# Patient Record
Sex: FEMALE | Race: WHITE | NOT HISPANIC OR LATINO | Employment: OTHER | URBAN - METROPOLITAN AREA
[De-identification: names, ages, dates, MRNs, and addresses within clinical notes are randomized per-mention and may not be internally consistent; named-entity substitution may affect disease eponyms.]

---

## 2023-03-06 ENCOUNTER — TELEPHONE (OUTPATIENT)
Dept: PRIMARY CARE | Facility: CLINIC | Age: 63
End: 2023-03-06
Payer: COMMERCIAL

## 2023-03-06 DIAGNOSIS — C53.9 CERVICAL CANCER, FIGO STAGE IVB (MULTI): ICD-10-CM

## 2023-03-06 DIAGNOSIS — C53.9 CERVICAL CANCER, FIGO STAGE IV (MULTI): ICD-10-CM

## 2023-03-06 DIAGNOSIS — R10.2 PAIN, PELVIC, FEMALE: Primary | ICD-10-CM

## 2023-03-06 PROBLEM — R06.83 SNORING: Status: ACTIVE | Noted: 2023-03-06

## 2023-03-06 PROBLEM — L30.9 DERMATITIS: Status: ACTIVE | Noted: 2023-03-06

## 2023-03-06 PROBLEM — R00.2 HEART PALPITATIONS: Status: ACTIVE | Noted: 2023-03-06

## 2023-03-06 PROBLEM — F41.9 ANXIETY: Status: ACTIVE | Noted: 2023-03-06

## 2023-03-06 PROBLEM — I10 HYPERTENSION: Status: ACTIVE | Noted: 2023-03-06

## 2023-03-06 PROBLEM — R06.02 SOB (SHORTNESS OF BREATH) ON EXERTION: Status: ACTIVE | Noted: 2023-03-06

## 2023-03-06 PROBLEM — I47.10 PAROXYSMAL SUPRAVENTRICULAR TACHYCARDIA (CMS-HCC): Status: ACTIVE | Noted: 2023-03-06

## 2023-03-06 PROBLEM — I27.20 PULMONARY HYPERTENSION (MULTI): Status: ACTIVE | Noted: 2023-03-06

## 2023-03-06 PROBLEM — R20.2 PARESTHESIA OF SKIN: Status: ACTIVE | Noted: 2023-03-06

## 2023-03-06 PROBLEM — G47.33 OBSTRUCTIVE SLEEP APNEA: Status: ACTIVE | Noted: 2023-03-06

## 2023-03-06 PROBLEM — R79.89 ABNORMAL TSH: Status: ACTIVE | Noted: 2023-03-06

## 2023-03-06 RX ORDER — METOPROLOL TARTRATE 50 MG/1
25 TABLET ORAL 2 TIMES DAILY
COMMUNITY
Start: 2014-04-08 | End: 2023-05-02 | Stop reason: SDUPTHER

## 2023-03-06 RX ORDER — HYDROXYZINE HYDROCHLORIDE 10 MG/1
TABLET, FILM COATED ORAL
COMMUNITY
Start: 2020-08-24 | End: 2023-10-12 | Stop reason: ALTCHOICE

## 2023-03-06 RX ORDER — LORAZEPAM 1 MG/1
TABLET ORAL EVERY 6 HOURS PRN
COMMUNITY
Start: 2017-08-11 | End: 2023-11-30 | Stop reason: ALTCHOICE

## 2023-03-06 NOTE — TELEPHONE ENCOUNTER
Pt  called to state pt is continuing to have trouble and pain in lower left buttock. Pt is not sure whether the pain is coming from the bulging disc in back or bone cancer. Pt requesting to see spine doctor.

## 2023-03-17 ENCOUNTER — TELEPHONE (OUTPATIENT)
Dept: PRIMARY CARE | Facility: CLINIC | Age: 63
End: 2023-03-17
Payer: COMMERCIAL

## 2023-03-17 DIAGNOSIS — D64.9 ANEMIA, UNSPECIFIED TYPE: ICD-10-CM

## 2023-03-17 DIAGNOSIS — D51.0 ANEMIA, PERNICIOUS: Primary | ICD-10-CM

## 2023-03-19 DIAGNOSIS — C53.9: Primary | ICD-10-CM

## 2023-03-19 DIAGNOSIS — C79.11: Primary | ICD-10-CM

## 2023-03-20 RX ORDER — OXYCODONE HYDROCHLORIDE 10 MG/1
10 TABLET ORAL EVERY 6 HOURS PRN
Qty: 15 TABLET | Refills: 0 | Status: SHIPPED | OUTPATIENT
Start: 2023-03-20 | End: 2023-04-04

## 2023-03-21 LAB
AMPHETAMINE (PRESENCE) IN URINE BY SCREEN METHOD: ABNORMAL
BARBITURATES PRESENCE IN URINE BY SCREEN METHOD: ABNORMAL
BENZODIAZEPINE (PRESENCE) IN URINE BY SCREEN METHOD: ABNORMAL
CANNABINOIDS IN URINE BY SCREEN METHOD: ABNORMAL
COCAINE (PRESENCE) IN URINE BY SCREEN METHOD: ABNORMAL
DRUG SCREEN COMMENT URINE: ABNORMAL
FENTANYL URINE: ABNORMAL
METHADONE (PRESENCE) IN URINE BY SCREEN METHOD: ABNORMAL
OPIATES (PRESENCE) IN URINE BY SCREEN METHOD: ABNORMAL
OXYCODONE (PRESENCE) IN URINE BY SCREEN METHOD: ABNORMAL
PHENCYCLIDINE (PRESENCE) IN URINE BY SCREEN METHOD: ABNORMAL

## 2023-03-27 LAB
6-ACETYLMORPHINE: <25 NG/ML
CODEINE: <50 NG/ML
HYDROCODONE: <25 NG/ML
HYDROMORPHONE: <25 NG/ML
MORPHINE URINE: <50 NG/ML
NORHYDROCODONE: <25 NG/ML
NOROXYCODONE: 228 NG/ML
OXYCODONE: <25 NG/ML
OXYMORPHONE: 400 NG/ML

## 2023-05-02 ENCOUNTER — TELEPHONE (OUTPATIENT)
Dept: PRIMARY CARE | Facility: CLINIC | Age: 63
End: 2023-05-02
Payer: COMMERCIAL

## 2023-05-02 DIAGNOSIS — I10 BENIGN ESSENTIAL HTN: Primary | ICD-10-CM

## 2023-05-02 RX ORDER — METOPROLOL TARTRATE 50 MG/1
25 TABLET ORAL 2 TIMES DAILY
Qty: 30 TABLET | Refills: 3 | Status: SHIPPED | OUTPATIENT
Start: 2023-05-02 | End: 2023-10-17

## 2023-05-02 NOTE — TELEPHONE ENCOUNTER
Pt had in home visit, BP was 88/62. Home care nurse asking if Metoprolol rx is to high of a dose.

## 2023-05-10 DIAGNOSIS — M62.838 MUSCLE SPASM: ICD-10-CM

## 2023-05-10 DIAGNOSIS — M62.838 MUSCLE SPASM: Primary | ICD-10-CM

## 2023-05-10 RX ORDER — TIZANIDINE 4 MG/1
4 TABLET ORAL 3 TIMES DAILY
COMMUNITY
Start: 2023-04-12 | End: 2023-05-10 | Stop reason: SDUPTHER

## 2023-05-10 RX ORDER — TIZANIDINE 4 MG/1
4 TABLET ORAL 3 TIMES DAILY
Qty: 90 TABLET | Refills: 0 | Status: SHIPPED | OUTPATIENT
Start: 2023-05-10 | End: 2023-11-21

## 2023-05-10 RX ORDER — TIZANIDINE 4 MG/1
4 TABLET ORAL 3 TIMES DAILY
Qty: 90 TABLET | Refills: 0 | Status: SHIPPED | OUTPATIENT
Start: 2023-05-10 | End: 2023-05-10 | Stop reason: SDUPTHER

## 2023-05-10 RX ORDER — CIPROFLOXACIN 500 MG/1
500 TABLET ORAL 2 TIMES DAILY
COMMUNITY
Start: 2023-05-05 | End: 2023-05-10 | Stop reason: ALTCHOICE

## 2023-05-10 RX ORDER — CIPROFLOXACIN 500 MG/1
500 TABLET ORAL 2 TIMES DAILY
Qty: 14 TABLET | Refills: 0 | OUTPATIENT
Start: 2023-05-10

## 2023-07-05 DIAGNOSIS — R79.89 ABNORMAL TSH: Primary | ICD-10-CM

## 2023-07-05 RX ORDER — LEVOTHYROXINE SODIUM 25 UG/1
TABLET ORAL
Qty: 30 TABLET | Refills: 0 | Status: SHIPPED | OUTPATIENT
Start: 2023-07-05 | End: 2023-08-22 | Stop reason: SDUPTHER

## 2023-08-02 DIAGNOSIS — R79.89 ABNORMAL TSH: ICD-10-CM

## 2023-08-02 RX ORDER — LEVOTHYROXINE SODIUM 25 UG/1
TABLET ORAL
Qty: 30 TABLET | Refills: 0 | OUTPATIENT
Start: 2023-08-02

## 2023-08-05 ENCOUNTER — HOSPITAL ENCOUNTER (OUTPATIENT)
Dept: DATA CONVERSION | Facility: HOSPITAL | Age: 63
Discharge: LEFT BEFORE DISCHARGE | End: 2023-08-05
Attending: STUDENT IN AN ORGANIZED HEALTH CARE EDUCATION/TRAINING PROGRAM

## 2023-08-05 DIAGNOSIS — N18.9 CHRONIC KIDNEY DISEASE, UNSPECIFIED: ICD-10-CM

## 2023-08-05 DIAGNOSIS — T83.128A: Primary | ICD-10-CM

## 2023-08-05 DIAGNOSIS — C67.9 MALIGNANT NEOPLASM OF BLADDER, UNSPECIFIED (MULTI): ICD-10-CM

## 2023-08-05 DIAGNOSIS — D63.1 ANEMIA IN CHRONIC KIDNEY DISEASE (CODE): ICD-10-CM

## 2023-08-05 DIAGNOSIS — I12.9 HYPERTENSIVE CHRONIC KIDNEY DISEASE WITH STAGE 1 THROUGH STAGE 4 CHRONIC KIDNEY DISEASE, OR UNSPECIFIED CHRONIC KIDNEY DISEASE: ICD-10-CM

## 2023-08-05 DIAGNOSIS — F17.210 NICOTINE DEPENDENCE, CIGARETTES, UNCOMPLICATED: ICD-10-CM

## 2023-08-05 DIAGNOSIS — N17.9 ACUTE KIDNEY FAILURE, UNSPECIFIED (CMS-HCC): ICD-10-CM

## 2023-08-05 DIAGNOSIS — N39.0 URINARY TRACT INFECTION, SITE NOT SPECIFIED: ICD-10-CM

## 2023-08-05 LAB
ALBUMIN SERPL-MCNC: 3.6 GM/DL (ref 3.5–5)
ALBUMIN/GLOB SERPL: 0.9 RATIO (ref 1.5–3)
ALP BLD-CCNC: 63 U/L (ref 35–125)
ALT SERPL-CCNC: 6 U/L (ref 5–40)
AMPICILLIN SUSC ISLT: NORMAL
ANION GAP SERPL CALCULATED.3IONS-SCNC: 12 MMOL/L (ref 0–19)
ANISOCYTOSIS BLD QL SMEAR: ABNORMAL
AST SERPL-CCNC: 17 U/L (ref 5–40)
BACTERIA ISLT: NORMAL
BACTERIA SPEC CULT: NORMAL
BACTERIA UR QL AUTO: NEGATIVE
BASOPHILS # BLD AUTO: 0.07 K/UL (ref 0–0.22)
BASOPHILS NFR BLD AUTO: 0.7 % (ref 0–1)
BILIRUB SERPL-MCNC: 0.2 MG/DL (ref 0.1–1.2)
BILIRUB UR QL STRIP.AUTO: NEGATIVE
BUN SERPL-MCNC: 33 MG/DL (ref 8–25)
BUN/CREAT SERPL: 14.3 RATIO (ref 8–21)
CALCIUM SERPL-MCNC: 9.3 MG/DL (ref 8.5–10.4)
CC # UR: NORMAL /UL
CHLORIDE SERPL-SCNC: 105 MMOL/L (ref 97–107)
CLARITY UR: ABNORMAL
CO2 SERPL-SCNC: 24 MMOL/L (ref 24–31)
COLOR UR: ABNORMAL
CREAT SERPL-MCNC: 2.3 MG/DL (ref 0.4–1.6)
DEPRECATED RDW RBC AUTO: 66.9 FL (ref 37–54)
DIFFERENTIAL METHOD BLD: ABNORMAL
EOSINOPHIL # BLD AUTO: 0.41 K/UL (ref 0–0.45)
EOSINOPHIL NFR BLD: 3.9 % (ref 0–3)
ERYTHROCYTE [DISTWIDTH] IN BLOOD BY AUTOMATED COUNT: 17.6 % (ref 11.7–15)
GFR SERPL CREATININE-BSD FRML MDRD: 23 ML/MIN/1.73 M2
GLOBULIN SER-MCNC: 4 G/DL (ref 1.9–3.7)
GLUCOSE SERPL-MCNC: 91 MG/DL (ref 65–99)
GLUCOSE UR STRIP.AUTO-MCNC: NEGATIVE MG/DL
HCT VFR BLD AUTO: 30.2 % (ref 36–44)
HGB BLD-MCNC: 9 GM/DL (ref 12–15)
HGB UR QL STRIP.AUTO: 105 /HPF (ref 0–3)
HGB UR QL: ABNORMAL
HYALINE CASTS UR QL AUTO: 3 /LPF
HYPOCHROMIA BLD QL SMEAR: ABNORMAL
IMM GRANULOCYTES # BLD AUTO: 0.04 K/UL (ref 0–0.1)
KETONES UR QL STRIP.AUTO: NEGATIVE
LEUKOCYTE ESTERASE UR QL STRIP.AUTO: ABNORMAL
LEVOFLOXACIN SUSC ISLT: NORMAL
LYMPHOCYTES # BLD AUTO: 0.83 K/UL (ref 1.2–3.2)
LYMPHOCYTES NFR BLD MANUAL: 8 % (ref 20–40)
MACROCYTES BLD QL SMEAR: ABNORMAL
MCH RBC QN AUTO: 31 PG (ref 26–34)
MCHC RBC AUTO-ENTMCNC: 29.8 % (ref 31–37)
MCV RBC AUTO: 104.1 FL (ref 80–100)
MIC (SUSCEPTIBILITY): NORMAL
MICROSCOPIC (UA): ABNORMAL
MONOCYTES # BLD AUTO: 0.69 K/UL (ref 0–0.8)
MONOCYTES NFR BLD MANUAL: 6.6 % (ref 0–8)
NEUTROPHILS # BLD AUTO: 8.39 K/UL
NEUTROPHILS # BLD AUTO: 8.39 K/UL (ref 1.8–7.7)
NEUTROPHILS.IMMATURE NFR BLD: 0.4 % (ref 0–1)
NEUTS SEG NFR BLD: 80.4 % (ref 50–70)
NITRITE UR QL STRIP.AUTO: POSITIVE
NITROFURANTOIN SUSC ISLT: NORMAL
NRBC BLD-RTO: 0 /100 WBC
OVALOCYTES BLD QL SMEAR: ABNORMAL
PENICILLIN SUSC ISLT: NORMAL
PH UR STRIP.AUTO: 7 [PH] (ref 4.6–8)
PLATELET # BLD AUTO: 384 K/UL (ref 150–450)
PLATELET BLD QL SMEAR: ABNORMAL
PMV BLD AUTO: 9.5 CU (ref 7–12.6)
POTASSIUM SERPL-SCNC: 4.7 MMOL/L (ref 3.4–5.1)
PROT SERPL-MCNC: 7.6 G/DL (ref 5.9–7.9)
PROT UR STRIP.AUTO-MCNC: 300 MG/DL
RBC # BLD AUTO: 2.9 M/UL (ref 4–4.9)
RBC MORPH BLD: ABNORMAL
REPORT STATUS -LH SQ DATA CONVERSION: NORMAL
SERVICE CMNT-IMP: NORMAL
SODIUM SERPL-SCNC: 141 MMOL/L (ref 133–145)
SP GR UR STRIP.AUTO: 1.01 (ref 1–1.03)
SPECIMEN SOURCE: NORMAL
SQUAMOUS UR QL AUTO: ABNORMAL /HPF
URINE CULTURE: ABNORMAL
UROBILINOGEN UR QL STRIP.AUTO: NORMAL MG/DL (ref 0–1)
WBC # BLD AUTO: 10.4 K/UL (ref 4.5–11)
WBC #/AREA URNS AUTO: 943 /HPF (ref 0–3)
WBC MORPH BLD: ABNORMAL

## 2023-08-11 ENCOUNTER — HOSPITAL ENCOUNTER (OUTPATIENT)
Dept: DATA CONVERSION | Facility: HOSPITAL | Age: 63
Discharge: HOME | End: 2023-08-11

## 2023-08-11 DIAGNOSIS — C54.1 MALIGNANT NEOPLASM OF ENDOMETRIUM (MULTI): ICD-10-CM

## 2023-08-14 ENCOUNTER — HOSPITAL ENCOUNTER (OUTPATIENT)
Dept: DATA CONVERSION | Facility: HOSPITAL | Age: 63
Discharge: HOME | End: 2023-08-14

## 2023-08-14 DIAGNOSIS — Z79.899 OTHER LONG TERM (CURRENT) DRUG THERAPY: ICD-10-CM

## 2023-08-14 DIAGNOSIS — F17.210 NICOTINE DEPENDENCE, CIGARETTES, UNCOMPLICATED: ICD-10-CM

## 2023-08-14 DIAGNOSIS — I10 ESSENTIAL (PRIMARY) HYPERTENSION: ICD-10-CM

## 2023-08-14 DIAGNOSIS — T83.012A: ICD-10-CM

## 2023-08-14 DIAGNOSIS — N18.9 CHRONIC KIDNEY DISEASE, UNSPECIFIED: ICD-10-CM

## 2023-08-14 DIAGNOSIS — Z79.891 LONG TERM (CURRENT) USE OF OPIATE ANALGESIC: ICD-10-CM

## 2023-08-14 DIAGNOSIS — D64.9 ANEMIA, UNSPECIFIED: ICD-10-CM

## 2023-08-14 DIAGNOSIS — Z85.51 PERSONAL HISTORY OF MALIGNANT NEOPLASM OF BLADDER: ICD-10-CM

## 2023-08-14 DIAGNOSIS — C53.9 MALIGNANT NEOPLASM OF CERVIX UTERI, UNSPECIFIED (MULTI): ICD-10-CM

## 2023-08-14 DIAGNOSIS — R79.89 ABNORMAL TSH: ICD-10-CM

## 2023-08-14 LAB
ANION GAP SERPL CALCULATED.3IONS-SCNC: 11 MMOL/L (ref 0–19)
ANTICOAGULANT: NORMAL
BACTERIA ISLT: NORMAL
BACTERIA SPEC CULT: NORMAL
BUN SERPL-MCNC: 34 MG/DL (ref 8–25)
BUN/CREAT SERPL: 14.8 RATIO (ref 8–21)
CALCIUM SERPL-MCNC: 8.7 MG/DL (ref 8.5–10.4)
CC # UR: NORMAL /UL
CHLORIDE SERPL-SCNC: 101 MMOL/L (ref 97–107)
CO2 SERPL-SCNC: 26 MMOL/L (ref 24–31)
CREAT SERPL-MCNC: 2.3 MG/DL (ref 0.4–1.6)
DAPTOMYCIN SUSC ISLT: NORMAL
DEPRECATED RDW RBC AUTO: 66.3 FL (ref 37–54)
ERYTHROCYTE [DISTWIDTH] IN BLOOD BY AUTOMATED COUNT: 17.4 % (ref 11.7–15)
GFR SERPL CREATININE-BSD FRML MDRD: 23 ML/MIN/1.73 M2
GLUCOSE SERPL-MCNC: 99 MG/DL (ref 65–99)
GRAM STN SPEC: NORMAL
HCT VFR BLD AUTO: 28.7 % (ref 36–44)
HGB BLD-MCNC: 8.8 GM/DL (ref 12–15)
INR PPP: 1 (ref 0.86–1.16)
MCH RBC QN AUTO: 31.3 PG (ref 26–34)
MCHC RBC AUTO-ENTMCNC: 30.7 % (ref 31–37)
MCV RBC AUTO: 102.1 FL (ref 80–100)
MIC (SUSCEPTIBILITY): NORMAL
NRBC BLD-RTO: 0 /100 WBC
PLATELET # BLD AUTO: 391 K/UL (ref 150–450)
PMV BLD AUTO: 9.2 CU (ref 7–12.6)
POTASSIUM SERPL-SCNC: 4.6 MMOL/L (ref 3.4–5.1)
PROTHROMBIN TIME: 10.9 SEC (ref 9.3–12.7)
RBC # BLD AUTO: 2.81 M/UL (ref 4–4.9)
REPORT STATUS -LH SQ DATA CONVERSION: NORMAL
REPORT STATUS -LH SQ DATA CONVERSION: NORMAL
SERVICE CMNT-IMP: NORMAL
SERVICE CMNT-IMP: NORMAL
SODIUM SERPL-SCNC: 138 MMOL/L (ref 133–145)
SPECIMEN SOURCE: NORMAL
SPECIMEN SOURCE: NORMAL
TETRACYCLINE SUSC ISLT: NORMAL
TMP SMX SUSC ISLT: NORMAL
VANCOMYCIN SUSC ISLT: NORMAL
WBC # BLD AUTO: 11.5 K/UL (ref 4.5–11)

## 2023-08-14 RX ORDER — LEVOTHYROXINE SODIUM 25 UG/1
TABLET ORAL
Qty: 30 TABLET | Refills: 0 | OUTPATIENT
Start: 2023-08-14

## 2023-08-21 RX ORDER — OSELTAMIVIR PHOSPHATE 75 MG/1
1 CAPSULE ORAL
COMMUNITY
Start: 2022-12-14 | End: 2023-11-30 | Stop reason: ALTCHOICE

## 2023-08-22 ENCOUNTER — OFFICE VISIT (OUTPATIENT)
Dept: PRIMARY CARE | Facility: CLINIC | Age: 63
End: 2023-08-22
Payer: COMMERCIAL

## 2023-08-22 VITALS
OXYGEN SATURATION: 94 % | HEIGHT: 66 IN | WEIGHT: 175.2 LBS | BODY MASS INDEX: 28.16 KG/M2 | DIASTOLIC BLOOD PRESSURE: 68 MMHG | SYSTOLIC BLOOD PRESSURE: 104 MMHG | HEART RATE: 86 BPM

## 2023-08-22 DIAGNOSIS — N23 RENAL PAIN: ICD-10-CM

## 2023-08-22 DIAGNOSIS — R79.89 ABNORMAL TSH: ICD-10-CM

## 2023-08-22 DIAGNOSIS — I10 HYPERTENSION, UNSPECIFIED TYPE: Primary | ICD-10-CM

## 2023-08-22 DIAGNOSIS — R31.9 HEMATURIA, UNSPECIFIED TYPE: ICD-10-CM

## 2023-08-22 DIAGNOSIS — N18.30 CRI (CHRONIC RENAL INSUFFICIENCY), STAGE 3 (MODERATE) (MULTI): ICD-10-CM

## 2023-08-22 DIAGNOSIS — R60.0 LOWER LEG EDEMA: ICD-10-CM

## 2023-08-22 PROBLEM — I47.10 PAROXYSMAL SUPRAVENTRICULAR TACHYCARDIA (CMS-HCC): Status: RESOLVED | Noted: 2023-03-06 | Resolved: 2023-08-22

## 2023-08-22 PROCEDURE — 3074F SYST BP LT 130 MM HG: CPT | Performed by: INTERNAL MEDICINE

## 2023-08-22 PROCEDURE — 99214 OFFICE O/P EST MOD 30 MIN: CPT | Performed by: INTERNAL MEDICINE

## 2023-08-22 PROCEDURE — 1036F TOBACCO NON-USER: CPT | Performed by: INTERNAL MEDICINE

## 2023-08-22 PROCEDURE — 3078F DIAST BP <80 MM HG: CPT | Performed by: INTERNAL MEDICINE

## 2023-08-22 RX ORDER — OXYCODONE HYDROCHLORIDE 5 MG/1
5 TABLET ORAL 2 TIMES DAILY
COMMUNITY
End: 2023-10-12 | Stop reason: ALTCHOICE

## 2023-08-22 RX ORDER — FUROSEMIDE 40 MG/1
40 TABLET ORAL DAILY
Qty: 30 TABLET | Refills: 11 | Status: SHIPPED | OUTPATIENT
Start: 2023-08-22 | End: 2023-12-15 | Stop reason: ALTCHOICE

## 2023-08-22 RX ORDER — GABAPENTIN 300 MG/1
3 CAPSULE ORAL 3 TIMES DAILY
COMMUNITY
End: 2024-02-27 | Stop reason: SDUPTHER

## 2023-08-22 RX ORDER — LEVOTHYROXINE SODIUM 25 UG/1
25 TABLET ORAL DAILY
Qty: 90 TABLET | Refills: 3 | Status: SHIPPED | OUTPATIENT
Start: 2023-08-22 | End: 2024-04-24 | Stop reason: SDUPTHER

## 2023-08-22 ASSESSMENT — COLUMBIA-SUICIDE SEVERITY RATING SCALE - C-SSRS
2. HAVE YOU ACTUALLY HAD ANY THOUGHTS OF KILLING YOURSELF?: NO
6. HAVE YOU EVER DONE ANYTHING, STARTED TO DO ANYTHING, OR PREPARED TO DO ANYTHING TO END YOUR LIFE?: NO
1. IN THE PAST MONTH, HAVE YOU WISHED YOU WERE DEAD OR WISHED YOU COULD GO TO SLEEP AND NOT WAKE UP?: NO

## 2023-08-22 ASSESSMENT — ENCOUNTER SYMPTOMS
LOSS OF SENSATION IN FEET: 0
CONSTITUTIONAL NEGATIVE: 1
OCCASIONAL FEELINGS OF UNSTEADINESS: 0
DEPRESSION: 0

## 2023-08-22 ASSESSMENT — PATIENT HEALTH QUESTIONNAIRE - PHQ9
SUM OF ALL RESPONSES TO PHQ9 QUESTIONS 1 AND 2: 0
1. LITTLE INTEREST OR PLEASURE IN DOING THINGS: NOT AT ALL
2. FEELING DOWN, DEPRESSED OR HOPELESS: NOT AT ALL

## 2023-08-22 NOTE — PROGRESS NOTES
"Subjective   Patient ID: Shelbi Delaney is a 63 y.o. female who presents for Med Refill.    HPI     Fu hypothyroid. Kidney disease and cervical cancer.   She is  getting transfusions tx.  She  is done with her  chemo.  She is  on maint  chemo now   Had nephrostomy tube  changed recently 1 week  ago. Some  blood.  Pain  and  pain in the front  piercing.     She  report she has  kidney inf  right   side  wont  go away. Id  rxed antibiotic but it  return.     Review of Systems   Constitutional: Negative.    All other systems reviewed and are negative.      Objective   /68   Pulse 86   Ht 1.676 m (5' 6\")   Wt 79.5 kg (175 lb 3.2 oz)   SpO2 94%   BMI 28.28 kg/m²     Physical Exam  Vitals and nursing note reviewed.   Constitutional:       Appearance: Normal appearance.   Neurological:      Mental Status: She is alert.         Below is the patient's most recent value for Albumin, ALT, AST, BUN, Calcium, Chloride, Cholesterol, CO2, Creatinine, GFR, Glucose, HDL, Hematocrit, Hemoglobin, Hemoglobin A1C, LDL, Magnesium, Phosphorus, Platelets, Potassium, PSA, Sodium, Triglycerides, and WBC.   Lab Results   Component Value Date    ALBUMIN 3.6 08/08/2023    ALT 4 (L) 08/08/2023    AST 8 08/08/2023    BUN 32 (H) 08/08/2023    CALCIUM 8.5 08/08/2023     08/08/2023    CHOL 221 (H) 08/24/2020    CO2 25 08/08/2023    CREATININE 2.43 (H) 08/08/2023    HDL 97.2 08/24/2020    HCT 27.1 (L) 08/08/2023    HGB 8.2 (L) 08/08/2023    MG 1.96 08/08/2023     08/08/2023    K 4.3 08/08/2023     08/08/2023    TRIG 68 08/24/2020    WBC 10.2 08/08/2023     Note: for a comprehensive list of the patient's lab results, access the Results Review activity.    Assessment/Plan   Problem List Items Addressed This Visit          Medium    Abnormal TSH    Relevant Medications    levothyroxine (Synthroid, Levoxyl) 25 mcg tablet    Hypertension - Primary    Relevant Medications    furosemide (Lasix) 40 mg tablet    Other Relevant " Orders    US renal complete     Other Visit Diagnoses       Lower leg edema        Relevant Medications    furosemide (Lasix) 40 mg tablet    Other Relevant Orders    US renal complete    CRI (chronic renal insufficiency), stage 3 (moderate) (CMS/HCC)        Relevant Medications    furosemide (Lasix) 40 mg tablet    Other Relevant Orders    US renal complete    Renal pain        Relevant Orders    US renal complete    Hematuria, unspecified type        Relevant Orders    US renal complete

## 2023-08-23 PROBLEM — M51.26 HERNIATED LUMBAR INTERVERTEBRAL DISC: Status: ACTIVE | Noted: 2023-08-23

## 2023-08-23 PROBLEM — E87.5 HYPERKALEMIA: Status: ACTIVE | Noted: 2023-08-23

## 2023-08-23 PROBLEM — N95.0 POSTMENOPAUSAL BLEEDING: Status: ACTIVE | Noted: 2023-08-23

## 2023-08-23 PROBLEM — I49.3 VENTRICULAR PREMATURE DEPOLARIZATION: Status: ACTIVE | Noted: 2023-08-23

## 2023-08-23 PROBLEM — E03.9 BORDERLINE HYPOTHYROIDISM: Status: ACTIVE | Noted: 2023-08-23

## 2023-08-23 PROBLEM — N13.9 URINARY TRACT OBSTRUCTION: Status: ACTIVE | Noted: 2023-08-23

## 2023-08-23 PROBLEM — R53.83 FATIGUE: Status: ACTIVE | Noted: 2023-08-23

## 2023-08-23 PROBLEM — E87.1 HYPONATREMIA: Status: ACTIVE | Noted: 2023-08-23

## 2023-08-23 PROBLEM — G89.29 CHRONIC MIDLINE LOW BACK PAIN WITHOUT SCIATICA: Status: ACTIVE | Noted: 2023-08-23

## 2023-08-23 PROBLEM — R91.1 PULMONARY NODULE: Status: ACTIVE | Noted: 2023-08-23

## 2023-08-23 PROBLEM — E03.9 HYPOTHYROIDISM, UNSPECIFIED: Status: ACTIVE | Noted: 2023-05-10

## 2023-08-23 PROBLEM — I49.9 CARDIAC ARRHYTHMIA: Status: ACTIVE | Noted: 2023-08-23

## 2023-08-23 PROBLEM — R59.1 LYMPHADENOPATHY: Status: ACTIVE | Noted: 2023-08-23

## 2023-08-23 PROBLEM — D49.4 BLADDER NEOPLASM: Status: ACTIVE | Noted: 2023-05-10

## 2023-08-23 PROBLEM — C67.9 CARCINOMA OF BLADDER (MULTI): Status: ACTIVE | Noted: 2023-08-23

## 2023-08-23 PROBLEM — F10.10 ALCOHOL ABUSE: Status: ACTIVE | Noted: 2023-05-10

## 2023-08-23 PROBLEM — N19 RENAL FAILURE SYNDROME: Status: ACTIVE | Noted: 2023-08-23

## 2023-08-23 PROBLEM — J98.8 RESPIRATORY INFECTION: Status: ACTIVE | Noted: 2023-08-23

## 2023-08-23 PROBLEM — N83.299 OVARIAN CYST, COMPLEX: Status: ACTIVE | Noted: 2023-08-23

## 2023-08-23 PROBLEM — D49.59 CERVICAL NEOPLASM: Status: ACTIVE | Noted: 2023-05-10

## 2023-08-23 PROBLEM — R10.9 FLANK PAIN: Status: ACTIVE | Noted: 2023-08-23

## 2023-08-23 PROBLEM — N28.89 OBSTRUCTION OF KIDNEY: Status: ACTIVE | Noted: 2023-08-23

## 2023-08-23 PROBLEM — M48.062 LUMBAR STENOSIS WITH NEUROGENIC CLAUDICATION: Status: ACTIVE | Noted: 2023-08-23

## 2023-08-23 PROBLEM — E87.20 ACIDOSIS: Status: ACTIVE | Noted: 2023-08-23

## 2023-08-23 PROBLEM — G89.29 CHRONIC BACK PAIN GREATER THAN 3 MONTHS DURATION: Status: ACTIVE | Noted: 2023-08-23

## 2023-08-23 PROBLEM — R19.09 INGUINAL NODULE: Status: ACTIVE | Noted: 2023-08-23

## 2023-08-23 PROBLEM — I49.8 VENTRICULAR BIGEMINY: Status: ACTIVE | Noted: 2023-08-23

## 2023-08-23 PROBLEM — G89.18 ACUTE POSTOPERATIVE PAIN: Status: ACTIVE | Noted: 2023-08-23

## 2023-08-23 PROBLEM — D64.9 ANEMIA: Status: ACTIVE | Noted: 2023-08-23

## 2023-08-23 PROBLEM — I10 BENIGN ESSENTIAL HYPERTENSION: Status: ACTIVE | Noted: 2023-08-23

## 2023-08-23 PROBLEM — N93.9 ABNORMAL UTERINE BLEEDING: Status: ACTIVE | Noted: 2023-08-23

## 2023-08-23 PROBLEM — F32.A DEPRESSION, UNSPECIFIED: Status: ACTIVE | Noted: 2023-05-10

## 2023-08-23 PROBLEM — H90.3 SENSORINEURAL HEARING LOSS OF BOTH EARS: Status: ACTIVE | Noted: 2023-08-23

## 2023-08-23 PROBLEM — R35.0 URINARY FREQUENCY: Status: ACTIVE | Noted: 2023-08-23

## 2023-08-23 PROBLEM — I10 ESSENTIAL (PRIMARY) HYPERTENSION: Status: ACTIVE | Noted: 2023-05-10

## 2023-08-23 PROBLEM — N94.89 ADNEXAL MASS: Status: ACTIVE | Noted: 2023-08-23

## 2023-08-23 PROBLEM — N13.30 HYDRONEPHROSIS: Status: ACTIVE | Noted: 2023-05-10

## 2023-08-23 PROBLEM — M54.9 CHRONIC BACK PAIN GREATER THAN 3 MONTHS DURATION: Status: ACTIVE | Noted: 2023-08-23

## 2023-08-23 PROBLEM — E86.0 DEHYDRATION: Status: ACTIVE | Noted: 2023-08-23

## 2023-08-23 PROBLEM — N19 KIDNEY FAILURE: Status: ACTIVE | Noted: 2023-08-23

## 2023-08-23 PROBLEM — R50.9 FEVER WITH CHILLS: Status: ACTIVE | Noted: 2023-08-23

## 2023-08-23 PROBLEM — M47.816 LUMBAR SPONDYLOSIS: Status: ACTIVE | Noted: 2023-08-23

## 2023-08-23 PROBLEM — G47.00 INSOMNIA: Status: ACTIVE | Noted: 2023-08-23

## 2023-08-23 PROBLEM — N13.5 OCCLUSION OF URETER: Status: ACTIVE | Noted: 2023-08-23

## 2023-08-23 PROBLEM — I20.9 ANGINA PECTORIS (CMS-HCC): Status: ACTIVE | Noted: 2023-08-23

## 2023-08-23 PROBLEM — N32.89 MASS OF BLADDER: Status: ACTIVE | Noted: 2023-08-23

## 2023-08-23 PROBLEM — N32.89 MASS OF URINARY BLADDER: Status: ACTIVE | Noted: 2023-08-23

## 2023-08-23 PROBLEM — D72.10 EOSINOPHILIA: Status: ACTIVE | Noted: 2023-08-23

## 2023-08-23 PROBLEM — M54.50 CHRONIC MIDLINE LOW BACK PAIN WITHOUT SCIATICA: Status: ACTIVE | Noted: 2023-08-23

## 2023-08-23 PROBLEM — N13.30 BILATERAL HYDRONEPHROSIS: Status: ACTIVE | Noted: 2023-08-23

## 2023-08-23 PROBLEM — I77.810 THORACIC AORTIC ECTASIA (CMS-HCC): Status: ACTIVE | Noted: 2023-08-23

## 2023-08-23 PROBLEM — C53.9: Status: ACTIVE | Noted: 2023-08-23

## 2023-08-23 PROBLEM — M47.816 SPONDYLOSIS WITHOUT MYELOPATHY OR RADICULOPATHY, LUMBAR REGION: Status: ACTIVE | Noted: 2023-05-10

## 2023-08-23 PROBLEM — R19.7 DIARRHEA: Status: ACTIVE | Noted: 2023-08-23

## 2023-08-23 RX ORDER — NALOXONE HYDROCHLORIDE 4 MG/.1ML
1 SPRAY NASAL AS NEEDED
COMMUNITY

## 2023-08-23 RX ORDER — ZINC SULFATE 50(220)MG
1 CAPSULE ORAL DAILY
COMMUNITY
Start: 2022-11-23 | End: 2023-10-12 | Stop reason: ALTCHOICE

## 2023-08-23 RX ORDER — IBUPROFEN 100 MG/5ML
1 SUSPENSION, ORAL (FINAL DOSE FORM) ORAL DAILY
COMMUNITY
Start: 2022-11-23 | End: 2023-10-12 | Stop reason: ALTCHOICE

## 2023-08-23 RX ORDER — HYDROXYZINE HYDROCHLORIDE 25 MG/1
1 TABLET, FILM COATED ORAL 3 TIMES DAILY PRN
COMMUNITY
Start: 2022-11-08

## 2023-08-23 RX ORDER — HYDROXYZINE HYDROCHLORIDE 10 MG/1
1 TABLET, FILM COATED ORAL 3 TIMES DAILY PRN
COMMUNITY
End: 2023-12-28 | Stop reason: ALTCHOICE

## 2023-08-23 RX ORDER — FUROSEMIDE 20 MG/1
20 TABLET ORAL DAILY PRN
COMMUNITY
Start: 2022-12-28 | End: 2023-10-12 | Stop reason: ALTCHOICE

## 2023-08-23 RX ORDER — DULOXETIN HYDROCHLORIDE 20 MG/1
1 CAPSULE, DELAYED RELEASE ORAL DAILY PRN
COMMUNITY
Start: 2023-03-15 | End: 2023-11-01 | Stop reason: SDUPTHER

## 2023-08-23 RX ORDER — VIT C/E/ZN/COPPR/LUTEIN/ZEAXAN 250MG-90MG
1 CAPSULE ORAL DAILY
COMMUNITY
Start: 2022-11-23 | End: 2023-10-12 | Stop reason: ALTCHOICE

## 2023-08-23 RX ORDER — FERROUS SULFATE 325(65) MG
65 TABLET ORAL DAILY
COMMUNITY
End: 2023-10-12 | Stop reason: ALTCHOICE

## 2023-08-23 RX ORDER — METOPROLOL TARTRATE 50 MG/1
1.5 TABLET ORAL 2 TIMES DAILY
COMMUNITY
End: 2023-10-12 | Stop reason: ALTCHOICE

## 2023-08-23 RX ORDER — TRAZODONE HYDROCHLORIDE 50 MG/1
1 TABLET ORAL NIGHTLY PRN
COMMUNITY
Start: 2022-11-11 | End: 2024-04-24 | Stop reason: SDUPTHER

## 2023-08-23 RX ORDER — BISACODYL 5 MG/1
1 TABLET, COATED ORAL DAILY
COMMUNITY
Start: 2022-11-23 | End: 2023-10-12 | Stop reason: ALTCHOICE

## 2023-08-23 RX ORDER — CALCIUM CARBONATE 600 MG
600 TABLET ORAL DAILY
COMMUNITY
Start: 2022-11-23

## 2023-08-23 RX ORDER — OXYCODONE HYDROCHLORIDE 5 MG/1
5 CAPSULE ORAL EVERY 6 HOURS PRN
COMMUNITY
Start: 2023-03-15 | End: 2023-11-07 | Stop reason: SDUPTHER

## 2023-08-28 ENCOUNTER — HOSPITAL ENCOUNTER (OUTPATIENT)
Dept: DATA CONVERSION | Facility: HOSPITAL | Age: 63
Discharge: HOME | End: 2023-08-28
Payer: COMMERCIAL

## 2023-08-28 DIAGNOSIS — C53.9 MALIGNANT NEOPLASM OF CERVIX UTERI, UNSPECIFIED (MULTI): ICD-10-CM

## 2023-08-28 DIAGNOSIS — R52 PAIN, UNSPECIFIED: ICD-10-CM

## 2023-09-18 ENCOUNTER — HOSPITAL ENCOUNTER (OUTPATIENT)
Dept: DATA CONVERSION | Facility: HOSPITAL | Age: 63
Discharge: HOME | End: 2023-09-18
Payer: COMMERCIAL

## 2023-09-18 DIAGNOSIS — C53.9 MALIGNANT NEOPLASM OF CERVIX UTERI, UNSPECIFIED (MULTI): ICD-10-CM

## 2023-09-18 DIAGNOSIS — Z51.12 ENCOUNTER FOR ANTINEOPLASTIC IMMUNOTHERAPY: ICD-10-CM

## 2023-09-25 DIAGNOSIS — C53.9: Primary | ICD-10-CM

## 2023-09-25 RX ORDER — HEPARIN SODIUM,PORCINE/PF 10 UNIT/ML
50 SYRINGE (ML) INTRAVENOUS AS NEEDED
Status: CANCELLED | OUTPATIENT
Start: 2023-10-01

## 2023-09-25 RX ORDER — HEPARIN 100 UNIT/ML
500 SYRINGE INTRAVENOUS AS NEEDED
Status: CANCELLED | OUTPATIENT
Start: 2023-10-01

## 2023-09-29 RX ORDER — ALBUTEROL SULFATE 0.83 MG/ML
3 SOLUTION RESPIRATORY (INHALATION) AS NEEDED
Status: CANCELLED | OUTPATIENT
Start: 2023-10-12

## 2023-09-29 RX ORDER — FAMOTIDINE 10 MG/ML
20 INJECTION INTRAVENOUS ONCE AS NEEDED
Status: CANCELLED | OUTPATIENT
Start: 2023-10-12

## 2023-09-29 RX ORDER — PROCHLORPERAZINE EDISYLATE 5 MG/ML
10 INJECTION INTRAMUSCULAR; INTRAVENOUS EVERY 6 HOURS PRN
Status: CANCELLED | OUTPATIENT
Start: 2023-10-12

## 2023-09-29 RX ORDER — EPINEPHRINE 0.3 MG/.3ML
0.3 INJECTION SUBCUTANEOUS EVERY 5 MIN PRN
Status: CANCELLED | OUTPATIENT
Start: 2023-10-12

## 2023-09-29 RX ORDER — METHYLPREDNISOLONE SODIUM SUCCINATE 40 MG/ML
40 INJECTION INTRAMUSCULAR; INTRAVENOUS AS NEEDED
Status: CANCELLED | OUTPATIENT
Start: 2023-10-12

## 2023-09-29 RX ORDER — PROCHLORPERAZINE MALEATE 5 MG
10 TABLET ORAL EVERY 6 HOURS PRN
Status: CANCELLED | OUTPATIENT
Start: 2023-10-12

## 2023-09-29 RX ORDER — DIPHENHYDRAMINE HYDROCHLORIDE 50 MG/ML
50 INJECTION INTRAMUSCULAR; INTRAVENOUS AS NEEDED
Status: CANCELLED | OUTPATIENT
Start: 2023-10-12

## 2023-10-09 ENCOUNTER — LAB (OUTPATIENT)
Dept: LAB | Facility: CLINIC | Age: 63
End: 2023-10-09
Payer: COMMERCIAL

## 2023-10-09 DIAGNOSIS — C53.9: ICD-10-CM

## 2023-10-09 LAB
ALBUMIN SERPL BCP-MCNC: 3.8 G/DL (ref 3.4–5)
ALP SERPL-CCNC: 45 U/L (ref 33–136)
ALT SERPL W P-5'-P-CCNC: 5 U/L (ref 7–45)
ANION GAP SERPL CALC-SCNC: 13 MMOL/L (ref 10–20)
AST SERPL W P-5'-P-CCNC: 11 U/L (ref 9–39)
BASOPHILS # BLD AUTO: 0.1 X10*3/UL (ref 0–0.1)
BASOPHILS NFR BLD AUTO: 1.6 %
BILIRUB SERPL-MCNC: 0.4 MG/DL (ref 0–1.2)
BUN SERPL-MCNC: 30 MG/DL (ref 6–23)
CALCIUM SERPL-MCNC: 8.9 MG/DL (ref 8.6–10.3)
CHLORIDE SERPL-SCNC: 103 MMOL/L (ref 98–107)
CO2 SERPL-SCNC: 27 MMOL/L (ref 21–32)
CORTIS AM PEAK SERPL-MSCNC: 7.7 UG/DL (ref 5–20)
CREAT SERPL-MCNC: 2.25 MG/DL (ref 0.5–1.05)
EOSINOPHIL # BLD AUTO: 0.33 X10*3/UL (ref 0–0.7)
EOSINOPHIL NFR BLD AUTO: 5.2 %
ERYTHROCYTE [DISTWIDTH] IN BLOOD BY AUTOMATED COUNT: 14.8 % (ref 11.5–14.5)
GFR SERPL CREATININE-BSD FRML MDRD: 24 ML/MIN/1.73M*2
GLUCOSE SERPL-MCNC: 89 MG/DL (ref 74–99)
HCT VFR BLD AUTO: 27.9 % (ref 36–46)
HGB BLD-MCNC: 8.6 G/DL (ref 12–16)
IMM GRANULOCYTES # BLD AUTO: 0.03 X10*3/UL (ref 0–0.7)
IMM GRANULOCYTES NFR BLD AUTO: 0.5 % (ref 0–0.9)
LYMPHOCYTES # BLD AUTO: 1.18 X10*3/UL (ref 1.2–4.8)
LYMPHOCYTES NFR BLD AUTO: 18.7 %
MCH RBC QN AUTO: 29.8 PG (ref 26–34)
MCHC RBC AUTO-ENTMCNC: 30.8 G/DL (ref 32–36)
MCV RBC AUTO: 97 FL (ref 80–100)
MONOCYTES # BLD AUTO: 0.74 X10*3/UL (ref 0.1–1)
MONOCYTES NFR BLD AUTO: 11.7 %
NEUTROPHILS # BLD AUTO: 3.92 X10*3/UL (ref 1.2–7.7)
NEUTROPHILS NFR BLD AUTO: 62.3 %
NRBC BLD-RTO: 0 /100 WBCS (ref 0–0)
PLATELET # BLD AUTO: 257 X10*3/UL (ref 150–450)
PMV BLD AUTO: 8.6 FL (ref 7.5–11.5)
POTASSIUM SERPL-SCNC: 4.4 MMOL/L (ref 3.5–5.3)
PROT SERPL-MCNC: 7.4 G/DL (ref 6.4–8.2)
RBC # BLD AUTO: 2.89 X10*6/UL (ref 4–5.2)
SODIUM SERPL-SCNC: 139 MMOL/L (ref 136–145)
TSH SERPL-ACNC: 3.4 MIU/L (ref 0.44–3.98)
WBC # BLD AUTO: 6.3 X10*3/UL (ref 4.4–11.3)

## 2023-10-09 PROCEDURE — 84443 ASSAY THYROID STIM HORMONE: CPT

## 2023-10-09 PROCEDURE — 82024 ASSAY OF ACTH: CPT

## 2023-10-09 PROCEDURE — 82533 TOTAL CORTISOL: CPT

## 2023-10-09 PROCEDURE — 36415 COLL VENOUS BLD VENIPUNCTURE: CPT

## 2023-10-09 PROCEDURE — 85025 COMPLETE CBC W/AUTO DIFF WBC: CPT

## 2023-10-09 PROCEDURE — 80053 COMPREHEN METABOLIC PANEL: CPT

## 2023-10-11 LAB — ACTH PLAS-MCNC: 19.2 PG/ML (ref 7.2–63.3)

## 2023-10-12 ENCOUNTER — OFFICE VISIT (OUTPATIENT)
Dept: GYNECOLOGIC ONCOLOGY | Facility: CLINIC | Age: 63
End: 2023-10-12
Payer: COMMERCIAL

## 2023-10-12 ENCOUNTER — INFUSION (OUTPATIENT)
Dept: HEMATOLOGY/ONCOLOGY | Facility: CLINIC | Age: 63
End: 2023-10-12
Payer: COMMERCIAL

## 2023-10-12 VITALS
DIASTOLIC BLOOD PRESSURE: 84 MMHG | SYSTOLIC BLOOD PRESSURE: 138 MMHG | TEMPERATURE: 95.2 F | BODY MASS INDEX: 27.88 KG/M2 | HEART RATE: 67 BPM | HEIGHT: 66 IN | WEIGHT: 173.5 LBS | OXYGEN SATURATION: 97 % | RESPIRATION RATE: 18 BRPM

## 2023-10-12 VITALS
RESPIRATION RATE: 18 BRPM | HEIGHT: 66 IN | SYSTOLIC BLOOD PRESSURE: 138 MMHG | HEART RATE: 67 BPM | TEMPERATURE: 95.2 F | DIASTOLIC BLOOD PRESSURE: 84 MMHG | BODY MASS INDEX: 27.88 KG/M2 | WEIGHT: 173.5 LBS | OXYGEN SATURATION: 97 %

## 2023-10-12 DIAGNOSIS — N13.1 ACQUIRED HYDRONEPHROSIS DUE TO OBSTRUCTION OF URETER: ICD-10-CM

## 2023-10-12 DIAGNOSIS — C53.9: ICD-10-CM

## 2023-10-12 DIAGNOSIS — Z51.12 ENCOUNTER FOR ANTINEOPLASTIC IMMUNOTHERAPY: ICD-10-CM

## 2023-10-12 DIAGNOSIS — F17.200 TOBACCO USE DISORDER: ICD-10-CM

## 2023-10-12 DIAGNOSIS — C53.9 CERVICAL CANCER, FIGO STAGE IVA (MULTI): Primary | ICD-10-CM

## 2023-10-12 PROBLEM — N32.89 MASS OF URINARY BLADDER: Status: RESOLVED | Noted: 2023-08-23 | Resolved: 2023-10-12

## 2023-10-12 PROBLEM — N28.89 OBSTRUCTION OF KIDNEY: Status: RESOLVED | Noted: 2023-08-23 | Resolved: 2023-10-12

## 2023-10-12 PROBLEM — N94.89 ADNEXAL MASS: Status: RESOLVED | Noted: 2023-08-23 | Resolved: 2023-10-12

## 2023-10-12 PROBLEM — N95.0 POSTMENOPAUSAL BLEEDING: Status: RESOLVED | Noted: 2023-08-23 | Resolved: 2023-10-12

## 2023-10-12 PROBLEM — N13.30 BILATERAL HYDRONEPHROSIS: Status: RESOLVED | Noted: 2023-08-23 | Resolved: 2023-10-12

## 2023-10-12 PROBLEM — D49.59 CERVICAL NEOPLASM: Status: RESOLVED | Noted: 2023-05-10 | Resolved: 2023-10-12

## 2023-10-12 PROBLEM — C67.9 CARCINOMA OF BLADDER (MULTI): Status: RESOLVED | Noted: 2023-08-23 | Resolved: 2023-10-12

## 2023-10-12 PROBLEM — D49.4 BLADDER NEOPLASM: Status: RESOLVED | Noted: 2023-05-10 | Resolved: 2023-10-12

## 2023-10-12 PROBLEM — N13.5 OCCLUSION OF URETER: Status: RESOLVED | Noted: 2023-08-23 | Resolved: 2023-10-12

## 2023-10-12 PROCEDURE — 3075F SYST BP GE 130 - 139MM HG: CPT | Performed by: STUDENT IN AN ORGANIZED HEALTH CARE EDUCATION/TRAINING PROGRAM

## 2023-10-12 PROCEDURE — 96413 CHEMO IV INFUSION 1 HR: CPT

## 2023-10-12 PROCEDURE — 2500000004 HC RX 250 GENERAL PHARMACY W/ HCPCS (ALT 636 FOR OP/ED): Performed by: STUDENT IN AN ORGANIZED HEALTH CARE EDUCATION/TRAINING PROGRAM

## 2023-10-12 PROCEDURE — 99214 OFFICE O/P EST MOD 30 MIN: CPT | Performed by: STUDENT IN AN ORGANIZED HEALTH CARE EDUCATION/TRAINING PROGRAM

## 2023-10-12 PROCEDURE — 3079F DIAST BP 80-89 MM HG: CPT | Performed by: STUDENT IN AN ORGANIZED HEALTH CARE EDUCATION/TRAINING PROGRAM

## 2023-10-12 PROCEDURE — 1036F TOBACCO NON-USER: CPT | Performed by: STUDENT IN AN ORGANIZED HEALTH CARE EDUCATION/TRAINING PROGRAM

## 2023-10-12 RX ORDER — ALBUTEROL SULFATE 0.83 MG/ML
3 SOLUTION RESPIRATORY (INHALATION) AS NEEDED
Status: DISCONTINUED | OUTPATIENT
Start: 2023-10-12 | End: 2023-10-12 | Stop reason: HOSPADM

## 2023-10-12 RX ORDER — HEPARIN 100 UNIT/ML
500 SYRINGE INTRAVENOUS AS NEEDED
Status: CANCELLED | OUTPATIENT
Start: 2023-10-12

## 2023-10-12 RX ORDER — HEPARIN SODIUM,PORCINE/PF 10 UNIT/ML
50 SYRINGE (ML) INTRAVENOUS AS NEEDED
Status: CANCELLED | OUTPATIENT
Start: 2023-10-12

## 2023-10-12 RX ORDER — DIPHENHYDRAMINE HYDROCHLORIDE 50 MG/ML
50 INJECTION INTRAMUSCULAR; INTRAVENOUS AS NEEDED
Status: DISCONTINUED | OUTPATIENT
Start: 2023-10-12 | End: 2023-10-12 | Stop reason: HOSPADM

## 2023-10-12 RX ORDER — EPINEPHRINE 0.3 MG/.3ML
0.3 INJECTION SUBCUTANEOUS EVERY 5 MIN PRN
Status: DISCONTINUED | OUTPATIENT
Start: 2023-10-12 | End: 2023-10-12 | Stop reason: HOSPADM

## 2023-10-12 RX ORDER — PROCHLORPERAZINE MALEATE 10 MG
10 TABLET ORAL EVERY 6 HOURS PRN
Status: DISCONTINUED | OUTPATIENT
Start: 2023-10-12 | End: 2023-10-12 | Stop reason: HOSPADM

## 2023-10-12 RX ORDER — PROCHLORPERAZINE EDISYLATE 5 MG/ML
10 INJECTION INTRAMUSCULAR; INTRAVENOUS EVERY 6 HOURS PRN
Status: DISCONTINUED | OUTPATIENT
Start: 2023-10-12 | End: 2023-10-12 | Stop reason: HOSPADM

## 2023-10-12 RX ORDER — HEPARIN SODIUM,PORCINE/PF 10 UNIT/ML
50 SYRINGE (ML) INTRAVENOUS AS NEEDED
Status: DISCONTINUED | OUTPATIENT
Start: 2023-10-12 | End: 2023-10-12 | Stop reason: HOSPADM

## 2023-10-12 RX ORDER — HEPARIN 100 UNIT/ML
500 SYRINGE INTRAVENOUS AS NEEDED
Status: DISCONTINUED | OUTPATIENT
Start: 2023-10-12 | End: 2023-10-12 | Stop reason: HOSPADM

## 2023-10-12 RX ORDER — FAMOTIDINE 10 MG/ML
20 INJECTION INTRAVENOUS ONCE AS NEEDED
Status: DISCONTINUED | OUTPATIENT
Start: 2023-10-12 | End: 2023-10-12 | Stop reason: HOSPADM

## 2023-10-12 RX ADMIN — HEPARIN 500 UNITS: 100 SYRINGE at 16:54

## 2023-10-12 RX ADMIN — SODIUM CHLORIDE 200 MG: 9 INJECTION, SOLUTION INTRAVENOUS at 16:20

## 2023-10-12 ASSESSMENT — COLUMBIA-SUICIDE SEVERITY RATING SCALE - C-SSRS
1. IN THE PAST MONTH, HAVE YOU WISHED YOU WERE DEAD OR WISHED YOU COULD GO TO SLEEP AND NOT WAKE UP?: NO
6. HAVE YOU EVER DONE ANYTHING, STARTED TO DO ANYTHING, OR PREPARED TO DO ANYTHING TO END YOUR LIFE?: NO
2. HAVE YOU ACTUALLY HAD ANY THOUGHTS OF KILLING YOURSELF?: NO

## 2023-10-12 ASSESSMENT — PAIN SCALES - GENERAL
PAINLEVEL: 0-NO PAIN
PAINLEVEL: 4

## 2023-10-12 ASSESSMENT — PATIENT HEALTH QUESTIONNAIRE - PHQ9
2. FEELING DOWN, DEPRESSED OR HOPELESS: NOT AT ALL
1. LITTLE INTEREST OR PLEASURE IN DOING THINGS: NOT AT ALL
SUM OF ALL RESPONSES TO PHQ9 QUESTIONS 1 AND 2: 0

## 2023-10-12 ASSESSMENT — ENCOUNTER SYMPTOMS
OCCASIONAL FEELINGS OF UNSTEADINESS: 0
LOSS OF SENSATION IN FEET: 1

## 2023-10-12 NOTE — PROGRESS NOTES
"Had to search \"ok to treat\" in pt chart to find Dr. Jefferson order to treat overriding creatinine condition of 1.5xULN  "

## 2023-10-13 NOTE — PROGRESS NOTES
"Patient ID: Shelbi Delaney is a 63 y.o. female.  Primary Care Provider: Sonja Uriarte MD    Subjective    Patient presents today in follow-up evaluation for maintenance Pembrolizumab. She reports she is overall tolerating treatment without significant side effects. Worsening facial and bilateral lower extremity rash for which continues intermittent topical steroid use. Continues with every 2-3 day dressing changes for her nephrostomies with no ongoing drainage since last exchange. No foul odor from output; otherwise no fevers/chills, chest pain or shortness of breath. Denies vaginal bleeding; reports regular bowel movements.   Denies interim hospitalizations since last assessment.     A comprehensive review of systems was performed and otherwise negative.      Objective    BSA: 1.92 meters squared  /84   Pulse 67   Temp 35.1 °C (95.2 °F) (Temporal)   Resp 18   Ht 1.68 m (5' 6.14\")   Wt 78.7 kg (173 lb 8 oz)   SpO2 97%   BMI 27.88 kg/m²      Physical Exam  Vitals and nursing note reviewed.   Constitutional:       General: She is not in acute distress.     Appearance: Normal appearance.   HENT:      Head: Normocephalic and atraumatic.      Mouth/Throat:      Mouth: Mucous membranes are moist.      Pharynx: Oropharynx is clear.   Eyes:      Extraocular Movements: Extraocular movements intact.      Conjunctiva/sclera: Conjunctivae normal.      Pupils: Pupils are equal, round, and reactive to light.   Cardiovascular:      Rate and Rhythm: Normal rate and regular rhythm.      Pulses: Normal pulses.   Pulmonary:      Effort: Pulmonary effort is normal.      Breath sounds: Normal breath sounds. No wheezing, rhonchi or rales.   Abdominal:      General: There is no distension.      Palpations: Abdomen is soft. There is no mass.      Tenderness: There is no abdominal tenderness.      Comments: Bilateral nephrostomies C/D/I with dressing; no drainage   Genitourinary:     Comments: Deferred  Musculoskeletal:        "  General: No swelling or tenderness. Normal range of motion.      Cervical back: Normal range of motion and neck supple.   Skin:     General: Skin is warm and dry.      Findings: Rash present.      Comments: Scattered erythematous macules over cheeks with flaking  Bilateral lower extremities with dry flaky skin over anterior legs; no drainage/erythema    Neurological:      General: No focal deficit present.      Mental Status: She is alert and oriented to person, place, and time.   Psychiatric:         Mood and Affect: Mood normal.         Behavior: Behavior normal.     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 ECOG 1.   Comorbidities: active tobacco use, obstructive nephropathy with bilateral PCNs; CKD.     Oncology History   Cervical cancer, FIGO stage IVB (CMS/HCC)   3/21/2022 Cancer Staged    Staging form: Cervix Uteri, AJCC Version 9, Clinical stage from 3/21/2022: FIGO Stage RONIT (cT4, cN2, cM0) - Signed by Adrianna Jefferson MD on 10/12/2023, Prognostic indicators: CPS 10     7/20/2023 -  Chemotherapy    Pembrolizumab, 21 Day Cycles     8/23/2023 Initial Diagnosis    Cervical cancer, FIGO stage IIIB (CMS/HCC)     1) Recurrent cervical cancer  - Continue with current regimen with single-agent Pembrolizumab; pretreatment labs reviewed and within normal limits  - No dose-limiting toxicities with current therapy  - Anticipate CT after next cycle (CT C/A/P - noncontrast)   - Follow up in 3 weeks per protocol     2) Obstructive nephropathy   - Bilateral PCNs in place; s/p treatment of cellulitis in setting of inadequate frequency of dressing changes. Reevaluated with interval improvement on examination today  - Continue to reinforce q2-3 day dressing changes and self-monitoring of dressing sites     3)  Tobacco use   - Continue to encourage cessation efforts  - Declined referral for [facility] Cessation counseling previously     There are no diagnoses linked to this encounter.    Treatment Plans       Name Type Plan Dates Plan Provider         Active    Pembrolizumab, 21 Day Cycles Oncology Treatment  7/19/2023 - Present MD Adrianna Melgar MD

## 2023-10-17 DIAGNOSIS — I10 BENIGN ESSENTIAL HTN: ICD-10-CM

## 2023-10-17 RX ORDER — METOPROLOL TARTRATE 50 MG/1
TABLET ORAL
Qty: 30 TABLET | Refills: 5 | Status: SHIPPED | OUTPATIENT
Start: 2023-10-17

## 2023-10-17 NOTE — TELEPHONE ENCOUNTER
Patient called for refill oxycodone, can't fill d/t missed appointment on 10/3/23. Rescheduled her for 11/7/23. Will refill after she is seen then. Patient agrees to plan.

## 2023-10-19 DIAGNOSIS — C53.9 CERVICAL CANCER, FIGO STAGE IVB (MULTI): ICD-10-CM

## 2023-10-20 ENCOUNTER — HOSPITAL ENCOUNTER (OUTPATIENT)
Dept: CARDIOLOGY | Facility: HOSPITAL | Age: 63
Discharge: HOME | End: 2023-10-20
Payer: COMMERCIAL

## 2023-10-20 VITALS
WEIGHT: 175 LBS | TEMPERATURE: 98.2 F | HEIGHT: 67 IN | HEART RATE: 78 BPM | SYSTOLIC BLOOD PRESSURE: 134 MMHG | BODY MASS INDEX: 27.47 KG/M2 | DIASTOLIC BLOOD PRESSURE: 72 MMHG | OXYGEN SATURATION: 98 % | RESPIRATION RATE: 18 BRPM

## 2023-10-20 DIAGNOSIS — C53.9 MALIGNANT NEOPLASM OF CERVIX UTERI, UNSPECIFIED (MULTI): ICD-10-CM

## 2023-10-20 PROCEDURE — 96372 THER/PROPH/DIAG INJ SC/IM: CPT

## 2023-10-20 PROCEDURE — C1729 CATH, DRAINAGE: HCPCS

## 2023-10-20 PROCEDURE — 50435 EXCHANGE NEPHROSTOMY CATH: CPT

## 2023-10-20 PROCEDURE — C1769 GUIDE WIRE: HCPCS

## 2023-10-20 PROCEDURE — 96372 THER/PROPH/DIAG INJ SC/IM: CPT | Mod: 59

## 2023-10-20 PROCEDURE — 2720000007 HC OR 272 NO HCPCS

## 2023-10-20 PROCEDURE — 7100000010 HC PHASE TWO TIME - EACH INCREMENTAL 1 MINUTE

## 2023-10-20 PROCEDURE — 2500000005 HC RX 250 GENERAL PHARMACY W/O HCPCS

## 2023-10-20 PROCEDURE — 7100000009 HC PHASE TWO TIME - INITIAL BASE CHARGE

## 2023-10-20 RX ORDER — LIDOCAINE HCL 100 MG/5ML
SYRINGE (ML) INJECTION AS NEEDED
Status: DISCONTINUED | OUTPATIENT
Start: 2023-10-20 | End: 2023-10-21 | Stop reason: HOSPADM

## 2023-10-20 RX ADMIN — Medication 5 ML: at 11:50

## 2023-10-20 ASSESSMENT — PAIN SCALES - GENERAL: PAINLEVEL_OUTOF10: 4

## 2023-10-20 ASSESSMENT — PAIN - FUNCTIONAL ASSESSMENT: PAIN_FUNCTIONAL_ASSESSMENT: 0-10

## 2023-10-30 ENCOUNTER — LAB (OUTPATIENT)
Dept: LAB | Facility: CLINIC | Age: 63
End: 2023-10-30
Payer: COMMERCIAL

## 2023-10-30 DIAGNOSIS — C53.9 CERVICAL CANCER, FIGO STAGE IVB (MULTI): Primary | ICD-10-CM

## 2023-10-30 DIAGNOSIS — C53.9 CERVICAL CANCER, FIGO STAGE IVB: ICD-10-CM

## 2023-10-30 LAB
ALBUMIN SERPL BCP-MCNC: 3.5 G/DL (ref 3.4–5)
ALP SERPL-CCNC: 44 U/L (ref 33–136)
ALT SERPL W P-5'-P-CCNC: 5 U/L (ref 7–45)
ANION GAP SERPL CALC-SCNC: 13 MMOL/L (ref 10–20)
AST SERPL W P-5'-P-CCNC: 10 U/L (ref 9–39)
BASOPHILS # BLD AUTO: 0.08 X10*3/UL (ref 0–0.1)
BASOPHILS NFR BLD AUTO: 1.1 %
BILIRUB SERPL-MCNC: 0.2 MG/DL (ref 0–1.2)
BUN SERPL-MCNC: 35 MG/DL (ref 6–23)
CALCIUM SERPL-MCNC: 8.8 MG/DL (ref 8.6–10.3)
CHLORIDE SERPL-SCNC: 107 MMOL/L (ref 98–107)
CO2 SERPL-SCNC: 25 MMOL/L (ref 21–32)
CREAT SERPL-MCNC: 2.32 MG/DL (ref 0.5–1.05)
EOSINOPHIL # BLD AUTO: 0.44 X10*3/UL (ref 0–0.7)
EOSINOPHIL NFR BLD AUTO: 6.1 %
ERYTHROCYTE [DISTWIDTH] IN BLOOD BY AUTOMATED COUNT: 14.8 % (ref 11.5–14.5)
GFR SERPL CREATININE-BSD FRML MDRD: 23 ML/MIN/1.73M*2
GLUCOSE SERPL-MCNC: 95 MG/DL (ref 74–99)
HCT VFR BLD AUTO: 26.4 % (ref 36–46)
HGB BLD-MCNC: 8.1 G/DL (ref 12–16)
IMM GRANULOCYTES # BLD AUTO: 0.03 X10*3/UL (ref 0–0.7)
IMM GRANULOCYTES NFR BLD AUTO: 0.4 % (ref 0–0.9)
LYMPHOCYTES # BLD AUTO: 0.99 X10*3/UL (ref 1.2–4.8)
LYMPHOCYTES NFR BLD AUTO: 13.8 %
MCH RBC QN AUTO: 30 PG (ref 26–34)
MCHC RBC AUTO-ENTMCNC: 30.7 G/DL (ref 32–36)
MCV RBC AUTO: 98 FL (ref 80–100)
MONOCYTES # BLD AUTO: 0.56 X10*3/UL (ref 0.1–1)
MONOCYTES NFR BLD AUTO: 7.8 %
NEUTROPHILS # BLD AUTO: 5.1 X10*3/UL (ref 1.2–7.7)
NEUTROPHILS NFR BLD AUTO: 70.8 %
NRBC BLD-RTO: 0 /100 WBCS (ref 0–0)
PLATELET # BLD AUTO: 329 X10*3/UL (ref 150–450)
PMV BLD AUTO: 8.4 FL (ref 7.5–11.5)
POTASSIUM SERPL-SCNC: 4.8 MMOL/L (ref 3.5–5.3)
PROT SERPL-MCNC: 7.1 G/DL (ref 6.4–8.2)
RBC # BLD AUTO: 2.7 X10*6/UL (ref 4–5.2)
SODIUM SERPL-SCNC: 140 MMOL/L (ref 136–145)
WBC # BLD AUTO: 7.2 X10*3/UL (ref 4.4–11.3)

## 2023-10-30 PROCEDURE — 84075 ASSAY ALKALINE PHOSPHATASE: CPT

## 2023-10-30 PROCEDURE — 85025 COMPLETE CBC W/AUTO DIFF WBC: CPT

## 2023-10-30 RX ORDER — PROCHLORPERAZINE EDISYLATE 5 MG/ML
10 INJECTION INTRAMUSCULAR; INTRAVENOUS EVERY 6 HOURS PRN
Status: CANCELLED | OUTPATIENT
Start: 2023-11-02

## 2023-10-30 RX ORDER — EPINEPHRINE 0.3 MG/.3ML
0.3 INJECTION SUBCUTANEOUS EVERY 5 MIN PRN
Status: CANCELLED | OUTPATIENT
Start: 2023-11-02

## 2023-10-30 RX ORDER — FAMOTIDINE 10 MG/ML
20 INJECTION INTRAVENOUS ONCE AS NEEDED
Status: CANCELLED | OUTPATIENT
Start: 2023-11-02

## 2023-10-30 RX ORDER — PROCHLORPERAZINE MALEATE 10 MG
10 TABLET ORAL EVERY 6 HOURS PRN
Status: CANCELLED | OUTPATIENT
Start: 2023-11-02

## 2023-10-30 RX ORDER — ALBUTEROL SULFATE 0.83 MG/ML
3 SOLUTION RESPIRATORY (INHALATION) AS NEEDED
Status: CANCELLED | OUTPATIENT
Start: 2023-11-02

## 2023-10-30 RX ORDER — DIPHENHYDRAMINE HYDROCHLORIDE 50 MG/ML
50 INJECTION INTRAMUSCULAR; INTRAVENOUS AS NEEDED
Status: CANCELLED | OUTPATIENT
Start: 2023-11-02

## 2023-11-01 ENCOUNTER — TELEMEDICINE (OUTPATIENT)
Dept: GYNECOLOGIC ONCOLOGY | Facility: HOSPITAL | Age: 63
End: 2023-11-01
Payer: COMMERCIAL

## 2023-11-01 DIAGNOSIS — G62.0 CHEMOTHERAPY-INDUCED PERIPHERAL NEUROPATHY (MULTI): ICD-10-CM

## 2023-11-01 DIAGNOSIS — C53.9 CERVICAL CANCER, FIGO STAGE IVB (MULTI): Primary | ICD-10-CM

## 2023-11-01 DIAGNOSIS — T45.1X5A CHEMOTHERAPY-INDUCED PERIPHERAL NEUROPATHY (MULTI): ICD-10-CM

## 2023-11-01 DIAGNOSIS — R60.0 LOWER EXTREMITY EDEMA: ICD-10-CM

## 2023-11-01 PROCEDURE — 99214 OFFICE O/P EST MOD 30 MIN: CPT | Mod: 95 | Performed by: NURSE PRACTITIONER

## 2023-11-01 PROCEDURE — 99214 OFFICE O/P EST MOD 30 MIN: CPT | Performed by: NURSE PRACTITIONER

## 2023-11-01 RX ORDER — DULOXETIN HYDROCHLORIDE 20 MG/1
20 CAPSULE, DELAYED RELEASE ORAL DAILY
Qty: 30 CAPSULE | Refills: 5 | Status: SHIPPED | OUTPATIENT
Start: 2023-11-01 | End: 2024-02-27 | Stop reason: ALTCHOICE

## 2023-11-02 ENCOUNTER — INFUSION (OUTPATIENT)
Dept: HEMATOLOGY/ONCOLOGY | Facility: CLINIC | Age: 63
End: 2023-11-02
Payer: COMMERCIAL

## 2023-11-02 VITALS
RESPIRATION RATE: 18 BRPM | DIASTOLIC BLOOD PRESSURE: 83 MMHG | WEIGHT: 183.64 LBS | BODY MASS INDEX: 28.76 KG/M2 | OXYGEN SATURATION: 98 % | TEMPERATURE: 97 F | HEART RATE: 63 BPM | SYSTOLIC BLOOD PRESSURE: 127 MMHG

## 2023-11-02 DIAGNOSIS — C53.9 CERVICAL CANCER, FIGO STAGE IVB (MULTI): Primary | ICD-10-CM

## 2023-11-02 DIAGNOSIS — C53.9 CERVICAL CANCER, FIGO STAGE IVB (MULTI): ICD-10-CM

## 2023-11-02 PROCEDURE — 2500000004 HC RX 250 GENERAL PHARMACY W/ HCPCS (ALT 636 FOR OP/ED): Performed by: OBSTETRICS & GYNECOLOGY

## 2023-11-02 PROCEDURE — 96413 CHEMO IV INFUSION 1 HR: CPT

## 2023-11-02 PROCEDURE — 2500000004 HC RX 250 GENERAL PHARMACY W/ HCPCS (ALT 636 FOR OP/ED): Performed by: STUDENT IN AN ORGANIZED HEALTH CARE EDUCATION/TRAINING PROGRAM

## 2023-11-02 RX ORDER — ALBUTEROL SULFATE 0.83 MG/ML
3 SOLUTION RESPIRATORY (INHALATION) AS NEEDED
Status: DISCONTINUED | OUTPATIENT
Start: 2023-11-02 | End: 2023-11-02 | Stop reason: HOSPADM

## 2023-11-02 RX ORDER — PROCHLORPERAZINE EDISYLATE 5 MG/ML
10 INJECTION INTRAMUSCULAR; INTRAVENOUS EVERY 6 HOURS PRN
Status: DISCONTINUED | OUTPATIENT
Start: 2023-11-02 | End: 2023-11-02 | Stop reason: HOSPADM

## 2023-11-02 RX ORDER — HEPARIN 100 UNIT/ML
500 SYRINGE INTRAVENOUS AS NEEDED
Status: DISCONTINUED | OUTPATIENT
Start: 2023-11-02 | End: 2023-11-02 | Stop reason: HOSPADM

## 2023-11-02 RX ORDER — HEPARIN SODIUM,PORCINE/PF 10 UNIT/ML
50 SYRINGE (ML) INTRAVENOUS AS NEEDED
Status: CANCELLED | OUTPATIENT
Start: 2023-11-02

## 2023-11-02 RX ORDER — HEPARIN SODIUM,PORCINE/PF 10 UNIT/ML
50 SYRINGE (ML) INTRAVENOUS AS NEEDED
Status: DISCONTINUED | OUTPATIENT
Start: 2023-11-02 | End: 2023-11-02 | Stop reason: HOSPADM

## 2023-11-02 RX ORDER — EPINEPHRINE 0.3 MG/.3ML
0.3 INJECTION SUBCUTANEOUS EVERY 5 MIN PRN
Status: DISCONTINUED | OUTPATIENT
Start: 2023-11-02 | End: 2023-11-02 | Stop reason: HOSPADM

## 2023-11-02 RX ORDER — PROCHLORPERAZINE MALEATE 10 MG
10 TABLET ORAL EVERY 6 HOURS PRN
Status: DISCONTINUED | OUTPATIENT
Start: 2023-11-02 | End: 2023-11-02 | Stop reason: HOSPADM

## 2023-11-02 RX ORDER — HEPARIN 100 UNIT/ML
500 SYRINGE INTRAVENOUS AS NEEDED
Status: CANCELLED | OUTPATIENT
Start: 2023-11-02

## 2023-11-02 RX ORDER — DIPHENHYDRAMINE HYDROCHLORIDE 50 MG/ML
50 INJECTION INTRAMUSCULAR; INTRAVENOUS AS NEEDED
Status: DISCONTINUED | OUTPATIENT
Start: 2023-11-02 | End: 2023-11-02 | Stop reason: HOSPADM

## 2023-11-02 RX ORDER — FAMOTIDINE 10 MG/ML
20 INJECTION INTRAVENOUS ONCE AS NEEDED
Status: DISCONTINUED | OUTPATIENT
Start: 2023-11-02 | End: 2023-11-02 | Stop reason: HOSPADM

## 2023-11-02 RX ADMIN — HEPARIN 500 UNITS: 100 SYRINGE at 15:53

## 2023-11-02 RX ADMIN — SODIUM CHLORIDE 200 MG: 9 INJECTION, SOLUTION INTRAVENOUS at 15:08

## 2023-11-02 ASSESSMENT — PAIN SCALES - GENERAL: PAINLEVEL: 5

## 2023-11-07 ENCOUNTER — OFFICE VISIT (OUTPATIENT)
Dept: PAIN MEDICINE | Facility: CLINIC | Age: 63
End: 2023-11-07
Payer: COMMERCIAL

## 2023-11-07 ENCOUNTER — ANCILLARY PROCEDURE (OUTPATIENT)
Dept: RADIOLOGY | Facility: CLINIC | Age: 63
End: 2023-11-07
Payer: COMMERCIAL

## 2023-11-07 VITALS
RESPIRATION RATE: 16 BRPM | BODY MASS INDEX: 28.28 KG/M2 | DIASTOLIC BLOOD PRESSURE: 70 MMHG | HEART RATE: 81 BPM | SYSTOLIC BLOOD PRESSURE: 106 MMHG | HEIGHT: 66 IN | WEIGHT: 176 LBS

## 2023-11-07 DIAGNOSIS — G89.3 CANCER RELATED PAIN: Primary | ICD-10-CM

## 2023-11-07 DIAGNOSIS — M25.562 CHRONIC PAIN OF BOTH KNEES: ICD-10-CM

## 2023-11-07 DIAGNOSIS — M25.561 CHRONIC PAIN OF BOTH KNEES: ICD-10-CM

## 2023-11-07 DIAGNOSIS — G89.29 CHRONIC PAIN OF BOTH KNEES: ICD-10-CM

## 2023-11-07 DIAGNOSIS — C53.9 CERVICAL CANCER, FIGO STAGE IVB (MULTI): ICD-10-CM

## 2023-11-07 DIAGNOSIS — R60.0 BILATERAL LOWER EXTREMITY EDEMA: ICD-10-CM

## 2023-11-07 DIAGNOSIS — M48.062 LUMBAR STENOSIS WITH NEUROGENIC CLAUDICATION: ICD-10-CM

## 2023-11-07 DIAGNOSIS — Z79.891 ENCOUNTER FOR LONG-TERM USE OF OPIATE ANALGESIC: ICD-10-CM

## 2023-11-07 PROCEDURE — 3078F DIAST BP <80 MM HG: CPT | Performed by: PHYSICAL MEDICINE & REHABILITATION

## 2023-11-07 PROCEDURE — 99215 OFFICE O/P EST HI 40 MIN: CPT | Performed by: PHYSICAL MEDICINE & REHABILITATION

## 2023-11-07 PROCEDURE — 73564 X-RAY EXAM KNEE 4 OR MORE: CPT | Mod: 50,FY

## 2023-11-07 PROCEDURE — 73564 X-RAY EXAM KNEE 4 OR MORE: CPT | Mod: BILATERAL PROCEDURE | Performed by: RADIOLOGY

## 2023-11-07 PROCEDURE — 3074F SYST BP LT 130 MM HG: CPT | Performed by: PHYSICAL MEDICINE & REHABILITATION

## 2023-11-07 PROCEDURE — 1036F TOBACCO NON-USER: CPT | Performed by: PHYSICAL MEDICINE & REHABILITATION

## 2023-11-07 RX ORDER — OXYCODONE HYDROCHLORIDE 5 MG/1
5 CAPSULE ORAL 3 TIMES DAILY PRN
Qty: 90 CAPSULE | Refills: 0 | Status: SHIPPED | OUTPATIENT
Start: 2023-11-07 | End: 2023-12-06 | Stop reason: SDUPTHER

## 2023-11-07 ASSESSMENT — PAIN SCALES - GENERAL: PAINLEVEL: 6

## 2023-11-07 NOTE — ASSESSMENT & PLAN NOTE
Patient with bilateral knee pain I suspect she is having osteoarthritis.  I will obtain some new x-rays of her knees and did offer her a corticosteroid injection in both of her knees.  She will discuss with her oncologist if it would be appropriate to have steroid injection and she will call and let our office know and we will get her scheduled for an ultrasound-guided bilateral knee injection.

## 2023-11-07 NOTE — ASSESSMENT & PLAN NOTE
Patient with bilateral lower extremity edema.  I did write her for some orthopedic shoes to hopefully help that she is hopefully getting to be getting these compression stockings that will help get some of the fluid off of her feet.

## 2023-11-07 NOTE — ASSESSMENT & PLAN NOTE
64 yo F with PMH of bladder cancer s/p nephrostomy tubes, cervical cancer on chemotherapy, and herniated lumbar disc presenting today for follow-up. She has been managed with very low-dose oxycodone no than twice per day.  Unfortunately patient did miss her appointment last months with me and we did discuss that I do need to see her every month in order to continue on these medications.  Patient voiced understanding.  I did discuss with patient and her  that while tolerance is certainly a concern but given her cancer related pain opiates are indicated and especially if she is able to stay below 2-3 overall per day this medication the risk of tolerance is significantly less. We have previously discussed injections for the lumbar stenosis with neurogenic claudication and we could consider that in the future once her cancer treatment is hopefully done.     Plan for today:  -Refill of oxycodone 5 mg however I would like to increase to 3 times daily given the issues with some of the nephrostomy tubes and pain after chemotherapy. OARRS reviewed no issues.  -Continue gabapentin to 900 mg 3 times daily.  -Patient will follow-up with me in 2 to 3 months. Urine drug screen and controlled substance agreement up-to-date.  - Given that she is having some more issues especially at home and getting around her house I would like to get her over to one of my physical medicine and rehabilitation colleagues Dr. Mcknight for her assessment and potentially help with any of the cancer related rehabilitation she would need especially moving forward.

## 2023-11-07 NOTE — ASSESSMENT & PLAN NOTE
Patient does have some lumbar stenosis with neurogenic claudication.  We could certainly do an epidural steroid injection at any point in the future should this claudication symptoms become more of an issue which they are currently not.

## 2023-11-07 NOTE — PROGRESS NOTES
Subjective   Patient ID: Shelbi Delaney is a 63 y.o. female who presents for No chief complaint on file..  HPI    64 yo F with PMH of bladder cancer s/p nephrostomy tube, cervical cancer, herniated lumbar disc who presents geo follow up today for routine 3-month visit for medication management as she is currently on very low-dose oxycodone therapy for her cancer related pains. In addition we placed on gabapentin she is titrated up to 900 mg 3 times daily with relief of her pain. She has very sparingly taking the oxycodone though does feel that she has not had to take it more often recently as she has had increase in her pain especially around the nephrostomy tubes as well as after chemotherapy.  Otherwise symptoms are pretty similar to previous evaluation in terms of her back and nephrostomy tube pain.     Of note patient is having quite a bit of bilateral lower extremity edema and pain in her ankles as well as bilateral knee pain.  She does have likely osteoarthritis and has noticed some swelling in her knees and pain with transitioning from sitting to standing or going up and down stairs.  In addition she is having herself fitted for some compression stockings and is asking for prescription for orthopedic shoes to help as her feet her swelling quite a bit this is all related to her kidney issues with the nephrostomy tubes.                    OARRS:  Richard Zuniga MD on 11/7/2023  1:27 PM  I have personally reviewed the OARRS report for Shelbi Delaney. I have considered the risks of abuse, dependence, addiction and diversion and I believe that it is clinically appropriate for Shelbi Delaney to be prescribed this medication    Is the patient prescribed a combination of a benzodiazepine and opioid?  No    Last Urine Drug Screen / ordered today: No  Recent Results (from the past 8760 hour(s))   Opiate Confirmation, Urine    Collection Time: 03/21/23 11:26 AM   Result Value Ref Range    6-Acetylmorphine <25 Cutoff <25  ng/mL    Codeine <50 Cutoff <50 ng/mL    Hydrocodone <25 Cutoff <25 ng/mL    Hydromorphone <25 Cutoff <25 ng/mL    Morphine Urine <50 Cutoff <50 ng/mL    Norhydrocodone <25 Cutoff <25 ng/mL    Noroxycodone 228 (A) Cutoff <25 ng/mL    Oxycodone <25 Cutoff <25 ng/mL    Oxymorphone 400 (A) Cutoff <25 ng/mL   Drug Screen, Urine With Reflex to Confirmation    Collection Time: 03/21/23 11:26 AM   Result Value Ref Range    DRUG SCREEN COMMENT URINE SEE BELOW     Amphetamine Screen, Urine PRESUMPTIVE NEGATIVE NEGATIVE    Barbiturate Screen, Urine PRESUMPTIVE NEGATIVE NEGATIVE    BENZODIAZEPINE (PRESENCE) IN URINE BY SCREEN METHOD PRESUMPTIVE NEGATIVE NEGATIVE    Cannabinoid Screen, Urine PRESUMPTIVE NEGATIVE NEGATIVE    Cocaine Screen, Urine PRESUMPTIVE NEGATIVE NEGATIVE    Fentanyl, Ur PRESUMPTIVE NEGATIVE NEGATIVE    Methadone Screen, Urine PRESUMPTIVE NEGATIVE NEGATIVE    Opiate Screen, Urine PRESUMPTIVE NEGATIVE NEGATIVE    Oxycodone Screen, Ur PRESUMPTIVE POSITIVE (A) NEGATIVE    PCP Screen, Urine PRESUMPTIVE NEGATIVE NEGATIVE   OPIATE/OPIOID/BENZO PRESCRIPTION COMPLIANCE    Collection Time: 11/21/22  4:28 PM   Result Value Ref Range    DRUG SCREEN COMMENT URINE SEE BELOW     Creatine, Urine 73.3 mg/dL    Amphetamine Screen, Urine PRESUMPTIVE NEGATIVE NEGATIVE    Barbiturate Screen, Urine PRESUMPTIVE NEGATIVE NEGATIVE    Cannabinoid Screen, Urine PRESUMPTIVE NEGATIVE NEGATIVE    Cocaine Screen, Urine PRESUMPTIVE NEGATIVE NEGATIVE    PCP Screen, Urine PRESUMPTIVE NEGATIVE NEGATIVE    7-Aminoclonazepam <25 Cutoff <25 ng/mL    Alpha-Hydroxyalprazolam <25 Cutoff <25 ng/mL    Alpha-Hydroxymidazolam <25 Cutoff <25 ng/mL    Alprazolam <25 Cutoff <25 ng/mL    Chlordiazepoxide <25 Cutoff <25 ng/mL    Clonazepam <25 Cutoff <25 ng/mL    Diazepam <25 Cutoff <25 ng/mL    Lorazepam <25 Cutoff <25 ng/mL    Midazolam <25 Cutoff <25 ng/mL    Nordiazepam <25 Cutoff <25 ng/mL    Oxazepam <25 Cutoff <25 ng/mL    Temazepam <25 Cutoff <25  ng/mL    Zolpidem <25 Cutoff <25 ng/mL    Zolpidem Metabolite (ZCA) <25 Cutoff <25 ng/mL    6-Acetylmorphine <25 Cutoff <25 ng/mL    Codeine <50 Cutoff <50 ng/mL    Hydrocodone <25 Cutoff <25 ng/mL    Hydromorphone <25 Cutoff <25 ng/mL    Morphine Urine <50 Cutoff <50 ng/mL    Norhydrocodone <25 Cutoff <25 ng/mL    Noroxycodone <25 Cutoff <25 ng/mL    Oxycodone <25 Cutoff <25 ng/mL    Oxymorphone <25 Cutoff <25 ng/mL    Tramadol <50 Cutoff <50 ng/mL    O-Desmethyltramadol <50 Cutoff <50 ng/mL    Fentanyl <2.5 Cutoff<2.5 ng/mL    Norfentanyl <2.5 Cutoff<2.5 ng/mL    METHADONE CONFIRMATION,URINE <25 Cutoff <25 ng/mL    EDDP <25 Cutoff <25 ng/mL     Results are as expected.     Controlled Substance Agreement:  Date of the Last Agreement: 3/21/23  Reviewed Controlled Substance Agreement including but not limited to the benefits, risks, and alternatives to treatment with a Controlled Substance medication(s).    Monitoring and compliance:    ORT:    PDUQ:    Office Agreement:      Review of Systems   All other systems reviewed and are negative.       Current Outpatient Medications   Medication Instructions    calcium carbonate (CALCIUM 600) 600 mg, oral, Daily    DULoxetine (CYMBALTA) 20 mg, oral, Daily    furosemide (LASIX) 40 mg, oral, Daily    gabapentin (NEURONTIN) 900 mg, oral, 3 times daily    hydrOXYzine HCL (Atarax) 10 mg tablet 1 tablet, oral, 3 times daily PRN    hydrOXYzine HCL (Atarax) 25 mg tablet 1 tablet, oral, 3 times daily PRN    levothyroxine (SYNTHROID, LEVOXYL) 25 mcg, oral, Daily    LORazepam (Ativan) 1 mg tablet Every 6 hours PRN    metoprolol tartrate (Lopressor) 50 mg tablet take 0.5 tablets by mouth 2 times a day    naloxone (Narcan) 4 mg/0.1 mL nasal spray 0.1 mL, nasal, As needed, May repeat every 2-3 minutes if needed, alternating nostrils, until medical assistance becomes available.    oseltamivir (Tamiflu) 75 mg capsule 1 capsule, oral, 2 times daily after meals    oxyCODONE (OXY-IR) 5 mg,  oral, Every 6 hours PRN    tiZANidine (ZANAFLEX) 4 mg, oral, 3 times daily    traZODone (Desyrel) 50 mg tablet 1 tablet, oral, Nightly PRN        Past Medical History:   Diagnosis Date    Abdominal distension (gaseous) 11/11/2022    Abdominal bloating    Body mass index (BMI)30.0-30.9, adult 08/07/2019    BMI 30.0-30.9,adult    Encounter for screening for malignant neoplasm of colon     Encounter for screening colonoscopy    Personal history of other diseases of the circulatory system 08/24/2020    History of hypertension    Personal history of other specified conditions     History of snoring    Tachycardia, unspecified     Tachycardia        Past Surgical History:   Procedure Laterality Date    CT GUIDED IMAGING FOR NEEDLE PLACEMENT  1/12/2023    CT GUIDED IMAGING FOR NEEDLE PLACEMENT [facility]    IR NEPHROSTOMY TUBE EXCHANGE  10/20/2023    IR NEPHROSTOMY TUBE EXCHANGE 10/20/2023 Manuel Marvin MD [facility]    OTHER SURGICAL HISTORY  11/21/2022    Nephrostomy    OTHER SURGICAL HISTORY  11/21/2022    Transurethral resection of bladder tumor    US GUIDED PERCUTANEOUS PLACEMENT  11/14/2022    US GUIDED PERCUTANEOUS PLACEMENT [facility]    US GUIDED PERCUTANEOUS PLACEMENT  8/28/2023    US GUIDED PERCUTANEOUS PLACEMENT [facility]        Family History   Problem Relation Name Age of Onset    No Known Problems Mother      Other (Acute myocardial infarction) Father          No Known Allergies     Objective     There were no vitals filed for this visit.     Physical Exam    GENERAL EXAM  Vital Signs: Vital signs to include heart rate, respiration rate, blood pressure, and temperature were reviewed.  General Appearance:  Awake, alert, healthy appearing, well developed, No acute distress.  Head: Normocephalic without evidence of head injury.  Neck: The appearance of the neck was normal without swelling with a midline trachea.  Eyes: The eyelids and eyebrows exhibited no abnormalities.  Pupils  were not pin-point.  Sclera was without icterus.  Lungs: Respiration rhythm and depth was normal.  Respiratory movements were normal without labored breathing.  Cardiovascular: No peripheral edema was present.    Neurological: Patient was oriented to time, place, and person.  Speech was normal.  Balance, gait, and stance were unremarkable.    Psychiatric: Appearance was normal with appropriate dress.  Mood was euthymic and affect was normal.  Skin: Affected regions were without ecchymosis or skin lesions.        Physical exam as above except:  2-3+ pitting edema bilateral lower extremities.  Tenderness to palpation over bilateral knees at the medial and lateral joint lines.      Assessment/Plan   Problem List Items Addressed This Visit             ICD-10-CM    Cervical cancer, FIGO stage IVB (CMS/[facility]) C53.9    Relevant Medications    oxyCODONE (Oxy-IR) 5 mg immediate release capsule    Other Relevant Orders    Referral to Physical Medicine Rehab    Lumbar stenosis with neurogenic claudication M48.062     Patient does have some lumbar stenosis with neurogenic claudication.  We could certainly do an epidural steroid injection at any point in the future should this claudication symptoms become more of an issue which they are currently not.         Cancer related pain - Primary G89.3     62 yo F with PMH of bladder cancer s/p nephrostomy tubes, cervical cancer on chemotherapy, and herniated lumbar disc presenting today for follow-up. She has been managed with very low-dose oxycodone no than twice per day.  Unfortunately patient did miss her appointment last months with me and we did discuss that I do need to see her every month in order to continue on these medications.  Patient voiced understanding.  I did discuss with patient and her  that while tolerance is certainly a concern but given her cancer related pain opiates are indicated and especially if she is able to stay below 2-3 overall per day this medication  the risk of tolerance is significantly less. We have previously discussed injections for the lumbar stenosis with neurogenic claudication and we could consider that in the future once her cancer treatment is hopefully done.     Plan for today:  -Refill of oxycodone 5 mg however I would like to increase to 3 times daily given the issues with some of the nephrostomy tubes and pain after chemotherapy. OARRS reviewed no issues.  -Continue gabapentin to 900 mg 3 times daily.  -Patient will follow-up with me in 2 to 3 months. Urine drug screen and controlled substance agreement up-to-date.  - Given that she is having some more issues especially at home and getting around her house I would like to get her over to one of my physical medicine and rehabilitation colleagues Dr. Mcknight for her assessment and potentially help with any of the cancer related rehabilitation she would need especially moving forward.           Relevant Medications    oxyCODONE (Oxy-IR) 5 mg immediate release capsule    Other Relevant Orders    Referral to Physical Medicine Rehab    Encounter for long-term use of opiate analgesic Z79.891    Relevant Medications    oxyCODONE (Oxy-IR) 5 mg immediate release capsule    Bilateral lower extremity edema R60.0     Patient with bilateral lower extremity edema.  I did write her for some orthopedic shoes to hopefully help that she is hopefully getting to be getting these compression stockings that will help get some of the fluid off of her feet.         Relevant Orders    Orthopedic Footwear, Ladies Shoe    Chronic pain of both knees M25.561, M25.562, G89.29     Patient with bilateral knee pain I suspect she is having osteoarthritis.  I will obtain some new x-rays of her knees and did offer her a corticosteroid injection in both of her knees.  She will discuss with her oncologist if it would be appropriate to have steroid injection and she will call and let our office know and we will get her scheduled for an  ultrasound-guided bilateral knee injection.         Relevant Orders    XR knee 4+ views bilateral

## 2023-11-09 DIAGNOSIS — I89.0 LYMPHEDEMA: ICD-10-CM

## 2023-11-09 DIAGNOSIS — C53.9 CERVICAL CANCER, FIGO STAGE IVB (MULTI): Primary | ICD-10-CM

## 2023-11-09 DIAGNOSIS — C53.9 CERVICAL CANCER, FIGO STAGE IVB (MULTI): ICD-10-CM

## 2023-11-12 DIAGNOSIS — C53.9 CERVICAL CANCER, FIGO STAGE IVB (MULTI): Primary | ICD-10-CM

## 2023-11-12 RX ORDER — FAMOTIDINE 10 MG/ML
20 INJECTION INTRAVENOUS ONCE AS NEEDED
Status: CANCELLED | OUTPATIENT
Start: 2023-11-30

## 2023-11-12 RX ORDER — PROCHLORPERAZINE EDISYLATE 5 MG/ML
10 INJECTION INTRAMUSCULAR; INTRAVENOUS EVERY 6 HOURS PRN
Status: CANCELLED | OUTPATIENT
Start: 2023-12-21

## 2023-11-12 RX ORDER — ALBUTEROL SULFATE 0.83 MG/ML
3 SOLUTION RESPIRATORY (INHALATION) AS NEEDED
Status: CANCELLED | OUTPATIENT
Start: 2023-12-21

## 2023-11-12 RX ORDER — PROCHLORPERAZINE EDISYLATE 5 MG/ML
10 INJECTION INTRAMUSCULAR; INTRAVENOUS EVERY 6 HOURS PRN
Status: CANCELLED | OUTPATIENT
Start: 2023-11-30

## 2023-11-12 RX ORDER — EPINEPHRINE 0.3 MG/.3ML
0.3 INJECTION SUBCUTANEOUS EVERY 5 MIN PRN
Status: CANCELLED | OUTPATIENT
Start: 2023-11-30

## 2023-11-12 RX ORDER — PROCHLORPERAZINE MALEATE 5 MG
10 TABLET ORAL EVERY 6 HOURS PRN
Status: CANCELLED | OUTPATIENT
Start: 2023-11-30

## 2023-11-12 RX ORDER — DIPHENHYDRAMINE HYDROCHLORIDE 50 MG/ML
50 INJECTION INTRAMUSCULAR; INTRAVENOUS AS NEEDED
Status: CANCELLED | OUTPATIENT
Start: 2023-12-21

## 2023-11-12 RX ORDER — PROCHLORPERAZINE MALEATE 5 MG
10 TABLET ORAL EVERY 6 HOURS PRN
Status: CANCELLED | OUTPATIENT
Start: 2023-12-21

## 2023-11-12 RX ORDER — EPINEPHRINE 0.3 MG/.3ML
0.3 INJECTION SUBCUTANEOUS EVERY 5 MIN PRN
Status: CANCELLED | OUTPATIENT
Start: 2023-12-21

## 2023-11-12 RX ORDER — ALBUTEROL SULFATE 0.83 MG/ML
3 SOLUTION RESPIRATORY (INHALATION) AS NEEDED
Status: CANCELLED | OUTPATIENT
Start: 2023-11-30

## 2023-11-12 RX ORDER — FAMOTIDINE 10 MG/ML
20 INJECTION INTRAVENOUS ONCE AS NEEDED
Status: CANCELLED | OUTPATIENT
Start: 2023-12-21

## 2023-11-12 RX ORDER — DIPHENHYDRAMINE HYDROCHLORIDE 50 MG/ML
50 INJECTION INTRAMUSCULAR; INTRAVENOUS AS NEEDED
Status: CANCELLED | OUTPATIENT
Start: 2023-11-30

## 2023-11-13 ENCOUNTER — TELEPHONE (OUTPATIENT)
Dept: GYNECOLOGIC ONCOLOGY | Facility: HOSPITAL | Age: 63
End: 2023-11-13
Payer: COMMERCIAL

## 2023-11-13 DIAGNOSIS — Z79.891 ENCOUNTER FOR LONG-TERM USE OF OPIATE ANALGESIC: ICD-10-CM

## 2023-11-13 DIAGNOSIS — C53.9 CERVICAL CANCER, FIGO STAGE IVB (MULTI): ICD-10-CM

## 2023-11-13 DIAGNOSIS — G89.3 CANCER RELATED PAIN: ICD-10-CM

## 2023-11-13 RX ORDER — OXYCODONE HYDROCHLORIDE 5 MG/1
5 TABLET ORAL 3 TIMES DAILY
COMMUNITY
End: 2023-11-13 | Stop reason: SDUPTHER

## 2023-11-13 NOTE — TELEPHONE ENCOUNTER
Pharmacy called re: they are not able to get oxycodone capsules, but they have tablets. Need to cancel the order for oxcodone 5mg capsules and reorder oxycodone 5mg tablets.

## 2023-11-14 RX ORDER — OXYCODONE HYDROCHLORIDE 5 MG/1
5 TABLET ORAL 3 TIMES DAILY
Qty: 90 TABLET | Refills: 0 | Status: SHIPPED | OUTPATIENT
Start: 2023-11-14 | End: 2024-02-05

## 2023-11-15 NOTE — PROGRESS NOTES
Consent:  Verbal consent was requested and obtained from patient on this date for a telehealth visit.    Patient ID: Shelbi Delaney is a 63 y.o. female.  Referring Physician: No referring provider defined for this encounter.  Primary Care Provider: Sonja Uriarte MD    Subjective    Patient presents today in follow-up evaluation for C#7 Pembrolizumab. She reports she is overall tolerating treatment without significant side effects. Denies abdominopelvic pain, nausea/vomiting, fevers, chills, chest pain or shortness of breath. Continues to have bilateral lower extremity swelling unchanged since last assessment. Intermittent facial rash controlled with steroids. Having regular bowel movements; voiding via nephrostomies no issues/changes. Denies redness around drain sites or foul odor. No recent falls. No other concerns/complaints.   Denies interim hospitalizations since last assessment.     A comprehensive review of systems was performed and otherwise negative.     Objective    BSA: There is no height or weight on file to calculate BSA.  There were no vitals taken for this visit.     Physical Exam  Pulmonary:      Effort: Pulmonary effort is normal.   Neurological:      Mental Status: She is alert and oriented to person, place, and time. Mental status is at baseline.   Psychiatric:         Mood and Affect: Mood normal.         Behavior: Behavior normal.       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  review with history of cervical mass consistent with GYN primary  - Bilateral nephrostomy placement   2/16/23 - 6/1/23: Carbo AUC5/Taxol 175 +Avastin 15mg/kg, Pembrolizumab 200mg/m2  - C2-C4 +Avastin - further treatments held due to progressive gr2 proteinuria   - Grade 3 anemia declining transfusion,   7/20/23 - present: Pembrolizumab 200mg/m2      Molecular Testing  PLD1: CPS 10     Cervical cancer, FIGO stage IVB (CMS/HCC)   3/21/2022 Cancer Staged    Staging form: Cervix Uteri, AJCC Version 9, Clinical stage from 3/21/2022: FIGO Stage RONIT (cT4, cN2, cM0) - Signed by Adrianna Jefferson MD on 10/12/2023, Prognostic indicators: CPS 10     7/20/2023 -  Chemotherapy    Pembrolizumab, 21 Day Cycles     8/23/2023 Initial Diagnosis    Cervical cancer, FIGO stage IIIB (CMS/HCC)       Cancer Staging   Cervical cancer, FIGO stage IVB (CMS/HCC)  Staging form: Cervix Uteri, AJCC Version 9  - Clinical stage from 3/21/2022: FIGO Stage RONIT (cT4, cN2, cM0) - Signed by Adrianna Jefferson MD on 10/12/2023    Diagnoses and all orders for this visit:  Cervical cancer, FIGO stage IVB (CMS/HCC)  - Treatment 11/30 (delay x1 week) for Thanksgiving holiday per patient request  - Follow up pretreatment labs; continue q3 wk dosing  - Anticipate CT C/A/P (noncon) after C#9  Acquired hydronephrosis due to obstruction of ureter  - Last PCN exchange 10/16/23; due for exchange next month per patient report  - History of recurrent UTIs - no symptoms at present; reassess next visit  Encounter for antineoplastic immunotherapy  - Follow up per chemo calendar    Approximately 7min spent in discussion with patient.     Adrianna Jefferson MD

## 2023-11-16 ENCOUNTER — TELEMEDICINE (OUTPATIENT)
Dept: GYNECOLOGIC ONCOLOGY | Facility: CLINIC | Age: 63
End: 2023-11-16
Payer: COMMERCIAL

## 2023-11-16 DIAGNOSIS — I27.20 PULMONARY HYPERTENSION (MULTI): ICD-10-CM

## 2023-11-16 DIAGNOSIS — Z51.12 ENCOUNTER FOR ANTINEOPLASTIC IMMUNOTHERAPY: ICD-10-CM

## 2023-11-16 DIAGNOSIS — C53.9 CERVICAL CANCER, FIGO STAGE IVB (MULTI): Primary | ICD-10-CM

## 2023-11-16 DIAGNOSIS — N18.4 CHRONIC RENAL DISEASE, STAGE IV (MULTI): ICD-10-CM

## 2023-11-16 DIAGNOSIS — N13.1 ACQUIRED HYDRONEPHROSIS DUE TO OBSTRUCTION OF URETER: ICD-10-CM

## 2023-11-16 PROCEDURE — 99441 PR PHYS/QHP TELEPHONE EVALUATION 5-10 MIN: CPT | Performed by: STUDENT IN AN ORGANIZED HEALTH CARE EDUCATION/TRAINING PROGRAM

## 2023-11-16 ASSESSMENT — PAIN SCALES - GENERAL: PAINLEVEL: 0-NO PAIN

## 2023-11-16 ASSESSMENT — COLUMBIA-SUICIDE SEVERITY RATING SCALE - C-SSRS
6. HAVE YOU EVER DONE ANYTHING, STARTED TO DO ANYTHING, OR PREPARED TO DO ANYTHING TO END YOUR LIFE?: NO
1. IN THE PAST MONTH, HAVE YOU WISHED YOU WERE DEAD OR WISHED YOU COULD GO TO SLEEP AND NOT WAKE UP?: NO
2. HAVE YOU ACTUALLY HAD ANY THOUGHTS OF KILLING YOURSELF?: NO

## 2023-11-16 ASSESSMENT — PATIENT HEALTH QUESTIONNAIRE - PHQ9
2. FEELING DOWN, DEPRESSED OR HOPELESS: NOT AT ALL
SUM OF ALL RESPONSES TO PHQ9 QUESTIONS 1 AND 2: 0
1. LITTLE INTEREST OR PLEASURE IN DOING THINGS: NOT AT ALL

## 2023-11-16 ASSESSMENT — ENCOUNTER SYMPTOMS
OCCASIONAL FEELINGS OF UNSTEADINESS: 0
LOSS OF SENSATION IN FEET: 0
DEPRESSION: 0

## 2023-11-20 DIAGNOSIS — M62.838 MUSCLE SPASM: ICD-10-CM

## 2023-11-21 RX ORDER — TIZANIDINE 4 MG/1
4 TABLET ORAL 3 TIMES DAILY
Qty: 90 TABLET | Refills: 0 | Status: SHIPPED | OUTPATIENT
Start: 2023-11-21 | End: 2024-02-05

## 2023-11-22 ENCOUNTER — APPOINTMENT (OUTPATIENT)
Dept: HEMATOLOGY/ONCOLOGY | Facility: CLINIC | Age: 63
End: 2023-11-22
Payer: COMMERCIAL

## 2023-11-27 ENCOUNTER — LAB (OUTPATIENT)
Dept: LAB | Facility: CLINIC | Age: 63
End: 2023-11-27
Payer: COMMERCIAL

## 2023-11-27 DIAGNOSIS — C53.9 CERVICAL CANCER, FIGO STAGE IVB: ICD-10-CM

## 2023-11-27 LAB
ALBUMIN SERPL BCP-MCNC: 3.8 G/DL (ref 3.4–5)
ALP SERPL-CCNC: 42 U/L (ref 33–136)
ALT SERPL W P-5'-P-CCNC: 5 U/L (ref 7–45)
ANION GAP SERPL CALC-SCNC: 14 MMOL/L (ref 10–20)
AST SERPL W P-5'-P-CCNC: 14 U/L (ref 9–39)
BASOPHILS # BLD AUTO: 0.09 X10*3/UL (ref 0–0.1)
BASOPHILS NFR BLD AUTO: 1.4 %
BILIRUB SERPL-MCNC: 0.3 MG/DL (ref 0–1.2)
BUN SERPL-MCNC: 38 MG/DL (ref 6–23)
CALCIUM SERPL-MCNC: 9 MG/DL (ref 8.6–10.3)
CHLORIDE SERPL-SCNC: 104 MMOL/L (ref 98–107)
CO2 SERPL-SCNC: 25 MMOL/L (ref 21–32)
CORTIS AM PEAK SERPL-MSCNC: 8.2 UG/DL (ref 5–20)
CREAT SERPL-MCNC: 2.47 MG/DL (ref 0.5–1.05)
EOSINOPHIL # BLD AUTO: 0.45 X10*3/UL (ref 0–0.7)
EOSINOPHIL NFR BLD AUTO: 6.9 %
ERYTHROCYTE [DISTWIDTH] IN BLOOD BY AUTOMATED COUNT: 14.8 % (ref 11.5–14.5)
GFR SERPL CREATININE-BSD FRML MDRD: 21 ML/MIN/1.73M*2
GLUCOSE SERPL-MCNC: 89 MG/DL (ref 74–99)
HCT VFR BLD AUTO: 29.2 % (ref 36–46)
HGB BLD-MCNC: 8.8 G/DL (ref 12–16)
IMM GRANULOCYTES # BLD AUTO: 0.01 X10*3/UL (ref 0–0.7)
IMM GRANULOCYTES NFR BLD AUTO: 0.2 % (ref 0–0.9)
LYMPHOCYTES # BLD AUTO: 0.87 X10*3/UL (ref 1.2–4.8)
LYMPHOCYTES NFR BLD AUTO: 13.3 %
MCH RBC QN AUTO: 29.1 PG (ref 26–34)
MCHC RBC AUTO-ENTMCNC: 30.1 G/DL (ref 32–36)
MCV RBC AUTO: 97 FL (ref 80–100)
MONOCYTES # BLD AUTO: 0.56 X10*3/UL (ref 0.1–1)
MONOCYTES NFR BLD AUTO: 8.6 %
NEUTROPHILS # BLD AUTO: 4.56 X10*3/UL (ref 1.2–7.7)
NEUTROPHILS NFR BLD AUTO: 69.6 %
NRBC BLD-RTO: 0 /100 WBCS (ref 0–0)
PLATELET # BLD AUTO: 293 X10*3/UL (ref 150–450)
POTASSIUM SERPL-SCNC: 4.7 MMOL/L (ref 3.5–5.3)
PROT SERPL-MCNC: 7.6 G/DL (ref 6.4–8.2)
RBC # BLD AUTO: 3.02 X10*6/UL (ref 4–5.2)
SODIUM SERPL-SCNC: 138 MMOL/L (ref 136–145)
T4 FREE SERPL-MCNC: 0.79 NG/DL (ref 0.78–1.48)
TSH SERPL-ACNC: 5.1 MIU/L (ref 0.44–3.98)
WBC # BLD AUTO: 6.5 X10*3/UL (ref 4.4–11.3)

## 2023-11-27 PROCEDURE — 80053 COMPREHEN METABOLIC PANEL: CPT

## 2023-11-27 PROCEDURE — 84439 ASSAY OF FREE THYROXINE: CPT

## 2023-11-27 PROCEDURE — 82533 TOTAL CORTISOL: CPT

## 2023-11-27 PROCEDURE — 84443 ASSAY THYROID STIM HORMONE: CPT

## 2023-11-27 PROCEDURE — 82024 ASSAY OF ACTH: CPT

## 2023-11-27 PROCEDURE — 85025 COMPLETE CBC W/AUTO DIFF WBC: CPT

## 2023-11-29 LAB — ACTH PLAS-MCNC: 18 PG/ML (ref 7.2–63.3)

## 2023-11-30 ENCOUNTER — INFUSION (OUTPATIENT)
Dept: HEMATOLOGY/ONCOLOGY | Facility: CLINIC | Age: 63
End: 2023-11-30
Payer: COMMERCIAL

## 2023-11-30 VITALS
BODY MASS INDEX: 29 KG/M2 | RESPIRATION RATE: 18 BRPM | SYSTOLIC BLOOD PRESSURE: 124 MMHG | OXYGEN SATURATION: 96 % | WEIGHT: 179.68 LBS | TEMPERATURE: 97.5 F | DIASTOLIC BLOOD PRESSURE: 83 MMHG | HEART RATE: 73 BPM

## 2023-11-30 DIAGNOSIS — C53.9 CERVICAL CANCER, FIGO STAGE IVB (MULTI): ICD-10-CM

## 2023-11-30 PROCEDURE — 2500000004 HC RX 250 GENERAL PHARMACY W/ HCPCS (ALT 636 FOR OP/ED): Mod: JZ | Performed by: STUDENT IN AN ORGANIZED HEALTH CARE EDUCATION/TRAINING PROGRAM

## 2023-11-30 PROCEDURE — 96413 CHEMO IV INFUSION 1 HR: CPT

## 2023-11-30 RX ORDER — HEPARIN 100 UNIT/ML
500 SYRINGE INTRAVENOUS AS NEEDED
Status: DISCONTINUED | OUTPATIENT
Start: 2023-11-30 | End: 2023-11-30 | Stop reason: HOSPADM

## 2023-11-30 RX ORDER — EPINEPHRINE 0.3 MG/.3ML
0.3 INJECTION SUBCUTANEOUS EVERY 5 MIN PRN
Status: DISCONTINUED | OUTPATIENT
Start: 2023-11-30 | End: 2023-11-30 | Stop reason: HOSPADM

## 2023-11-30 RX ORDER — HEPARIN SODIUM,PORCINE/PF 10 UNIT/ML
50 SYRINGE (ML) INTRAVENOUS AS NEEDED
Status: DISCONTINUED | OUTPATIENT
Start: 2023-11-30 | End: 2023-11-30 | Stop reason: HOSPADM

## 2023-11-30 RX ORDER — HEPARIN 100 UNIT/ML
500 SYRINGE INTRAVENOUS AS NEEDED
Status: CANCELLED | OUTPATIENT
Start: 2023-11-30

## 2023-11-30 RX ORDER — PROCHLORPERAZINE MALEATE 10 MG
10 TABLET ORAL EVERY 6 HOURS PRN
Status: DISCONTINUED | OUTPATIENT
Start: 2023-11-30 | End: 2023-11-30 | Stop reason: HOSPADM

## 2023-11-30 RX ORDER — PROCHLORPERAZINE EDISYLATE 5 MG/ML
10 INJECTION INTRAMUSCULAR; INTRAVENOUS EVERY 6 HOURS PRN
Status: DISCONTINUED | OUTPATIENT
Start: 2023-11-30 | End: 2023-11-30 | Stop reason: HOSPADM

## 2023-11-30 RX ORDER — FAMOTIDINE 10 MG/ML
20 INJECTION INTRAVENOUS ONCE AS NEEDED
Status: DISCONTINUED | OUTPATIENT
Start: 2023-11-30 | End: 2023-11-30 | Stop reason: HOSPADM

## 2023-11-30 RX ORDER — ALBUTEROL SULFATE 0.83 MG/ML
3 SOLUTION RESPIRATORY (INHALATION) AS NEEDED
Status: DISCONTINUED | OUTPATIENT
Start: 2023-11-30 | End: 2023-11-30 | Stop reason: HOSPADM

## 2023-11-30 RX ORDER — HEPARIN SODIUM,PORCINE/PF 10 UNIT/ML
50 SYRINGE (ML) INTRAVENOUS AS NEEDED
Status: CANCELLED | OUTPATIENT
Start: 2023-11-30

## 2023-11-30 RX ORDER — DIPHENHYDRAMINE HYDROCHLORIDE 50 MG/ML
50 INJECTION INTRAMUSCULAR; INTRAVENOUS AS NEEDED
Status: DISCONTINUED | OUTPATIENT
Start: 2023-11-30 | End: 2023-11-30 | Stop reason: HOSPADM

## 2023-11-30 RX ADMIN — SODIUM CHLORIDE 200 MG: 9 INJECTION, SOLUTION INTRAVENOUS at 10:12

## 2023-11-30 ASSESSMENT — PAIN SCALES - GENERAL: PAINLEVEL: 4

## 2023-12-01 DIAGNOSIS — C53.9 CERVICAL CANCER, FIGO STAGE IVB (MULTI): Primary | ICD-10-CM

## 2023-12-06 DIAGNOSIS — C53.9 CERVICAL CANCER, FIGO STAGE IVB (MULTI): ICD-10-CM

## 2023-12-06 DIAGNOSIS — G89.3 CANCER RELATED PAIN: ICD-10-CM

## 2023-12-06 DIAGNOSIS — Z79.891 ENCOUNTER FOR LONG-TERM USE OF OPIATE ANALGESIC: ICD-10-CM

## 2023-12-06 RX ORDER — OXYCODONE HYDROCHLORIDE 5 MG/1
5 CAPSULE ORAL 3 TIMES DAILY PRN
Qty: 90 CAPSULE | Refills: 0 | Status: SHIPPED | OUTPATIENT
Start: 2023-12-06 | End: 2024-01-03 | Stop reason: SDUPTHER

## 2023-12-11 ENCOUNTER — PREP FOR PROCEDURE (OUTPATIENT)
Dept: RADIOLOGY | Facility: HOSPITAL | Age: 63
End: 2023-12-11
Payer: COMMERCIAL

## 2023-12-12 ENCOUNTER — TELEPHONE (OUTPATIENT)
Dept: RADIOLOGY | Facility: HOSPITAL | Age: 63
End: 2023-12-12

## 2023-12-12 DIAGNOSIS — N13.9 URINARY TRACT OBSTRUCTION: Primary | ICD-10-CM

## 2023-12-14 ENCOUNTER — APPOINTMENT (OUTPATIENT)
Dept: GYNECOLOGIC ONCOLOGY | Facility: CLINIC | Age: 63
End: 2023-12-14
Payer: COMMERCIAL

## 2023-12-15 ENCOUNTER — PRE-ADMISSION TESTING (OUTPATIENT)
Dept: PREADMISSION TESTING | Facility: HOSPITAL | Age: 63
End: 2023-12-15
Payer: COMMERCIAL

## 2023-12-15 VITALS
BODY MASS INDEX: 28.93 KG/M2 | HEART RATE: 78 BPM | WEIGHT: 184.3 LBS | DIASTOLIC BLOOD PRESSURE: 84 MMHG | TEMPERATURE: 96.8 F | HEIGHT: 67 IN | OXYGEN SATURATION: 100 % | SYSTOLIC BLOOD PRESSURE: 128 MMHG

## 2023-12-15 DIAGNOSIS — N18.9 CHRONIC KIDNEY DISEASE, UNSPECIFIED CKD STAGE: Primary | ICD-10-CM

## 2023-12-15 PROCEDURE — 94760 N-INVAS EAR/PLS OXIMETRY 1: CPT

## 2023-12-15 PROCEDURE — 99203 OFFICE O/P NEW LOW 30 MIN: CPT | Performed by: PHYSICIAN ASSISTANT

## 2023-12-15 ASSESSMENT — CHADS2 SCORE
HYPERTENSION: NO
AGE GREATER THAN OR EQUAL TO 75: NO
CHADS2 SCORE: 0
CHF: NO
AGE GREATER THAN OR EQUAL TO 75: NO
PRIOR STROKE OR TIA OR THROMBOEMBOLISM: NO
DIABETES: NO

## 2023-12-15 ASSESSMENT — DUKE ACTIVITY SCORE INDEX (DASI)
DASI METS SCORE: 5.1
CAN YOU PARTICIPATE IN MODERATE RECREATIONAL ACTIVITIES LIKE GOLF, BOWLING, DANCING, DOUBLES TENNIS OR THROWING A BASEBALL OR FOOTBALL: NO
CAN YOU WALK INDOORS, SUCH AS AROUND YOUR HOUSE: YES
CAN YOU WALK A BLOCK OR TWO ON LEVEL GROUND: YES
TOTAL_SCORE: 18.95
CAN YOU PARTICIPATE IN STRENOUS SPORTS LIKE SWIMMING, SINGLES TENNIS, FOOTBALL, BASKETBALL, OR SKIING: NO
CAN YOU DO LIGHT WORK AROUND THE HOUSE LIKE DUSTING OR WASHING DISHES: YES
CAN YOU DO YARD WORK LIKE RAKING LEAVES, WEEDING OR PUSHING A MOWER: NO
CAN YOU DO MODERATE WORK AROUND THE HOUSE LIKE VACUUMING, SWEEPING FLOORS OR CARRYING GROCERIES: YES
CAN YOU TAKE CARE OF YOURSELF (EAT, DRESS, BATHE, OR USE TOILET): YES
CAN YOU CLIMB A FLIGHT OF STAIRS OR WALK UP A HILL: YES
CAN YOU DO HEAVY WORK AROUND THE HOUSE LIKE SCRUBBING FLOORS OR LIFTING AND MOVING HEAVY FURNITURE: NO
CAN YOU HAVE SEXUAL RELATIONS: NO
CAN YOU RUN A SHORT DISTANCE: NO

## 2023-12-15 ASSESSMENT — PAIN SCALES - GENERAL: PAINLEVEL_OUTOF10: 5 - MODERATE PAIN

## 2023-12-15 ASSESSMENT — LIFESTYLE VARIABLES: SMOKING_STATUS: NONSMOKER

## 2023-12-15 ASSESSMENT — PAIN - FUNCTIONAL ASSESSMENT: PAIN_FUNCTIONAL_ASSESSMENT: 0-10

## 2023-12-15 NOTE — CPM/PAT H&P
CPM/PAT Evaluation       Name: Shelbi Delaney (Shelbi Delaney)  /Age: 1960/63 y.o.     In-Person       Chief Complaint: Chronic kidney disease    HPI 63-year-old female with history of stage IV cervix cancer and chronic kidney disease.  Patient has had multiple percutaneous nephrostomy tube exchanges.  Patient states she usually gets exchanged every 3 months.  Last change was approximately 2 months ago.  Patient states that she has local infection of both sides.  Patient denies pain.  She states the nephrostomy tubes are uncomfortable.  She denies fevers or chills.  Patient is scheduled for bilateral nephrostomy tube exchange 2023    Past Medical History:   Diagnosis Date    Abdominal distension (gaseous) 2022    Abdominal bloating    Anxiety     Body mass index (BMI)30.0-30.9, adult 2019    BMI 30.0-30.9,adult    Cervix cancer (CMS/HCC)     Chemotherapy-induced peripheral neuropathy (CMS/HCC)     Chronic kidney disease     Encounter for screening for malignant neoplasm of colon     Encounter for screening colonoscopy    Hypertension     Hypothyroidism     Personal history of other specified conditions     History of snoring    Tachycardia, unspecified     Tachycardia       Past Surgical History:   Procedure Laterality Date    CT GUIDED IMAGING FOR NEEDLE PLACEMENT  2023    CT GUIDED IMAGING FOR NEEDLE PLACEMENT [facility]    IR NEPHROSTOMY TUBE EXCHANGE  10/20/2023    IR NEPHROSTOMY TUBE EXCHANGE 10/20/2023 Manuel Marvin MD [facility]    NEPHROSTOMY      ORIF TIBIA FRACTURE Right     TRANSURETHRAL RESECTION OF BLADDER TUMOR      US GUIDED PERCUTANEOUS PLACEMENT  2022    US GUIDED PERCUTANEOUS PLACEMENT [facility]    US GUIDED PERCUTANEOUS PLACEMENT  2023    US GUIDED PERCUTANEOUS PLACEMENT [facility]       Patient  has no history on file for sexual activity.    Family History   Problem Relation Name Age of Onset    No Known Problems Mother       Other (Acute myocardial infarction) Father         No Known Allergies    Current Outpatient Medications   Medication Instructions    calcium carbonate (CALCIUM 600) 600 mg, oral, Daily    DULoxetine (CYMBALTA) 20 mg, oral, Daily    gabapentin (NEURONTIN) 900 mg, oral, 3 times daily    hydrOXYzine HCL (Atarax) 10 mg tablet 1 tablet, oral, 3 times daily PRN    hydrOXYzine HCL (Atarax) 25 mg tablet 1 tablet, oral, 3 times daily PRN    levothyroxine (SYNTHROID, LEVOXYL) 25 mcg, oral, Daily    metoprolol tartrate (Lopressor) 50 mg tablet take 0.5 tablets by mouth 2 times a day    naloxone (Narcan) 4 mg/0.1 mL nasal spray 0.1 mL, nasal, As needed, May repeat every 2-3 minutes if needed, alternating nostrils, until medical assistance becomes available.    oxyCODONE (OXY-IR) 5 mg, oral, 3 times daily PRN    oxyCODONE (ROXICODONE) 5 mg, oral, 3 times daily, As needed for pain    tiZANidine (ZANAFLEX) 4 mg, oral, 3 times daily    traZODone (Desyrel) 50 mg tablet 1 tablet, oral, Nightly PRN     Review of Systems   All other systems reviewed and are negative.       Physical Exam  Vitals reviewed. Physical exam within normal limits.   Constitutional:       Appearance: Normal appearance.   HENT:      Head: Normocephalic and atraumatic.      Mouth/Throat:      Mouth: Mucous membranes are moist.   Eyes:      Extraocular Movements: Extraocular movements intact.      Pupils: Pupils are equal, round, and reactive to light.   Cardiovascular:      Rate and Rhythm: Normal rate and regular rhythm.   Pulmonary:      Effort: Pulmonary effort is normal.      Breath sounds: Normal breath sounds.   Abdominal:      General: Bowel sounds are normal.      Palpations: Abdomen is soft.   Musculoskeletal:         General: Normal range of motion.      Cervical back: Normal range of motion.      Right lower leg: Edema present.      Left lower leg: Edema present.   Skin:     General: Skin is warm and dry.   Neurological:      General: No focal  "deficit present.      Mental Status: She is alert and oriented to person, place, and time.   Psychiatric:         Mood and Affect: Mood normal.         Behavior: Behavior normal.         Thought Content: Thought content normal.         Judgment: Judgment normal.          PAT AIRWAY:   Airway:     Mallampati::  III    TM distance::  >3 FB    Neck ROM::  Full      Vitals  Heart Rate:  [78]   Temp:  [36 °C (96.8 °F)]   BP: (128)/(84)   Height:  [170.2 cm (5' 7\")]   Weight:  [83.6 kg (184 lb 4.9 oz)]   SpO2:  [100 %]        DASI Risk Score      Flowsheet Row Most Recent Value   DASI SCORE 18.95   METS Score (Will be calculated only when all the questions are answered) 5.1          Caprini DVT Assessment      Flowsheet Row Most Recent Value   DVT Score 19   Current Status Central venous access, Varicose veins, Swollen legs, Minor surgery planned, Present cancer or chemotherapy   History SVT, DVT/PE, Prior major surgery, Hip, pelvis or leg fracture   Age 60-75 years   BMI 30 or less          Modified Frailty Index    No data to display       CHADS2 Stroke Risk  Current as of just now        N/A 3 - 100%: High Risk   2 - 3%: Medium Risk   0 - 2%: Low Risk     Last Change: N/A          This score determines the patient's risk of having a stroke if the patient has atrial fibrillation.        This score is not applicable to this patient. Components are not calculated.          Revised Cardiac Risk Index      Flowsheet Row Most Recent Value   Revised Cardiac Risk Calculator 1          Apfel Simplified Score      Flowsheet Row Most Recent Value   Apfel Simplified Score Calculator 3          Risk Analysis Index Results This Encounter    No data found in the last 1 encounters.       Stop Bang Score      Flowsheet Row Most Recent Value   Do you snore loudly? 1   Do you often feel tired or fatigued after your sleep? 0   Has anyone ever observed you stop breathing in your sleep? 0   Do you have or are you being treated for high " blood pressure? 1   Recent BMI (Calculated) 28.9   Is BMI greater than 35 kg/m2? 0=No   Age older than 50 years old? 1=Yes   Is your neck circumference greater than 17 inches (Male) or 16 inches (Female)? 0   Gender - Male 0=No   STOP-BANG Total Score 3            Assessment and Plan:     1.  Chronic kidney disease   Plan: Bilateral nephrostomy tube exchange 12/19/2023  2.  Cervix cancer stage IV  3.  Hypothyroidism  4.  Hypertension  5.  ASA 3

## 2023-12-15 NOTE — PREPROCEDURE INSTRUCTIONS
Medication List            Accurate as of December 15, 2023 10:55 AM. Always use your most recent med list.                Calcium 600 600 mg calcium (1,500 mg) tablet  Generic drug: calcium carbonate     DULoxetine 20 mg DR capsule  Commonly known as: Cymbalta  Take 1 capsule (20 mg) by mouth once daily.     gabapentin 300 mg capsule  Commonly known as: Neurontin     * hydrOXYzine HCL 10 mg tablet  Commonly known as: Atarax     * hydrOXYzine HCL 25 mg tablet  Commonly known as: Atarax     levothyroxine 25 mcg tablet  Commonly known as: Synthroid, Levoxyl  Take 1 tablet (25 mcg) by mouth once daily.     metoprolol tartrate 50 mg tablet  Commonly known as: Lopressor  take 0.5 tablets by mouth 2 times a day     naloxone 4 mg/0.1 mL nasal spray  Commonly known as: Narcan     * oxyCODONE 5 mg immediate release tablet  Commonly known as: Roxicodone  Take 1 tablet (5 mg) by mouth 3 times a day. As needed for pain     * oxyCODONE 5 mg immediate release capsule  Commonly known as: Oxy-IR  Take 1 capsule (5 mg) by mouth 3 times a day as needed for severe pain (7 - 10).     tiZANidine 4 mg tablet  Commonly known as: Zanaflex  TAKE 1 TABLET BY MOUTH 3 TIMES DAILY     traZODone 50 mg tablet  Commonly known as: Desyrel           * This list has 4 medication(s) that are the same as other medications prescribed for you. Read the directions carefully, and ask your doctor or other care provider to review them with you.                                  NPO Instructions:    Do not eat any food after midnight the night before your surgery/procedure.    Additional Instructions:     Wear  comfortable loose fitting clothing  Do not use moisturizers, creams, lotions or perfume  All jewelry and valuables should be left at home    You will receive a call with further instructions from Interventional radiology and [facility] phone number  call with any questions or concerns.

## 2023-12-18 ENCOUNTER — LAB (OUTPATIENT)
Dept: LAB | Facility: CLINIC | Age: 63
End: 2023-12-18
Payer: COMMERCIAL

## 2023-12-18 ENCOUNTER — TELEPHONE (OUTPATIENT)
Dept: PREOP | Facility: HOSPITAL | Age: 63
End: 2023-12-18

## 2023-12-18 ENCOUNTER — ANCILLARY PROCEDURE (OUTPATIENT)
Dept: RADIOLOGY | Facility: CLINIC | Age: 63
End: 2023-12-18
Payer: COMMERCIAL

## 2023-12-18 DIAGNOSIS — C53.9 CERVICAL CANCER, FIGO STAGE IVB (MULTI): ICD-10-CM

## 2023-12-18 DIAGNOSIS — C53.9 CERVICAL CANCER, FIGO STAGE IVB: ICD-10-CM

## 2023-12-18 LAB
ALBUMIN SERPL BCP-MCNC: 3.7 G/DL (ref 3.4–5)
ALP SERPL-CCNC: 53 U/L (ref 33–136)
ALT SERPL W P-5'-P-CCNC: 5 U/L (ref 7–45)
ANION GAP SERPL CALC-SCNC: 14 MMOL/L (ref 10–20)
AST SERPL W P-5'-P-CCNC: 12 U/L (ref 9–39)
BASOPHILS # BLD AUTO: 0.07 X10*3/UL (ref 0–0.1)
BASOPHILS NFR BLD AUTO: 1.2 %
BILIRUB SERPL-MCNC: 0.4 MG/DL (ref 0–1.2)
BUN SERPL-MCNC: 33 MG/DL (ref 6–23)
CALCIUM SERPL-MCNC: 8.8 MG/DL (ref 8.6–10.3)
CHLORIDE SERPL-SCNC: 106 MMOL/L (ref 98–107)
CO2 SERPL-SCNC: 24 MMOL/L (ref 21–32)
CREAT SERPL-MCNC: 2.5 MG/DL (ref 0.5–1.05)
EOSINOPHIL # BLD AUTO: 0.44 X10*3/UL (ref 0–0.7)
EOSINOPHIL NFR BLD AUTO: 7.6 %
ERYTHROCYTE [DISTWIDTH] IN BLOOD BY AUTOMATED COUNT: 14.7 % (ref 11.5–14.5)
GFR SERPL CREATININE-BSD FRML MDRD: 21 ML/MIN/1.73M*2
GLUCOSE SERPL-MCNC: 92 MG/DL (ref 74–99)
HCT VFR BLD AUTO: 28.4 % (ref 36–46)
HGB BLD-MCNC: 8.7 G/DL (ref 12–16)
IMM GRANULOCYTES # BLD AUTO: 0.03 X10*3/UL (ref 0–0.7)
IMM GRANULOCYTES NFR BLD AUTO: 0.5 % (ref 0–0.9)
LYMPHOCYTES # BLD AUTO: 0.84 X10*3/UL (ref 1.2–4.8)
LYMPHOCYTES NFR BLD AUTO: 14.5 %
MCH RBC QN AUTO: 29.4 PG (ref 26–34)
MCHC RBC AUTO-ENTMCNC: 30.6 G/DL (ref 32–36)
MCV RBC AUTO: 96 FL (ref 80–100)
MONOCYTES # BLD AUTO: 0.53 X10*3/UL (ref 0.1–1)
MONOCYTES NFR BLD AUTO: 9.1 %
NEUTROPHILS # BLD AUTO: 3.9 X10*3/UL (ref 1.2–7.7)
NEUTROPHILS NFR BLD AUTO: 67.1 %
NRBC BLD-RTO: 0 /100 WBCS (ref 0–0)
PLATELET # BLD AUTO: 283 X10*3/UL (ref 150–450)
POTASSIUM SERPL-SCNC: 4.8 MMOL/L (ref 3.5–5.3)
PROT SERPL-MCNC: 7.5 G/DL (ref 6.4–8.2)
RBC # BLD AUTO: 2.96 X10*6/UL (ref 4–5.2)
SODIUM SERPL-SCNC: 139 MMOL/L (ref 136–145)
WBC # BLD AUTO: 5.8 X10*3/UL (ref 4.4–11.3)

## 2023-12-18 PROCEDURE — 80053 COMPREHEN METABOLIC PANEL: CPT

## 2023-12-18 PROCEDURE — 85025 COMPLETE CBC W/AUTO DIFF WBC: CPT

## 2023-12-18 PROCEDURE — 71250 CT THORAX DX C-: CPT | Performed by: STUDENT IN AN ORGANIZED HEALTH CARE EDUCATION/TRAINING PROGRAM

## 2023-12-18 PROCEDURE — 74176 CT ABD & PELVIS W/O CONTRAST: CPT

## 2023-12-18 PROCEDURE — 74176 CT ABD & PELVIS W/O CONTRAST: CPT | Performed by: STUDENT IN AN ORGANIZED HEALTH CARE EDUCATION/TRAINING PROGRAM

## 2023-12-18 RX ORDER — GABAPENTIN 300 MG/1
900 CAPSULE ORAL 3 TIMES DAILY
Qty: 270 CAPSULE | Refills: 0 | OUTPATIENT
Start: 2023-12-18

## 2023-12-19 ENCOUNTER — TELEPHONE (OUTPATIENT)
Dept: PRIMARY CARE | Facility: CLINIC | Age: 63
End: 2023-12-19
Payer: COMMERCIAL

## 2023-12-19 ENCOUNTER — HOSPITAL ENCOUNTER (OUTPATIENT)
Dept: CARDIOLOGY | Facility: HOSPITAL | Age: 63
Discharge: HOME | End: 2023-12-19
Payer: COMMERCIAL

## 2023-12-19 VITALS
RESPIRATION RATE: 16 BRPM | DIASTOLIC BLOOD PRESSURE: 81 MMHG | OXYGEN SATURATION: 100 % | TEMPERATURE: 97.9 F | SYSTOLIC BLOOD PRESSURE: 129 MMHG | HEART RATE: 58 BPM

## 2023-12-19 DIAGNOSIS — R21 RASH: Primary | ICD-10-CM

## 2023-12-19 DIAGNOSIS — C53.9 MALIGNANT NEOPLASM OF CERVIX, UNSPECIFIED SITE (MULTI): Primary | ICD-10-CM

## 2023-12-19 DIAGNOSIS — N13.9 URINARY TRACT OBSTRUCTION: ICD-10-CM

## 2023-12-19 PROCEDURE — 2500000004 HC RX 250 GENERAL PHARMACY W/ HCPCS (ALT 636 FOR OP/ED): Performed by: RADIOLOGY

## 2023-12-19 PROCEDURE — 2550000001 HC RX 255 CONTRASTS: Performed by: RADIOLOGY

## 2023-12-19 PROCEDURE — 99152 MOD SED SAME PHYS/QHP 5/>YRS: CPT

## 2023-12-19 PROCEDURE — 2720000007 HC OR 272 NO HCPCS

## 2023-12-19 PROCEDURE — C1769 GUIDE WIRE: HCPCS

## 2023-12-19 PROCEDURE — 7100000010 HC PHASE TWO TIME - EACH INCREMENTAL 1 MINUTE

## 2023-12-19 PROCEDURE — 2500000005 HC RX 250 GENERAL PHARMACY W/O HCPCS: Performed by: RADIOLOGY

## 2023-12-19 PROCEDURE — 7100000009 HC PHASE TWO TIME - INITIAL BASE CHARGE

## 2023-12-19 PROCEDURE — C1729 CATH, DRAINAGE: HCPCS

## 2023-12-19 RX ORDER — SODIUM CHLORIDE 9 MG/ML
INJECTION, SOLUTION INTRAVENOUS CONTINUOUS PRN
Status: DISCONTINUED | OUTPATIENT
Start: 2023-12-19 | End: 2023-12-20 | Stop reason: HOSPADM

## 2023-12-19 RX ORDER — MIDAZOLAM HYDROCHLORIDE 1 MG/ML
INJECTION, SOLUTION INTRAMUSCULAR; INTRAVENOUS AS NEEDED
Status: DISCONTINUED | OUTPATIENT
Start: 2023-12-19 | End: 2023-12-20 | Stop reason: HOSPADM

## 2023-12-19 RX ORDER — IODIXANOL 320 MG/ML
INJECTION, SOLUTION INTRAVASCULAR AS NEEDED
Status: DISCONTINUED | OUTPATIENT
Start: 2023-12-19 | End: 2023-12-20 | Stop reason: HOSPADM

## 2023-12-19 RX ORDER — CIPROFLOXACIN 2 MG/ML
400 INJECTION, SOLUTION INTRAVENOUS ONCE
Status: COMPLETED | OUTPATIENT
Start: 2023-12-19 | End: 2023-12-19

## 2023-12-19 RX ORDER — FENTANYL CITRATE 50 UG/ML
INJECTION, SOLUTION INTRAMUSCULAR; INTRAVENOUS AS NEEDED
Status: DISCONTINUED | OUTPATIENT
Start: 2023-12-19 | End: 2023-12-20 | Stop reason: HOSPADM

## 2023-12-19 RX ORDER — NYSTATIN 100000 [USP'U]/G
1 POWDER TOPICAL 2 TIMES DAILY
Qty: 60 G | Refills: 1 | Status: SHIPPED | OUTPATIENT
Start: 2023-12-19 | End: 2023-12-28

## 2023-12-19 RX ADMIN — IODIXANOL 10 ML: 320 INJECTION, SOLUTION INTRAVASCULAR at 13:49

## 2023-12-19 RX ADMIN — FENTANYL CITRATE 50 MCG: 50 INJECTION, SOLUTION INTRAMUSCULAR; INTRAVENOUS at 13:25

## 2023-12-19 RX ADMIN — SODIUM CHLORIDE 100 ML/HR: 9 INJECTION, SOLUTION INTRAVENOUS at 13:02

## 2023-12-19 RX ADMIN — CIPROFLOXACIN 400 MG: 400 INJECTION, SOLUTION INTRAVENOUS at 11:45

## 2023-12-19 RX ADMIN — Medication 3 L/MIN: at 12:59

## 2023-12-19 RX ADMIN — MIDAZOLAM 1 MG: 1 INJECTION INTRAMUSCULAR; INTRAVENOUS at 13:26

## 2023-12-19 ASSESSMENT — PAIN SCALES - GENERAL
PAINLEVEL_OUTOF10: 7
PAINLEVEL_OUTOF10: 0 - NO PAIN
PAINLEVEL_OUTOF10: 0 - NO PAIN

## 2023-12-19 ASSESSMENT — PAIN - FUNCTIONAL ASSESSMENT
PAIN_FUNCTIONAL_ASSESSMENT: 0-10

## 2023-12-19 NOTE — NURSING NOTE
Patient going to wound clinic from [facility].  Nephrostomy tubes secured with large ace wrap.

## 2023-12-20 NOTE — TUMOR BOARD NOTE
Gynecologic Oncology Tumor Board 2023    Specialties Present: Gyn Onc, Radiology, Genetics, Radiation oncology, Pathology, Nurse Navigator, Research    Shelbi Delaney  63 y.o.    1960  MRN [id number]    Provider: Adrianna Jefferson MD  Disease Site/Stage: Cervical, Stage IVB  Pathology: Squamous cell carcinoma  Prior Therapy: TURBT, PCNs, Carbo/Taxol + Avastin + Pembrolizumab (Avastin held after C4 due to progressive proteinuria)  Impression: 64yo with stage IVB G2 SCC of cervix s/p cycle 9 of Pembrolizumab with progressive disease.    We reviewed previous medical and familial history, history of present illness, and recent lab results along with all available histopathologic and imaging studies. The tumor board considered available treatment options and made the following recommendations.    Recommendations:  Recommended Plan: Chemotherapy  HER2 testing.   Consider NGS.  Consider Tisotumab vedotin vs. Fam-trastuzumab deruxtecan (T-DXd)  Clinical Trial Eligibility: None available       National site-specific guidelines were discussed with respect to the case. It is recognized that there may be additional factors not discussed in the Review Board discussion that can influence management decisions, and alternative management options which fall within the standard of care. Accordingly the final treatment disposition will be determined by the patient, in the context of an informed discussion with their providers. The actual prescribed management or treatment plan may or may not be consistent with the Review Board recommendations.

## 2023-12-20 NOTE — TELEPHONE ENCOUNTER
Patients phone number is disconnected, I also tried her emergency contacts and their phone number won't go through.

## 2023-12-21 ENCOUNTER — OFFICE VISIT (OUTPATIENT)
Dept: GYNECOLOGIC ONCOLOGY | Facility: CLINIC | Age: 63
End: 2023-12-21
Payer: COMMERCIAL

## 2023-12-21 ENCOUNTER — APPOINTMENT (OUTPATIENT)
Dept: HEMATOLOGY/ONCOLOGY | Facility: CLINIC | Age: 63
End: 2023-12-21
Payer: COMMERCIAL

## 2023-12-21 ENCOUNTER — HOME HEALTH ADMISSION (OUTPATIENT)
Dept: HOME HEALTH SERVICES | Facility: HOME HEALTH | Age: 63
End: 2023-12-21
Payer: COMMERCIAL

## 2023-12-21 ENCOUNTER — INFUSION (OUTPATIENT)
Dept: HEMATOLOGY/ONCOLOGY | Facility: CLINIC | Age: 63
End: 2023-12-21
Payer: COMMERCIAL

## 2023-12-21 VITALS
WEIGHT: 182.21 LBS | OXYGEN SATURATION: 97 % | HEART RATE: 67 BPM | DIASTOLIC BLOOD PRESSURE: 76 MMHG | BODY MASS INDEX: 28.54 KG/M2 | SYSTOLIC BLOOD PRESSURE: 112 MMHG | RESPIRATION RATE: 18 BRPM | TEMPERATURE: 98.1 F

## 2023-12-21 DIAGNOSIS — C53.9 CERVICAL CANCER, FIGO STAGE IVB (MULTI): ICD-10-CM

## 2023-12-21 DIAGNOSIS — B37.2 YEAST DERMATITIS: ICD-10-CM

## 2023-12-21 DIAGNOSIS — C53.9 CERVICAL CANCER, FIGO STAGE IVA (MULTI): Primary | ICD-10-CM

## 2023-12-21 DIAGNOSIS — R60.0 BILATERAL LOWER EXTREMITY EDEMA: ICD-10-CM

## 2023-12-21 DIAGNOSIS — Z51.11 ENCOUNTER FOR ANTINEOPLASTIC CHEMOTHERAPY AND IMMUNOTHERAPY: ICD-10-CM

## 2023-12-21 DIAGNOSIS — Z51.12 ENCOUNTER FOR ANTINEOPLASTIC CHEMOTHERAPY AND IMMUNOTHERAPY: ICD-10-CM

## 2023-12-21 DIAGNOSIS — N13.9 OBSTRUCTIVE UROPATHY: ICD-10-CM

## 2023-12-21 PROCEDURE — 3078F DIAST BP <80 MM HG: CPT | Performed by: STUDENT IN AN ORGANIZED HEALTH CARE EDUCATION/TRAINING PROGRAM

## 2023-12-21 PROCEDURE — 99215 OFFICE O/P EST HI 40 MIN: CPT | Performed by: STUDENT IN AN ORGANIZED HEALTH CARE EDUCATION/TRAINING PROGRAM

## 2023-12-21 PROCEDURE — 1036F TOBACCO NON-USER: CPT | Performed by: STUDENT IN AN ORGANIZED HEALTH CARE EDUCATION/TRAINING PROGRAM

## 2023-12-21 PROCEDURE — 3074F SYST BP LT 130 MM HG: CPT | Performed by: STUDENT IN AN ORGANIZED HEALTH CARE EDUCATION/TRAINING PROGRAM

## 2023-12-21 RX ORDER — FLUCONAZOLE 150 MG/1
150 TABLET ORAL ONCE
Qty: 1 TABLET | Refills: 0 | Status: SHIPPED | OUTPATIENT
Start: 2023-12-21 | End: 2023-12-21

## 2023-12-21 ASSESSMENT — ENCOUNTER SYMPTOMS
OCCASIONAL FEELINGS OF UNSTEADINESS: 0
DEPRESSION: 0
LOSS OF SENSATION IN FEET: 1

## 2023-12-21 ASSESSMENT — COLUMBIA-SUICIDE SEVERITY RATING SCALE - C-SSRS
1. IN THE PAST MONTH, HAVE YOU WISHED YOU WERE DEAD OR WISHED YOU COULD GO TO SLEEP AND NOT WAKE UP?: NO
2. HAVE YOU ACTUALLY HAD ANY THOUGHTS OF KILLING YOURSELF?: NO
6. HAVE YOU EVER DONE ANYTHING, STARTED TO DO ANYTHING, OR PREPARED TO DO ANYTHING TO END YOUR LIFE?: NO

## 2023-12-21 ASSESSMENT — PAIN SCALES - GENERAL: PAINLEVEL: 4

## 2023-12-21 NOTE — PROGRESS NOTES
Patient ID: Shelbi Delaney is a 63 y.o. female.  Referring Physician: No referring provider defined for this encounter.  Primary Care Provider: Sonja Uriarte MD    Subjective    Patient presenting for consideration of C#8 of Pembrolizumab; reports progressive fatigue and muscle weakness over past several weeks. Otherwise tolerating therapy as anticipated - facial rash present, adequately controlled with topical steroids. She reports interval development of diffuse rash over lower back attributed to PCN occlusive dressings and empirically started on topical antifungal treatment. PCNs most recently changed on 12/19 and states they advised Kerlix/Ace wraps around flank to stabilize nephrostomies. Pain related to rash and cancer pain for which she follows with Pain Management - increased Oxycodone requirement. Denies fevers or chills. Having regular bowel movements, denies vaginal bleeding/drainage. No other complaints.     A comprehensive review of systems was performed and otherwise negative.    Objective    BSA: 1.98 meters squared  /76 (BP Location: Left arm, Patient Position: Sitting, BP Cuff Size: Adult)   Pulse 67   Temp 36.7 °C (98.1 °F) (Temporal)   Resp 18   Wt 82.6 kg (182 lb 3.4 oz)   SpO2 97%   BMI 28.54 kg/m²      Physical Exam  Vitals and nursing note reviewed.   Constitutional:       General: She is not in acute distress.     Appearance: Normal appearance.      Comments: Ambulates with cane   HENT:      Head: Normocephalic and atraumatic.      Mouth/Throat:      Mouth: Mucous membranes are moist.      Pharynx: Oropharynx is clear.   Eyes:      Extraocular Movements: Extraocular movements intact.      Conjunctiva/sclera: Conjunctivae normal.      Pupils: Pupils are equal, round, and reactive to light.   Cardiovascular:      Rate and Rhythm: Normal rate and regular rhythm.      Pulses: Normal pulses.   Pulmonary:      Effort: Pulmonary effort is normal.      Breath sounds: Normal breath  sounds. No wheezing, rhonchi or rales.   Abdominal:      General: There is no distension.      Palpations: Abdomen is soft. There is no mass.      Tenderness: There is no abdominal tenderness.   Genitourinary:     Comments: Deferred  Musculoskeletal:         General: Swelling present. No tenderness. Normal range of motion.      Cervical back: Normal range of motion and neck supple.      Comments: RLE 2+ edema greater than left, 1+ - unchanged from prior assessments   Skin:     General: Skin is warm.      Findings: Rash present.      Comments: Erythematous desquamated macules with irregular borders and satellite erythematous papule on bilateral flanks circumferentially encompassing nephrostomy sites x12cm with excoriations   Neurological:      General: No focal deficit present.      Mental Status: She is alert and oriented to person, place, and time.   Psychiatric:         Mood and Affect: Mood normal.         Behavior: Behavior normal.     CT chest abdomen pelvis wo IV contrast  Result Date: 12/20/2023  Impression:   1. Cervical cancer restaging scan compared to loops 09/10/2023 CT chest and 08/28/2023 CT abdominopelvic. The patient's primary cervical mass is not well delineated on this noncontrast CT scan. The appearance of extension of calcifications into the vagina and uterus is unchanged. However, there is overall increasing metastatic lymphadenopathy including interval enlargement of calcified lymph nodes within the mediastinum, retroperitoneum, bilateral pelvis, and bilateral inguinal region. Metastatic focus within the right inferior pubic ramus is stable. No new metastatic lesions identified.   2. Cystic pelvic masses as described above are not well delineated on this noncontrast study. These appear overall not significantly changed from the prior CT.   3. Bilateral nephrostomy tubes in place with mild right hydroureteronephrosis, unchanged compared to 08/28/2023 CT and no left hydronephrosis. Right  pelvicaliceal and ureteral urothelial thickening and fat stranding noted, for correlation with urinalysis to assess for urinary tract infection.  4. Additional findings as described above.      Performance Status:  Symptomatic; fully ambulatory    Assessment/Plan   64yo with stage RONIT squamous cell carcinoma of the cervix s/p Carbo/Taxol/Avastin/Pembrolizumab; most recently on Pembrolizumab maintenance with imaging concerning for progressive disease. Obstructive uropathy with PCNs in place, candidiasis on exam. ECOG 1.   Oncology History Overview Note   Stage RONIT grade 2 squamous cell carcinoma of cervix  1/20/23: TURBT - poorly differentiated squamaous cell carcinoma; CPS10.  review with history of cervical mass consistent with GYN primary  - Extension to rectal mucosa, pubic ramus with ?reactive mediastinal LN   - Bilateral nephrostomy placement   2/16/23 - 6/1/23: Carbo AUC5/Taxol 175 +Avastin 15mg/kg, Pembrolizumab 200mg/m2  - C2-C4 +Avastin - further treatments held due to progressive gr2 proteinuria   - Grade 3 anemia declining transfusion,   7/20/23 - present: Pembrolizumab 200mg/m2     Molecular Testing  PLD1: CPS 10     Cervical cancer, FIGO stage IVB (CMS/HCC)   3/21/2022 Cancer Staged    Staging form: Cervix Uteri, AJCC Version 9, Clinical stage from 3/21/2022: FIGO Stage RONIT (cT4, cN2, cM0) - Signed by Adrianna Jefferson MD on 10/12/2023, Prognostic indicators: CPS 10     7/20/2023 -  Chemotherapy    Pembrolizumab, 21 Day Cycles       Diagnoses and all orders for this visit:  Cervical cancer, FIGO stage RONIT (CMS/HCC)  - Imaging concerning for disease progression on maintenance Pembrolizumab, >30% interval change consistent with progressive disease per irRECIST criteria  - Discussed NGS testing, Her2 re: TDXd vs. T[place], supportive care. Patient wishes to pursue further cancer-directed therapy   -     Referral to Home Care; Future  Encounter for antineoplastic chemotherapy and immunotherapy  - Concern for  progression of pulmonary disease; MD in pelvis - plan to review imaging at GYN Tumor board in setting of mixed response  - Defer treatment x1 week pending treatment planning discussion   Yeast dermatitis  -     fluconazole (Diflucan) 150 mg tablet; Take 1 tablet (150 mg) by mouth 1 time for 1 dose.  Lower extremity swelling  - Previously ruled out for DVT; last US 8/11/23  - Repeat US evaluation and referral for lymphedema compression stockings; likely 2/2 disease process with inguinal lymphadenopathy and renal insufficiency   Obstructive uropathy  - Limited ability for ongoing self-care and approrpiate identification of progressive infection; s/p recent PCN exchange 12/19 with severe progressive candidal dermatitis  - Consider Wound Care RN evaluation for further recommendations if refractory   -     Referral to Home Care; Future    Treatment Plans       Name Type Plan Dates Plan Provider         Active    Pembrolizumab, 21 Day Cycles Oncology Treatment  7/19/2023 - Present MD Adrianna Melgar MD

## 2023-12-22 ENCOUNTER — TUMOR BOARD CONFERENCE (OUTPATIENT)
Dept: HEMATOLOGY/ONCOLOGY | Facility: HOSPITAL | Age: 63
End: 2023-12-22
Payer: COMMERCIAL

## 2023-12-22 ENCOUNTER — DOCUMENTATION (OUTPATIENT)
Dept: HOME HEALTH SERVICES | Facility: HOME HEALTH | Age: 63
End: 2023-12-22

## 2023-12-22 NOTE — HH CARE COORDINATION
Home Care received a Referral for Nursing. We have processed the referral for a Start of Care on 12/23.     If you have any questions or concerns, please feel free to contact us at [phone]. Follow the prompts, enter your five digit zip code, and you will be directed to your care team on E[facility] 1.

## 2023-12-27 ENCOUNTER — TELEPHONE (OUTPATIENT)
Dept: GYNECOLOGIC ONCOLOGY | Facility: HOSPITAL | Age: 63
End: 2023-12-27
Payer: COMMERCIAL

## 2023-12-27 DIAGNOSIS — B37.9 YEAST INFECTION: ICD-10-CM

## 2023-12-27 DIAGNOSIS — C53.9 CERVICAL CANCER, FIGO STAGE IVA (MULTI): ICD-10-CM

## 2023-12-27 NOTE — TELEPHONE ENCOUNTER
Patient's  Kenn asking about a wound care consult for the yeast infection of her nephrostomy tube as discussed at her appointment. Sent referral to [facility]. Also, he is asking if she can take Dr. Padron lymphatic drain supplement. Reviewed the label. It is an immune booster and has 27 herbs.  Recommend that she not take this during chemo and explained rationale. Recommend compression hose and elevation as the best way to treat edema.

## 2023-12-28 ENCOUNTER — HOME CARE VISIT (OUTPATIENT)
Dept: HOME HEALTH SERVICES | Facility: HOME HEALTH | Age: 63
End: 2023-12-28
Payer: COMMERCIAL

## 2023-12-28 VITALS
OXYGEN SATURATION: 99 % | DIASTOLIC BLOOD PRESSURE: 82 MMHG | TEMPERATURE: 97.7 F | HEART RATE: 74 BPM | SYSTOLIC BLOOD PRESSURE: 130 MMHG | RESPIRATION RATE: 18 BRPM

## 2023-12-28 DIAGNOSIS — R21 RASH: ICD-10-CM

## 2023-12-28 DIAGNOSIS — C53.9 CERVICAL CANCER, FIGO STAGE IVA (MULTI): Primary | ICD-10-CM

## 2023-12-28 PROCEDURE — 0023 HH SOC

## 2023-12-28 PROCEDURE — G0299 HHS/HOSPICE OF RN EA 15 MIN: HCPCS

## 2023-12-28 RX ORDER — NYSTATIN 100000 [USP'U]/G
POWDER TOPICAL
Qty: 60 G | Refills: 0 | Status: SHIPPED | OUTPATIENT
Start: 2023-12-28 | End: 2024-02-27 | Stop reason: ALTCHOICE

## 2023-12-28 ASSESSMENT — ENCOUNTER SYMPTOMS
APPETITE LEVEL: GOOD
PAIN LOCATION: BACK
HIGHEST PAIN SEVERITY IN PAST 24 HOURS: 5/10
PAIN: 1
PAIN SEVERITY GOAL: 0/10
LOWEST PAIN SEVERITY IN PAST 24 HOURS: 2/10

## 2023-12-28 ASSESSMENT — ACTIVITIES OF DAILY LIVING (ADL)
DRESSING_LB_CURRENT_FUNCTION: STAND BY ASSIST
ENTERING_EXITING_HOME: STAND BY ASSIST
OASIS_M1830: 03

## 2023-12-29 ENCOUNTER — OFFICE VISIT (OUTPATIENT)
Dept: WOUND CARE | Facility: HOSPITAL | Age: 63
End: 2023-12-29
Payer: COMMERCIAL

## 2023-12-29 DIAGNOSIS — T83.512D INFECTION AND INFLAMMATORY REACTION DUE TO NEPHROSTOMY CATHETER, SUBSEQUENT ENCOUNTER: ICD-10-CM

## 2023-12-29 PROCEDURE — 99215 OFFICE O/P EST HI 40 MIN: CPT

## 2023-12-29 PROCEDURE — 87205 SMEAR GRAM STAIN: CPT | Mod: WESLAB

## 2023-12-29 PROCEDURE — 99417 PROLNG OP E/M EACH 15 MIN: CPT

## 2024-01-01 LAB
BACTERIA SPEC CULT: ABNORMAL
GRAM STN SPEC: ABNORMAL
GRAM STN SPEC: ABNORMAL

## 2024-01-03 DIAGNOSIS — Z79.891 ENCOUNTER FOR LONG-TERM USE OF OPIATE ANALGESIC: ICD-10-CM

## 2024-01-03 DIAGNOSIS — C53.9 CERVICAL CANCER, FIGO STAGE IVB (MULTI): ICD-10-CM

## 2024-01-03 DIAGNOSIS — G89.3 CANCER RELATED PAIN: ICD-10-CM

## 2024-01-03 NOTE — TELEPHONE ENCOUNTER
Patient requesting oxycodone refill, OARRS reviewed, no issues, last fill 12/6/23, CSA/UDS up to date. Will remind patient to schedule 3 month follow up.

## 2024-01-04 ENCOUNTER — HOME CARE VISIT (OUTPATIENT)
Dept: HOME HEALTH SERVICES | Facility: HOME HEALTH | Age: 64
End: 2024-01-04
Payer: COMMERCIAL

## 2024-01-04 VITALS
DIASTOLIC BLOOD PRESSURE: 80 MMHG | SYSTOLIC BLOOD PRESSURE: 128 MMHG | HEART RATE: 70 BPM | OXYGEN SATURATION: 100 % | RESPIRATION RATE: 18 BRPM | TEMPERATURE: 98.1 F

## 2024-01-04 DIAGNOSIS — R23.8: ICD-10-CM

## 2024-01-04 DIAGNOSIS — C53.9 CERVICAL CANCER, FIGO STAGE IVA (MULTI): ICD-10-CM

## 2024-01-04 PROCEDURE — G0299 HHS/HOSPICE OF RN EA 15 MIN: HCPCS

## 2024-01-04 RX ORDER — OXYCODONE HYDROCHLORIDE 5 MG/1
5 CAPSULE ORAL 3 TIMES DAILY PRN
Qty: 90 CAPSULE | Refills: 0 | Status: SHIPPED | OUTPATIENT
Start: 2024-01-05 | End: 2024-02-05

## 2024-01-04 RX ORDER — AMMONIUM LACTATE 12 G/100G
LOTION TOPICAL AS NEEDED
Qty: 225 G | Refills: 1 | Status: SHIPPED | OUTPATIENT
Start: 2024-01-04

## 2024-01-04 SDOH — ECONOMIC STABILITY: GENERAL

## 2024-01-04 ASSESSMENT — ENCOUNTER SYMPTOMS
PAIN: 1
PAIN LOCATION: GENERALIZED
LOWER EXTREMITY EDEMA: 1
APPETITE LEVEL: GOOD

## 2024-01-04 ASSESSMENT — ACTIVITIES OF DAILY LIVING (ADL)
CURRENT_FUNCTION: STAND BY ASSIST
AMBULATION ASSISTANCE: STAND BY ASSIST

## 2024-01-04 NOTE — PROGRESS NOTES
Patient was seen by wound clinic yesterday. They advised that she use Lac Hydrin cream to her neph tube sites. She would like to see if it could be covered under her insurance. Rx sent.

## 2024-01-05 ENCOUNTER — APPOINTMENT (OUTPATIENT)
Dept: WOUND CARE | Facility: HOSPITAL | Age: 64
End: 2024-01-05
Payer: COMMERCIAL

## 2024-01-09 ENCOUNTER — HOME CARE VISIT (OUTPATIENT)
Dept: HOME HEALTH SERVICES | Facility: HOME HEALTH | Age: 64
End: 2024-01-09
Payer: COMMERCIAL

## 2024-01-09 VITALS
TEMPERATURE: 97.2 F | SYSTOLIC BLOOD PRESSURE: 130 MMHG | HEART RATE: 55 BPM | RESPIRATION RATE: 18 BRPM | OXYGEN SATURATION: 100 % | DIASTOLIC BLOOD PRESSURE: 80 MMHG

## 2024-01-09 PROCEDURE — G0299 HHS/HOSPICE OF RN EA 15 MIN: HCPCS

## 2024-01-09 SDOH — ECONOMIC STABILITY: GENERAL

## 2024-01-09 ASSESSMENT — ENCOUNTER SYMPTOMS
APPETITE LEVEL: GOOD
DRY SKIN: 1
LOWER EXTREMITY EDEMA: 1

## 2024-01-09 ASSESSMENT — ACTIVITIES OF DAILY LIVING (ADL)
AMBULATION ASSISTANCE: STAND BY ASSIST
CURRENT_FUNCTION: STAND BY ASSIST

## 2024-01-10 ENCOUNTER — ONCOLOGY MEDICATION OUTREACH (OUTPATIENT)
Dept: GYNECOLOGIC ONCOLOGY | Facility: HOSPITAL | Age: 64
End: 2024-01-10
Payer: COMMERCIAL

## 2024-01-10 DIAGNOSIS — C53.9 CERVICAL CANCER, FIGO STAGE IVA (MULTI): Primary | ICD-10-CM

## 2024-01-10 DIAGNOSIS — C53.9 CERVICAL CANCER, FIGO STAGE IVA (MULTI): ICD-10-CM

## 2024-01-10 RX ORDER — PROCHLORPERAZINE MALEATE 10 MG
10 TABLET ORAL EVERY 6 HOURS PRN
Qty: 30 TABLET | Refills: 5 | Status: SHIPPED | OUTPATIENT
Start: 2024-01-10

## 2024-01-10 RX ORDER — ONDANSETRON HYDROCHLORIDE 8 MG/1
8 TABLET, FILM COATED ORAL EVERY 8 HOURS PRN
Qty: 30 TABLET | Refills: 5 | Status: SHIPPED | OUTPATIENT
Start: 2024-01-10

## 2024-01-10 RX ORDER — PREDNISOLONE ACETATE 10 MG/ML
1 SUSPENSION/ DROPS OPHTHALMIC 3 TIMES DAILY
Qty: 5 ML | Refills: 11 | Status: SHIPPED | OUTPATIENT
Start: 2024-01-10 | End: 2024-03-14 | Stop reason: WASHOUT

## 2024-01-10 NOTE — PROGRESS NOTES
ONCOLOGY CLINICAL PHARMACY NOTE     Subjective  Shelbi Delaney is a 63 y.o. female with cervical cancer, called for education.        Treatment history  Treatment Details   Treatment goal [No plan goal]   Plan Name Venous Access Orders   Status Active   Start Date 10/12/2023   End Date Until discontinued   Provider Adrianna Jefferson MD   Chemotherapy [No matching medication found in this treatment plan]     Treatment Details   Treatment goal [No plan goal]   Plan Name Pembrolizumab, 21 Day Cycles   Status Inactive   Start Date 7/20/2023   End Date 11/30/2023   Provider Adrianna Jefferson MD   Chemotherapy pembrolizumab (Keytruda) 200 mg in sodium chloride 0.9% 100 mL IV, 200 mg, intravenous, Once, 7 of 17 cycles  Administration: 200 mg (10/12/2023), 200 mg (11/2/2023), 200 mg (11/30/2023)       Treatment Details   Treatment goal Palliative   Plan Name Tisotumab vedotin, 21 Day Cycles   Status Active   Start Date 1/11/2024 (Planned)   End Date 12/12/2024 (Planned)   Provider Adrianna Jefferson MD   Chemotherapy tisotumab vedotin (Tivdak) 74 mg in sodium chloride 0.9% 107.4 mL IV, 0.9 mg/kg = 74 mg (45 % of original dose 2 mg/kg), intravenous, Once, 0 of 17 cycles  Dose modification: 0.9 mg/kg (original dose 2 mg/kg, Cycle 1, Reason: Abnormal Renal Function, Comment: reduced CrCl)          Objective  There were no vitals taken for this visit.  Lab Results   Component Value Date    WBC 5.8 12/18/2023    HGB 8.7 (L) 12/18/2023    HCT 28.4 (L) 12/18/2023    MCV 96 12/18/2023     12/18/2023      Lab Results   Component Value Date    GLUCOSE 92 12/18/2023    CALCIUM 8.8 12/18/2023     12/18/2023    K 4.8 12/18/2023    CO2 24 12/18/2023     12/18/2023    BUN 33 (H) 12/18/2023    CREATININE 2.50 (H) 12/18/2023     Lab Results   Component Value Date    ALT 5 (L) 12/18/2023    AST 12 12/18/2023    ALKPHOS 53 12/18/2023    BILITOT 0.4 12/18/2023       Allergies and Medications   No Known Allergies    Current Outpatient  Medications:     ammonium lactate (Lac-Hydrin) 12 % lotion, Apply topically if needed for dry skin., Disp: 225 g, Rfl: 1    calcium carbonate (Calcium 600) 600 mg calcium (1,500 mg) tablet, Take 600 mg by mouth once daily., Disp: , Rfl:     carboxymethylcell-glycerin,PF, 0.5-0.9 % dropperette, Administer 1 drop into both eyes every 6 hours. Continue for 30 days after last treatment is complete., Disp: 120 each, Rfl: 11    doxycycline (Vibra-Tabs) 100 mg tablet, Take 100 mg by mouth 2 times a day. Indications: infection, Disp: , Rfl:     DULoxetine (Cymbalta) 20 mg DR capsule, Take 1 capsule (20 mg) by mouth once daily., Disp: 30 capsule, Rfl: 5    gabapentin (Neurontin) 300 mg capsule, Take 3 capsules by mouth 3 times a day., Disp: , Rfl:     hydrOXYzine HCL (Atarax) 25 mg tablet, Take 1 tablet (25 mg) by mouth 3 times a day as needed for anxiety., Disp: , Rfl:     levothyroxine (Synthroid, Levoxyl) 25 mcg tablet, Take 1 tablet (25 mcg) by mouth once daily., Disp: 90 tablet, Rfl: 3    metoprolol tartrate (Lopressor) 50 mg tablet, take 0.5 tablets by mouth 2 times a day, Disp: 30 tablet, Rfl: 5    naloxone (Narcan) 4 mg/0.1 mL nasal spray, Administer 1 spray into affected nostril(s) if needed for opioid reversal. May repeat every 2-3 minutes if needed, alternating nostrils, until medical assistance becomes available., Disp: , Rfl:     naphazoline-glycerin 0.012-0.2 % drops, Administer 2 drops into both eyes 1 time for 1 dose. Take immediately prior to infusion., Disp: 6 mL, Rfl: 0    nystatin (Mycostatin) 100,000 unit/gram powder, apply one application topically two times a day, Disp: 60 g, Rfl: 0    ondansetron (Zofran) 8 mg tablet, Take 1 tablet (8 mg) by mouth every 8 hours if needed for nausea or vomiting., Disp: 30 tablet, Rfl: 5    oxyCODONE (Oxy-IR) 5 mg immediate release capsule, Take 1 capsule (5 mg) by mouth 3 times a day as needed for severe pain (7 - 10). Do not start before January 5, 2024., Disp: 90  capsule, Rfl: 0    oxyCODONE (Roxicodone) 5 mg immediate release tablet, Take 1 tablet (5 mg) by mouth 3 times a day. As needed for pain, Disp: 90 tablet, Rfl: 0    prednisoLONE acetate (Pred-Forte) 1 % ophthalmic suspension, Administer 1 drop into both eyes 3 times a day. Administer the first drop in each eye prior to each tisotumab vedotin infusion then continue as prescribed for 72 hours., Disp: 5 mL, Rfl: 11    prochlorperazine (Compazine) 10 mg tablet, Take 1 tablet (10 mg) by mouth every 6 hours if needed for nausea or vomiting., Disp: 30 tablet, Rfl: 5    tiZANidine (Zanaflex) 4 mg tablet, TAKE 1 TABLET BY MOUTH 3 TIMES DAILY, Disp: 90 tablet, Rfl: 0    traZODone (Desyrel) 50 mg tablet, Take 1 tablet (50 mg) by mouth as needed at bedtime for sleep., Disp: , Rfl:     Assessment and Plan  Shelbi Delaney is a 63 y.o. female with cervical cancer, to be treated with tivdak.    Chemotherapy  Education: Reviewed drug, dose, frequency, administration, treatment cycle, duration of therapy, and missed doses. Counseled on potential side effects including but not limited to chemotherapy side effects: neutropenia, infection risk, anemia, fatigue, weakness, low energy, thrombocytopenia, bleeding/bruising, n/v, diarrhea, hair loss, muscle or joint pain, skin rash, tumor lysis syndrome, and neuropathy. Other key toxicity reviewed was ocular toxicity. Discussed signs/symptoms and monitoring frequency. Discussed techniques to mitigate severity of side effects such as blood count checks, temperature checks, electrolyte monitoring, antiemetic use, loperamide use with max dose of 8 tabs per 24 hours, and staying hydrated if having diarrhea.  Reviewed eye drop schedule with patient (naphazoline, prednisolone, and lubricant) in detail. Unable to provide handouts due to virtual nature of encounter. Patient had questions about toxicities. All questions answered and contact information was given to patient.    Drug-drug interactions:  no major interactions identified  Time spent on direct patient care: 45 minutes.             Coleman Moon, ArvindD

## 2024-01-11 ENCOUNTER — OFFICE VISIT (OUTPATIENT)
Dept: OPHTHALMOLOGY | Facility: CLINIC | Age: 64
End: 2024-01-11
Payer: COMMERCIAL

## 2024-01-11 ENCOUNTER — OFFICE VISIT (OUTPATIENT)
Dept: GYNECOLOGIC ONCOLOGY | Facility: CLINIC | Age: 64
End: 2024-01-11
Payer: COMMERCIAL

## 2024-01-11 ENCOUNTER — INFUSION (OUTPATIENT)
Dept: HEMATOLOGY/ONCOLOGY | Facility: CLINIC | Age: 64
End: 2024-01-11
Payer: COMMERCIAL

## 2024-01-11 VITALS
SYSTOLIC BLOOD PRESSURE: 128 MMHG | WEIGHT: 181.66 LBS | OXYGEN SATURATION: 98 % | DIASTOLIC BLOOD PRESSURE: 85 MMHG | TEMPERATURE: 96.4 F | BODY MASS INDEX: 28.45 KG/M2 | RESPIRATION RATE: 18 BRPM | HEART RATE: 69 BPM

## 2024-01-11 DIAGNOSIS — H52.4 HYPEROPIA WITH PRESBYOPIA OF BOTH EYES: ICD-10-CM

## 2024-01-11 DIAGNOSIS — L30.9 DERMATITIS: ICD-10-CM

## 2024-01-11 DIAGNOSIS — Z51.12 ENCOUNTER FOR ANTINEOPLASTIC CHEMOTHERAPY AND IMMUNOTHERAPY: ICD-10-CM

## 2024-01-11 DIAGNOSIS — C53.9 CERVICAL CANCER, FIGO STAGE IVA (MULTI): ICD-10-CM

## 2024-01-11 DIAGNOSIS — N13.9 OBSTRUCTIVE UROPATHY: ICD-10-CM

## 2024-01-11 DIAGNOSIS — H25.13 AGE-RELATED NUCLEAR CATARACT OF BOTH EYES: ICD-10-CM

## 2024-01-11 DIAGNOSIS — C53.9 CERVICAL CANCER, FIGO STAGE IVB (MULTI): ICD-10-CM

## 2024-01-11 DIAGNOSIS — H04.123 DRY EYE SYNDROME OF BOTH EYES: Primary | ICD-10-CM

## 2024-01-11 DIAGNOSIS — H01.029 SQUAMOUS BLEPHARITIS OF BOTH UPPER AND LOWER EYELID: ICD-10-CM

## 2024-01-11 DIAGNOSIS — C53.9 CERVICAL CANCER, FIGO STAGE IVA (MULTI): Primary | ICD-10-CM

## 2024-01-11 DIAGNOSIS — G89.3 CANCER RELATED PAIN: ICD-10-CM

## 2024-01-11 DIAGNOSIS — Z51.11 ENCOUNTER FOR ANTINEOPLASTIC CHEMOTHERAPY AND IMMUNOTHERAPY: ICD-10-CM

## 2024-01-11 DIAGNOSIS — H52.03 HYPEROPIA WITH PRESBYOPIA OF BOTH EYES: ICD-10-CM

## 2024-01-11 DIAGNOSIS — H02.889 MEIBOMIAN GLAND DISEASE, UNSPECIFIED LATERALITY: ICD-10-CM

## 2024-01-11 LAB
ALBUMIN SERPL BCP-MCNC: 3.8 G/DL (ref 3.4–5)
ALP SERPL-CCNC: 50 U/L (ref 33–136)
ALT SERPL W P-5'-P-CCNC: 7 U/L (ref 7–45)
ANION GAP SERPL CALC-SCNC: 13 MMOL/L (ref 10–20)
AST SERPL W P-5'-P-CCNC: 14 U/L (ref 9–39)
BASOPHILS # BLD AUTO: 0.1 X10*3/UL (ref 0–0.1)
BASOPHILS NFR BLD AUTO: 1.5 %
BILIRUB SERPL-MCNC: 0.4 MG/DL (ref 0–1.2)
BUN SERPL-MCNC: 43 MG/DL (ref 6–23)
CALCIUM SERPL-MCNC: 8.7 MG/DL (ref 8.6–10.3)
CHLORIDE SERPL-SCNC: 108 MMOL/L (ref 98–107)
CO2 SERPL-SCNC: 25 MMOL/L (ref 21–32)
CREAT SERPL-MCNC: 2.19 MG/DL (ref 0.5–1.05)
EGFRCR SERPLBLD CKD-EPI 2021: 25 ML/MIN/1.73M*2
EOSINOPHIL # BLD AUTO: 0.49 X10*3/UL (ref 0–0.7)
EOSINOPHIL NFR BLD AUTO: 7.5 %
ERYTHROCYTE [DISTWIDTH] IN BLOOD BY AUTOMATED COUNT: 16.3 % (ref 11.5–14.5)
GLUCOSE SERPL-MCNC: 95 MG/DL (ref 74–99)
HBV CORE AB SER QL: NONREACTIVE
HBV SURFACE AB SER-ACNC: <3.1 MIU/ML
HBV SURFACE AG SERPL QL IA: NONREACTIVE
HCT VFR BLD AUTO: 29.2 % (ref 36–46)
HGB BLD-MCNC: 9 G/DL (ref 12–16)
IMM GRANULOCYTES # BLD AUTO: 0.02 X10*3/UL (ref 0–0.7)
IMM GRANULOCYTES NFR BLD AUTO: 0.3 % (ref 0–0.9)
LYMPHOCYTES # BLD AUTO: 0.76 X10*3/UL (ref 1.2–4.8)
LYMPHOCYTES NFR BLD AUTO: 11.7 %
MCH RBC QN AUTO: 29.7 PG (ref 26–34)
MCHC RBC AUTO-ENTMCNC: 30.8 G/DL (ref 32–36)
MCV RBC AUTO: 96 FL (ref 80–100)
MONOCYTES # BLD AUTO: 0.57 X10*3/UL (ref 0.1–1)
MONOCYTES NFR BLD AUTO: 8.8 %
NEUTROPHILS # BLD AUTO: 4.56 X10*3/UL (ref 1.2–7.7)
NEUTROPHILS NFR BLD AUTO: 70.2 %
NRBC BLD-RTO: 0 /100 WBCS (ref 0–0)
PLATELET # BLD AUTO: 234 X10*3/UL (ref 150–450)
POTASSIUM SERPL-SCNC: 4.9 MMOL/L (ref 3.5–5.3)
PROT SERPL-MCNC: 7.5 G/DL (ref 6.4–8.2)
RBC # BLD AUTO: 3.03 X10*6/UL (ref 4–5.2)
SODIUM SERPL-SCNC: 141 MMOL/L (ref 136–145)
WBC # BLD AUTO: 6.5 X10*3/UL (ref 4.4–11.3)

## 2024-01-11 PROCEDURE — 3079F DIAST BP 80-89 MM HG: CPT | Performed by: STUDENT IN AN ORGANIZED HEALTH CARE EDUCATION/TRAINING PROGRAM

## 2024-01-11 PROCEDURE — 86706 HEP B SURFACE ANTIBODY: CPT

## 2024-01-11 PROCEDURE — 1036F TOBACCO NON-USER: CPT | Performed by: STUDENT IN AN ORGANIZED HEALTH CARE EDUCATION/TRAINING PROGRAM

## 2024-01-11 PROCEDURE — 92004 COMPRE OPH EXAM NEW PT 1/>: CPT | Performed by: STUDENT IN AN ORGANIZED HEALTH CARE EDUCATION/TRAINING PROGRAM

## 2024-01-11 PROCEDURE — 86704 HEP B CORE ANTIBODY TOTAL: CPT

## 2024-01-11 PROCEDURE — 80053 COMPREHEN METABOLIC PANEL: CPT

## 2024-01-11 PROCEDURE — 87340 HEPATITIS B SURFACE AG IA: CPT

## 2024-01-11 PROCEDURE — 3074F SYST BP LT 130 MM HG: CPT | Performed by: STUDENT IN AN ORGANIZED HEALTH CARE EDUCATION/TRAINING PROGRAM

## 2024-01-11 PROCEDURE — 99215 OFFICE O/P EST HI 40 MIN: CPT | Performed by: STUDENT IN AN ORGANIZED HEALTH CARE EDUCATION/TRAINING PROGRAM

## 2024-01-11 PROCEDURE — 85025 COMPLETE CBC W/AUTO DIFF WBC: CPT

## 2024-01-11 RX ORDER — EPINEPHRINE 0.3 MG/.3ML
0.3 INJECTION SUBCUTANEOUS EVERY 5 MIN PRN
Status: CANCELLED | OUTPATIENT
Start: 2024-01-12

## 2024-01-11 RX ORDER — PROCHLORPERAZINE EDISYLATE 5 MG/ML
10 INJECTION INTRAMUSCULAR; INTRAVENOUS EVERY 6 HOURS PRN
Status: CANCELLED | OUTPATIENT
Start: 2024-01-12

## 2024-01-11 RX ORDER — ONDANSETRON HYDROCHLORIDE 2 MG/ML
8 INJECTION, SOLUTION INTRAVENOUS ONCE
Status: CANCELLED | OUTPATIENT
Start: 2024-01-12

## 2024-01-11 RX ORDER — ALBUTEROL SULFATE 0.83 MG/ML
3 SOLUTION RESPIRATORY (INHALATION) AS NEEDED
Status: CANCELLED | OUTPATIENT
Start: 2024-01-12

## 2024-01-11 RX ORDER — DIPHENHYDRAMINE HYDROCHLORIDE 50 MG/ML
50 INJECTION INTRAMUSCULAR; INTRAVENOUS AS NEEDED
Status: CANCELLED | OUTPATIENT
Start: 2024-01-12

## 2024-01-11 RX ORDER — PREDNISOLONE ACETATE 10 MG/ML
1 SUSPENSION/ DROPS OPHTHALMIC ONCE
Status: CANCELLED | OUTPATIENT
Start: 2024-01-12

## 2024-01-11 RX ORDER — PROCHLORPERAZINE MALEATE 10 MG
10 TABLET ORAL EVERY 6 HOURS PRN
Status: CANCELLED | OUTPATIENT
Start: 2024-01-12

## 2024-01-11 RX ORDER — FAMOTIDINE 10 MG/ML
20 INJECTION INTRAVENOUS ONCE AS NEEDED
Status: CANCELLED | OUTPATIENT
Start: 2024-01-12

## 2024-01-11 ASSESSMENT — CONF VISUAL FIELD
OD_SUPERIOR_TEMPORAL_RESTRICTION: 0
OD_SUPERIOR_NASAL_RESTRICTION: 0
OS_NORMAL: 1
OS_SUPERIOR_NASAL_RESTRICTION: 0
OS_SUPERIOR_TEMPORAL_RESTRICTION: 0
OS_INFERIOR_TEMPORAL_RESTRICTION: 0
METHOD: COUNTING FINGERS
OD_INFERIOR_TEMPORAL_RESTRICTION: 0
OD_NORMAL: 1
OS_INFERIOR_NASAL_RESTRICTION: 0
OD_INFERIOR_NASAL_RESTRICTION: 0

## 2024-01-11 ASSESSMENT — ENCOUNTER SYMPTOMS
LOSS OF SENSATION IN FEET: 0
HEMATOLOGIC/LYMPHATIC NEGATIVE: 0
GASTROINTESTINAL NEGATIVE: 0
OCCASIONAL FEELINGS OF UNSTEADINESS: 0
PSYCHIATRIC NEGATIVE: 0
ALLERGIC/IMMUNOLOGIC NEGATIVE: 0
RESPIRATORY NEGATIVE: 0
CONSTITUTIONAL NEGATIVE: 0
DEPRESSION: 0
MUSCULOSKELETAL NEGATIVE: 0
EYES NEGATIVE: 0
NEUROLOGICAL NEGATIVE: 0
ENDOCRINE NEGATIVE: 0
CARDIOVASCULAR NEGATIVE: 0

## 2024-01-11 ASSESSMENT — COLUMBIA-SUICIDE SEVERITY RATING SCALE - C-SSRS
6. HAVE YOU EVER DONE ANYTHING, STARTED TO DO ANYTHING, OR PREPARED TO DO ANYTHING TO END YOUR LIFE?: NO
2. HAVE YOU ACTUALLY HAD ANY THOUGHTS OF KILLING YOURSELF?: NO
1. IN THE PAST MONTH, HAVE YOU WISHED YOU WERE DEAD OR WISHED YOU COULD GO TO SLEEP AND NOT WAKE UP?: NO

## 2024-01-11 ASSESSMENT — TONOMETRY
OS_IOP_MMHG: 15
OD_IOP_MMHG: 15
IOP_METHOD: GOLDMANN APPLANATION

## 2024-01-11 ASSESSMENT — REFRACTION_WEARINGRX
OD_SPHERE: +2.75
OS_SPHERE: +2.75

## 2024-01-11 ASSESSMENT — CUP TO DISC RATIO
OS_RATIO: .35
OD_RATIO: .35

## 2024-01-11 ASSESSMENT — VISUAL ACUITY
OD_CC: J5
CORRECTION_TYPE: GLASSES
OS_PH_CC: 20/20
OS_CC: 20/30-1
METHOD: SNELLEN - LINEAR
OD_PH_CC: 20/20
OD_CC: 20/25

## 2024-01-11 ASSESSMENT — PATIENT HEALTH QUESTIONNAIRE - PHQ9
SUM OF ALL RESPONSES TO PHQ9 QUESTIONS 1 AND 2: 0
2. FEELING DOWN, DEPRESSED OR HOPELESS: NOT AT ALL
1. LITTLE INTEREST OR PLEASURE IN DOING THINGS: NOT AT ALL

## 2024-01-11 ASSESSMENT — EXTERNAL EXAM - LEFT EYE: OS_EXAM: NORMAL

## 2024-01-11 ASSESSMENT — REFRACTION_MANIFEST
OS_CYLINDER: -1.00
OD_ADD: +2.50
OS_SPHERE: +3.75
OD_AXIS: 085
OS_ADD: +2.50
OD_SPHERE: +3.75
OD_CYLINDER: -1.25
OS_AXIS: 082

## 2024-01-11 ASSESSMENT — PAIN SCALES - GENERAL: PAINLEVEL: 5

## 2024-01-11 ASSESSMENT — EXTERNAL EXAM - RIGHT EYE: OD_EXAM: NORMAL

## 2024-01-11 NOTE — PROGRESS NOTES
Patient ID: Shelbi Delaney is a 63 y.o. female.  Referring Physician: No referring provider defined for this encounter.  Primary Care Provider: Sonja Uriarte MD    Subjective    Patient presents today in follow-up/initiation for C#1 of Tivdak for progressive metastatic cervical cancer. Continues to have bilateral flank rash 2/2 superimposed staph infection around nephrostomy sites with [facility] - home wound care RN and following up with wound clinic tomorrow for ongoing evaluation/management. Continues with dry dressings; no fevers or chills. Ongoing pain in bilateral lower extremities and back - otherwise in usual health. Denies nausea/vomiting, fevers, chills, chest pain or shortness of breath. Adequate output via PCNs and having bowel movements without difficulty. Denies intermittent numbness/tingling in hands and feet. No recent falls. No other concerns/complaints.   Denies interim hospitalizations since last assessment.     A comprehensive review of systems was performed and otherwise negative.     Last eye examination: 1/11/24  - dry eye syndrome of both eyes    Objective    BSA: 1.97 meters squared  /85 (BP Location: Left arm, Patient Position: Sitting, BP Cuff Size: Large adult long)   Pulse 69   Temp 35.8 °C (96.4 °F) (Temporal)   Resp 18   Wt 82.4 kg (181 lb 10.5 oz)   SpO2 98%   BMI 28.45 kg/m²      Physical Exam  Vitals and nursing note reviewed.   Constitutional:       General: She is not in acute distress.     Appearance: Normal appearance.   HENT:      Head: Normocephalic and atraumatic.      Mouth/Throat:      Mouth: Mucous membranes are moist.      Pharynx: Oropharynx is clear.   Eyes:      Extraocular Movements: Extraocular movements intact.      Conjunctiva/sclera: Conjunctivae normal.      Pupils: Pupils are equal, round, and reactive to light.   Cardiovascular:      Rate and Rhythm: Normal rate and regular rhythm.      Pulses: Normal pulses.   Pulmonary:      Effort: Pulmonary  effort is normal.      Breath sounds: Normal breath sounds. No wheezing, rhonchi or rales.   Abdominal:      General: There is no distension.      Palpations: Abdomen is soft. There is no mass.      Tenderness: There is no abdominal tenderness.   Genitourinary:     Comments: Bilateral PCNs in place draining clear urine  Musculoskeletal:         General: No swelling or tenderness. Normal range of motion.      Cervical back: Normal range of motion and neck supple.   Skin:     General: Skin is warm.      Findings: Rash present.      Comments: Erythematous desquamated patch with irregular borders and satellite erythematous papule on bilateral flanks circumferentially encompassing nephrostomy sites x12cm with excoriations    Neurological:      General: No focal deficit present.      Mental Status: She is alert and oriented to person, place, and time.   Psychiatric:         Mood and Affect: Mood normal.         Behavior: Behavior normal.     Performance Status:  Symptomatic; fully ambulatory    Assessment/Plan   64yo with progressive stage RONIT moderately differentiated SCC of cervix for initiation of Tivdak; severe dermatitis re: chronic leakage and superimposed staph infection of bilateral PCNs   Oncology History Overview Note   Stage RONIT grade 2 squamous cell carcinoma of cervix  1/20/23: TURBT - poorly differentiated squamaous cell carcinoma; CPS10.  review with history of cervical mass consistent with GYN primary  - Extension to rectal mucosa, pubic ramus with ?reactive mediastinal LN   - Bilateral nephrostomy placement   2/16/23 - 6/1/23: Carbo AUC5/Taxol 175 +Avastin 15mg/kg, Pembrolizumab 200mg/m2  - C2-C4 +Avastin - further treatments held due to progressive gr2 proteinuria   - Grade 3 anemia declining transfusion,   7/20/23 - 12/18/23: Pembrolizumab 200mg/m2, progressive disease  1/11/24 - Present: Tivdak  - C1: 0.9mg/kg in setting of CrCl < 30    Molecular Testing  PLD1: CPS 10     Cervical cancer, FIGO stage  RONIT (CMS/HCC)   3/21/2022 Cancer Staged    Staging form: Cervix Uteri, AJCC Version 9, Clinical stage from 3/21/2022: FIGO Stage RONIT (cT4, cN2, cM0) - Signed by Adrianna Jefferson MD on 10/12/2023, Prognostic indicators: CPS 10     7/20/2023 - 11/30/2023 Chemotherapy    Pembrolizumab, 21 Day Cycles     1/11/2024 -  Chemotherapy    Tisotumab vedotin, 21 Day Cycles          Diagnoses and all orders for this visit:  Cervical cancer, FIGO stage RONIT (CMS/HCC)  Encounter for antineoplastic chemotherapy and immunotherapy  - Imaging with progression of disease reviewed with patient and her spouse; recommendation for Tivdak based on GYN Tumor Board recommendations. Discussed anticipated response rates and consideration of Her2 testing re: T-DXd in event of disease progression.   - Follow up pretreatment labs re: proceeding with therapy as scheduled  - Anticipate imaging after 3 cycles/sooner PRN  Obstructive uropathy  - Superimposed staph infection of bilateral PCN sites  - Most recent PCN exchange 12/19/23; continue to assess q visit     Treatment Plans       Name Type Plan Dates Plan Provider         Active    Tisotumab vedotin, 21 Day Cycles Oncology Treatment  1/10/2024 - Present MD Adrianna Melgar MD

## 2024-01-11 NOTE — PROGRESS NOTES
Unable to obtain eye exam report from pt eye doctor, pt will return tomorrow for JEISON almanza RN & Dr. Jefferson aware

## 2024-01-11 NOTE — PROGRESS NOTES
Assessment/Plan   Diagnoses and all orders for this visit:  Dry eye syndrome of both eyes  Meibomian gland disease, unspecified laterality  Squamous blepharitis of both upper and lower eyelid  Age-related nuclear cataract of both eyes  Hyperopia with presbyopia of both eyes  -patient here for baseline evaluation prior to Tisotumab infusion  -BCVA 20/20 OD+OS  -anterior segment reveals signs of dry eye, MGD, and blepharitis  -in addition to corticosteroid, vasoconstrictor, and lubricating drops provided by oncology will have patient use Cliradex or Ocusoft lid scrubs BID, warm compresses, and Refresh PM or Genteal jane at bedtime  -RTC 3 weeks for F/u prior to first infusion    RTC for VA check, IOP, and slit lamp exam. No dilation

## 2024-01-12 ENCOUNTER — OFFICE VISIT (OUTPATIENT)
Dept: WOUND CARE | Facility: HOSPITAL | Age: 64
End: 2024-01-12
Payer: COMMERCIAL

## 2024-01-12 ENCOUNTER — INFUSION (OUTPATIENT)
Dept: HEMATOLOGY/ONCOLOGY | Facility: CLINIC | Age: 64
End: 2024-01-12
Payer: COMMERCIAL

## 2024-01-12 VITALS
RESPIRATION RATE: 16 BRPM | DIASTOLIC BLOOD PRESSURE: 77 MMHG | HEART RATE: 73 BPM | BODY MASS INDEX: 28.45 KG/M2 | OXYGEN SATURATION: 97 % | WEIGHT: 181.66 LBS | TEMPERATURE: 98.1 F | SYSTOLIC BLOOD PRESSURE: 137 MMHG

## 2024-01-12 DIAGNOSIS — C53.9 CERVICAL CANCER, FIGO STAGE IVA (MULTI): ICD-10-CM

## 2024-01-12 DIAGNOSIS — C53.9 CERVICAL CANCER, FIGO STAGE IVB (MULTI): ICD-10-CM

## 2024-01-12 LAB — UH GNO TEMPUS PORTAL: NORMAL

## 2024-01-12 PROCEDURE — 96413 CHEMO IV INFUSION 1 HR: CPT

## 2024-01-12 PROCEDURE — 2500000005 HC RX 250 GENERAL PHARMACY W/O HCPCS: Performed by: STUDENT IN AN ORGANIZED HEALTH CARE EDUCATION/TRAINING PROGRAM

## 2024-01-12 PROCEDURE — 96375 TX/PRO/DX INJ NEW DRUG ADDON: CPT | Mod: INF

## 2024-01-12 PROCEDURE — 99214 OFFICE O/P EST MOD 30 MIN: CPT | Mod: 25

## 2024-01-12 PROCEDURE — 2500000004 HC RX 250 GENERAL PHARMACY W/ HCPCS (ALT 636 FOR OP/ED): Performed by: STUDENT IN AN ORGANIZED HEALTH CARE EDUCATION/TRAINING PROGRAM

## 2024-01-12 RX ORDER — ALBUTEROL SULFATE 0.83 MG/ML
3 SOLUTION RESPIRATORY (INHALATION) AS NEEDED
Status: DISCONTINUED | OUTPATIENT
Start: 2024-01-12 | End: 2024-01-12 | Stop reason: HOSPADM

## 2024-01-12 RX ORDER — PREDNISOLONE ACETATE 10 MG/ML
1 SUSPENSION/ DROPS OPHTHALMIC ONCE
Status: COMPLETED | OUTPATIENT
Start: 2024-01-12 | End: 2024-01-12

## 2024-01-12 RX ORDER — EPINEPHRINE 0.3 MG/.3ML
0.3 INJECTION SUBCUTANEOUS EVERY 5 MIN PRN
Status: DISCONTINUED | OUTPATIENT
Start: 2024-01-12 | End: 2024-01-12 | Stop reason: HOSPADM

## 2024-01-12 RX ORDER — DIPHENHYDRAMINE HYDROCHLORIDE 50 MG/ML
50 INJECTION INTRAMUSCULAR; INTRAVENOUS AS NEEDED
Status: DISCONTINUED | OUTPATIENT
Start: 2024-01-12 | End: 2024-01-12 | Stop reason: HOSPADM

## 2024-01-12 RX ORDER — PROCHLORPERAZINE EDISYLATE 5 MG/ML
10 INJECTION INTRAMUSCULAR; INTRAVENOUS EVERY 6 HOURS PRN
Status: DISCONTINUED | OUTPATIENT
Start: 2024-01-12 | End: 2024-01-12 | Stop reason: HOSPADM

## 2024-01-12 RX ORDER — PROCHLORPERAZINE MALEATE 10 MG
10 TABLET ORAL EVERY 6 HOURS PRN
Status: DISCONTINUED | OUTPATIENT
Start: 2024-01-12 | End: 2024-01-12 | Stop reason: HOSPADM

## 2024-01-12 RX ORDER — FAMOTIDINE 10 MG/ML
20 INJECTION INTRAVENOUS ONCE AS NEEDED
Status: DISCONTINUED | OUTPATIENT
Start: 2024-01-12 | End: 2024-01-12 | Stop reason: HOSPADM

## 2024-01-12 RX ORDER — ONDANSETRON HYDROCHLORIDE 2 MG/ML
8 INJECTION, SOLUTION INTRAVENOUS ONCE
Status: COMPLETED | OUTPATIENT
Start: 2024-01-12 | End: 2024-01-12

## 2024-01-12 RX ADMIN — PREDNISOLONE ACETATE 1 DROP: 10 SUSPENSION/ DROPS OPHTHALMIC at 08:46

## 2024-01-12 RX ADMIN — ONDANSETRON 8 MG: 2 INJECTION INTRAMUSCULAR; INTRAVENOUS at 08:46

## 2024-01-12 RX ADMIN — TISOTUMAB VEDOTIN 74 MG: 40 INJECTION, POWDER, FOR SOLUTION INTRAVENOUS at 09:00

## 2024-01-12 RX ADMIN — Medication 1 DROP: at 08:45

## 2024-01-12 ASSESSMENT — PAIN SCALES - GENERAL: PAINLEVEL: 0-NO PAIN

## 2024-01-18 ENCOUNTER — HOME CARE VISIT (OUTPATIENT)
Dept: HOME HEALTH SERVICES | Facility: HOME HEALTH | Age: 64
End: 2024-01-18
Payer: COMMERCIAL

## 2024-01-18 VITALS
OXYGEN SATURATION: 95 % | HEART RATE: 76 BPM | SYSTOLIC BLOOD PRESSURE: 128 MMHG | TEMPERATURE: 96.8 F | RESPIRATION RATE: 18 BRPM | DIASTOLIC BLOOD PRESSURE: 80 MMHG

## 2024-01-18 PROCEDURE — G0299 HHS/HOSPICE OF RN EA 15 MIN: HCPCS

## 2024-01-18 SDOH — ECONOMIC STABILITY: GENERAL

## 2024-01-18 ASSESSMENT — ACTIVITIES OF DAILY LIVING (ADL)
CURRENT_FUNCTION: STAND BY ASSIST
AMBULATION ASSISTANCE: STAND BY ASSIST

## 2024-01-18 ASSESSMENT — ENCOUNTER SYMPTOMS
LOWER EXTREMITY EDEMA: 1
DENIES PAIN: 1
APPETITE LEVEL: GOOD

## 2024-01-19 ENCOUNTER — OFFICE VISIT (OUTPATIENT)
Dept: WOUND CARE | Facility: HOSPITAL | Age: 64
End: 2024-01-19
Payer: COMMERCIAL

## 2024-01-19 ENCOUNTER — APPOINTMENT (OUTPATIENT)
Dept: WOUND CARE | Facility: HOSPITAL | Age: 64
End: 2024-01-19
Payer: COMMERCIAL

## 2024-01-19 PROCEDURE — 29580 STRAPPING UNNA BOOT: CPT

## 2024-01-25 ENCOUNTER — HOME CARE VISIT (OUTPATIENT)
Dept: HOME HEALTH SERVICES | Facility: HOME HEALTH | Age: 64
End: 2024-01-25
Payer: COMMERCIAL

## 2024-01-25 VITALS
OXYGEN SATURATION: 96 % | TEMPERATURE: 96.8 F | SYSTOLIC BLOOD PRESSURE: 122 MMHG | RESPIRATION RATE: 18 BRPM | DIASTOLIC BLOOD PRESSURE: 80 MMHG | HEART RATE: 74 BPM

## 2024-01-25 PROCEDURE — G0299 HHS/HOSPICE OF RN EA 15 MIN: HCPCS

## 2024-01-25 SDOH — ECONOMIC STABILITY: GENERAL

## 2024-01-25 ASSESSMENT — ENCOUNTER SYMPTOMS
LOWER EXTREMITY EDEMA: 1
DENIES PAIN: 1
APPETITE LEVEL: GOOD

## 2024-01-26 ENCOUNTER — TELEPHONE (OUTPATIENT)
Dept: GYNECOLOGIC ONCOLOGY | Facility: HOSPITAL | Age: 64
End: 2024-01-26
Payer: COMMERCIAL

## 2024-01-26 ENCOUNTER — OFFICE VISIT (OUTPATIENT)
Dept: WOUND CARE | Facility: HOSPITAL | Age: 64
End: 2024-01-26
Payer: COMMERCIAL

## 2024-01-26 DIAGNOSIS — L97.812 NON-PRESSURE CHRONIC ULCER OF OTHER PART OF RIGHT LOWER LEG WITH FAT LAYER EXPOSED (MULTI): Primary | ICD-10-CM

## 2024-01-26 PROCEDURE — 11042 DBRDMT SUBQ TIS 1ST 20SQCM/<: CPT

## 2024-01-26 PROCEDURE — 11045 DBRDMT SUBQ TISS EACH ADDL: CPT

## 2024-01-26 PROCEDURE — 87186 SC STD MICRODIL/AGAR DIL: CPT | Mod: WESLAB

## 2024-01-26 NOTE — TELEPHONE ENCOUNTER
Patient's   called and asked if the eye exam appointment on Monday can be changed from [facility] to [place]. Messaged Dr. Rai's office and he is the only provider in the [place] area and he does not see patients at the [place] location every week. Called and LM that the [facility] location is the only day next week that he is available. Recommended she contact her insurance and see what eye doctors she could go to locally and we can schedule her with another provider.

## 2024-01-29 ENCOUNTER — APPOINTMENT (OUTPATIENT)
Dept: OPHTHALMOLOGY | Facility: CLINIC | Age: 64
End: 2024-01-29
Payer: COMMERCIAL

## 2024-01-29 ENCOUNTER — TELEPHONE (OUTPATIENT)
Dept: GYNECOLOGIC ONCOLOGY | Facility: HOSPITAL | Age: 64
End: 2024-01-29

## 2024-01-29 ENCOUNTER — OFFICE VISIT (OUTPATIENT)
Dept: OPHTHALMOLOGY | Facility: CLINIC | Age: 64
End: 2024-01-29
Payer: COMMERCIAL

## 2024-01-29 ENCOUNTER — LAB (OUTPATIENT)
Dept: LAB | Facility: LAB | Age: 64
End: 2024-01-29
Payer: COMMERCIAL

## 2024-01-29 DIAGNOSIS — Z79.899 ENCOUNTER FOR LONG-TERM (CURRENT) USE OF HIGH-RISK MEDICATION: ICD-10-CM

## 2024-01-29 DIAGNOSIS — H04.123 DRY EYE SYNDROME OF BOTH EYES: Primary | ICD-10-CM

## 2024-01-29 DIAGNOSIS — H02.889 MEIBOMIAN GLAND DISEASE, UNSPECIFIED LATERALITY: ICD-10-CM

## 2024-01-29 DIAGNOSIS — C53.9 CERVICAL CANCER, FIGO STAGE IVA (MULTI): ICD-10-CM

## 2024-01-29 LAB
ALBUMIN SERPL BCP-MCNC: 3.7 G/DL (ref 3.4–5)
ALP SERPL-CCNC: 59 U/L (ref 33–136)
ALT SERPL W P-5'-P-CCNC: 6 U/L (ref 7–45)
ANION GAP SERPL CALC-SCNC: 14 MMOL/L (ref 10–20)
AST SERPL W P-5'-P-CCNC: 15 U/L (ref 9–39)
BACTERIA SPEC CULT: ABNORMAL
BACTERIA SPEC CULT: ABNORMAL
BASOPHILS # BLD AUTO: 0.1 X10*3/UL (ref 0–0.1)
BASOPHILS NFR BLD AUTO: 1.4 %
BILIRUB SERPL-MCNC: 0.3 MG/DL (ref 0–1.2)
BUN SERPL-MCNC: 33 MG/DL (ref 6–23)
CALCIUM SERPL-MCNC: 9.2 MG/DL (ref 8.6–10.6)
CHLORIDE SERPL-SCNC: 106 MMOL/L (ref 98–107)
CO2 SERPL-SCNC: 28 MMOL/L (ref 21–32)
CREAT SERPL-MCNC: 2.39 MG/DL (ref 0.5–1.05)
EGFRCR SERPLBLD CKD-EPI 2021: 22 ML/MIN/1.73M*2
EOSINOPHIL # BLD AUTO: 0.61 X10*3/UL (ref 0–0.7)
EOSINOPHIL NFR BLD AUTO: 8.7 %
ERYTHROCYTE [DISTWIDTH] IN BLOOD BY AUTOMATED COUNT: 16.8 % (ref 11.5–14.5)
GLUCOSE SERPL-MCNC: 97 MG/DL (ref 74–99)
GRAM STN SPEC: ABNORMAL
GRAM STN SPEC: ABNORMAL
HCT VFR BLD AUTO: 29.8 % (ref 36–46)
HGB BLD-MCNC: 9 G/DL (ref 12–16)
IMM GRANULOCYTES # BLD AUTO: 0.01 X10*3/UL (ref 0–0.7)
IMM GRANULOCYTES NFR BLD AUTO: 0.1 % (ref 0–0.9)
LYMPHOCYTES # BLD AUTO: 0.97 X10*3/UL (ref 1.2–4.8)
LYMPHOCYTES NFR BLD AUTO: 13.9 %
MCH RBC QN AUTO: 29.8 PG (ref 26–34)
MCHC RBC AUTO-ENTMCNC: 30.2 G/DL (ref 32–36)
MCV RBC AUTO: 99 FL (ref 80–100)
MONOCYTES # BLD AUTO: 0.75 X10*3/UL (ref 0.1–1)
MONOCYTES NFR BLD AUTO: 10.7 %
NEUTROPHILS # BLD AUTO: 4.55 X10*3/UL (ref 1.2–7.7)
NEUTROPHILS NFR BLD AUTO: 65.2 %
NRBC BLD-RTO: 0 /100 WBCS (ref 0–0)
PLATELET # BLD AUTO: 313 X10*3/UL (ref 150–450)
POTASSIUM SERPL-SCNC: 5 MMOL/L (ref 3.5–5.3)
PROT SERPL-MCNC: 6.9 G/DL (ref 6.4–8.2)
RBC # BLD AUTO: 3.02 X10*6/UL (ref 4–5.2)
SODIUM SERPL-SCNC: 143 MMOL/L (ref 136–145)
WBC # BLD AUTO: 7 X10*3/UL (ref 4.4–11.3)

## 2024-01-29 PROCEDURE — 80053 COMPREHEN METABOLIC PANEL: CPT

## 2024-01-29 PROCEDURE — 99213 OFFICE O/P EST LOW 20 MIN: CPT | Performed by: STUDENT IN AN ORGANIZED HEALTH CARE EDUCATION/TRAINING PROGRAM

## 2024-01-29 PROCEDURE — 85025 COMPLETE CBC W/AUTO DIFF WBC: CPT

## 2024-01-29 ASSESSMENT — CONF VISUAL FIELD
OS_INFERIOR_TEMPORAL_RESTRICTION: 0
OS_SUPERIOR_TEMPORAL_RESTRICTION: 0
OD_INFERIOR_NASAL_RESTRICTION: 0
OS_NORMAL: 1
OD_NORMAL: 1
METHOD: COUNTING FINGERS
OD_SUPERIOR_NASAL_RESTRICTION: 0
OD_INFERIOR_TEMPORAL_RESTRICTION: 0
OD_SUPERIOR_TEMPORAL_RESTRICTION: 0
OS_SUPERIOR_NASAL_RESTRICTION: 0
OS_INFERIOR_NASAL_RESTRICTION: 0

## 2024-01-29 ASSESSMENT — VISUAL ACUITY
METHOD: SNELLEN - LINEAR
OD_CC+: -2
OD_CC: 20/20
OS_CC+: +
OS_CC: 20/25
CORRECTION_TYPE: GLASSES

## 2024-01-29 ASSESSMENT — ENCOUNTER SYMPTOMS
CONSTITUTIONAL NEGATIVE: 0
MUSCULOSKELETAL NEGATIVE: 0
PSYCHIATRIC NEGATIVE: 0
NEUROLOGICAL NEGATIVE: 0
EYES NEGATIVE: 0
CARDIOVASCULAR NEGATIVE: 0
RESPIRATORY NEGATIVE: 0
ENDOCRINE NEGATIVE: 0
GASTROINTESTINAL NEGATIVE: 0
HEMATOLOGIC/LYMPHATIC NEGATIVE: 0
ALLERGIC/IMMUNOLOGIC NEGATIVE: 0

## 2024-01-29 ASSESSMENT — EXTERNAL EXAM - RIGHT EYE: OD_EXAM: NORMAL

## 2024-01-29 ASSESSMENT — REFRACTION_WEARINGRX
OS_SPHERE: +2.75
OD_SPHERE: +2.75

## 2024-01-29 ASSESSMENT — CUP TO DISC RATIO
OS_RATIO: .35
OD_RATIO: .35

## 2024-01-29 ASSESSMENT — TONOMETRY
IOP_METHOD: GOLDMANN APPLANATION
OD_IOP_MMHG: 14
OS_IOP_MMHG: 14

## 2024-01-29 ASSESSMENT — EXTERNAL EXAM - LEFT EYE: OS_EXAM: NORMAL

## 2024-01-29 NOTE — PROGRESS NOTES
Assessment/Plan   Diagnoses and all orders for this visit:  Dry eye syndrome of both eyes  Meibomian gland disease, unspecified laterality  Encounter for long term (current) use of high risk medication  -patient here for 3 week follow up after 1st Tisotumab infusion  -BCVA OD 20/20 OS 20/25+  -patient reveals no new symptoms-had one episode where eye was crusted shut  -reports compliance with corticosteroids, vasoconstrictor, lubricating drops  -has not used Genteal or warm compresses at this time  -anterior segment actually improved to last exam OS; overall stable findings OD  -mild signs of ocular surface changes stable to baseline  -continue with corticosteroids, vasoconstrictor, lubricating drops; can add Genteal or warm compresses    RTC for VA check, IOP, and slit lamp exam. No dilation 3 weeks for F/u

## 2024-01-29 NOTE — TELEPHONE ENCOUNTER
Patient's  called and reports she has been constipated. She tried a stool softner one tablet and she did not have results so she took 2 Colace and this was effective. She will continue to take one Colace daily for prevention.

## 2024-01-30 ENCOUNTER — HOME CARE VISIT (OUTPATIENT)
Dept: HOME HEALTH SERVICES | Facility: HOME HEALTH | Age: 64
End: 2024-01-30
Payer: COMMERCIAL

## 2024-01-30 VITALS
SYSTOLIC BLOOD PRESSURE: 130 MMHG | DIASTOLIC BLOOD PRESSURE: 80 MMHG | RESPIRATION RATE: 18 BRPM | HEART RATE: 85 BPM | OXYGEN SATURATION: 95 % | TEMPERATURE: 98.1 F

## 2024-01-30 PROCEDURE — G0299 HHS/HOSPICE OF RN EA 15 MIN: HCPCS

## 2024-01-30 PROCEDURE — 0023 HH SOC

## 2024-01-30 ASSESSMENT — ACTIVITIES OF DAILY LIVING (ADL)
HOME_HEALTH_OASIS: 00
OASIS_M1830: 00

## 2024-02-01 ENCOUNTER — INFUSION (OUTPATIENT)
Dept: HEMATOLOGY/ONCOLOGY | Facility: CLINIC | Age: 64
End: 2024-02-01
Payer: COMMERCIAL

## 2024-02-01 ENCOUNTER — OFFICE VISIT (OUTPATIENT)
Dept: GYNECOLOGIC ONCOLOGY | Facility: CLINIC | Age: 64
End: 2024-02-01
Payer: COMMERCIAL

## 2024-02-01 VITALS
WEIGHT: 184.97 LBS | TEMPERATURE: 98.6 F | RESPIRATION RATE: 18 BRPM | DIASTOLIC BLOOD PRESSURE: 88 MMHG | OXYGEN SATURATION: 97 % | SYSTOLIC BLOOD PRESSURE: 139 MMHG | BODY MASS INDEX: 28.97 KG/M2 | HEART RATE: 63 BPM

## 2024-02-01 DIAGNOSIS — N18.4 CHRONIC RENAL DISEASE, STAGE IV (MULTI): ICD-10-CM

## 2024-02-01 DIAGNOSIS — C53.9 CERVICAL CANCER, FIGO STAGE IVA (MULTI): ICD-10-CM

## 2024-02-01 DIAGNOSIS — L03.115 CELLULITIS OF RIGHT LOWER EXTREMITY: ICD-10-CM

## 2024-02-01 DIAGNOSIS — N13.9 OBSTRUCTIVE UROPATHY: ICD-10-CM

## 2024-02-01 DIAGNOSIS — Z51.12 ENCOUNTER FOR ANTINEOPLASTIC IMMUNOTHERAPY: Primary | ICD-10-CM

## 2024-02-01 DIAGNOSIS — H04.123 DRY EYE SYNDROME OF BOTH EYES: ICD-10-CM

## 2024-02-01 DIAGNOSIS — C53.9 CERVICAL CANCER, FIGO STAGE IVB (MULTI): ICD-10-CM

## 2024-02-01 LAB — TEST COMMENT - SURGICAL SENDOUT REQUEST: NORMAL

## 2024-02-01 PROCEDURE — 96375 TX/PRO/DX INJ NEW DRUG ADDON: CPT | Mod: INF

## 2024-02-01 PROCEDURE — 2500000004 HC RX 250 GENERAL PHARMACY W/ HCPCS (ALT 636 FOR OP/ED): Performed by: STUDENT IN AN ORGANIZED HEALTH CARE EDUCATION/TRAINING PROGRAM

## 2024-02-01 PROCEDURE — 2500000005 HC RX 250 GENERAL PHARMACY W/O HCPCS: Performed by: STUDENT IN AN ORGANIZED HEALTH CARE EDUCATION/TRAINING PROGRAM

## 2024-02-01 PROCEDURE — 99215 OFFICE O/P EST HI 40 MIN: CPT | Mod: 25 | Performed by: STUDENT IN AN ORGANIZED HEALTH CARE EDUCATION/TRAINING PROGRAM

## 2024-02-01 PROCEDURE — 1036F TOBACCO NON-USER: CPT | Performed by: STUDENT IN AN ORGANIZED HEALTH CARE EDUCATION/TRAINING PROGRAM

## 2024-02-01 PROCEDURE — 3079F DIAST BP 80-89 MM HG: CPT | Performed by: STUDENT IN AN ORGANIZED HEALTH CARE EDUCATION/TRAINING PROGRAM

## 2024-02-01 PROCEDURE — 96413 CHEMO IV INFUSION 1 HR: CPT

## 2024-02-01 PROCEDURE — 2500000001 HC RX 250 WO HCPCS SELF ADMINISTERED DRUGS (ALT 637 FOR MEDICARE OP): Performed by: STUDENT IN AN ORGANIZED HEALTH CARE EDUCATION/TRAINING PROGRAM

## 2024-02-01 PROCEDURE — 99215 OFFICE O/P EST HI 40 MIN: CPT | Performed by: STUDENT IN AN ORGANIZED HEALTH CARE EDUCATION/TRAINING PROGRAM

## 2024-02-01 PROCEDURE — 3075F SYST BP GE 130 - 139MM HG: CPT | Performed by: STUDENT IN AN ORGANIZED HEALTH CARE EDUCATION/TRAINING PROGRAM

## 2024-02-01 RX ORDER — EPINEPHRINE 0.3 MG/.3ML
0.3 INJECTION SUBCUTANEOUS EVERY 5 MIN PRN
Status: DISCONTINUED | OUTPATIENT
Start: 2024-02-01 | End: 2024-02-01 | Stop reason: HOSPADM

## 2024-02-01 RX ORDER — PREDNISOLONE ACETATE 10 MG/ML
1 SUSPENSION/ DROPS OPHTHALMIC ONCE
Status: CANCELLED | OUTPATIENT
Start: 2024-02-01

## 2024-02-01 RX ORDER — ONDANSETRON HYDROCHLORIDE 2 MG/ML
8 INJECTION, SOLUTION INTRAVENOUS ONCE
Status: CANCELLED | OUTPATIENT
Start: 2024-02-01

## 2024-02-01 RX ORDER — ALBUTEROL SULFATE 0.83 MG/ML
3 SOLUTION RESPIRATORY (INHALATION) AS NEEDED
Status: DISCONTINUED | OUTPATIENT
Start: 2024-02-01 | End: 2024-02-01 | Stop reason: HOSPADM

## 2024-02-01 RX ORDER — ONDANSETRON HYDROCHLORIDE 2 MG/ML
8 INJECTION, SOLUTION INTRAVENOUS ONCE
Status: COMPLETED | OUTPATIENT
Start: 2024-02-01 | End: 2024-02-01

## 2024-02-01 RX ORDER — PREDNISOLONE ACETATE 10 MG/ML
1 SUSPENSION/ DROPS OPHTHALMIC ONCE
Status: COMPLETED | OUTPATIENT
Start: 2024-02-01 | End: 2024-02-01

## 2024-02-01 RX ORDER — DIPHENHYDRAMINE HYDROCHLORIDE 50 MG/ML
50 INJECTION INTRAMUSCULAR; INTRAVENOUS AS NEEDED
Status: DISCONTINUED | OUTPATIENT
Start: 2024-02-01 | End: 2024-02-01 | Stop reason: HOSPADM

## 2024-02-01 RX ORDER — PROCHLORPERAZINE EDISYLATE 5 MG/ML
10 INJECTION INTRAMUSCULAR; INTRAVENOUS EVERY 6 HOURS PRN
Status: CANCELLED | OUTPATIENT
Start: 2024-02-01

## 2024-02-01 RX ORDER — EPINEPHRINE 0.3 MG/.3ML
0.3 INJECTION SUBCUTANEOUS EVERY 5 MIN PRN
Status: CANCELLED | OUTPATIENT
Start: 2024-02-01

## 2024-02-01 RX ORDER — ALBUTEROL SULFATE 0.83 MG/ML
3 SOLUTION RESPIRATORY (INHALATION) AS NEEDED
Status: CANCELLED | OUTPATIENT
Start: 2024-02-01

## 2024-02-01 RX ORDER — FAMOTIDINE 10 MG/ML
20 INJECTION INTRAVENOUS ONCE AS NEEDED
Status: DISCONTINUED | OUTPATIENT
Start: 2024-02-01 | End: 2024-02-01 | Stop reason: HOSPADM

## 2024-02-01 RX ORDER — DIPHENHYDRAMINE HYDROCHLORIDE 50 MG/ML
50 INJECTION INTRAMUSCULAR; INTRAVENOUS AS NEEDED
Status: CANCELLED | OUTPATIENT
Start: 2024-02-01

## 2024-02-01 RX ORDER — PROCHLORPERAZINE MALEATE 10 MG
10 TABLET ORAL EVERY 6 HOURS PRN
Status: CANCELLED | OUTPATIENT
Start: 2024-02-01

## 2024-02-01 RX ORDER — PROCHLORPERAZINE MALEATE 10 MG
10 TABLET ORAL EVERY 6 HOURS PRN
Status: DISCONTINUED | OUTPATIENT
Start: 2024-02-01 | End: 2024-02-01 | Stop reason: HOSPADM

## 2024-02-01 RX ORDER — PROCHLORPERAZINE EDISYLATE 5 MG/ML
10 INJECTION INTRAMUSCULAR; INTRAVENOUS EVERY 6 HOURS PRN
Status: DISCONTINUED | OUTPATIENT
Start: 2024-02-01 | End: 2024-02-01 | Stop reason: HOSPADM

## 2024-02-01 RX ORDER — FAMOTIDINE 10 MG/ML
20 INJECTION INTRAVENOUS ONCE AS NEEDED
Status: CANCELLED | OUTPATIENT
Start: 2024-02-01

## 2024-02-01 RX ADMIN — TISOTUMAB VEDOTIN 108 MG: 40 INJECTION, POWDER, FOR SOLUTION INTRAVENOUS at 11:12

## 2024-02-01 RX ADMIN — ONDANSETRON 8 MG: 2 INJECTION INTRAMUSCULAR; INTRAVENOUS at 10:56

## 2024-02-01 RX ADMIN — PREDNISOLONE ACETATE 1 DROP: 10 SUSPENSION/ DROPS OPHTHALMIC at 10:57

## 2024-02-01 RX ADMIN — Medication 2 DROP: at 10:45

## 2024-02-01 ASSESSMENT — ENCOUNTER SYMPTOMS
DEPRESSION: 0
LOSS OF SENSATION IN FEET: 1

## 2024-02-01 ASSESSMENT — PAIN SCALES - GENERAL: PAINLEVEL: 0-NO PAIN

## 2024-02-01 NOTE — PROGRESS NOTES
Patient ID: Shelbi Delaney is a 63 y.o. female.  Referring Physician: No referring provider defined for this encounter.  Primary Care Provider: Sonja Uriarte MD    Subjective    Patient presents today in follow-up evaluation for C#2 Tisotumab vedotin/Tivdak infusion. She reports she is overall tolerating treatment without significant side effects. Mild exacerbation of dry eye after first infusion for a day, isolated episode of blurry vision that has resolved. Continues to use drops with no other interval changes per Ophthalmology. Progressively bothersome lower extremity swelling with blistering of the skin for which Wound Clinic ([facility]) applied lymphedema wraps and was recommended antibiotics for presumed cellulitis. Improvement in bilateral flank rash related to PCNs. Denies abdominopelvic pain, nausea/vomiting, fevers, chills, chest pain or shortness of breath. Voiding and having regular bowel movements without difficulty. Constipation over the weekend now resolved; denies blood in stools. Occasional pink vaginal discharge - no bleeding. No recent falls. Continues to follow with pain management re: spinal stenosis, cancer-related pain. No other concerns/complaints. Denies interim hospitalizations since last assessment.     Last ophthalmologic assessment: 1/29/24 (prior to treatment).  A comprehensive review of systems was performed and otherwise negative.     Objective    BSA: 1.99 meters squared  /88 (BP Location: Left arm, Patient Position: Sitting, BP Cuff Size: Adult long)   Pulse 63   Temp 37 °C (98.6 °F) (Temporal)   Resp 18   Wt 83.9 kg (184 lb 15.5 oz)   SpO2 97%   BMI 28.97 kg/m²      Physical Exam  Vitals and nursing note reviewed. Exam conducted with a chaperone present.   Constitutional:       General: She is not in acute distress.     Appearance: Normal appearance.   HENT:      Head: Normocephalic and atraumatic.      Mouth/Throat:      Mouth: Mucous membranes are moist.       Pharynx: No oropharyngeal exudate or posterior oropharyngeal erythema.   Eyes:      Extraocular Movements: Extraocular movements intact.      Conjunctiva/sclera: Conjunctivae normal.      Pupils: Pupils are equal, round, and reactive to light.   Neck:      Thyroid: No thyroid mass or thyromegaly.   Cardiovascular:      Rate and Rhythm: Normal rate and regular rhythm.      Pulses: Normal pulses.      Heart sounds: Normal heart sounds.   Pulmonary:      Effort: Pulmonary effort is normal.      Breath sounds: Normal breath sounds. No wheezing, rhonchi or rales.   Abdominal:      General: Bowel sounds are normal. There is no distension.      Palpations: Abdomen is soft. There is no mass.      Tenderness: There is no abdominal tenderness.      Hernia: No hernia is present.   Genitourinary:     Comments: Bilateral nephrostomies in place, small blood in left PCN tubing otherwise clear urine  Musculoskeletal:         General: No tenderness. Normal range of motion.      Cervical back: Normal range of motion and neck supple. No tenderness.      Right lower leg: Edema present.      Left lower leg: Edema present.      Comments: Chronic BLE edema; compression dressings in place   Skin:     General: Skin is warm.      Findings: Rash present. No lesion.      Comments: Erythematous desquamated patch with irregular borders encompassing bilateral flanks around nephrostomy sites x8cm with excoriations - marked interval improvement since last assessment. No induration/erythema.    Neurological:      General: No focal deficit present.      Mental Status: She is alert and oriented to person, place, and time.   Psychiatric:         Mood and Affect: Mood normal.         Behavior: Behavior normal.     Performance Status:  Symptomatic; fully ambulatory    Assessment/Plan   62yo with stage RONIT moderately differentiated SCC of cervix for C#2 Tivdak; obstructive uropathy with bilateral PCNs in place. Chronic venous stasis changes with  superimposed MSSA cellulitis of RLE. ECOG 1.   Oncology History Overview Note   Stage RONIT grade 2 squamous cell carcinoma of cervix  1/20/23: TURBT - poorly differentiated squamaous cell carcinoma; CPS10. [facility] review with history of cervical mass consistent with GYN primary  - Extension to rectal mucosa, pubic ramus with ?reactive mediastinal LN   - Bilateral nephrostomy placement   2/16/23 - 6/1/23: Carbo AUC5/Taxol 175 +Avastin 15mg/kg, Pembrolizumab 200mg/m2  - C2-C4 +Avastin - further treatments held due to progressive gr2 proteinuria   - Grade 3 anemia declining transfusion,   7/20/23 - 12/18/23: Pembrolizumab 200mg/m2, progressive disease  1/11/24 - Present: Tivdak  - C1: 0.9mg/kg in setting of CrCl < 30    Molecular Testing  PLD1: CPS 10  1/2024 Tempus xT - PIK3CA [id number] missense (gain): consider alpelisib  Loss of function: BCORL1, KMT2C, NSD1, KMT2D, RAD21  TMB 7.9mB - RACHELE  - Her2 pending     Cervical cancer, FIGO stage RONIT (CMS/HCC)   3/21/2022 Cancer Staged    Staging form: Cervix Uteri, AJCC Version 9, Clinical stage from 3/21/2022: FIGO Stage RONIT (cT4, cN2, cM0) - Signed by Adrianna Jefferson MD on 10/12/2023, Prognostic indicators: CPS 10     7/20/2023 - 11/30/2023 Chemotherapy    Pembrolizumab, 21 Day Cycles     1/12/2024 -  Chemotherapy    Tisotumab vedotin, 21 Day Cycles          Diagnoses and all orders for this visit:  Encounter for antineoplastic immunotherapy  Cervical cancer, FIGO stage RONIT (CMS/HCC)  - Grade 1 dry eye; stable on Tisotumab treatment with no interval changes on ophthalmologic assessment. Ongoing compliance to prescribed eye drops reviewed and confirmed.   - No dose-limiting toxicities; ongoing renal insufficiency with no evidence of progressive impairment on current treatment regimen  - Anticipate imaging (noncon CT) after C3  - Caris NGS   -     Clinic Appointment Request; Future  -     Infusion Appointment Request; Future  -     Referral to Nephrology; Future  Dry eye syndrome of  both eyes  - Continue current therapy; following with Ophto  Obstructive uropathy  - Interval improvement s/p treatment of superimposed MSSA of PCN sites; most recent PCN change 12/19/23  - Ongoing home care with RN assessment   Chronic renal disease, stage IV (CMS/HCC)  - Chronic in setting of obstructive uropathy (peak Cr 11) at initial clinical presentation; eGFR <25 stable after dose-adjusted therapies now  with treatment ongoing with Tivdak. No specific adjustments in setting of reduced GFR, however not investigated in setting of GFR <30  - Continue current therapy  - Referral re: Onconephrology for further recs in setting of potentially nephrotoxic agents  -     Referral to Nephrology; Future  Cellulitis of right lower extremity  - MSSA noted on RLE culture obtained in wound care clinic  - Pending initiation of PO antibiotics today - adherence strongly encouraged in setting of active cancer-directed therapy   Other orders  - Continues to require home care and  evaluation in setting of limited ability for self-care and wound management despite frequent education  -     Treatment Conditions - OK to Treat    Treatment Plans       Name Type Plan Dates Plan Provider         Active    Tisotumab vedotin, 21 Day Cycles Oncology Treatment  1/10/2024 - Present Adrianna Jefferson MD

## 2024-02-02 ENCOUNTER — OFFICE VISIT (OUTPATIENT)
Dept: WOUND CARE | Facility: HOSPITAL | Age: 64
End: 2024-02-02
Payer: COMMERCIAL

## 2024-02-02 DIAGNOSIS — C53.9 CERVICAL CANCER, FIGO STAGE IVA (MULTI): ICD-10-CM

## 2024-02-02 DIAGNOSIS — M62.838 MUSCLE SPASM: ICD-10-CM

## 2024-02-02 DIAGNOSIS — B37.9 YEAST INFECTION: ICD-10-CM

## 2024-02-02 PROCEDURE — 97597 DBRDMT OPN WND 1ST 20 CM/<: CPT

## 2024-02-05 DIAGNOSIS — C53.9 CERVICAL CANCER, FIGO STAGE IVB (MULTI): ICD-10-CM

## 2024-02-05 DIAGNOSIS — Z79.891 ENCOUNTER FOR LONG-TERM USE OF OPIATE ANALGESIC: ICD-10-CM

## 2024-02-05 RX ORDER — OXYCODONE HYDROCHLORIDE 5 MG/1
5 TABLET ORAL 3 TIMES DAILY
Qty: 90 TABLET | Refills: 0 | Status: SHIPPED | OUTPATIENT
Start: 2024-02-05 | End: 2024-03-19 | Stop reason: SDUPTHER

## 2024-02-05 RX ORDER — TIZANIDINE 4 MG/1
4 TABLET ORAL 3 TIMES DAILY
Qty: 90 TABLET | Refills: 0 | Status: SHIPPED | OUTPATIENT
Start: 2024-02-05 | End: 2024-02-12

## 2024-02-05 NOTE — TELEPHONE ENCOUNTER
Patient needs refill of Oxycodone. Must be tablets per capsules are too expensive. Last fill 12/06/23, CSA/UDS up to date. Has 3 month follow up scheduled 2/27/24.

## 2024-02-09 ENCOUNTER — OFFICE VISIT (OUTPATIENT)
Dept: WOUND CARE | Facility: HOSPITAL | Age: 64
End: 2024-02-09
Payer: COMMERCIAL

## 2024-02-09 PROCEDURE — 29580 STRAPPING UNNA BOOT: CPT

## 2024-02-12 DIAGNOSIS — M62.838 MUSCLE SPASM: ICD-10-CM

## 2024-02-12 RX ORDER — TIZANIDINE 4 MG/1
4 TABLET ORAL 3 TIMES DAILY
Qty: 90 TABLET | Refills: 0 | Status: SHIPPED | OUTPATIENT
Start: 2024-02-12 | End: 2024-04-24 | Stop reason: SDUPTHER

## 2024-02-13 ENCOUNTER — TELEPHONE (OUTPATIENT)
Dept: GYNECOLOGIC ONCOLOGY | Facility: HOSPITAL | Age: 64
End: 2024-02-13
Payer: COMMERCIAL

## 2024-02-13 NOTE — TELEPHONE ENCOUNTER
Patient's  noting that patient's blood pressure has been higher than usual. She was taking metoprolol but has been off of it due to low blood pressures. He is asking if her current regimen Tivdak can cause high blood pressure. Her current regimen is not known to cause high blood pressure. Recommended that he contact her PCP for evaluation of blood pressure and the need for resuming medication.

## 2024-02-16 ENCOUNTER — OFFICE VISIT (OUTPATIENT)
Dept: WOUND CARE | Facility: HOSPITAL | Age: 64
End: 2024-02-16
Payer: COMMERCIAL

## 2024-02-16 PROCEDURE — 97597 DBRDMT OPN WND 1ST 20 CM/<: CPT

## 2024-02-19 ENCOUNTER — OFFICE VISIT (OUTPATIENT)
Dept: OPHTHALMOLOGY | Facility: CLINIC | Age: 64
End: 2024-02-19
Payer: COMMERCIAL

## 2024-02-19 DIAGNOSIS — H02.889 MEIBOMIAN GLAND DISEASE, UNSPECIFIED LATERALITY: ICD-10-CM

## 2024-02-19 DIAGNOSIS — Z79.899 ENCOUNTER FOR LONG-TERM (CURRENT) USE OF HIGH-RISK MEDICATION: Primary | ICD-10-CM

## 2024-02-19 DIAGNOSIS — H04.123 DRY EYE SYNDROME OF BOTH EYES: ICD-10-CM

## 2024-02-19 PROCEDURE — 99213 OFFICE O/P EST LOW 20 MIN: CPT | Performed by: STUDENT IN AN ORGANIZED HEALTH CARE EDUCATION/TRAINING PROGRAM

## 2024-02-19 ASSESSMENT — VISUAL ACUITY
OS_CC+: +
OS_PH_CC: 20/25
METHOD: SNELLEN - LINEAR
OS_CC: 20/25
OD_CC: 20/25
OD_PH_CC: 20/25

## 2024-02-19 ASSESSMENT — CONF VISUAL FIELD
OS_NORMAL: 1
METHOD: COUNTING FINGERS
OD_INFERIOR_NASAL_RESTRICTION: 0
OS_SUPERIOR_NASAL_RESTRICTION: 0
OS_INFERIOR_TEMPORAL_RESTRICTION: 0
OD_NORMAL: 1
OD_SUPERIOR_NASAL_RESTRICTION: 0
OS_SUPERIOR_TEMPORAL_RESTRICTION: 0
OD_SUPERIOR_TEMPORAL_RESTRICTION: 0
OD_INFERIOR_TEMPORAL_RESTRICTION: 0
OS_INFERIOR_NASAL_RESTRICTION: 0

## 2024-02-19 ASSESSMENT — EXTERNAL EXAM - LEFT EYE: OS_EXAM: NORMAL

## 2024-02-19 ASSESSMENT — ENCOUNTER SYMPTOMS
EYES NEGATIVE: 0
RESPIRATORY NEGATIVE: 0
CONSTITUTIONAL NEGATIVE: 0
PSYCHIATRIC NEGATIVE: 0
CARDIOVASCULAR NEGATIVE: 0
ALLERGIC/IMMUNOLOGIC NEGATIVE: 0
MUSCULOSKELETAL NEGATIVE: 0
NEUROLOGICAL NEGATIVE: 0
HEMATOLOGIC/LYMPHATIC NEGATIVE: 0
ENDOCRINE NEGATIVE: 0
GASTROINTESTINAL NEGATIVE: 0

## 2024-02-19 ASSESSMENT — REFRACTION_MANIFEST
OS_CYLINDER: +1.00
OD_ADD: +2.50
OD_AXIS: 175
OS_ADD: +2.50
OS_SPHERE: +2.75
OD_CYLINDER: +1.25
OS_AXIS: 172
OD_SPHERE: +2.50

## 2024-02-19 ASSESSMENT — TONOMETRY
OD_IOP_MMHG: 13
OS_IOP_MMHG: 13
IOP_METHOD: GOLDMANN APPLANATION

## 2024-02-19 ASSESSMENT — CUP TO DISC RATIO
OS_RATIO: .35
OD_RATIO: .35

## 2024-02-19 ASSESSMENT — EXTERNAL EXAM - RIGHT EYE: OD_EXAM: NORMAL

## 2024-02-19 NOTE — PROGRESS NOTES
Assessment/Plan   Diagnoses and all orders for this visit:  Dry eye syndrome of both eyes  Meibomian gland disease, unspecified laterality  Encounter for long term (current) use of high risk medication  -patient here for 3 week follow up after 2nd Tisotumab infusion  -BCVA OD 20/25+ OS 20/25+  -patient reveals no new symptoms  -reports compliance with corticosteroids, vasoconstrictor, lubricating drops  -has not used Genteal ointment at this time  -using warm compresses intermittently  -anterior segment stable findings OU  -mild signs of ocular surface changes stable to baseline  -continue with corticosteroids, vasoconstrictor, lubricating drops; can add Genteal or warm compresses    RTC for VA check, IOP, and slit lamp exam. No dilation 3 weeks for F/u

## 2024-02-20 ENCOUNTER — LAB (OUTPATIENT)
Dept: LAB | Facility: LAB | Age: 64
End: 2024-02-20
Payer: COMMERCIAL

## 2024-02-20 DIAGNOSIS — C53.9 CERVICAL CANCER, FIGO STAGE IVA (MULTI): ICD-10-CM

## 2024-02-20 PROCEDURE — 85025 COMPLETE CBC W/AUTO DIFF WBC: CPT

## 2024-02-20 PROCEDURE — 80053 COMPREHEN METABOLIC PANEL: CPT

## 2024-02-21 LAB
ALBUMIN SERPL BCP-MCNC: 3.9 G/DL (ref 3.4–5)
ALP SERPL-CCNC: 55 U/L (ref 33–136)
ALT SERPL W P-5'-P-CCNC: 7 U/L (ref 7–45)
ANION GAP SERPL CALC-SCNC: 14 MMOL/L (ref 10–20)
AST SERPL W P-5'-P-CCNC: 16 U/L (ref 9–39)
BASOPHILS # BLD AUTO: 0.1 X10*3/UL (ref 0–0.1)
BASOPHILS NFR BLD AUTO: 1.6 %
BILIRUB SERPL-MCNC: 0.4 MG/DL (ref 0–1.2)
BUN SERPL-MCNC: 28 MG/DL (ref 6–23)
CALCIUM SERPL-MCNC: 8.9 MG/DL (ref 8.6–10.6)
CHLORIDE SERPL-SCNC: 103 MMOL/L (ref 98–107)
CO2 SERPL-SCNC: 26 MMOL/L (ref 21–32)
CREAT SERPL-MCNC: 2.03 MG/DL (ref 0.5–1.05)
EGFRCR SERPLBLD CKD-EPI 2021: 27 ML/MIN/1.73M*2
EOSINOPHIL # BLD AUTO: 0.56 X10*3/UL (ref 0–0.7)
EOSINOPHIL NFR BLD AUTO: 8.9 %
ERYTHROCYTE [DISTWIDTH] IN BLOOD BY AUTOMATED COUNT: 14.8 % (ref 11.5–14.5)
GLUCOSE SERPL-MCNC: 106 MG/DL (ref 74–99)
HCT VFR BLD AUTO: 32.8 % (ref 36–46)
HGB BLD-MCNC: 10.1 G/DL (ref 12–16)
IMM GRANULOCYTES # BLD AUTO: 0.02 X10*3/UL (ref 0–0.7)
IMM GRANULOCYTES NFR BLD AUTO: 0.3 % (ref 0–0.9)
LYMPHOCYTES # BLD AUTO: 0.96 X10*3/UL (ref 1.2–4.8)
LYMPHOCYTES NFR BLD AUTO: 15.3 %
MCH RBC QN AUTO: 31.3 PG (ref 26–34)
MCHC RBC AUTO-ENTMCNC: 30.8 G/DL (ref 32–36)
MCV RBC AUTO: 102 FL (ref 80–100)
MONOCYTES # BLD AUTO: 0.51 X10*3/UL (ref 0.1–1)
MONOCYTES NFR BLD AUTO: 8.1 %
NEUTROPHILS # BLD AUTO: 4.12 X10*3/UL (ref 1.2–7.7)
NEUTROPHILS NFR BLD AUTO: 65.8 %
NRBC BLD-RTO: 0 /100 WBCS (ref 0–0)
PLATELET # BLD AUTO: 249 X10*3/UL (ref 150–450)
POTASSIUM SERPL-SCNC: 4.4 MMOL/L (ref 3.5–5.3)
PROT SERPL-MCNC: 7.1 G/DL (ref 6.4–8.2)
RBC # BLD AUTO: 3.23 X10*6/UL (ref 4–5.2)
SODIUM SERPL-SCNC: 139 MMOL/L (ref 136–145)
WBC # BLD AUTO: 6.3 X10*3/UL (ref 4.4–11.3)

## 2024-02-22 ENCOUNTER — OFFICE VISIT (OUTPATIENT)
Dept: GYNECOLOGIC ONCOLOGY | Facility: CLINIC | Age: 64
End: 2024-02-22
Payer: COMMERCIAL

## 2024-02-22 ENCOUNTER — INFUSION (OUTPATIENT)
Dept: HEMATOLOGY/ONCOLOGY | Facility: CLINIC | Age: 64
End: 2024-02-22
Payer: COMMERCIAL

## 2024-02-22 ENCOUNTER — APPOINTMENT (OUTPATIENT)
Dept: OPHTHALMOLOGY | Facility: CLINIC | Age: 64
End: 2024-02-22
Payer: COMMERCIAL

## 2024-02-22 VITALS
OXYGEN SATURATION: 97 % | DIASTOLIC BLOOD PRESSURE: 86 MMHG | BODY MASS INDEX: 27.99 KG/M2 | RESPIRATION RATE: 18 BRPM | HEART RATE: 67 BPM | TEMPERATURE: 97.3 F | SYSTOLIC BLOOD PRESSURE: 143 MMHG | WEIGHT: 178.68 LBS

## 2024-02-22 DIAGNOSIS — N18.4 CHRONIC RENAL DISEASE, STAGE IV (MULTI): ICD-10-CM

## 2024-02-22 DIAGNOSIS — H04.123 DRY EYE SYNDROME OF BOTH EYES: ICD-10-CM

## 2024-02-22 DIAGNOSIS — N13.1 ACQUIRED HYDRONEPHROSIS DUE TO OBSTRUCTION OF URETER: ICD-10-CM

## 2024-02-22 DIAGNOSIS — C53.9 CERVICAL CANCER, FIGO STAGE IVA (MULTI): ICD-10-CM

## 2024-02-22 DIAGNOSIS — Z51.12 ENCOUNTER FOR ANTINEOPLASTIC CHEMOTHERAPY AND IMMUNOTHERAPY: Primary | ICD-10-CM

## 2024-02-22 DIAGNOSIS — Z51.11 ENCOUNTER FOR ANTINEOPLASTIC CHEMOTHERAPY AND IMMUNOTHERAPY: Primary | ICD-10-CM

## 2024-02-22 PROCEDURE — 2500000005 HC RX 250 GENERAL PHARMACY W/O HCPCS: Performed by: STUDENT IN AN ORGANIZED HEALTH CARE EDUCATION/TRAINING PROGRAM

## 2024-02-22 PROCEDURE — 3079F DIAST BP 80-89 MM HG: CPT | Performed by: STUDENT IN AN ORGANIZED HEALTH CARE EDUCATION/TRAINING PROGRAM

## 2024-02-22 PROCEDURE — 3077F SYST BP >= 140 MM HG: CPT | Performed by: STUDENT IN AN ORGANIZED HEALTH CARE EDUCATION/TRAINING PROGRAM

## 2024-02-22 PROCEDURE — 1036F TOBACCO NON-USER: CPT | Performed by: STUDENT IN AN ORGANIZED HEALTH CARE EDUCATION/TRAINING PROGRAM

## 2024-02-22 PROCEDURE — 96413 CHEMO IV INFUSION 1 HR: CPT

## 2024-02-22 PROCEDURE — 2500000001 HC RX 250 WO HCPCS SELF ADMINISTERED DRUGS (ALT 637 FOR MEDICARE OP): Performed by: STUDENT IN AN ORGANIZED HEALTH CARE EDUCATION/TRAINING PROGRAM

## 2024-02-22 PROCEDURE — 99214 OFFICE O/P EST MOD 30 MIN: CPT | Performed by: STUDENT IN AN ORGANIZED HEALTH CARE EDUCATION/TRAINING PROGRAM

## 2024-02-22 PROCEDURE — 96375 TX/PRO/DX INJ NEW DRUG ADDON: CPT | Mod: INF

## 2024-02-22 PROCEDURE — 2500000004 HC RX 250 GENERAL PHARMACY W/ HCPCS (ALT 636 FOR OP/ED): Performed by: STUDENT IN AN ORGANIZED HEALTH CARE EDUCATION/TRAINING PROGRAM

## 2024-02-22 RX ORDER — FAMOTIDINE 10 MG/ML
20 INJECTION INTRAVENOUS ONCE AS NEEDED
Status: DISCONTINUED | OUTPATIENT
Start: 2024-02-22 | End: 2024-02-22 | Stop reason: HOSPADM

## 2024-02-22 RX ORDER — EPINEPHRINE 0.3 MG/.3ML
0.3 INJECTION SUBCUTANEOUS EVERY 5 MIN PRN
Status: DISCONTINUED | OUTPATIENT
Start: 2024-02-22 | End: 2024-02-22 | Stop reason: HOSPADM

## 2024-02-22 RX ORDER — HEPARIN 100 UNIT/ML
500 SYRINGE INTRAVENOUS AS NEEDED
Status: CANCELLED | OUTPATIENT
Start: 2024-02-22

## 2024-02-22 RX ORDER — DIPHENHYDRAMINE HYDROCHLORIDE 50 MG/ML
50 INJECTION INTRAMUSCULAR; INTRAVENOUS AS NEEDED
Status: DISCONTINUED | OUTPATIENT
Start: 2024-02-22 | End: 2024-02-22 | Stop reason: HOSPADM

## 2024-02-22 RX ORDER — PROCHLORPERAZINE EDISYLATE 5 MG/ML
10 INJECTION INTRAMUSCULAR; INTRAVENOUS EVERY 6 HOURS PRN
Status: DISCONTINUED | OUTPATIENT
Start: 2024-02-22 | End: 2024-02-22 | Stop reason: HOSPADM

## 2024-02-22 RX ORDER — ALBUTEROL SULFATE 0.83 MG/ML
3 SOLUTION RESPIRATORY (INHALATION) AS NEEDED
Status: DISCONTINUED | OUTPATIENT
Start: 2024-02-22 | End: 2024-02-22 | Stop reason: HOSPADM

## 2024-02-22 RX ORDER — ONDANSETRON HYDROCHLORIDE 2 MG/ML
8 INJECTION, SOLUTION INTRAVENOUS ONCE
Status: CANCELLED | OUTPATIENT
Start: 2024-02-22

## 2024-02-22 RX ORDER — PROCHLORPERAZINE MALEATE 10 MG
10 TABLET ORAL EVERY 6 HOURS PRN
Status: CANCELLED | OUTPATIENT
Start: 2024-02-22

## 2024-02-22 RX ORDER — PREDNISOLONE ACETATE 10 MG/ML
1 SUSPENSION/ DROPS OPHTHALMIC ONCE
Status: CANCELLED | OUTPATIENT
Start: 2024-02-22

## 2024-02-22 RX ORDER — PROCHLORPERAZINE EDISYLATE 5 MG/ML
10 INJECTION INTRAMUSCULAR; INTRAVENOUS EVERY 6 HOURS PRN
Status: CANCELLED | OUTPATIENT
Start: 2024-02-22

## 2024-02-22 RX ORDER — HEPARIN SODIUM,PORCINE/PF 10 UNIT/ML
50 SYRINGE (ML) INTRAVENOUS AS NEEDED
Status: DISCONTINUED | OUTPATIENT
Start: 2024-02-22 | End: 2024-02-22 | Stop reason: HOSPADM

## 2024-02-22 RX ORDER — DIPHENHYDRAMINE HYDROCHLORIDE 50 MG/ML
50 INJECTION INTRAMUSCULAR; INTRAVENOUS AS NEEDED
Status: CANCELLED | OUTPATIENT
Start: 2024-02-22

## 2024-02-22 RX ORDER — PROCHLORPERAZINE MALEATE 10 MG
10 TABLET ORAL EVERY 6 HOURS PRN
Status: DISCONTINUED | OUTPATIENT
Start: 2024-02-22 | End: 2024-02-22 | Stop reason: HOSPADM

## 2024-02-22 RX ORDER — ALBUTEROL SULFATE 0.83 MG/ML
3 SOLUTION RESPIRATORY (INHALATION) AS NEEDED
Status: CANCELLED | OUTPATIENT
Start: 2024-02-22

## 2024-02-22 RX ORDER — PREDNISOLONE ACETATE 10 MG/ML
1 SUSPENSION/ DROPS OPHTHALMIC ONCE
Status: COMPLETED | OUTPATIENT
Start: 2024-02-22 | End: 2024-02-22

## 2024-02-22 RX ORDER — HEPARIN SODIUM,PORCINE/PF 10 UNIT/ML
50 SYRINGE (ML) INTRAVENOUS AS NEEDED
Status: CANCELLED | OUTPATIENT
Start: 2024-02-22

## 2024-02-22 RX ORDER — EPINEPHRINE 0.3 MG/.3ML
0.3 INJECTION SUBCUTANEOUS EVERY 5 MIN PRN
Status: CANCELLED | OUTPATIENT
Start: 2024-02-22

## 2024-02-22 RX ORDER — ONDANSETRON HYDROCHLORIDE 2 MG/ML
8 INJECTION, SOLUTION INTRAVENOUS ONCE
Status: COMPLETED | OUTPATIENT
Start: 2024-02-22 | End: 2024-02-22

## 2024-02-22 RX ORDER — HEPARIN 100 UNIT/ML
500 SYRINGE INTRAVENOUS AS NEEDED
Status: DISCONTINUED | OUTPATIENT
Start: 2024-02-22 | End: 2024-02-22 | Stop reason: HOSPADM

## 2024-02-22 RX ORDER — FAMOTIDINE 10 MG/ML
20 INJECTION INTRAVENOUS ONCE AS NEEDED
Status: CANCELLED | OUTPATIENT
Start: 2024-02-22

## 2024-02-22 RX ADMIN — Medication 2 DROP: at 11:00

## 2024-02-22 RX ADMIN — ONDANSETRON 8 MG: 2 INJECTION INTRAMUSCULAR; INTRAVENOUS at 10:21

## 2024-02-22 RX ADMIN — HEPARIN 500 UNITS: 100 SYRINGE at 11:38

## 2024-02-22 RX ADMIN — TISOTUMAB VEDOTIN 108 MG: 40 INJECTION, POWDER, FOR SOLUTION INTRAVENOUS at 10:51

## 2024-02-22 RX ADMIN — PREDNISOLONE ACETATE 1 DROP: 10 SUSPENSION/ DROPS OPHTHALMIC at 10:35

## 2024-02-22 ASSESSMENT — ENCOUNTER SYMPTOMS
LOSS OF SENSATION IN FEET: 0
OCCASIONAL FEELINGS OF UNSTEADINESS: 0
DEPRESSION: 0

## 2024-02-22 ASSESSMENT — PAIN SCALES - GENERAL: PAINLEVEL: 0-NO PAIN

## 2024-02-22 NOTE — PROGRESS NOTES
Patient ID: Shelbi Delaney is a 63 y.o. female.  Referring Physician: No referring provider defined for this encounter.  Primary Care Provider: Sonja Uriarte MD    Subjective    Patient presents today in follow-up evaluation for C#3 Tivdak for persistent cervical cancer (started at dose level -2; most recent: 1.3mg/kg). Chronic LE swelling for which she is followed by [facility] - treated for cellulitis noted last assessment. Bilateral PCNs in place - no interval changes. Ongoing improvement in flank rash. Denies abdominopelvic pain, nausea/vomiting, fevers, chills, chest pain or shortness of breath. Having regular bowel movements without difficulty - no further episodes of constipation. No recent falls. No other concerns/complaints.   Denies interim hospitalizations since last assessment.     Last ophthalmologic assessment: 2/19/24 - stable findings.  Last PCN exchange 12/9/23 ([facility]); q12 weeks   A comprehensive review of systems was performed and otherwise negative.       Objective    BSA: 1.96 meters squared  /86 (BP Location: Right arm, Patient Position: Sitting, BP Cuff Size: Adult long)   Pulse 67   Temp 36.3 °C (97.3 °F) (Temporal)   Resp 18   Wt 81.1 kg (178 lb 10.9 oz)   SpO2 97%   BMI 27.99 kg/m²      Physical Exam  Vitals and nursing note reviewed. Exam conducted with a chaperone present.   Constitutional:       General: She is not in acute distress.     Appearance: Normal appearance.   HENT:      Head: Normocephalic and atraumatic.      Mouth/Throat:      Mouth: Mucous membranes are moist.      Pharynx: No oropharyngeal exudate or posterior oropharyngeal erythema.   Eyes:      Extraocular Movements: Extraocular movements intact.      Conjunctiva/sclera: Conjunctivae normal.      Pupils: Pupils are equal, round, and reactive to light.   Neck:      Thyroid: No thyroid mass or thyromegaly.   Cardiovascular:      Rate and Rhythm: Normal rate and regular rhythm.      Pulses:  Normal pulses.      Heart sounds: Normal heart sounds.   Pulmonary:      Effort: Pulmonary effort is normal.      Breath sounds: Normal breath sounds. No wheezing, rhonchi or rales.   Abdominal:      General: Bowel sounds are normal. There is no distension.      Palpations: Abdomen is soft. There is no mass.      Tenderness: There is no abdominal tenderness.      Hernia: No hernia is present.   Genitourinary:     Comments: Bilateral nephrostomies in place,  clear urine in tubing  Musculoskeletal:         General: No tenderness. Normal range of motion.      Cervical back: Normal range of motion and neck supple. No tenderness.      Right lower leg: Edema present.      Left lower leg: Edema present.      Comments: Chronic BLE edema; compression dressings in place   Skin:     General: Skin is warm.      Findings: Rash present. No lesion.      Comments: Hyperkeratotic plaque surrounding nephrostomy site - no redness/drainage/erythema; scattered excoriations throughout. Ongoing interval improvement.   Neurological:      General: No focal deficit present.      Mental Status: She is alert and oriented to person, place, and time.   Psychiatric:         Mood and Affect: Mood normal.         Behavior: Behavior normal.       Performance Status:  Symptomatic; fully ambulatory    Assessment/Plan   62yo with stage RONIT moderately differentiated SCC of cervix for C#3 Tivdak; obstructive uropathy with bilateral PCNs in place. Resolved RLE MSSA. ECOG 1.   Oncology History Overview Note   Stage RONIT grade 2 squamous cell carcinoma of cervix  1/20/23: TURBT - poorly differentiated squamaous cell carcinoma; CPS10.  review with history of cervical mass consistent with GYN primary  - Extension to rectal mucosa, pubic ramus with ?reactive mediastinal LN   - Bilateral nephrostomy placement   2/16/23 - 6/1/23: Carbo AUC5/Taxol 175 +Avastin 15mg/kg, Pembrolizumab 200mg/m2  - C2-C4 +Avastin - further treatments held due to progressive gr2  proteinuria   - Grade 3 anemia declining transfusion,   7/20/23 - 12/18/23: Pembrolizumab 200mg/m2, progressive disease  1/11/24 - Present: Tivdak  - C1: 0.9mg/kg in setting of CrCl < 30    Molecular Testing  PLD1: CPS 10  1/2024 Tempus xT - PIK3CA p.[id number] missense (gain): consider alpelisib  Loss of function: BCORL1, KMT2C, NSD1, KMT2D, RAD21  TMB 7.9mB - RACHELE  - Her2 pending     Cervical cancer, FIGO stage RONIT (CMS/HCC)   3/21/2022 Cancer Staged    Staging form: Cervix Uteri, AJCC Version 9, Clinical stage from 3/21/2022: FIGO Stage RONIT (cT4, cN2, cM0) - Signed by Adrianna Jefferson MD on 10/12/2023, Prognostic indicators: CPS 10     7/20/2023 - 11/30/2023 Chemotherapy    Pembrolizumab, 21 Day Cycles     1/12/2024 -  Chemotherapy    Tisotumab vedotin, 21 Day Cycles          Diagnoses and all orders for this visit:  Encounter for antineoplastic immunotherapy  Cervical cancer, FIGO stage RONIT (CMS/HCC)  - Grade 1 dry eye; stable on Tisotumab treatment with no interval changes on ophthalmologic assessment. Ongoing compliance to prescribed eye drops reviewed and confirmed.   - No dose-limiting toxicities; ongoing renal insufficiency with no evidence of progressive impairment on current treatment regimen  - Anticipate imaging (noncon CT) after C3; ordered   [ ] Her2 pending   -     Clinic Appointment Request; Future  -     Infusion Appointment Request; Future  -     Referral to Nephrology; Future  Dry eye syndrome of both eyes  - Continue current therapy; following with Ophto  Obstructive uropathy  - Interval improvement s/p treatment of superimposed MSSA of PCN sites; most recent PCN change 12/19/23  - Ongoing home care with RN assessment   Chronic renal disease, stage IV (CMS/HCC)  - Chronic in setting of obstructive uropathy (peak Cr 11) at initial clinical presentation; eGFR <25 stable after dose-adjusted therapies now  with treatment ongoing with Tivdak. No specific adjustments in setting of reduced GFR, however not  investigated in setting of GFR <30  - Continue current therapy  - Referral re: Onconephrology for further recs in setting of potentially nephrotoxic agents  -     Referral to Nephrology; Future  Other orders  - Continues to require home care and  evaluation in setting of limited ability for self-care and wound management despite frequent education  -     Treatment Conditions - OK to Treat    Treatment Plans       Name Type Plan Dates Plan Provider         Active    Tisotumab vedotin, 21 Day Cycles Oncology Treatment  1/10/2024 - Present Adrianna Jefferson MD

## 2024-02-23 ENCOUNTER — OFFICE VISIT (OUTPATIENT)
Dept: WOUND CARE | Facility: HOSPITAL | Age: 64
End: 2024-02-23
Payer: COMMERCIAL

## 2024-02-23 DIAGNOSIS — C53.9 CERVICAL CANCER, FIGO STAGE IVA (MULTI): ICD-10-CM

## 2024-02-23 PROCEDURE — 99214 OFFICE O/P EST MOD 30 MIN: CPT

## 2024-02-27 ENCOUNTER — OFFICE VISIT (OUTPATIENT)
Dept: PAIN MEDICINE | Facility: CLINIC | Age: 64
End: 2024-02-27
Payer: COMMERCIAL

## 2024-02-27 ENCOUNTER — LAB (OUTPATIENT)
Dept: LAB | Facility: LAB | Age: 64
End: 2024-02-27
Payer: COMMERCIAL

## 2024-02-27 DIAGNOSIS — M48.062 LUMBAR STENOSIS WITH NEUROGENIC CLAUDICATION: ICD-10-CM

## 2024-02-27 DIAGNOSIS — Z79.891 ENCOUNTER FOR LONG-TERM USE OF OPIATE ANALGESIC: ICD-10-CM

## 2024-02-27 DIAGNOSIS — C53.9 CERVICAL CANCER, FIGO STAGE IVA (MULTI): ICD-10-CM

## 2024-02-27 DIAGNOSIS — G89.3 CANCER RELATED PAIN: ICD-10-CM

## 2024-02-27 DIAGNOSIS — G89.3 CANCER RELATED PAIN: Primary | ICD-10-CM

## 2024-02-27 LAB
AMPHETAMINES UR QL SCN: ABNORMAL
BARBITURATES UR QL SCN: ABNORMAL
BENZODIAZ UR QL SCN: ABNORMAL
BZE UR QL SCN: ABNORMAL
CANNABINOIDS UR QL SCN: ABNORMAL
FENTANYL+NORFENTANYL UR QL SCN: ABNORMAL
OPIATES UR QL SCN: ABNORMAL
OXYCODONE+OXYMORPHONE UR QL SCN: ABNORMAL
PCP UR QL SCN: ABNORMAL

## 2024-02-27 PROCEDURE — 80361 OPIATES 1 OR MORE: CPT

## 2024-02-27 PROCEDURE — 80365 DRUG SCREENING OXYCODONE: CPT

## 2024-02-27 PROCEDURE — 99214 OFFICE O/P EST MOD 30 MIN: CPT | Performed by: PHYSICAL MEDICINE & REHABILITATION

## 2024-02-27 PROCEDURE — 80307 DRUG TEST PRSMV CHEM ANLYZR: CPT

## 2024-02-27 PROCEDURE — 1036F TOBACCO NON-USER: CPT | Performed by: PHYSICAL MEDICINE & REHABILITATION

## 2024-02-27 RX ORDER — GABAPENTIN 300 MG/1
900 CAPSULE ORAL 3 TIMES DAILY
Qty: 270 CAPSULE | Refills: 2 | Status: SHIPPED | OUTPATIENT
Start: 2024-02-27

## 2024-02-27 NOTE — PROGRESS NOTES
Subjective   Patient ID: Shelbi Delaney is a 63 y.o. female who presents for Pain.  HPI    64 yo F with PMH of bladder cancer s/p nephrostomy tube, cervical cancer, herniated lumbar disc who presents geo follow up today for routine 3-month visit for medication management as she is currently on very low-dose oxycodone therapy for her cancer related pains. In addition we placed on gabapentin she is titrated up to 900 mg 3 times daily with relief of her pain. She has very sparingly taking the oxycodone of no more than 2 to 3/day.  Also taking gabapentin 900 mg 3 times daily.  She does need a refill of gabapentin today.  She is here to update her urine drug screening and controlled substance agreement.  She is undergoing a new treatment for her cancer and she is having a scan shortly here and is hopeful for good results from that                  OARRS:  No data recorded  I have personally reviewed the OARRS report for Shelbi Delaney. I have considered the risks of abuse, dependence, addiction and diversion and I believe that it is clinically appropriate for Shelbi Delaney to be prescribed this medication    Is the patient prescribed a combination of a benzodiazepine and opioid?  No    Last Urine Drug Screen / ordered today: Yes  Recent Results (from the past 8760 hour(s))   Opiate Confirmation, Urine    Collection Time: 03/21/23 11:26 AM   Result Value Ref Range    6-Acetylmorphine <25 Cutoff <25 ng/mL    Codeine <50 Cutoff <50 ng/mL    Hydrocodone <25 Cutoff <25 ng/mL    Hydromorphone <25 Cutoff <25 ng/mL    Morphine Urine <50 Cutoff <50 ng/mL    Norhydrocodone <25 Cutoff <25 ng/mL    Noroxycodone 228 (A) Cutoff <25 ng/mL    Oxycodone <25 Cutoff <25 ng/mL    Oxymorphone 400 (A) Cutoff <25 ng/mL   Drug Screen, Urine With Reflex to Confirmation    Collection Time: 03/21/23 11:26 AM   Result Value Ref Range    DRUG SCREEN COMMENT URINE SEE BELOW     Amphetamine Screen, Urine PRESUMPTIVE NEGATIVE NEGATIVE    Barbiturate  Screen, Urine PRESUMPTIVE NEGATIVE NEGATIVE    BENZODIAZEPINE (PRESENCE) IN URINE BY SCREEN METHOD PRESUMPTIVE NEGATIVE NEGATIVE    Cannabinoid Screen, Urine PRESUMPTIVE NEGATIVE NEGATIVE    Cocaine Screen, Urine PRESUMPTIVE NEGATIVE NEGATIVE    Fentanyl, Ur PRESUMPTIVE NEGATIVE NEGATIVE    Methadone Screen, Urine PRESUMPTIVE NEGATIVE NEGATIVE    Opiate Screen, Urine PRESUMPTIVE NEGATIVE NEGATIVE    Oxycodone Screen, Ur PRESUMPTIVE POSITIVE (A) NEGATIVE    PCP Screen, Urine PRESUMPTIVE NEGATIVE NEGATIVE     Results are as expected.     Controlled Substance Agreement:  Date of the Last Agreement: 2/27/24  Reviewed Controlled Substance Agreement including but not limited to the benefits, risks, and alternatives to treatment with a Controlled Substance medication(s).    Monitoring and compliance:    ORT:    PDUQ:    Office Agreement:      Review of Systems     Current Outpatient Medications   Medication Instructions    ammonium lactate (Lac-Hydrin) 12 % lotion Topical, As needed    calcium carbonate (CALCIUM 600) 600 mg, oral, Daily    carboxymethylcell-glycerin,PF, 0.5-0.9 % dropperette 1 drop, Both Eyes, Every 6 hours, Continue for 30 days after last treatment is complete.    gabapentin (NEURONTIN) 900 mg, oral, 3 times daily    hydrOXYzine HCL (Atarax) 25 mg tablet 1 tablet, oral, 3 times daily PRN    levothyroxine (SYNTHROID, LEVOXYL) 25 mcg, oral, Daily    metoprolol tartrate (Lopressor) 50 mg tablet take 0.5 tablets by mouth 2 times a day    naloxone (Narcan) 4 mg/0.1 mL nasal spray 1 spray, nasal, As needed, May repeat every 2-3 minutes if needed, alternating nostrils, until medical assistance becomes available.    ondansetron (ZOFRAN) 8 mg, oral, Every 8 hours PRN    oxyCODONE (ROXICODONE) 5 mg, oral, 3 times daily, As needed for pain    prednisoLONE acetate (Pred-Forte) 1 % ophthalmic suspension 1 drop, Both Eyes, 3 times daily, Administer the first drop in each eye prior to each tisotumab vedotin infusion  then continue as prescribed for 72 hours.    prochlorperazine (COMPAZINE) 10 mg, oral, Every 6 hours PRN    tiZANidine (ZANAFLEX) 4 mg, oral, 3 times daily    traZODone (Desyrel) 50 mg tablet 1 tablet, oral, Nightly PRN        Past Medical History:   Diagnosis Date    Abdominal distension (gaseous) 11/11/2022    Abdominal bloating    Anxiety     Body mass index (BMI)30.0-30.9, adult 08/07/2019    BMI 30.0-30.9,adult    Cervix cancer (CMS/HCC)     Chemotherapy-induced peripheral neuropathy (CMS/HCC)     Chronic kidney disease     Encounter for screening for malignant neoplasm of colon     Encounter for screening colonoscopy    Hypertension     Hypothyroidism     Personal history of other specified conditions     History of snoring    Tachycardia, unspecified     Tachycardia        Past Surgical History:   Procedure Laterality Date    CT GUIDED IMAGING FOR NEEDLE PLACEMENT  01/12/2023    CT GUIDED IMAGING FOR NEEDLE PLACEMENT [facility] CLINICAL LEGACY    IR NEPHROSTOMY TUBE EXCHANGE  10/20/2023    IR NEPHROSTOMY TUBE EXCHANGE 10/20/2023 Manuel Marvin MD [facility]    IR NEPHROSTOMY TUBE EXCHANGE  12/19/2023    IR NEPHROSTOMY TUBE EXCHANGE 12/19/2023 [facility]    MEDIPORT      NEPHROSTOMY      ORIF TIBIA FRACTURE Right     TRANSURETHRAL RESECTION OF BLADDER TUMOR      US GUIDED PERCUTANEOUS PLACEMENT  11/14/2022    US GUIDED PERCUTANEOUS PLACEMENT [facility] INPATIENT LEGACY    US GUIDED PERCUTANEOUS PLACEMENT  08/28/2023    US GUIDED PERCUTANEOUS PLACEMENT [facility]        Family History   Problem Relation Name Age of Onset    No Known Problems Mother      Other (Acute myocardial infarction) Father          No Known Allergies     Objective     There were no vitals filed for this visit.     Physical Exam    GENERAL EXAM  Vital Signs: Vital signs to include heart rate, respiration rate, blood pressure, and temperature were reviewed.  General Appearance:  Awake, alert, healthy appearing, well developed, No acute  distress.  Head: Normocephalic without evidence of head injury.  Neck: The appearance of the neck was normal without swelling with a midline trachea.  Eyes: The eyelids and eyebrows exhibited no abnormalities.  Pupils were not pin-point.  Sclera was without icterus.  Lungs: Respiration rhythm and depth was normal.  Respiratory movements were normal without labored breathing.  Cardiovascular: No peripheral edema was present.    Neurological: Patient was oriented to time, place, and person.  Speech was normal.  Balance, gait, and stance were unremarkable.    Psychiatric: Appearance was normal with appropriate dress.  Mood was euthymic and affect was normal.  Skin: Affected regions were without ecchymosis or skin lesions.          Assessment/Plan   Diagnoses and all orders for this visit:  Cancer related pain  -     Drug Screen, Urine With Reflex to Confirmation; Future  -     gabapentin (Neurontin) 300 mg capsule; Take 3 capsules (900 mg) by mouth 3 times a day.  Cervical cancer, FIGO stage RONIT (CMS/HCC)  -     Drug Screen, Urine With Reflex to Confirmation; Future  -     gabapentin (Neurontin) 300 mg capsule; Take 3 capsules (900 mg) by mouth 3 times a day.  Encounter for long-term use of opiate analgesic  -     Drug Screen, Urine With Reflex to Confirmation; Future  -     gabapentin (Neurontin) 300 mg capsule; Take 3 capsules (900 mg) by mouth 3 times a day.  Lumbar stenosis with neurogenic claudication  -     Drug Screen, Urine With Reflex to Confirmation; Future  -     gabapentin (Neurontin) 300 mg capsule; Take 3 capsules (900 mg) by mouth 3 times a day.    64 yo F with PMH of bladder cancer s/p nephrostomy tubes, cervical cancer on chemotherapy, and herniated lumbar disc presenting today for follow-up. She has been managed with very low-dose oxycodone no than 2-3 per day.  We have previously discussed injections for the lumbar stenosis with neurogenic claudication and we could consider that in the future once  her cancer treatment is hopefully done.     Plan for today:  -Refill of oxycodone 5 mg 3 times daily. OARRS reviewed no issues.  We updated her controlled substance agreement and urine drug screen today  -Continue gabapentin to 900 mg 3 times daily.  -Patient will follow-up with me in 3 months. Urine drug screen and controlled substance agreement up-to-date.           This note was generated with the aid of dictation software, there may be typos despite my attempts at proofreading.

## 2024-03-01 ENCOUNTER — APPOINTMENT (OUTPATIENT)
Dept: WOUND CARE | Facility: HOSPITAL | Age: 64
End: 2024-03-01
Payer: COMMERCIAL

## 2024-03-01 LAB
6MAM UR CFM-MCNC: <25 NG/ML
CODEINE UR CFM-MCNC: <50 NG/ML
HYDROCODONE CTO UR CFM-MCNC: <25 NG/ML
HYDROMORPHONE UR CFM-MCNC: <25 NG/ML
MORPHINE UR CFM-MCNC: <50 NG/ML
NORHYDROCODONE UR CFM-MCNC: <25 NG/ML
NOROXYCODONE UR CFM-MCNC: 279 NG/ML
OXYCODONE UR CFM-MCNC: 35 NG/ML
OXYMORPHONE UR CFM-MCNC: 330 NG/ML

## 2024-03-05 ENCOUNTER — TELEPHONE (OUTPATIENT)
Dept: GYNECOLOGIC ONCOLOGY | Facility: HOSPITAL | Age: 64
End: 2024-03-05
Payer: COMMERCIAL

## 2024-03-05 NOTE — TELEPHONE ENCOUNTER
Called patient and left message re: nephrology consult.  Called central scheduling and [facility] [place]- there were none available.  Appointment made on 5/13 at [facility] main [facility].  I did leave her central scheduling phone on message if she would like to call and change appt.  Per central scheduling, first available appt at [facility] would be in June.

## 2024-03-07 DIAGNOSIS — T45.1X5A PERIPHERAL NEUROPATHY DUE TO CHEMOTHERAPY (MULTI): ICD-10-CM

## 2024-03-07 DIAGNOSIS — C53.9 CERVICAL CANCER, FIGO STAGE IVA (MULTI): ICD-10-CM

## 2024-03-07 DIAGNOSIS — G62.0 PERIPHERAL NEUROPATHY DUE TO CHEMOTHERAPY (MULTI): ICD-10-CM

## 2024-03-07 RX ORDER — DULOXETIN HYDROCHLORIDE 20 MG/1
20 CAPSULE, DELAYED RELEASE ORAL DAILY
Qty: 30 CAPSULE | Refills: 11 | Status: SHIPPED | OUTPATIENT
Start: 2024-03-07 | End: 2025-03-07

## 2024-03-11 ENCOUNTER — LAB (OUTPATIENT)
Dept: LAB | Facility: LAB | Age: 64
End: 2024-03-11
Payer: COMMERCIAL

## 2024-03-11 ENCOUNTER — TELEPHONE (OUTPATIENT)
Dept: CARDIOLOGY | Facility: HOSPITAL | Age: 64
End: 2024-03-11

## 2024-03-11 ENCOUNTER — APPOINTMENT (OUTPATIENT)
Dept: RADIOLOGY | Facility: HOSPITAL | Age: 64
End: 2024-03-11
Payer: COMMERCIAL

## 2024-03-11 ENCOUNTER — HOSPITAL ENCOUNTER (OUTPATIENT)
Dept: RADIOLOGY | Facility: CLINIC | Age: 64
Discharge: HOME | End: 2024-03-11
Payer: COMMERCIAL

## 2024-03-11 ENCOUNTER — OFFICE VISIT (OUTPATIENT)
Dept: OPHTHALMOLOGY | Facility: CLINIC | Age: 64
End: 2024-03-11
Payer: COMMERCIAL

## 2024-03-11 DIAGNOSIS — H04.123 DRY EYE SYNDROME OF BOTH EYES: ICD-10-CM

## 2024-03-11 DIAGNOSIS — C53.9 CERVICAL CANCER, FIGO STAGE IVA (MULTI): ICD-10-CM

## 2024-03-11 DIAGNOSIS — N13.1 ACQUIRED HYDRONEPHROSIS DUE TO OBSTRUCTION OF URETER: ICD-10-CM

## 2024-03-11 DIAGNOSIS — Z79.899 ENCOUNTER FOR LONG-TERM (CURRENT) USE OF HIGH-RISK MEDICATION: Primary | ICD-10-CM

## 2024-03-11 DIAGNOSIS — Z51.12 ENCOUNTER FOR ANTINEOPLASTIC CHEMOTHERAPY AND IMMUNOTHERAPY: ICD-10-CM

## 2024-03-11 DIAGNOSIS — Z51.11 ENCOUNTER FOR ANTINEOPLASTIC CHEMOTHERAPY AND IMMUNOTHERAPY: ICD-10-CM

## 2024-03-11 LAB
ALBUMIN SERPL BCP-MCNC: 4 G/DL (ref 3.4–5)
ALP SERPL-CCNC: 64 U/L (ref 33–136)
ALT SERPL W P-5'-P-CCNC: 9 U/L (ref 7–45)
ANION GAP SERPL CALC-SCNC: 15 MMOL/L (ref 10–20)
AST SERPL W P-5'-P-CCNC: 22 U/L (ref 9–39)
BASOPHILS # BLD AUTO: 0.1 X10*3/UL (ref 0–0.1)
BASOPHILS NFR BLD AUTO: 0.8 %
BILIRUB SERPL-MCNC: 0.2 MG/DL (ref 0–1.2)
BUN SERPL-MCNC: 36 MG/DL (ref 6–23)
CALCIUM SERPL-MCNC: 8.8 MG/DL (ref 8.6–10.6)
CHLORIDE SERPL-SCNC: 107 MMOL/L (ref 98–107)
CO2 SERPL-SCNC: 26 MMOL/L (ref 21–32)
CREAT SERPL-MCNC: 2.54 MG/DL (ref 0.5–1.05)
EGFRCR SERPLBLD CKD-EPI 2021: 21 ML/MIN/1.73M*2
EOSINOPHIL # BLD AUTO: 0.57 X10*3/UL (ref 0–0.7)
EOSINOPHIL NFR BLD AUTO: 4.7 %
ERYTHROCYTE [DISTWIDTH] IN BLOOD BY AUTOMATED COUNT: 14.7 % (ref 11.5–14.5)
GLUCOSE SERPL-MCNC: 89 MG/DL (ref 74–99)
HCT VFR BLD AUTO: 34.7 % (ref 36–46)
HGB BLD-MCNC: 10.3 G/DL (ref 12–16)
IMM GRANULOCYTES # BLD AUTO: 0.06 X10*3/UL (ref 0–0.7)
IMM GRANULOCYTES NFR BLD AUTO: 0.5 % (ref 0–0.9)
LYMPHOCYTES # BLD AUTO: 0.97 X10*3/UL (ref 1.2–4.8)
LYMPHOCYTES NFR BLD AUTO: 7.9 %
MCH RBC QN AUTO: 30.6 PG (ref 26–34)
MCHC RBC AUTO-ENTMCNC: 29.7 G/DL (ref 32–36)
MCV RBC AUTO: 103 FL (ref 80–100)
MONOCYTES # BLD AUTO: 0.75 X10*3/UL (ref 0.1–1)
MONOCYTES NFR BLD AUTO: 6.1 %
NEUTROPHILS # BLD AUTO: 9.8 X10*3/UL (ref 1.2–7.7)
NEUTROPHILS NFR BLD AUTO: 80 %
NRBC BLD-RTO: 0 /100 WBCS (ref 0–0)
PLATELET # BLD AUTO: 317 X10*3/UL (ref 150–450)
POTASSIUM SERPL-SCNC: 5 MMOL/L (ref 3.5–5.3)
PROT SERPL-MCNC: 7.1 G/DL (ref 6.4–8.2)
RBC # BLD AUTO: 3.37 X10*6/UL (ref 4–5.2)
SODIUM SERPL-SCNC: 143 MMOL/L (ref 136–145)
WBC # BLD AUTO: 12.3 X10*3/UL (ref 4.4–11.3)

## 2024-03-11 PROCEDURE — 74176 CT ABD & PELVIS W/O CONTRAST: CPT | Performed by: RADIOLOGY

## 2024-03-11 PROCEDURE — 85025 COMPLETE CBC W/AUTO DIFF WBC: CPT

## 2024-03-11 PROCEDURE — 71250 CT THORAX DX C-: CPT | Performed by: RADIOLOGY

## 2024-03-11 PROCEDURE — 80053 COMPREHEN METABOLIC PANEL: CPT

## 2024-03-11 PROCEDURE — 99213 OFFICE O/P EST LOW 20 MIN: CPT | Performed by: STUDENT IN AN ORGANIZED HEALTH CARE EDUCATION/TRAINING PROGRAM

## 2024-03-11 PROCEDURE — 74176 CT ABD & PELVIS W/O CONTRAST: CPT

## 2024-03-11 RX ORDER — NYSTATIN 100000 U/G
1 CREAM TOPICAL 2 TIMES DAILY
COMMUNITY
Start: 2024-01-11

## 2024-03-11 RX ORDER — DOXYCYCLINE 100 MG/1
100 CAPSULE ORAL 2 TIMES DAILY
COMMUNITY
Start: 2024-01-31

## 2024-03-11 RX ORDER — CLOTRIMAZOLE AND BETAMETHASONE DIPROPIONATE 10; .64 MG/G; MG/G
CREAM TOPICAL
COMMUNITY
Start: 2024-02-14 | End: 2024-04-04 | Stop reason: ALTCHOICE

## 2024-03-11 ASSESSMENT — VISUAL ACUITY
OS_CC+: +2
CORRECTION_TYPE: GLASSES
OD_CC: 20/20
OD_CC+: -2
METHOD: SNELLEN - LINEAR
OS_CC: 20/25

## 2024-03-11 ASSESSMENT — EXTERNAL EXAM - RIGHT EYE: OD_EXAM: NORMAL

## 2024-03-11 ASSESSMENT — CUP TO DISC RATIO
OD_RATIO: .35
OS_RATIO: .35

## 2024-03-11 ASSESSMENT — CONF VISUAL FIELD
OS_NORMAL: 1
OS_INFERIOR_TEMPORAL_RESTRICTION: 0
OD_INFERIOR_NASAL_RESTRICTION: 0
OD_NORMAL: 1
OD_SUPERIOR_NASAL_RESTRICTION: 0
METHOD: COUNTING FINGERS
OS_INFERIOR_NASAL_RESTRICTION: 0
OS_SUPERIOR_NASAL_RESTRICTION: 0
OS_SUPERIOR_TEMPORAL_RESTRICTION: 0
OD_INFERIOR_TEMPORAL_RESTRICTION: 0
OD_SUPERIOR_TEMPORAL_RESTRICTION: 0

## 2024-03-11 ASSESSMENT — ENCOUNTER SYMPTOMS
MUSCULOSKELETAL NEGATIVE: 0
GASTROINTESTINAL NEGATIVE: 0
CONSTITUTIONAL NEGATIVE: 0
CARDIOVASCULAR NEGATIVE: 0
EYES NEGATIVE: 0
NEUROLOGICAL NEGATIVE: 0
ALLERGIC/IMMUNOLOGIC NEGATIVE: 0
PSYCHIATRIC NEGATIVE: 0
RESPIRATORY NEGATIVE: 0
ENDOCRINE NEGATIVE: 0
HEMATOLOGIC/LYMPHATIC NEGATIVE: 0

## 2024-03-11 ASSESSMENT — TONOMETRY
IOP_METHOD: GOLDMANN APPLANATION
OD_IOP_MMHG: 12
OS_IOP_MMHG: 12

## 2024-03-11 ASSESSMENT — EXTERNAL EXAM - LEFT EYE: OS_EXAM: NORMAL

## 2024-03-11 NOTE — PROGRESS NOTES
Assessment/Plan   Diagnoses and all orders for this visit:  Dry eye syndrome of both eyes  Meibomian gland disease, unspecified laterality  Encounter for long term (current) use of high risk medication  -patient here for 3 week follow up after 3rd Tisotumab infusion  -BCVA OD 20/20+ OS 20/25+  -patient reveals no new symptoms  -reports compliance with corticosteroids, vasoconstrictor, lubricating drops  -intermittent use of Genteal ointment at this time  -using warm compresses intermittently  -anterior segment stable findings OU  -mild signs of ocular surface changes stable to baseline  -continue with corticosteroids, vasoconstrictor, lubricating drops, Genteal, warm compresses    RTC for VA check, IOP, and slit lamp exam. No dilation 3 weeks for F/u.

## 2024-03-12 ENCOUNTER — HOSPITAL ENCOUNTER (OUTPATIENT)
Dept: CARDIOLOGY | Facility: HOSPITAL | Age: 64
Discharge: HOME | End: 2024-03-12
Payer: COMMERCIAL

## 2024-03-12 VITALS
DIASTOLIC BLOOD PRESSURE: 79 MMHG | HEART RATE: 60 BPM | RESPIRATION RATE: 16 BRPM | SYSTOLIC BLOOD PRESSURE: 146 MMHG | OXYGEN SATURATION: 96 %

## 2024-03-12 DIAGNOSIS — C53.9 MALIGNANT NEOPLASM OF CERVIX UTERI, UNSPECIFIED (MULTI): ICD-10-CM

## 2024-03-12 LAB
POCT INTERNATIONAL NORMALIZATION RATIO: 1
POCT PROTHROMBIN TIME: 12.1 SECONDS

## 2024-03-12 PROCEDURE — 99152 MOD SED SAME PHYS/QHP 5/>YRS: CPT

## 2024-03-12 PROCEDURE — 50435 EXCHANGE NEPHROSTOMY CATH: CPT

## 2024-03-12 PROCEDURE — 7100000009 HC PHASE TWO TIME - INITIAL BASE CHARGE

## 2024-03-12 PROCEDURE — 2500000004 HC RX 250 GENERAL PHARMACY W/ HCPCS (ALT 636 FOR OP/ED): Performed by: RADIOLOGY

## 2024-03-12 PROCEDURE — C1769 GUIDE WIRE: HCPCS

## 2024-03-12 PROCEDURE — 99153 MOD SED SAME PHYS/QHP EA: CPT

## 2024-03-12 PROCEDURE — C1729 CATH, DRAINAGE: HCPCS

## 2024-03-12 PROCEDURE — 2720000007 HC OR 272 NO HCPCS

## 2024-03-12 PROCEDURE — 7100000010 HC PHASE TWO TIME - EACH INCREMENTAL 1 MINUTE

## 2024-03-12 PROCEDURE — 99153 MOD SED SAME PHYS/QHP EA: CPT | Performed by: RADIOLOGY

## 2024-03-12 PROCEDURE — 50435 EXCHANGE NEPHROSTOMY CATH: CPT | Performed by: RADIOLOGY

## 2024-03-12 PROCEDURE — 99152 MOD SED SAME PHYS/QHP 5/>YRS: CPT | Performed by: RADIOLOGY

## 2024-03-12 RX ORDER — MIDAZOLAM HYDROCHLORIDE 1 MG/ML
INJECTION, SOLUTION INTRAMUSCULAR; INTRAVENOUS AS NEEDED
Status: DISCONTINUED | OUTPATIENT
Start: 2024-03-12 | End: 2024-03-13 | Stop reason: HOSPADM

## 2024-03-12 RX ORDER — DEXTROSE MONOHYDRATE AND SODIUM CHLORIDE 5; .45 G/100ML; G/100ML
50 INJECTION, SOLUTION INTRAVENOUS CONTINUOUS
Status: DISCONTINUED | OUTPATIENT
Start: 2024-03-12 | End: 2024-03-13 | Stop reason: HOSPADM

## 2024-03-12 RX ORDER — FENTANYL CITRATE 50 UG/ML
INJECTION, SOLUTION INTRAMUSCULAR; INTRAVENOUS AS NEEDED
Status: DISCONTINUED | OUTPATIENT
Start: 2024-03-12 | End: 2024-03-13 | Stop reason: HOSPADM

## 2024-03-12 RX ADMIN — MIDAZOLAM 1 MG: 1 INJECTION INTRAMUSCULAR; INTRAVENOUS at 09:26

## 2024-03-12 RX ADMIN — MIDAZOLAM 1 MG: 1 INJECTION INTRAMUSCULAR; INTRAVENOUS at 09:25

## 2024-03-12 RX ADMIN — FENTANYL CITRATE 50 MCG: 50 INJECTION, SOLUTION INTRAMUSCULAR; INTRAVENOUS at 09:25

## 2024-03-12 ASSESSMENT — PAIN SCALES - GENERAL: PAINLEVEL_OUTOF10: 0 - NO PAIN

## 2024-03-12 ASSESSMENT — PAIN - FUNCTIONAL ASSESSMENT
PAIN_FUNCTIONAL_ASSESSMENT: 0-10

## 2024-03-13 DIAGNOSIS — C53.9 CERVICAL CANCER, FIGO STAGE IVA (MULTI): ICD-10-CM

## 2024-03-13 ASSESSMENT — PAIN SCALES - GENERAL: PAINLEVEL_OUTOF10: 0 - NO PAIN

## 2024-03-14 ENCOUNTER — OFFICE VISIT (OUTPATIENT)
Dept: GYNECOLOGIC ONCOLOGY | Facility: CLINIC | Age: 64
End: 2024-03-14
Payer: COMMERCIAL

## 2024-03-14 ENCOUNTER — APPOINTMENT (OUTPATIENT)
Dept: GYNECOLOGIC ONCOLOGY | Facility: CLINIC | Age: 64
End: 2024-03-14
Payer: COMMERCIAL

## 2024-03-14 VITALS
RESPIRATION RATE: 18 BRPM | WEIGHT: 184.08 LBS | HEART RATE: 71 BPM | DIASTOLIC BLOOD PRESSURE: 76 MMHG | BODY MASS INDEX: 28.83 KG/M2 | SYSTOLIC BLOOD PRESSURE: 126 MMHG | OXYGEN SATURATION: 99 % | TEMPERATURE: 96.4 F

## 2024-03-14 DIAGNOSIS — N13.9 OBSTRUCTIVE UROPATHY: ICD-10-CM

## 2024-03-14 DIAGNOSIS — Z51.11 ENCOUNTER FOR ANTINEOPLASTIC CHEMOTHERAPY AND IMMUNOTHERAPY: ICD-10-CM

## 2024-03-14 DIAGNOSIS — N18.4 CHRONIC RENAL DISEASE, STAGE IV (MULTI): ICD-10-CM

## 2024-03-14 DIAGNOSIS — Z51.12 ENCOUNTER FOR ANTINEOPLASTIC CHEMOTHERAPY AND IMMUNOTHERAPY: ICD-10-CM

## 2024-03-14 DIAGNOSIS — C53.9 CERVICAL CANCER, FIGO STAGE IVA (MULTI): ICD-10-CM

## 2024-03-14 DIAGNOSIS — R60.0 BILATERAL LOWER EXTREMITY EDEMA: ICD-10-CM

## 2024-03-14 DIAGNOSIS — C53.9 CERVICAL CANCER, FIGO STAGE IVA (MULTI): Primary | ICD-10-CM

## 2024-03-14 PROCEDURE — 99215 OFFICE O/P EST HI 40 MIN: CPT | Performed by: STUDENT IN AN ORGANIZED HEALTH CARE EDUCATION/TRAINING PROGRAM

## 2024-03-14 PROCEDURE — 1036F TOBACCO NON-USER: CPT | Performed by: STUDENT IN AN ORGANIZED HEALTH CARE EDUCATION/TRAINING PROGRAM

## 2024-03-14 PROCEDURE — 3074F SYST BP LT 130 MM HG: CPT | Performed by: STUDENT IN AN ORGANIZED HEALTH CARE EDUCATION/TRAINING PROGRAM

## 2024-03-14 PROCEDURE — 3078F DIAST BP <80 MM HG: CPT | Performed by: STUDENT IN AN ORGANIZED HEALTH CARE EDUCATION/TRAINING PROGRAM

## 2024-03-14 RX ORDER — PROCHLORPERAZINE EDISYLATE 5 MG/ML
10 INJECTION INTRAMUSCULAR; INTRAVENOUS EVERY 6 HOURS PRN
Status: CANCELLED | OUTPATIENT
Start: 2024-03-21

## 2024-03-14 RX ORDER — EPINEPHRINE 0.3 MG/.3ML
0.3 INJECTION SUBCUTANEOUS EVERY 5 MIN PRN
Status: CANCELLED | OUTPATIENT
Start: 2024-03-22

## 2024-03-14 RX ORDER — ALBUTEROL SULFATE 0.83 MG/ML
3 SOLUTION RESPIRATORY (INHALATION) AS NEEDED
Status: CANCELLED | OUTPATIENT
Start: 2024-03-21

## 2024-03-14 RX ORDER — DIPHENHYDRAMINE HYDROCHLORIDE 50 MG/ML
50 INJECTION INTRAMUSCULAR; INTRAVENOUS AS NEEDED
Status: CANCELLED | OUTPATIENT
Start: 2024-03-22

## 2024-03-14 RX ORDER — FAMOTIDINE 10 MG/ML
20 INJECTION INTRAVENOUS ONCE AS NEEDED
Status: CANCELLED | OUTPATIENT
Start: 2024-03-22

## 2024-03-14 RX ORDER — PROCHLORPERAZINE MALEATE 10 MG
10 TABLET ORAL EVERY 6 HOURS PRN
Status: CANCELLED | OUTPATIENT
Start: 2024-03-21

## 2024-03-14 RX ORDER — DIPHENHYDRAMINE HYDROCHLORIDE 50 MG/ML
50 INJECTION INTRAMUSCULAR; INTRAVENOUS AS NEEDED
Status: CANCELLED | OUTPATIENT
Start: 2024-03-21

## 2024-03-14 RX ORDER — ALBUTEROL SULFATE 0.83 MG/ML
3 SOLUTION RESPIRATORY (INHALATION) AS NEEDED
Status: CANCELLED | OUTPATIENT
Start: 2024-03-22

## 2024-03-14 RX ORDER — DEXAMETHASONE 4 MG/1
8 TABLET ORAL EVERY MORNING
Qty: 66 TABLET | Refills: 1 | Status: SHIPPED | OUTPATIENT
Start: 2024-03-14 | End: 2024-03-20 | Stop reason: SDUPTHER

## 2024-03-14 RX ORDER — EPINEPHRINE 0.3 MG/.3ML
0.3 INJECTION SUBCUTANEOUS EVERY 5 MIN PRN
Status: CANCELLED | OUTPATIENT
Start: 2024-03-21

## 2024-03-14 RX ORDER — ONDANSETRON HYDROCHLORIDE 2 MG/ML
8 INJECTION, SOLUTION INTRAVENOUS ONCE
Status: CANCELLED | OUTPATIENT
Start: 2024-03-21

## 2024-03-14 RX ORDER — FAMOTIDINE 10 MG/ML
20 INJECTION INTRAVENOUS ONCE AS NEEDED
Status: CANCELLED | OUTPATIENT
Start: 2024-03-21

## 2024-03-14 ASSESSMENT — PAIN SCALES - GENERAL: PAINLEVEL: 5

## 2024-03-14 NOTE — PROGRESS NOTES
Patient ID: Shelbi Delaney is a 63 y.o. female.  Referring Physician: No referring provider defined for this encounter.  Primary Care Provider: Sonja Uriarte MD    Subjective    Patient presents today in follow-up after 3 cycles of Tivdak for persistent cervical cancer. Chronic LE swelling for which she is followed by [facility] [place], operated by [facility] and recently s/p PCN exchange yesterday with no issues. Ongoing improvement in flank rash. Denies abdominopelvic pain, nausea/vomiting, fevers, chills, chest pain or shortness of breath. Having regular bowel movements without difficulty - no further episodes of constipation. No recent falls. Persistent thin pink vaginal discharge without bleeding. Denies interim hospitalizations since last assessment.     Last PCN exchange 3/12/24 ([facility], [place], operated by [facility]); q12 weeks   A comprehensive review of systems was performed and otherwise negative.       Objective    BSA: 1.99 meters squared  /76 (BP Location: Left arm, Patient Position: Sitting, BP Cuff Size: Adult long)   Pulse 71   Temp 35.8 °C (96.4 °F) (Temporal)   Resp 18   Wt 83.5 kg (184 lb 1.4 oz)   SpO2 99%   BMI 28.83 kg/m²      Physical Exam  Vitals and nursing note reviewed. Exam conducted with a chaperone present.   Constitutional:       General: She is not in acute distress.     Appearance: Normal appearance.   HENT:      Head: Normocephalic and atraumatic.      Mouth/Throat:      Mouth: Mucous membranes are moist.      Pharynx: No oropharyngeal exudate or posterior oropharyngeal erythema.   Eyes:      Extraocular Movements: Extraocular movements intact.      Conjunctiva/sclera: Conjunctivae normal.      Pupils: Pupils are equal, round, and reactive to light.   Neck:      Thyroid: No thyroid mass or thyromegaly.   Cardiovascular:      Rate and Rhythm: Normal rate and regular rhythm.      Pulses: Normal pulses.      Heart sounds: Normal heart sounds.   Pulmonary:      Effort: Pulmonary effort is normal.      Breath sounds:  Normal breath sounds. No wheezing, rhonchi or rales.   Abdominal:      General: Bowel sounds are normal. There is no distension.      Palpations: Abdomen is soft. There is no mass.      Tenderness: There is no abdominal tenderness.      Hernia: No hernia is present.   Genitourinary:     Comments: Bilateral nephrostomies in place,  clear urine in tubing  Musculoskeletal:         General: No tenderness. Normal range of motion.      Cervical back: Normal range of motion and neck supple. No tenderness.      Right lower leg: Edema present.      Left lower leg: Edema present.      Comments: Chronic BLE edema; compression dressings in place   Skin:     General: Skin is warm.      Findings: Rash present. No lesion.      Comments: Marked improvement in hyperkeratotic plaque surrounding nephrostomy site - no redness/drainage/erythema   Neurological:      General: No focal deficit present.      Mental Status: She is alert and oriented to person, place, and time.   Psychiatric:         Mood and Affect: Mood normal.         Behavior: Behavior normal.       Performance Status:  Symptomatic; fully ambulatory    Assessment/Plan   62yo with stage RONIT moderately differentiated SCC of cervix with progressive disease s/p C#3 Tivdak; obstructive uropathy with bilateral PCNs in place. Resolved RLE MSSA. ECOG 1.   Oncology History Overview Note   Stage RONIT grade 2 squamous cell carcinoma of cervix  1/20/23: TURBT - poorly differentiated squamaous cell carcinoma; CPS10.  review with history of cervical mass consistent with GYN primary  - Extension to rectal mucosa, pubic ramus with ?reactive mediastinal LN   - Bilateral nephrostomy placement   2/16/23 - 6/1/23: Carbo AUC5/Taxol 175 +Avastin 15mg/kg, Pembrolizumab 200mg/m2  - C2-C4 +Avastin - further treatments held due to progressive gr2 proteinuria   - Grade 3 anemia declining transfusion,   7/20/23 - 12/18/23: Pembrolizumab 200mg/m2, progressive disease  1/11/24 - 3/14/24: Tivdak x3,  progressive disease   - C1: 0.9mg/kg in setting of CrCl < 30    Molecular Testing  PLD1: CPS 10  1/2024 Tempus xT - PIK3CA p.E726K missense (gain): consider alpelisib  Loss of function: BCORL1, KMT2C, NSD1, KMT2D, RAD21  TMB 7.9mB - RACHELE  - Her2 pending 2/21/24     Cervical cancer, FIGO stage RONIT (CMS/HCC)   3/21/2022 Cancer Staged    Staging form: Cervix Uteri, AJCC Version 9, Clinical stage from 3/21/2022: FIGO Stage RONIT (cT4, cN2, cM0) - Signed by Adrianna Jefferson MD on 10/12/2023, Prognostic indicators: CPS 10     7/20/2023 - 11/30/2023 Chemotherapy    Pembrolizumab, 21 Day Cycles     1/12/2024 - 2/22/2024 Chemotherapy    Tisotumab vedotin, 21 Day Cycles     3/21/2024 -  Chemotherapy    DOCEtaxel, 21 Day Cycles          Diagnoses and all orders for this visit:  Encounter for antineoplastic immunotherapy  Cervical cancer, FIGO stage RONIT (CMS/HCC)  - Imaging reviewed with patient demonstrating progression of primary cervical tumor with ?pulmonary nodule too small to characterize. Discussed treatment options including supportive care vs cancer-directed therapies, acknowledging limitation in treatment options in setting of chronic renal insufficiency. Discussed consideration of alpelisib however likely cost-prohibitive given off-label use of medication for GYN malignancy.   - Discussed anticipated response rates in setting of further cytotoxic chemotherapy options including Gemcitabine or Docetaxel; patient wishes to pursue treatment with Docetaxel based on risk of renal damage due to therapy. Treatment consents signed.   - Additionally discussed consideration for Supportive Oncology referral/discussion re: comprehensive pain management plan, symptom optimization and goals of care discussion with her /family as she presents alone to appointment today. She expressed she is still forward thinking and future-oriented, but understands risks of aggressive medical interventions at end of life. Will revisit at  subsequent visits.   [ ] Her2 pending   -     Clinic Appointment Request; Future  -     Infusion Appointment Request; Future  Obstructive uropathy  - Interval improvement s/p treatment of superimposed MSSA of PCN sites; most recent PCN change 3/12  - Ongoing home care with RN assessment   Chronic renal disease, stage IV (CMS/HCC)  - Chronic in setting of obstructive uropathy (peak Cr 11) at initial clinical presentation; eGFR <25 stable after dose-adjusted therapies now planning treatment with Docetaxel  - No specific adjustments in setting of reduced GFR, however not investigated in setting of GFR <30  - Previously referred re: Onconephrology for further recs in setting of potentially nephrotoxic agents  -     Referral to Nephrology; Future    Adrianna Jefferson MD

## 2024-03-15 ENCOUNTER — ONCOLOGY MEDICATION OUTREACH (OUTPATIENT)
Dept: GYNECOLOGIC ONCOLOGY | Facility: HOSPITAL | Age: 64
End: 2024-03-15
Payer: COMMERCIAL

## 2024-03-15 NOTE — PROGRESS NOTES
ONCOLOGY CLINICAL PHARMACY NOTE     Subjective  Shelbi Delaney is a 63 y.o. female with cervical cancer, here for education.        Treatment history  Treatment Details   Treatment goal [No plan goal]   Plan Name Venous Access Orders   Status Active   Start Date 10/12/2023   End Date Until discontinued   Provider Adrianna Jefferson MD   Chemotherapy [No matching medication found in this treatment plan]     Treatment Details   Treatment goal [No plan goal]   Plan Name Pembrolizumab, 21 Day Cycles   Status Inactive   Start Date 7/20/2023   End Date 11/30/2023   Provider Adrianna Jefferson MD   Chemotherapy pembrolizumab (Keytruda) 200 mg in sodium chloride 0.9% 100 mL IV, 200 mg, intravenous, Once, 7 of 17 cycles  Administration: 200 mg (10/12/2023), 200 mg (11/2/2023), 200 mg (11/30/2023)       Treatment Details   Treatment goal Palliative   Plan Name Tisotumab vedotin, 21 Day Cycles   Status Inactive   Start Date 1/12/2024   End Date 2/22/2024   Provider Adrianna Jefferson MD   Chemotherapy tisotumab vedotin (Tivdak) 74 mg in sodium chloride 0.9% 65 mL IV, 0.9 mg/kg = 74 mg (45 % of original dose 2 mg/kg), intravenous, Once, 3 of 17 cycles  Dose modification: 0.9 mg/kg (original dose 2 mg/kg, Cycle 1, Reason: Abnormal Renal Function, Comment: reduced CrCl), 1.3 mg/kg (original dose 2 mg/kg, Cycle 2, Reason: Other (See Comments), Comment: Reduced GFR)  Administration: 74 mg (1/12/2024), 108 mg (2/1/2024), 108 mg (2/22/2024)       Treatment Details   Treatment goal Palliative   Plan Name DOCEtaxel, 21 Day Cycles   Status Active   Start Date 3/21/2024 (Planned)   End Date 2/20/2025 (Planned)   Provider Adrianna Jefferson MD   Chemotherapy DOCEtaxeL (Taxotere) 200 mg in dextrose 5 % in water (D5W) 510 mL IV, 100 mg/m2 = 200 mg, intravenous, Once, 0 of 17 cycles       Treatment Details   Treatment goal Palliative   Plan Name PACLitaxel / CARBOplatin, 21 Day Cycles - Gyn   Status Inactive   Start Date    End Date    Provider Adrianna WASHINGTON  MD Li   Chemotherapy fosaprepitant (Emend) 150 mg in sodium chloride 0.9% 250 mL IV, 150 mg, intravenous, Once, 0 of 6 cycles    CARBOplatin (Paraplatin) in sodium chloride 0.9% 100 mL IV, , intravenous, Once, 0 of 6 cycles    PACLitaxeL (Taxol) 175 mg/m2 in dextrose 5 % in water (D5W) 500 mL IV, 175 mg/m2, intravenous, Once, 0 of 6 cycles    palonosetron (Aloxi) injection 250 mcg, 0.25 mg, intravenous, Once, 0 of 6 cycles       Treatment Details   Treatment goal Palliative   Plan Name PACLitaxel / CARBOplatin, 21 Day Cycles - Gyn   Status Inactive   Start Date    End Date    Provider Adrianna Jefferson MD   Chemotherapy fosaprepitant (Emend) 150 mg in sodium chloride 0.9% 250 mL IV, 150 mg, intravenous, Once, 0 of 6 cycles    CARBOplatin (Paraplatin) in sodium chloride 0.9% 100 mL IV, , intravenous, Once, 0 of 6 cycles    PACLitaxeL (Taxol) 348 mg in dextrose 5 % in water (D5W) 308 mL IV, 175 mg/m2, intravenous, Once, 0 of 6 cycles    palonosetron (Aloxi) injection 250 mcg, 0.25 mg, intravenous, Once, 0 of 6 cycles          Objective  There were no vitals taken for this visit.  Lab Results   Component Value Date    WBC 12.3 (H) 03/11/2024    HGB 10.3 (L) 03/11/2024    HCT 34.7 (L) 03/11/2024     (H) 03/11/2024     03/11/2024      Lab Results   Component Value Date    GLUCOSE 89 03/11/2024    CALCIUM 8.8 03/11/2024     03/11/2024    K 5.0 03/11/2024    CO2 26 03/11/2024     03/11/2024    BUN 36 (H) 03/11/2024    CREATININE 2.54 (H) 03/11/2024     Lab Results   Component Value Date    ALT 9 03/11/2024    AST 22 03/11/2024    ALKPHOS 64 03/11/2024    BILITOT 0.2 03/11/2024       Allergies and Medications   No Known Allergies    Current Outpatient Medications:     ammonium lactate (Lac-Hydrin) 12 % lotion, Apply topically if needed for dry skin. (Patient not taking: Reported on 3/14/2024), Disp: 225 g, Rfl: 1    calcium carbonate (Calcium 600) 600 mg calcium (1,500 mg) tablet, Take 600 mg by  mouth once daily., Disp: , Rfl:     clotrimazole-betamethasone (Lotrisone) cream, APPLY TO AFFECTED AREA TWICE DAILY FOR 14 DAYS, Disp: , Rfl:     dexAMETHasone (Decadron) 4 mg tablet, Take 2 tablets (8 mg) by mouth once daily in the morning. Take 5 tablets (20mg) by mouth the night BEFORE Chemotherapy -- Take 2 tablest (8mg) once a day in the morning on 1st, 2nd, 3rd day AFTER Chemotherapy, Disp: 66 tablet, Rfl: 1    doxycycline (Vibramycin) 100 mg capsule, Take 1 capsule (100 mg) by mouth 2 times a day., Disp: , Rfl:     DULoxetine (Cymbalta) 20 mg DR capsule, Take 1 capsule (20 mg) by mouth once daily. Do not crush or chew., Disp: 30 capsule, Rfl: 11    gabapentin (Neurontin) 300 mg capsule, Take 3 capsules (900 mg) by mouth 3 times a day., Disp: 270 capsule, Rfl: 2    hydrOXYzine HCL (Atarax) 25 mg tablet, Take 1 tablet (25 mg) by mouth 3 times a day as needed for anxiety., Disp: , Rfl:     levothyroxine (Synthroid, Levoxyl) 25 mcg tablet, Take 1 tablet (25 mcg) by mouth once daily., Disp: 90 tablet, Rfl: 3    metoprolol tartrate (Lopressor) 50 mg tablet, take 0.5 tablets by mouth 2 times a day, Disp: 30 tablet, Rfl: 5    naloxone (Narcan) 4 mg/0.1 mL nasal spray, Administer 1 spray (4 mg) into affected nostril(s) if needed for opioid reversal. May repeat every 2-3 minutes if needed, alternating nostrils, until medical assistance becomes available., Disp: , Rfl:     nystatin (Mycostatin) cream, Apply 1 Application topically 2 times a day., Disp: , Rfl:     ondansetron (Zofran) 8 mg tablet, Take 1 tablet (8 mg) by mouth every 8 hours if needed for nausea or vomiting., Disp: 30 tablet, Rfl: 5    oxyCODONE (Roxicodone) 5 mg immediate release tablet, Take 1 tablet (5 mg) by mouth 3 times a day. As needed for pain, Disp: 90 tablet, Rfl: 0    prochlorperazine (Compazine) 10 mg tablet, Take 1 tablet (10 mg) by mouth every 6 hours if needed for nausea or vomiting., Disp: 30 tablet, Rfl: 5    tiZANidine (Zanaflex) 4 mg  tablet, TAKE ONE TABLET BY MOUTH THREE TIMES A DAY, Disp: 90 tablet, Rfl: 0    traZODone (Desyrel) 50 mg tablet, Take 1 tablet (50 mg) by mouth as needed at bedtime for sleep., Disp: , Rfl:     Assessment and Plan  Shelbi Delaney is a 63 y.o. female with cervical cancer, to be treated with docetaxel.    Chemotherapy  Education: Reviewed drug, dose, frequency, administration, treatment cycle, duration of therapy, and missed doses. Counseled on potential side effects including but not limited to chemotherapy side effects: neutropenia, infection risk, anemia, fatigue, weakness, low energy, thrombocytopenia, bleeding/bruising, n/v, diarrhea, edema, hair loss, muscle or joint pain, skin rash, infusion reactions, and neuropathy. Discussed techniques to mitigate severity of side effects such as blood count checks, temperature checks, electrolyte monitoring, antiemetic use, loperamide use with max dose of 8 tabs per 24 hours, staying hydrated if having diarrhea, and monitoring for leg edema, SOB, trouble breathing. Unable to provide handout due to virtual nature of encounter. Patient had questions about toxicities. All questions answered and contact information was given to patient.    Drug drug interactions: no major interactions identified  Clinical clarification: will confirm dosing of oral dex premedication. Will inform patient of when to  script and final dosing.   Time spent on patient care: 45 minutes            Coleman Moon, ArvindD

## 2024-03-19 DIAGNOSIS — Z79.891 ENCOUNTER FOR LONG-TERM USE OF OPIATE ANALGESIC: ICD-10-CM

## 2024-03-19 DIAGNOSIS — C53.9 CERVICAL CANCER, FIGO STAGE IVB (MULTI): ICD-10-CM

## 2024-03-19 RX ORDER — OXYCODONE HYDROCHLORIDE 5 MG/1
5 TABLET ORAL 3 TIMES DAILY
Qty: 90 TABLET | Refills: 0 | Status: SHIPPED | OUTPATIENT
Start: 2024-03-19 | End: 2024-04-18 | Stop reason: SDUPTHER

## 2024-03-19 NOTE — TELEPHONE ENCOUNTER
Patient requesting oxycodone refill, OARRS reviewed, no issues. Last fill 2/5/24, CSA/UDS expires March. Will schedule patient for next available appointment to renew CSA/UDS.

## 2024-03-20 ENCOUNTER — LAB (OUTPATIENT)
Dept: LAB | Facility: LAB | Age: 64
End: 2024-03-20
Payer: COMMERCIAL

## 2024-03-20 DIAGNOSIS — C53.9 CERVICAL CANCER, FIGO STAGE IVA (MULTI): ICD-10-CM

## 2024-03-20 LAB
ALBUMIN SERPL BCP-MCNC: 3.8 G/DL (ref 3.4–5)
ALP SERPL-CCNC: 67 U/L (ref 33–136)
ALT SERPL W P-5'-P-CCNC: 13 U/L (ref 7–45)
ANION GAP SERPL CALC-SCNC: 12 MMOL/L (ref 10–20)
AST SERPL W P-5'-P-CCNC: 25 U/L (ref 9–39)
BASOPHILS # BLD AUTO: 0.1 X10*3/UL (ref 0–0.1)
BASOPHILS NFR BLD AUTO: 1.1 %
BILIRUB SERPL-MCNC: 0.3 MG/DL (ref 0–1.2)
BUN SERPL-MCNC: 39 MG/DL (ref 6–23)
CALCIUM SERPL-MCNC: 8.9 MG/DL (ref 8.6–10.6)
CHLORIDE SERPL-SCNC: 103 MMOL/L (ref 98–107)
CO2 SERPL-SCNC: 29 MMOL/L (ref 21–32)
CREAT SERPL-MCNC: 2.34 MG/DL (ref 0.5–1.05)
EGFRCR SERPLBLD CKD-EPI 2021: 23 ML/MIN/1.73M*2
EOSINOPHIL # BLD AUTO: 0.36 X10*3/UL (ref 0–0.7)
EOSINOPHIL NFR BLD AUTO: 3.9 %
ERYTHROCYTE [DISTWIDTH] IN BLOOD BY AUTOMATED COUNT: 14.5 % (ref 11.5–14.5)
GLUCOSE SERPL-MCNC: 103 MG/DL (ref 74–99)
HCT VFR BLD AUTO: 32 % (ref 36–46)
HGB BLD-MCNC: 10 G/DL (ref 12–16)
IMM GRANULOCYTES # BLD AUTO: 0.04 X10*3/UL (ref 0–0.7)
IMM GRANULOCYTES NFR BLD AUTO: 0.4 % (ref 0–0.9)
LYMPHOCYTES # BLD AUTO: 0.82 X10*3/UL (ref 1.2–4.8)
LYMPHOCYTES NFR BLD AUTO: 8.9 %
MCH RBC QN AUTO: 32.1 PG (ref 26–34)
MCHC RBC AUTO-ENTMCNC: 31.3 G/DL (ref 32–36)
MCV RBC AUTO: 103 FL (ref 80–100)
MONOCYTES # BLD AUTO: 0.86 X10*3/UL (ref 0.1–1)
MONOCYTES NFR BLD AUTO: 9.3 %
NEUTROPHILS # BLD AUTO: 7.06 X10*3/UL (ref 1.2–7.7)
NEUTROPHILS NFR BLD AUTO: 76.4 %
NRBC BLD-RTO: 0 /100 WBCS (ref 0–0)
PLATELET # BLD AUTO: 332 X10*3/UL (ref 150–450)
POTASSIUM SERPL-SCNC: 5.1 MMOL/L (ref 3.5–5.3)
PROT SERPL-MCNC: 7 G/DL (ref 6.4–8.2)
RBC # BLD AUTO: 3.12 X10*6/UL (ref 4–5.2)
SODIUM SERPL-SCNC: 139 MMOL/L (ref 136–145)
WBC # BLD AUTO: 9.2 X10*3/UL (ref 4.4–11.3)

## 2024-03-20 PROCEDURE — 80053 COMPREHEN METABOLIC PANEL: CPT

## 2024-03-20 PROCEDURE — 85025 COMPLETE CBC W/AUTO DIFF WBC: CPT

## 2024-03-20 RX ORDER — DEXAMETHASONE 4 MG/1
8 TABLET ORAL 2 TIMES DAILY
Qty: 36 TABLET | Refills: 3 | Status: SHIPPED | OUTPATIENT
Start: 2024-03-20

## 2024-03-21 ENCOUNTER — INFUSION (OUTPATIENT)
Dept: HEMATOLOGY/ONCOLOGY | Facility: CLINIC | Age: 64
End: 2024-03-21
Payer: COMMERCIAL

## 2024-03-21 VITALS
HEIGHT: 66 IN | HEART RATE: 75 BPM | SYSTOLIC BLOOD PRESSURE: 157 MMHG | TEMPERATURE: 97 F | BODY MASS INDEX: 29.83 KG/M2 | WEIGHT: 185.63 LBS | DIASTOLIC BLOOD PRESSURE: 84 MMHG

## 2024-03-21 DIAGNOSIS — C53.9 CERVICAL CANCER, FIGO STAGE IVA (MULTI): ICD-10-CM

## 2024-03-21 PROCEDURE — 96375 TX/PRO/DX INJ NEW DRUG ADDON: CPT | Mod: INF

## 2024-03-21 PROCEDURE — 2500000004 HC RX 250 GENERAL PHARMACY W/ HCPCS (ALT 636 FOR OP/ED): Performed by: STUDENT IN AN ORGANIZED HEALTH CARE EDUCATION/TRAINING PROGRAM

## 2024-03-21 PROCEDURE — 96413 CHEMO IV INFUSION 1 HR: CPT

## 2024-03-21 RX ORDER — HEPARIN 100 UNIT/ML
500 SYRINGE INTRAVENOUS AS NEEDED
Status: CANCELLED | OUTPATIENT
Start: 2024-03-21

## 2024-03-21 RX ORDER — DEXAMETHASONE IN 0.9 % SOD CHL 20 MG/50ML
20 INTRAVENOUS SOLUTION, PIGGYBACK (ML) INTRAVENOUS ONCE
Status: COMPLETED | OUTPATIENT
Start: 2024-03-21 | End: 2024-03-21

## 2024-03-21 RX ORDER — PROCHLORPERAZINE EDISYLATE 5 MG/ML
10 INJECTION INTRAMUSCULAR; INTRAVENOUS EVERY 6 HOURS PRN
Status: DISCONTINUED | OUTPATIENT
Start: 2024-03-21 | End: 2024-03-21 | Stop reason: HOSPADM

## 2024-03-21 RX ORDER — DIPHENHYDRAMINE HYDROCHLORIDE 50 MG/ML
50 INJECTION INTRAMUSCULAR; INTRAVENOUS AS NEEDED
Status: DISCONTINUED | OUTPATIENT
Start: 2024-03-21 | End: 2024-03-21 | Stop reason: HOSPADM

## 2024-03-21 RX ORDER — HEPARIN SODIUM,PORCINE/PF 10 UNIT/ML
50 SYRINGE (ML) INTRAVENOUS AS NEEDED
Status: CANCELLED | OUTPATIENT
Start: 2024-03-21

## 2024-03-21 RX ORDER — HEPARIN 100 UNIT/ML
500 SYRINGE INTRAVENOUS AS NEEDED
Status: DISCONTINUED | OUTPATIENT
Start: 2024-03-21 | End: 2024-03-21 | Stop reason: HOSPADM

## 2024-03-21 RX ORDER — HEPARIN SODIUM,PORCINE/PF 10 UNIT/ML
50 SYRINGE (ML) INTRAVENOUS AS NEEDED
Status: DISCONTINUED | OUTPATIENT
Start: 2024-03-21 | End: 2024-03-21 | Stop reason: HOSPADM

## 2024-03-21 RX ORDER — ONDANSETRON HYDROCHLORIDE 2 MG/ML
8 INJECTION, SOLUTION INTRAVENOUS ONCE
Status: COMPLETED | OUTPATIENT
Start: 2024-03-21 | End: 2024-03-21

## 2024-03-21 RX ORDER — ALBUTEROL SULFATE 0.83 MG/ML
3 SOLUTION RESPIRATORY (INHALATION) AS NEEDED
Status: DISCONTINUED | OUTPATIENT
Start: 2024-03-21 | End: 2024-03-21 | Stop reason: HOSPADM

## 2024-03-21 RX ORDER — EPINEPHRINE 0.3 MG/.3ML
0.3 INJECTION SUBCUTANEOUS EVERY 5 MIN PRN
Status: DISCONTINUED | OUTPATIENT
Start: 2024-03-21 | End: 2024-03-21 | Stop reason: HOSPADM

## 2024-03-21 RX ORDER — PROCHLORPERAZINE MALEATE 10 MG
10 TABLET ORAL EVERY 6 HOURS PRN
Status: DISCONTINUED | OUTPATIENT
Start: 2024-03-21 | End: 2024-03-21 | Stop reason: HOSPADM

## 2024-03-21 RX ORDER — FAMOTIDINE 10 MG/ML
20 INJECTION INTRAVENOUS ONCE AS NEEDED
Status: DISCONTINUED | OUTPATIENT
Start: 2024-03-21 | End: 2024-03-21 | Stop reason: HOSPADM

## 2024-03-21 RX ADMIN — DOCETAXEL 200 MG: 20 INJECTION, SOLUTION, CONCENTRATE INTRAVENOUS at 13:53

## 2024-03-21 RX ADMIN — ONDANSETRON 8 MG: 2 INJECTION INTRAMUSCULAR; INTRAVENOUS at 13:14

## 2024-03-21 RX ADMIN — SODIUM CHLORIDE, PRESERVATIVE FREE 500 UNITS: 5 INJECTION INTRAVENOUS at 15:00

## 2024-03-21 RX ADMIN — DEXAMETHASONE SODIUM PHOSPHATE 20 MG: 10 INJECTION, SOLUTION INTRAMUSCULAR; INTRAVENOUS at 13:14

## 2024-03-21 ASSESSMENT — PAIN SCALES - GENERAL: PAINLEVEL: 0-NO PAIN

## 2024-03-22 DIAGNOSIS — C53.9 CERVICAL CANCER, FIGO STAGE IVA (MULTI): ICD-10-CM

## 2024-03-23 ENCOUNTER — HOSPITAL ENCOUNTER (EMERGENCY)
Facility: HOSPITAL | Age: 64
Discharge: HOME | End: 2024-03-23
Attending: STUDENT IN AN ORGANIZED HEALTH CARE EDUCATION/TRAINING PROGRAM
Payer: COMMERCIAL

## 2024-03-23 VITALS
OXYGEN SATURATION: 99 % | HEART RATE: 51 BPM | BODY MASS INDEX: 28.61 KG/M2 | RESPIRATION RATE: 19 BRPM | HEIGHT: 66 IN | WEIGHT: 178 LBS | TEMPERATURE: 98.8 F | SYSTOLIC BLOOD PRESSURE: 170 MMHG | DIASTOLIC BLOOD PRESSURE: 75 MMHG

## 2024-03-23 DIAGNOSIS — T83.022A DISPLACEMENT OF NEPHROSTOMY TUBE (CMS-HCC): Primary | ICD-10-CM

## 2024-03-23 PROCEDURE — 99284 EMERGENCY DEPT VISIT MOD MDM: CPT

## 2024-03-23 RX ORDER — CEPHALEXIN 500 MG/1
500 CAPSULE ORAL 3 TIMES DAILY
Qty: 30 CAPSULE | Refills: 0 | Status: SHIPPED | OUTPATIENT
Start: 2024-03-23 | End: 2024-04-04 | Stop reason: ALTCHOICE

## 2024-03-23 ASSESSMENT — COLUMBIA-SUICIDE SEVERITY RATING SCALE - C-SSRS
2. HAVE YOU ACTUALLY HAD ANY THOUGHTS OF KILLING YOURSELF?: NO
1. IN THE PAST MONTH, HAVE YOU WISHED YOU WERE DEAD OR WISHED YOU COULD GO TO SLEEP AND NOT WAKE UP?: NO
6. HAVE YOU EVER DONE ANYTHING, STARTED TO DO ANYTHING, OR PREPARED TO DO ANYTHING TO END YOUR LIFE?: NO

## 2024-03-23 ASSESSMENT — LIFESTYLE VARIABLES
EVER FELT BAD OR GUILTY ABOUT YOUR DRINKING: NO
HAVE YOU EVER FELT YOU SHOULD CUT DOWN ON YOUR DRINKING: NO
EVER HAD A DRINK FIRST THING IN THE MORNING TO STEADY YOUR NERVES TO GET RID OF A HANGOVER: NO
HAVE PEOPLE ANNOYED YOU BY CRITICIZING YOUR DRINKING: NO
TOTAL SCORE: 0

## 2024-03-23 ASSESSMENT — PAIN - FUNCTIONAL ASSESSMENT: PAIN_FUNCTIONAL_ASSESSMENT: 0-10

## 2024-03-23 ASSESSMENT — PAIN SCALES - GENERAL: PAINLEVEL_OUTOF10: 0 - NO PAIN

## 2024-03-23 NOTE — ED PROVIDER NOTES
HPI   Chief Complaint   Patient presents with    Nephrostomy Tube Replacement     Patient stated she was visiting her mother in the nursing home and the right nephrostomy tube snagged on her mother's bed.  2 weeks ago replaced by Dr. Yates.       64-year-old female presented emergency department with a chief complaint of concern for dislodgment of right nephrostomy tube.  They have been in for several months.  She has a history of cervical and bladder cancer on chemotherapy.  She does have bilateral nephrostomy tubes.  Additionally has a urinary catheter with some urinary output.  States that her nephrostomy tube got caught on the side of bed while visiting her mother and pulled out.  She seen Dr. Camp in the past but does not regularly follow with a urologist.  Dr. Marvin placed the nephrostomy tube approximately 2 weeks ago.  Otherwise she has no complaints.                          Jerald Coma Scale Score: 15                     Patient History   Past Medical History:   Diagnosis Date    Abdominal distension (gaseous) 11/11/2022    Abdominal bloating    Anxiety     Body mass index (BMI)30.0-30.9, adult 08/07/2019    BMI 30.0-30.9,adult    Cervix cancer (CMS/HCC)     Chemotherapy-induced peripheral neuropathy (CMS/HCC)     Chronic kidney disease     Encounter for screening for malignant neoplasm of colon     Encounter for screening colonoscopy    Hypertension     Hypothyroidism     Personal history of other specified conditions     History of snoring    Tachycardia, unspecified     Tachycardia     Past Surgical History:   Procedure Laterality Date    CT GUIDED IMAGING FOR NEEDLE PLACEMENT  01/12/2023    CT GUIDED IMAGING FOR NEEDLE PLACEMENT [facility] CLINICAL LEGACY    IR NEPHROSTOMY TUBE EXCHANGE  10/20/2023    IR NEPHROSTOMY TUBE EXCHANGE 10/20/2023 MD MERY Berman CVEPINANGELIKA    IR NEPHROSTOMY TUBE EXCHANGE  12/19/2023    IR NEPHROSTOMY TUBE EXCHANGE 12/19/2023 MERY CVEPALBERT    MEDIPORT      NEPHROSTOMY       ORIF TIBIA FRACTURE Right     TRANSURETHRAL RESECTION OF BLADDER TUMOR      US GUIDED PERCUTANEOUS PLACEMENT  2022    US GUIDED PERCUTANEOUS PLACEMENT [facility] LEGAC[facility]    US GUIDED PERCUTANEOUS PLACEMENT  2023    US GUIDED PERCUTANEOUS PLACEMENT [facility]LL[facility] LEGAC[facility]     Family History   Problem Relation Name Age of Onset    No Known Problems Mother      Other (Acute myocardial infarction) Father       Social History     Tobacco Use    Smoking status: Former     Packs/day: 0.25     Years: 42.00     Additional pack years: 0.00     Total pack years: 10.50     Types: Cigarettes     Start date:      Quit date: 2019     Years since quittin.3     Passive exposure: Past    Smokeless tobacco: Never   Vaping Use    Vaping Use: Never used   Substance Use Topics    Alcohol use: Not Currently     Alcohol/week: 8.0 standard drinks of alcohol     Types: 4 Shots of liquor, 4 Standard drinks or equivalent per week     Comment: quit 2019    Drug use: Never       Physical Exam   ED Triage Vitals [24 1237]   Temperature Heart Rate Respirations BP   37.1 °C (98.8 °F) 51 19 (!) 183/84      Pulse Ox Temp Source Heart Rate Source Patient Position   99 % Oral Monitor Sitting      BP Location FiO2 (%)     Left arm --       Physical Exam  Vitals and nursing note reviewed.   Constitutional:       Appearance: Normal appearance.   HENT:      Head: Normocephalic.      Nose: Nose normal.      Mouth/Throat:      Mouth: Mucous membranes are moist.   Cardiovascular:      Rate and Rhythm: Normal rate and regular rhythm.   Pulmonary:      Effort: Pulmonary effort is normal.   Musculoskeletal:         General: Normal range of motion.      Cervical back: Normal range of motion.   Skin:     General: Skin is warm.   Neurological:      General: No focal deficit present.      Mental Status: She is alert and oriented to person, place, and time.   Psychiatric:         Mood and Affect: Mood normal.         ED Course &  [facility]   Diagnoses as of 03/23/24 1418   Displacement of nephrostomy tube (CMS/[facility])       Medical Decision Making  I have seen and evaluated this patient.  The attending physician has also seen and evaluated this patient.  Vital signs, laboratory testing and diagnostic images if applicable have been reviewed.  All laboratory and imaging is interpreted by myself unless otherwise stated.  Radiology studies are also formally interpreted by radiologist.    I called the on-call interventional radiologist and discussed the case at length.  They are unable to place the tube this weekend and state to call Monday and they will be able to accommodate and place the tube on Monday.  Patient was unhappy with this answer.  I subsequently called our on-call urologist Dr. Murray and explained the situation to him.  He recommended to place the patient prophylactically on antibiotic and place a gauze adhesive dressing over the right nephrostomy wound with the expectation that it would continue to leak and drain over the weekend until interventional radiology could place a tube on Monday.  I spoke with the patient about this plan which she was hesitant to do.  He additionally spoke with her significant other on the phone.  Ultimately I had the attending physician evaluate the patient who was able to convince the patient to this plan.  Given strict return precautions if development of fever or worsening or change in symptoms.  Placed on antibiotic.  Released in good condition.    Labs Reviewed - No data to display  Follow-Up Consult to Interventional Radiology    (Results Pending)     Medications - No data to display  New Prescriptions    CEPHALEXIN (KEFLEX) 500 MG CAPSULE    Take 1 capsule (500 mg) by mouth 3 times a day for 10 days.         Procedure  Procedures     Pio Ramirez PA-C  03/23/24 1421       Pio Ramirez PA-C  03/23/24 4345

## 2024-03-25 ENCOUNTER — PREP FOR PROCEDURE (OUTPATIENT)
Dept: RADIOLOGY | Facility: HOSPITAL | Age: 64
End: 2024-03-25

## 2024-03-25 ENCOUNTER — HOSPITAL ENCOUNTER (OUTPATIENT)
Dept: CARDIOLOGY | Facility: HOSPITAL | Age: 64
Discharge: HOME | End: 2024-03-25
Payer: COMMERCIAL

## 2024-03-25 ENCOUNTER — HOSPITAL ENCOUNTER (OUTPATIENT)
Dept: RADIOLOGY | Facility: HOSPITAL | Age: 64
Discharge: HOME | End: 2024-03-25
Payer: COMMERCIAL

## 2024-03-25 VITALS
OXYGEN SATURATION: 100 % | HEART RATE: 71 BPM | DIASTOLIC BLOOD PRESSURE: 66 MMHG | SYSTOLIC BLOOD PRESSURE: 126 MMHG | RESPIRATION RATE: 16 BRPM

## 2024-03-25 DIAGNOSIS — N13.1 ACQUIRED HYDRONEPHROSIS DUE TO OBSTRUCTION OF URETER: ICD-10-CM

## 2024-03-25 DIAGNOSIS — N13.1 ACQUIRED HYDRONEPHROSIS DUE TO OBSTRUCTION OF URETER: Primary | ICD-10-CM

## 2024-03-25 LAB
INR PPP: 1 (ref 0.9–1.2)
PROTHROMBIN TIME: 10.6 SECONDS (ref 9.3–12.7)

## 2024-03-25 PROCEDURE — 2720000007 HC OR 272 NO HCPCS

## 2024-03-25 PROCEDURE — C1894 INTRO/SHEATH, NON-LASER: HCPCS

## 2024-03-25 PROCEDURE — 50433 PLMT NEPHROURETERAL CATHETER: CPT | Performed by: RADIOLOGY

## 2024-03-25 PROCEDURE — C1887 CATHETER, GUIDING: HCPCS

## 2024-03-25 PROCEDURE — 99152 MOD SED SAME PHYS/QHP 5/>YRS: CPT

## 2024-03-25 PROCEDURE — 99153 MOD SED SAME PHYS/QHP EA: CPT | Performed by: RADIOLOGY

## 2024-03-25 PROCEDURE — 85610 PROTHROMBIN TIME: CPT | Performed by: RADIOLOGY

## 2024-03-25 PROCEDURE — 2500000005 HC RX 250 GENERAL PHARMACY W/O HCPCS: Performed by: RADIOLOGY

## 2024-03-25 PROCEDURE — 7100000010 HC PHASE TWO TIME - EACH INCREMENTAL 1 MINUTE

## 2024-03-25 PROCEDURE — 2500000004 HC RX 250 GENERAL PHARMACY W/ HCPCS (ALT 636 FOR OP/ED): Performed by: RADIOLOGY

## 2024-03-25 PROCEDURE — 7100000009 HC PHASE TWO TIME - INITIAL BASE CHARGE

## 2024-03-25 PROCEDURE — C2625 STENT, NON-COR, TEM W/DEL SY: HCPCS

## 2024-03-25 PROCEDURE — C1769 GUIDE WIRE: HCPCS

## 2024-03-25 PROCEDURE — 99152 MOD SED SAME PHYS/QHP 5/>YRS: CPT | Performed by: RADIOLOGY

## 2024-03-25 PROCEDURE — 99153 MOD SED SAME PHYS/QHP EA: CPT

## 2024-03-25 PROCEDURE — 50433 PLMT NEPHROURETERAL CATHETER: CPT

## 2024-03-25 PROCEDURE — 76942 ECHO GUIDE FOR BIOPSY: CPT

## 2024-03-25 PROCEDURE — 2780000003 HC OR 278 NO HCPCS

## 2024-03-25 RX ORDER — MIDAZOLAM HYDROCHLORIDE 1 MG/ML
INJECTION, SOLUTION INTRAMUSCULAR; INTRAVENOUS AS NEEDED
Status: DISCONTINUED | OUTPATIENT
Start: 2024-03-25 | End: 2024-03-26 | Stop reason: HOSPADM

## 2024-03-25 RX ORDER — DEXTROSE MONOHYDRATE AND SODIUM CHLORIDE 5; .45 G/100ML; G/100ML
100 INJECTION, SOLUTION INTRAVENOUS CONTINUOUS
Status: CANCELLED | OUTPATIENT
Start: 2024-03-25

## 2024-03-25 RX ORDER — LIDOCAINE HYDROCHLORIDE AND EPINEPHRINE 10; 10 MG/ML; UG/ML
INJECTION, SOLUTION INFILTRATION; PERINEURAL AS NEEDED
Status: DISCONTINUED | OUTPATIENT
Start: 2024-03-25 | End: 2024-03-26 | Stop reason: HOSPADM

## 2024-03-25 RX ORDER — FENTANYL CITRATE 50 UG/ML
INJECTION, SOLUTION INTRAMUSCULAR; INTRAVENOUS AS NEEDED
Status: DISCONTINUED | OUTPATIENT
Start: 2024-03-25 | End: 2024-03-26 | Stop reason: HOSPADM

## 2024-03-25 RX ORDER — DEXTROSE MONOHYDRATE AND SODIUM CHLORIDE 5; .45 G/100ML; G/100ML
100 INJECTION, SOLUTION INTRAVENOUS CONTINUOUS
Status: DISCONTINUED | OUTPATIENT
Start: 2024-03-25 | End: 2024-03-26 | Stop reason: HOSPADM

## 2024-03-25 RX ADMIN — MIDAZOLAM 2 MG: 1 INJECTION INTRAMUSCULAR; INTRAVENOUS at 12:30

## 2024-03-25 RX ADMIN — FENTANYL CITRATE 50 MCG: 50 INJECTION, SOLUTION INTRAMUSCULAR; INTRAVENOUS at 12:30

## 2024-03-25 RX ADMIN — LIDOCAINE HYDROCHLORIDE AND EPINEPHRINE 10 ML: 10; 10 INJECTION, SOLUTION INFILTRATION; PERINEURAL at 12:30

## 2024-03-25 RX ADMIN — FENTANYL CITRATE 50 MCG: 50 INJECTION, SOLUTION INTRAMUSCULAR; INTRAVENOUS at 12:56

## 2024-03-25 RX ADMIN — Medication 3 L/MIN: at 12:24

## 2024-03-25 ASSESSMENT — PAIN SCALES - GENERAL: PAINLEVEL_OUTOF10: 0 - NO PAIN

## 2024-03-25 ASSESSMENT — PAIN - FUNCTIONAL ASSESSMENT: PAIN_FUNCTIONAL_ASSESSMENT: 0-10

## 2024-03-26 ASSESSMENT — PAIN SCALES - GENERAL: PAINLEVEL_OUTOF10: 6

## 2024-03-29 ENCOUNTER — TELEPHONE (OUTPATIENT)
Dept: GYNECOLOGIC ONCOLOGY | Facility: HOSPITAL | Age: 64
End: 2024-03-29
Payer: COMMERCIAL

## 2024-03-29 DIAGNOSIS — C53.9 CERVICAL CANCER, FIGO STAGE IVA (MULTI): Primary | ICD-10-CM

## 2024-03-29 DIAGNOSIS — K12.31 MUCOSITIS DUE TO CHEMOTHERAPY: Primary | ICD-10-CM

## 2024-03-29 DIAGNOSIS — C53.9 CERVICAL CANCER, FIGO STAGE IVA (MULTI): ICD-10-CM

## 2024-03-29 RX ORDER — DIAPER,BRIEF,ADULT, DISPOSABLE
3 EACH MISCELLANEOUS 3 TIMES DAILY
Qty: 270 TABLET | Refills: 1 | Status: SHIPPED | OUTPATIENT
Start: 2024-03-29 | End: 2024-04-11

## 2024-03-29 RX ORDER — DIAPER,BRIEF,ADULT, DISPOSABLE
1 EACH MISCELLANEOUS 2 TIMES DAILY
Qty: 60 TABLET | Refills: 1 | Status: SHIPPED | OUTPATIENT
Start: 2024-03-29 | End: 2024-03-29 | Stop reason: SDUPTHER

## 2024-03-29 RX ORDER — DEXAMETHASONE 0.5 MG/5ML
1 ELIXIR ORAL 2 TIMES DAILY PRN
Qty: 100 ML | Refills: 0 | Status: SHIPPED | OUTPATIENT
Start: 2024-03-29

## 2024-03-29 NOTE — TELEPHONE ENCOUNTER
Patient SO called to report painful mouth sores. Advised to use L-lysine 1500mg TID to prevent future occurrences. Please order magic mouthwash.

## 2024-04-01 ENCOUNTER — APPOINTMENT (OUTPATIENT)
Dept: OPHTHALMOLOGY | Facility: CLINIC | Age: 64
End: 2024-04-01
Payer: COMMERCIAL

## 2024-04-04 ENCOUNTER — APPOINTMENT (OUTPATIENT)
Dept: HEMATOLOGY/ONCOLOGY | Facility: CLINIC | Age: 64
End: 2024-04-04
Payer: COMMERCIAL

## 2024-04-04 ENCOUNTER — OFFICE VISIT (OUTPATIENT)
Dept: GYNECOLOGIC ONCOLOGY | Facility: CLINIC | Age: 64
End: 2024-04-04
Payer: COMMERCIAL

## 2024-04-04 ENCOUNTER — APPOINTMENT (OUTPATIENT)
Dept: GYNECOLOGIC ONCOLOGY | Facility: CLINIC | Age: 64
End: 2024-04-04
Payer: COMMERCIAL

## 2024-04-04 VITALS
OXYGEN SATURATION: 97 % | DIASTOLIC BLOOD PRESSURE: 58 MMHG | SYSTOLIC BLOOD PRESSURE: 97 MMHG | WEIGHT: 181.22 LBS | BODY MASS INDEX: 29.25 KG/M2 | HEART RATE: 103 BPM | TEMPERATURE: 100.6 F | RESPIRATION RATE: 18 BRPM

## 2024-04-04 DIAGNOSIS — R50.9 FEVER, UNSPECIFIED FEVER CAUSE: ICD-10-CM

## 2024-04-04 DIAGNOSIS — C53.9 CERVICAL CANCER, FIGO STAGE IVA (MULTI): Primary | ICD-10-CM

## 2024-04-04 DIAGNOSIS — R21 MACULOPAPULAR RASH: ICD-10-CM

## 2024-04-04 LAB
ALBUMIN SERPL BCP-MCNC: 3.3 G/DL (ref 3.4–5)
ALP SERPL-CCNC: 60 U/L (ref 33–136)
ALT SERPL W P-5'-P-CCNC: 7 U/L (ref 7–45)
ANION GAP SERPL CALC-SCNC: 15 MMOL/L (ref 10–20)
AST SERPL W P-5'-P-CCNC: 14 U/L (ref 9–39)
BASOPHILS # BLD MANUAL: 0.17 X10*3/UL (ref 0–0.1)
BASOPHILS NFR BLD MANUAL: 1 %
BILIRUB SERPL-MCNC: 0.3 MG/DL (ref 0–1.2)
BUN SERPL-MCNC: 33 MG/DL (ref 6–23)
CALCIUM SERPL-MCNC: 8 MG/DL (ref 8.6–10.3)
CHLORIDE SERPL-SCNC: 102 MMOL/L (ref 98–107)
CO2 SERPL-SCNC: 22 MMOL/L (ref 21–32)
CREAT SERPL-MCNC: 2.75 MG/DL (ref 0.5–1.05)
DOHLE BOD BLD QL SMEAR: PRESENT
EGFRCR SERPLBLD CKD-EPI 2021: 19 ML/MIN/1.73M*2
EOSINOPHIL # BLD MANUAL: 0.17 X10*3/UL (ref 0–0.7)
EOSINOPHIL NFR BLD MANUAL: 1 %
ERYTHROCYTE [DISTWIDTH] IN BLOOD BY AUTOMATED COUNT: 14.4 % (ref 11.5–14.5)
GLUCOSE SERPL-MCNC: 123 MG/DL (ref 74–99)
HCT VFR BLD AUTO: 30.4 % (ref 36–46)
HGB BLD-MCNC: 9.9 G/DL (ref 12–16)
IMM GRANULOCYTES # BLD AUTO: 2.07 X10*3/UL (ref 0–0.7)
IMM GRANULOCYTES NFR BLD AUTO: 12.2 % (ref 0–0.9)
LYMPHOCYTES # BLD MANUAL: 0.34 X10*3/UL (ref 1.2–4.8)
LYMPHOCYTES NFR BLD MANUAL: 2 %
MAGNESIUM SERPL-MCNC: 1.64 MG/DL (ref 1.6–2.4)
MCH RBC QN AUTO: 31.6 PG (ref 26–34)
MCHC RBC AUTO-ENTMCNC: 32.6 G/DL (ref 32–36)
MCV RBC AUTO: 97 FL (ref 80–100)
METAMYELOCYTES # BLD MANUAL: 0.68 X10*3/UL
METAMYELOCYTES NFR BLD MANUAL: 4 %
MONOCYTES # BLD MANUAL: 0.34 X10*3/UL (ref 0.1–1)
MONOCYTES NFR BLD MANUAL: 2 %
MYELOCYTES # BLD MANUAL: 0.34 X10*3/UL
MYELOCYTES NFR BLD MANUAL: 2 %
NEUTROPHILS # BLD MANUAL: 14.87 X10*3/UL (ref 1.2–7.7)
NEUTS BAND # BLD MANUAL: 0.17 X10*3/UL (ref 0–0.7)
NEUTS BAND NFR BLD MANUAL: 1 %
NEUTS SEG # BLD MANUAL: 14.7 X10*3/UL (ref 1.2–7)
NEUTS SEG NFR BLD MANUAL: 87 %
NRBC BLD-RTO: 0 /100 WBCS (ref 0–0)
PLATELET # BLD AUTO: 293 X10*3/UL (ref 150–450)
POLYCHROMASIA BLD QL SMEAR: ABNORMAL
POTASSIUM SERPL-SCNC: 4.5 MMOL/L (ref 3.5–5.3)
PROT SERPL-MCNC: 6 G/DL (ref 6.4–8.2)
RBC # BLD AUTO: 3.13 X10*6/UL (ref 4–5.2)
RBC MORPH BLD: ABNORMAL
SODIUM SERPL-SCNC: 134 MMOL/L (ref 136–145)
TOTAL CELLS COUNTED BLD: 100
TOXIC GRANULES BLD QL SMEAR: PRESENT
WBC # BLD AUTO: 16.9 X10*3/UL (ref 4.4–11.3)

## 2024-04-04 PROCEDURE — 3074F SYST BP LT 130 MM HG: CPT | Performed by: STUDENT IN AN ORGANIZED HEALTH CARE EDUCATION/TRAINING PROGRAM

## 2024-04-04 PROCEDURE — 87040 BLOOD CULTURE FOR BACTERIA: CPT | Performed by: STUDENT IN AN ORGANIZED HEALTH CARE EDUCATION/TRAINING PROGRAM

## 2024-04-04 PROCEDURE — 3078F DIAST BP <80 MM HG: CPT | Performed by: STUDENT IN AN ORGANIZED HEALTH CARE EDUCATION/TRAINING PROGRAM

## 2024-04-04 PROCEDURE — 99215 OFFICE O/P EST HI 40 MIN: CPT | Performed by: STUDENT IN AN ORGANIZED HEALTH CARE EDUCATION/TRAINING PROGRAM

## 2024-04-04 PROCEDURE — 85027 COMPLETE CBC AUTOMATED: CPT | Performed by: STUDENT IN AN ORGANIZED HEALTH CARE EDUCATION/TRAINING PROGRAM

## 2024-04-04 PROCEDURE — 85007 BL SMEAR W/DIFF WBC COUNT: CPT | Performed by: STUDENT IN AN ORGANIZED HEALTH CARE EDUCATION/TRAINING PROGRAM

## 2024-04-04 PROCEDURE — 83735 ASSAY OF MAGNESIUM: CPT | Performed by: STUDENT IN AN ORGANIZED HEALTH CARE EDUCATION/TRAINING PROGRAM

## 2024-04-04 PROCEDURE — 80053 COMPREHEN METABOLIC PANEL: CPT | Performed by: STUDENT IN AN ORGANIZED HEALTH CARE EDUCATION/TRAINING PROGRAM

## 2024-04-04 PROCEDURE — 87086 URINE CULTURE/COLONY COUNT: CPT | Performed by: STUDENT IN AN ORGANIZED HEALTH CARE EDUCATION/TRAINING PROGRAM

## 2024-04-04 RX ORDER — PREDNISONE 20 MG/1
TABLET ORAL
Qty: 30 TABLET | Refills: 0 | Status: SHIPPED | OUTPATIENT
Start: 2024-04-04 | End: 2024-04-24 | Stop reason: WASHOUT

## 2024-04-04 ASSESSMENT — PAIN SCALES - GENERAL: PAINLEVEL: 10-WORST PAIN EVER

## 2024-04-04 ASSESSMENT — ENCOUNTER SYMPTOMS
OCCASIONAL FEELINGS OF UNSTEADINESS: 1
LOSS OF SENSATION IN FEET: 0
DEPRESSION: 0

## 2024-04-04 NOTE — PROGRESS NOTES
Patient ID: Shelbi Delaney is a 64 y.o. female.  Referring Physician: No referring provider defined for this encounter.  Primary Care Provider: Sonja Uriarte MD    Subjective    Patient presenting for urgent evaluation in setting of progressive drug rash x3 days after starting PO Lysine for severe mucositis in setting of ongoing chemotherapy with Docetaxel. Reports severe mouth pain/tenderness with decreased appetite starting 3/31. Started BMX solution and magic mouthwash with progressive development of red rash starting on extremities and neck now diffusely over the rest of her body. Increasing pain related to chronic lower extremity swelling. Denies abdominopelvic pain, nausea/vomiting, fevers, chills, chest pain or shortness of breath. Having regular bowel movements without difficulty - no further episodes of constipation. No recent falls. Persistent thin pink vaginal discharge without bleeding.     Last PCN exchange 3/25/24 ([facility]); q12 weeks -- reports ?anterograde stent placement prior to procedure. A comprehensive review of systems was performed and otherwise negative.       Objective    BSA: 1.96 meters squared  BP 97/58 (BP Location: Right arm, Patient Position: Sitting, BP Cuff Size: Large adult)   Pulse 103   Temp 38.1 °C (100.6 °F) (Temporal)   Resp 18   Wt 82.2 kg (181 lb 3.5 oz)   SpO2 97%   BMI 29.25 kg/m²      Physical Exam  Vitals and nursing note reviewed. Exam conducted with a chaperone present.   Constitutional:       General: She is not in acute distress.     Appearance: Normal appearance.   HENT:      Head: Normocephalic and atraumatic.      Mouth/Throat:      Mouth: Mucous membranes are moist.      Pharynx: No oropharyngeal exudate or posterior oropharyngeal erythema.   Eyes:      Extraocular Movements: Extraocular movements intact.      Conjunctiva/sclera: Conjunctivae normal.      Pupils: Pupils are equal, round, and reactive to light.   Neck:      Thyroid: No thyroid mass or  thyromegaly.   Cardiovascular:      Rate and Rhythm: Normal rate and regular rhythm.      Pulses: Normal pulses.      Heart sounds: Normal heart sounds.   Pulmonary:      Effort: Pulmonary effort is normal.      Breath sounds: Normal breath sounds. No wheezing, rhonchi or rales.   Abdominal:      General: Bowel sounds are normal. There is no distension.      Palpations: Abdomen is soft. There is no mass.      Tenderness: There is no abdominal tenderness.      Hernia: No hernia is present.   Genitourinary:     Comments: Bilateral nephrostomies in place, new entry site for R PCN - clear urine in tubing  Musculoskeletal:         General: No tenderness. Normal range of motion.      Cervical back: Normal range of motion and neck supple. No tenderness.      Right lower leg: Edema present.      Left lower leg: Edema present.      Comments: Chronic BLE edema; compression dressings in place   Skin:     General: Skin is warm.      Findings: Rash present. No lesion.      Comments: Diffuse maculopapular rash on face, neck, anterior chest, bilateral upper extremities, abdomen and flanks including bilateral nephrostomy sites - no purulent drainage/debris   Neurological:      General: No focal deficit present.      Mental Status: She is alert and oriented to person, place, and time.   Psychiatric:         Mood and Affect: Mood normal.         Behavior: Behavior normal.       Performance Status:  Symptomatic; fully ambulatory    Assessment/Plan   62yo with stage RONIT moderately differentiated SCC of cervix with progressive disease with obstructive uropathy with bilateral PCNs in place. Presenting with low grade fever and diffuse maculopapular rash; ECOG 1.   Oncology History Overview Note   Stage RONIT grade 2 squamous cell carcinoma of cervix  1/20/23: TURBT - poorly differentiated squamaous cell carcinoma; CPS10.  review with history of cervical mass consistent with GYN primary  - Extension to rectal mucosa, pubic ramus with  ?reactive mediastinal LN   - Bilateral nephrostomy placement   2/16/23 - 6/1/23: Carbo AUC5/Taxol 175 +Avastin 15mg/kg, Pembrolizumab 200mg/m2  - C2-C4 +Avastin - further treatments held due to progressive gr2 proteinuria   - Grade 3 anemia declining transfusion,   7/20/23 - 12/18/23: Pembrolizumab 200mg/m2, progressive disease  1/11/24 - 3/14/24: Tivdak x3, progressive disease   - C1: 0.9mg/kg in setting of CrCl < 30    Molecular Testing  PLD1: CPS 10  1/2024 Tempus xT - PIK3CA [id number] missense (gain): consider alpelisib  Loss of function: BCORL1, KMT2C, NSD1, KMT2D, RAD21  TMB 7.9mB - RACHELE  - Her2 pending 2/21/24     Cervical cancer, FIGO stage RONIT (CMS/HCC)   3/21/2022 Cancer Staged    Staging form: Cervix Uteri, AJCC Version 9, Clinical stage from 3/21/2022: FIGO Stage RONIT (cT4, cN2, cM0) - Signed by Adrianna Jefferson MD on 10/12/2023, Prognostic indicators: CPS 10     7/20/2023 - 11/30/2023 Chemotherapy    Pembrolizumab, 21 Day Cycles     1/12/2024 - 2/22/2024 Chemotherapy    Tisotumab vedotin, 21 Day Cycles     3/21/2024 -  Chemotherapy    DOCEtaxel, 21 Day Cycles     3/13/2025 - 3/13/2025 Chemotherapy    PACLitaxel / CARBOplatin, 21 Day Cycles - Gyn     3/13/2025 - 3/13/2025 Chemotherapy    PACLitaxel / CARBOplatin, 21 Day Cycles - Gyn          Diagnoses and all orders for this visit:  Encounter for antineoplastic immunotherapy  Cervical cancer, FIGO stage RONIT (CMS/HCC)  - Follow up blood and urine cultures; CBC and CMP to be collected in office  - Follow up Her2 status  Maculopapular rash  - Prednisone dose pack; follow up in 1 week for reassessment  - Favor drug-related eruption given timing of Lysine in association with symptoms  Obstructive uropathy  - PCN change 3/25 with anterograde stent placement; follow up with IR due to limited clinical benefit in setting of obstructive uropathy due to disease process  - Ongoing home care with RN assessment       Adrianna Jefferson MD

## 2024-04-05 ENCOUNTER — DOCUMENTATION (OUTPATIENT)
Dept: GYNECOLOGIC ONCOLOGY | Facility: HOSPITAL | Age: 64
End: 2024-04-05
Payer: COMMERCIAL

## 2024-04-05 NOTE — PROGRESS NOTES
Received email from patient's  asking if desonide could be applied to her face. Discussed that patient can use it on her face but she should not apply it directly to her face. She should apply by placing the foam on her hands and rubbing it in that way. She was also instructed to wash her hands after applying.

## 2024-04-06 LAB — BACTERIA UR CULT: ABNORMAL

## 2024-04-07 LAB — BACTERIA UR CULT: ABNORMAL

## 2024-04-08 ENCOUNTER — DOCUMENTATION (OUTPATIENT)
Dept: GYNECOLOGIC ONCOLOGY | Facility: HOSPITAL | Age: 64
End: 2024-04-08

## 2024-04-08 ENCOUNTER — APPOINTMENT (OUTPATIENT)
Dept: OPHTHALMOLOGY | Facility: CLINIC | Age: 64
End: 2024-04-08
Payer: COMMERCIAL

## 2024-04-08 DIAGNOSIS — C53.9 CERVICAL CANCER, FIGO STAGE IVA (MULTI): ICD-10-CM

## 2024-04-08 DIAGNOSIS — N30.00 ACUTE CYSTITIS WITHOUT HEMATURIA: Primary | ICD-10-CM

## 2024-04-08 RX ORDER — CIPROFLOXACIN 250 MG/1
250 TABLET, FILM COATED ORAL 2 TIMES DAILY
Qty: 14 TABLET | Refills: 0 | Status: SHIPPED | OUTPATIENT
Start: 2024-04-08 | End: 2024-04-15

## 2024-04-08 NOTE — PROGRESS NOTES
Patient has an elevated WBC. Thought it was due to starting prednisone, however, patient reports that she had blood work done prior to starting prednisone. She has a UTI and ATBs were sent to her pharmacy. Per Dr. Fisher, we will repeat CBC on Thursday prior to treatment to ensure it is improving.

## 2024-04-09 LAB — BACTERIA BLD CULT: NORMAL

## 2024-04-11 ENCOUNTER — INFUSION (OUTPATIENT)
Dept: HEMATOLOGY/ONCOLOGY | Facility: CLINIC | Age: 64
End: 2024-04-11
Payer: COMMERCIAL

## 2024-04-11 ENCOUNTER — OFFICE VISIT (OUTPATIENT)
Dept: GYNECOLOGIC ONCOLOGY | Facility: CLINIC | Age: 64
End: 2024-04-11
Payer: COMMERCIAL

## 2024-04-11 ENCOUNTER — APPOINTMENT (OUTPATIENT)
Dept: GYNECOLOGIC ONCOLOGY | Facility: CLINIC | Age: 64
End: 2024-04-11
Payer: COMMERCIAL

## 2024-04-11 VITALS
BODY MASS INDEX: 30.46 KG/M2 | SYSTOLIC BLOOD PRESSURE: 143 MMHG | RESPIRATION RATE: 18 BRPM | HEART RATE: 50 BPM | OXYGEN SATURATION: 99 % | WEIGHT: 188.71 LBS | DIASTOLIC BLOOD PRESSURE: 73 MMHG | TEMPERATURE: 96.5 F

## 2024-04-11 DIAGNOSIS — Z51.11 ENCOUNTER FOR ANTINEOPLASTIC CHEMOTHERAPY AND IMMUNOTHERAPY: Primary | ICD-10-CM

## 2024-04-11 DIAGNOSIS — N39.0 URINARY TRACT INFECTION ASSOCIATED WITH NEPHROSTOMY CATHETER, SUBSEQUENT ENCOUNTER: ICD-10-CM

## 2024-04-11 DIAGNOSIS — R21 MACULOPAPULAR RASH: ICD-10-CM

## 2024-04-11 DIAGNOSIS — T83.512D URINARY TRACT INFECTION ASSOCIATED WITH NEPHROSTOMY CATHETER, SUBSEQUENT ENCOUNTER: ICD-10-CM

## 2024-04-11 DIAGNOSIS — C53.9 CERVICAL CANCER, FIGO STAGE IVA (MULTI): ICD-10-CM

## 2024-04-11 DIAGNOSIS — R60.0 BILATERAL LOWER EXTREMITY EDEMA: ICD-10-CM

## 2024-04-11 DIAGNOSIS — K12.31 MUCOSITIS DUE TO CHEMOTHERAPY: ICD-10-CM

## 2024-04-11 DIAGNOSIS — N13.9 OBSTRUCTIVE UROPATHY: ICD-10-CM

## 2024-04-11 DIAGNOSIS — Z51.12 ENCOUNTER FOR ANTINEOPLASTIC CHEMOTHERAPY AND IMMUNOTHERAPY: Primary | ICD-10-CM

## 2024-04-11 LAB
ALBUMIN SERPL BCP-MCNC: 3.3 G/DL (ref 3.4–5)
ALP SERPL-CCNC: 43 U/L (ref 33–136)
ALT SERPL W P-5'-P-CCNC: 12 U/L (ref 7–45)
ANION GAP SERPL CALC-SCNC: 13 MMOL/L (ref 10–20)
AST SERPL W P-5'-P-CCNC: 10 U/L (ref 9–39)
BASOPHILS # BLD MANUAL: 0 X10*3/UL (ref 0–0.1)
BASOPHILS NFR BLD MANUAL: 0 %
BILIRUB SERPL-MCNC: 0.2 MG/DL (ref 0–1.2)
BUN SERPL-MCNC: 72 MG/DL (ref 6–23)
CALCIUM SERPL-MCNC: 8.5 MG/DL (ref 8.6–10.3)
CHLORIDE SERPL-SCNC: 110 MMOL/L (ref 98–107)
CO2 SERPL-SCNC: 23 MMOL/L (ref 21–32)
CREAT SERPL-MCNC: 2.48 MG/DL (ref 0.5–1.05)
EGFRCR SERPLBLD CKD-EPI 2021: 21 ML/MIN/1.73M*2
EOSINOPHIL # BLD MANUAL: 0 X10*3/UL (ref 0–0.7)
EOSINOPHIL NFR BLD MANUAL: 0 %
ERYTHROCYTE [DISTWIDTH] IN BLOOD BY AUTOMATED COUNT: 14.9 % (ref 11.5–14.5)
GLUCOSE SERPL-MCNC: 141 MG/DL (ref 74–99)
HCT VFR BLD AUTO: 29.9 % (ref 36–46)
HGB BLD-MCNC: 9.6 G/DL (ref 12–16)
IMM GRANULOCYTES # BLD AUTO: 1.1 X10*3/UL (ref 0–0.7)
IMM GRANULOCYTES NFR BLD AUTO: 7.3 % (ref 0–0.9)
LYMPHOCYTES # BLD MANUAL: 0.15 X10*3/UL (ref 1.2–4.8)
LYMPHOCYTES NFR BLD MANUAL: 1 %
MCH RBC QN AUTO: 31.2 PG (ref 26–34)
MCHC RBC AUTO-ENTMCNC: 32.1 G/DL (ref 32–36)
MCV RBC AUTO: 97 FL (ref 80–100)
METAMYELOCYTES # BLD MANUAL: 0.6 X10*3/UL
METAMYELOCYTES NFR BLD MANUAL: 4 %
MONOCYTES # BLD MANUAL: 0.3 X10*3/UL (ref 0.1–1)
MONOCYTES NFR BLD MANUAL: 2 %
MYELOCYTES # BLD MANUAL: 0.3 X10*3/UL
MYELOCYTES NFR BLD MANUAL: 2 %
NEUTROPHILS # BLD MANUAL: 13.65 X10*3/UL (ref 1.2–7.7)
NEUTS BAND # BLD MANUAL: 0.3 X10*3/UL (ref 0–0.7)
NEUTS BAND NFR BLD MANUAL: 2 %
NEUTS SEG # BLD MANUAL: 13.35 X10*3/UL (ref 1.2–7)
NEUTS SEG NFR BLD MANUAL: 89 %
NEUTS VAC BLD QL SMEAR: PRESENT
NRBC BLD-RTO: 0 /100 WBCS (ref 0–0)
OVALOCYTES BLD QL SMEAR: ABNORMAL
PLATELET # BLD AUTO: 454 X10*3/UL (ref 150–450)
POLYCHROMASIA BLD QL SMEAR: ABNORMAL
POTASSIUM SERPL-SCNC: 5.5 MMOL/L (ref 3.5–5.3)
PROT SERPL-MCNC: 6 G/DL (ref 6.4–8.2)
RBC # BLD AUTO: 3.08 X10*6/UL (ref 4–5.2)
RBC MORPH BLD: ABNORMAL
SODIUM SERPL-SCNC: 140 MMOL/L (ref 136–145)
TARGETS BLD QL SMEAR: ABNORMAL
TOTAL CELLS COUNTED BLD: 100
WBC # BLD AUTO: 15 X10*3/UL (ref 4.4–11.3)

## 2024-04-11 PROCEDURE — 2500000004 HC RX 250 GENERAL PHARMACY W/ HCPCS (ALT 636 FOR OP/ED): Performed by: STUDENT IN AN ORGANIZED HEALTH CARE EDUCATION/TRAINING PROGRAM

## 2024-04-11 PROCEDURE — 80053 COMPREHEN METABOLIC PANEL: CPT

## 2024-04-11 PROCEDURE — 99215 OFFICE O/P EST HI 40 MIN: CPT | Performed by: STUDENT IN AN ORGANIZED HEALTH CARE EDUCATION/TRAINING PROGRAM

## 2024-04-11 PROCEDURE — 1036F TOBACCO NON-USER: CPT | Performed by: STUDENT IN AN ORGANIZED HEALTH CARE EDUCATION/TRAINING PROGRAM

## 2024-04-11 PROCEDURE — 96413 CHEMO IV INFUSION 1 HR: CPT

## 2024-04-11 PROCEDURE — 85007 BL SMEAR W/DIFF WBC COUNT: CPT

## 2024-04-11 PROCEDURE — 96375 TX/PRO/DX INJ NEW DRUG ADDON: CPT | Mod: INF

## 2024-04-11 PROCEDURE — 85027 COMPLETE CBC AUTOMATED: CPT

## 2024-04-11 PROCEDURE — 3077F SYST BP >= 140 MM HG: CPT | Performed by: STUDENT IN AN ORGANIZED HEALTH CARE EDUCATION/TRAINING PROGRAM

## 2024-04-11 PROCEDURE — 3078F DIAST BP <80 MM HG: CPT | Performed by: STUDENT IN AN ORGANIZED HEALTH CARE EDUCATION/TRAINING PROGRAM

## 2024-04-11 RX ORDER — ONDANSETRON HYDROCHLORIDE 2 MG/ML
8 INJECTION, SOLUTION INTRAVENOUS ONCE
Status: COMPLETED | OUTPATIENT
Start: 2024-04-11 | End: 2024-04-11

## 2024-04-11 RX ORDER — ONDANSETRON HYDROCHLORIDE 2 MG/ML
8 INJECTION, SOLUTION INTRAVENOUS ONCE
Status: CANCELLED | OUTPATIENT
Start: 2024-04-11

## 2024-04-11 RX ORDER — EPINEPHRINE 0.3 MG/.3ML
0.3 INJECTION SUBCUTANEOUS EVERY 5 MIN PRN
Status: CANCELLED | OUTPATIENT
Start: 2024-04-11

## 2024-04-11 RX ORDER — ALBUTEROL SULFATE 0.83 MG/ML
3 SOLUTION RESPIRATORY (INHALATION) AS NEEDED
Status: DISCONTINUED | OUTPATIENT
Start: 2024-04-11 | End: 2024-04-11 | Stop reason: HOSPADM

## 2024-04-11 RX ORDER — PROCHLORPERAZINE MALEATE 10 MG
10 TABLET ORAL EVERY 6 HOURS PRN
Status: CANCELLED | OUTPATIENT
Start: 2024-04-11

## 2024-04-11 RX ORDER — DIPHENHYDRAMINE HYDROCHLORIDE 50 MG/ML
50 INJECTION INTRAMUSCULAR; INTRAVENOUS AS NEEDED
Status: CANCELLED | OUTPATIENT
Start: 2024-04-11

## 2024-04-11 RX ORDER — PROCHLORPERAZINE MALEATE 10 MG
10 TABLET ORAL EVERY 6 HOURS PRN
Status: DISCONTINUED | OUTPATIENT
Start: 2024-04-11 | End: 2024-04-11 | Stop reason: HOSPADM

## 2024-04-11 RX ORDER — HEPARIN SODIUM,PORCINE/PF 10 UNIT/ML
50 SYRINGE (ML) INTRAVENOUS AS NEEDED
Status: CANCELLED | OUTPATIENT
Start: 2024-04-11

## 2024-04-11 RX ORDER — HEPARIN SODIUM,PORCINE/PF 10 UNIT/ML
50 SYRINGE (ML) INTRAVENOUS AS NEEDED
Status: DISCONTINUED | OUTPATIENT
Start: 2024-04-11 | End: 2024-04-11 | Stop reason: HOSPADM

## 2024-04-11 RX ORDER — DIPHENHYDRAMINE HYDROCHLORIDE 50 MG/ML
50 INJECTION INTRAMUSCULAR; INTRAVENOUS AS NEEDED
Status: DISCONTINUED | OUTPATIENT
Start: 2024-04-11 | End: 2024-04-11 | Stop reason: HOSPADM

## 2024-04-11 RX ORDER — ALBUTEROL SULFATE 0.83 MG/ML
3 SOLUTION RESPIRATORY (INHALATION) AS NEEDED
Status: CANCELLED | OUTPATIENT
Start: 2024-04-11

## 2024-04-11 RX ORDER — PROCHLORPERAZINE EDISYLATE 5 MG/ML
10 INJECTION INTRAMUSCULAR; INTRAVENOUS EVERY 6 HOURS PRN
Status: DISCONTINUED | OUTPATIENT
Start: 2024-04-11 | End: 2024-04-11 | Stop reason: HOSPADM

## 2024-04-11 RX ORDER — HEPARIN 100 UNIT/ML
500 SYRINGE INTRAVENOUS AS NEEDED
Status: CANCELLED | OUTPATIENT
Start: 2024-04-11

## 2024-04-11 RX ORDER — FAMOTIDINE 10 MG/ML
20 INJECTION INTRAVENOUS ONCE AS NEEDED
Status: CANCELLED | OUTPATIENT
Start: 2024-04-11

## 2024-04-11 RX ORDER — HEPARIN 100 UNIT/ML
500 SYRINGE INTRAVENOUS AS NEEDED
Status: DISCONTINUED | OUTPATIENT
Start: 2024-04-11 | End: 2024-04-11 | Stop reason: HOSPADM

## 2024-04-11 RX ORDER — FAMOTIDINE 10 MG/ML
20 INJECTION INTRAVENOUS ONCE AS NEEDED
Status: DISCONTINUED | OUTPATIENT
Start: 2024-04-11 | End: 2024-04-11 | Stop reason: HOSPADM

## 2024-04-11 RX ORDER — PROCHLORPERAZINE EDISYLATE 5 MG/ML
10 INJECTION INTRAMUSCULAR; INTRAVENOUS EVERY 6 HOURS PRN
Status: CANCELLED | OUTPATIENT
Start: 2024-04-11

## 2024-04-11 RX ORDER — EPINEPHRINE 0.3 MG/.3ML
0.3 INJECTION SUBCUTANEOUS EVERY 5 MIN PRN
Status: DISCONTINUED | OUTPATIENT
Start: 2024-04-11 | End: 2024-04-11 | Stop reason: HOSPADM

## 2024-04-11 RX ADMIN — DOCETAXEL 160 MG: 20 INJECTION, SOLUTION, CONCENTRATE INTRAVENOUS at 10:21

## 2024-04-11 RX ADMIN — ONDANSETRON 8 MG: 2 INJECTION INTRAMUSCULAR; INTRAVENOUS at 09:46

## 2024-04-11 RX ADMIN — DEXAMETHASONE SODIUM PHOSPHATE 12 MG: 10 INJECTION, SOLUTION INTRAMUSCULAR; INTRAVENOUS at 09:53

## 2024-04-11 ASSESSMENT — PAIN SCALES - GENERAL: PAINLEVEL: 0-NO PAIN

## 2024-04-11 NOTE — PROGRESS NOTES
Patient ID: Shelbi Delaney is a 64 y.o. female.  Referring Physician: No referring provider defined for this encounter.  Primary Care Provider: Sonja Uriarte MD    Subjective    Patient presents today in follow-up evaluation for C#2 Docetaxel for recurrent cervical cancer. Seen in urgent follow up last week in setting of diffuse maculopapular rash attributed to Lysine in setting of mucositis. Marked improvement in rash since last assessment; also noted to have Pseudomonas UTI (+bilateral cultures 4/4; Bcx negative) but has not picked up antibiotics from pharmacy. Ongoing chronic lower extremity and back pain - stable on current regimen by Pain Management. Denies nausea/vomiting, fevers, chills, chest pain or shortness of breath. Clear urine via nephrostomies bilaterally. Mild to occasionally severe neuropathy since initiation of current regimen with intermittent numbness/tingling. No recent falls. Denies interim hospitalizations since last assessment.     A comprehensive review of systems was performed and otherwise negative.   - Dislodged R PCN over weekend, exchanged Monday 4/8  - L PCN changed 3/12      Objective    BSA: 2 meters squared  /73 (BP Location: Left arm, Patient Position: Sitting, BP Cuff Size: Adult long)   Pulse 50   Temp 35.8 °C (96.5 °F) (Temporal)   Resp 18   Wt 85.6 kg (188 lb 11.4 oz)   SpO2 99%   BMI 30.46 kg/m²      Physical Exam  Vitals and nursing note reviewed. Exam conducted with a chaperone present.   Constitutional:       General: She is not in acute distress.     Appearance: Normal appearance.   HENT:      Head: Normocephalic and atraumatic.      Mouth/Throat:      Mouth: Mucous membranes are moist.      Pharynx: No oropharyngeal exudate or posterior oropharyngeal erythema.   Eyes:      Extraocular Movements: Extraocular movements intact.      Conjunctiva/sclera: Conjunctivae normal.      Pupils: Pupils are equal, round, and reactive to light.   Neck:      Thyroid: No  thyroid mass or thyromegaly.   Cardiovascular:      Rate and Rhythm: Normal rate and regular rhythm.      Pulses: Normal pulses.      Heart sounds: Normal heart sounds.   Pulmonary:      Effort: Pulmonary effort is normal.      Breath sounds: Normal breath sounds. No wheezing, rhonchi or rales.   Abdominal:      General: Bowel sounds are normal. There is no distension.      Palpations: Abdomen is soft. There is no mass.      Tenderness: There is no abdominal tenderness.      Hernia: No hernia is present.   Genitourinary:     Comments: Bilateral nephrostomies in place, new entry site for R PCN - clear urine in tubing  Musculoskeletal:         General: No tenderness. Normal range of motion.      Cervical back: Normal range of motion and neck supple. No tenderness.      Right lower leg: Edema present.      Left lower leg: Edema present.      Comments: Chronic BLE edema; compression dressings in place   Skin:     General: Skin is warm.      Findings: Rash present. No lesion.      Comments: Interval resolution of rash on face, neck, anterior chest, bilateral upper extremities, abdomen and flanks including bilateral nephrostomy sites - no purulent drainage/debris. Right hand with 2cm area of desquamation without drainage/erythema +excoriations   Neurological:      General: No focal deficit present.      Mental Status: She is alert and oriented to person, place, and time.   Psychiatric:         Mood and Affect: Mood normal.         Behavior: Behavior normal.       Performance Status:  Symptomatic; fully ambulatory    Assessment/Plan   64yo with stage RONIT moderately differentiated SCC of cervix with progressive disease with obstructive uropathy with bilateral PCNs in place. Untreated Pseudomonas UTI; grade 2 mucositis improved and grade 1 CIPN. ECOG 1.   Oncology History Overview Note   Stage RONIT grade 2 squamous cell carcinoma of cervix  1/20/23: TURBT - poorly differentiated squamaous cell carcinoma; CPS10.  review with  history of cervical mass consistent with GYN primary  - Extension to rectal mucosa, pubic ramus with ?reactive mediastinal LN   - Bilateral nephrostomy placement   2/16/23 - 6/1/23: Carbo AUC5/Taxol 175 +Avastin 15mg/kg, Pembrolizumab 200mg/m2  - C2-C4 +Avastin - further treatments held due to progressive gr2 proteinuria   - Grade 3 anemia declining transfusion,   7/20/23 - 12/18/23: Pembrolizumab 200mg/m2, progressive disease  1/11/24 - 3/14/24: Tivdak x3, progressive disease   - C1: 0.9mg/kg in setting of CrCl < 30    Molecular Testing  PLD1: CPS 10  1/2024 Tempus xT - PIK3CA p.E726K missense (gain): consider alpelisib  Loss of function: BCORL1, KMT2C, NSD1, KMT2D, RAD21  TMB 7.9mB - RACHELE  - Her2 pending 2/21/24     Cervical cancer, FIGO stage RONIT (CMS/HCC)   3/21/2022 Cancer Staged    Staging form: Cervix Uteri, AJCC Version 9, Clinical stage from 3/21/2022: FIGO Stage RONIT (cT4, cN2, cM0) - Signed by Adrianna Jefferson MD on 10/12/2023, Prognostic indicators: CPS 10     7/20/2023 - 11/30/2023 Chemotherapy    Pembrolizumab, 21 Day Cycles     1/12/2024 - 2/22/2024 Chemotherapy    Tisotumab vedotin, 21 Day Cycles     3/21/2024 -  Chemotherapy    DOCEtaxel, 21 Day Cycles     3/13/2025 - 3/13/2025 Chemotherapy    PACLitaxel / CARBOplatin, 21 Day Cycles - Gyn     3/13/2025 - 3/13/2025 Chemotherapy    PACLitaxel / CARBOplatin, 21 Day Cycles - Gyn        Diagnoses and all orders for this visit:  Encounter for antineoplastic chemotherapy and immunotherapy  Cervical cancer, FIGO stage RONIT (CMS/HCC)  - Pretreatment labs reviewed with leukocytosis; pending repeat CBC today  - Grade 2 mucositis resolved; grade 1 CIPN. Plan for dose reduction to 80mg/m2 today  - Follow up in 3 weeks/sooner PRN  - Anticipate CT after C#3  - Follow up Her2 status  Mucositis due to chemotherapy  - Grade 2 with allergic reaction to Lysine; dose reduction today  - Continue to monitor for recurrence  Urinary tract infection associated with nephrostomy  catheter, subsequent encounter  - Patient strongly encouraged to  antibiotics (Cipro BID x7 days); dose of Cefepime with renal dose adjustment x1 dose during infusion  Obstructive uropathy  - Most recent exchanges 4/8 (R); 3/12 (L)     Adrianna Jefferson MD

## 2024-04-11 NOTE — PROGRESS NOTES
1014 Dr. Jefferson notified WBC 15, K 5.5, BUN 72, Cr 2.48, pt to start antibiotic, no new orders received to address K at this time

## 2024-04-18 DIAGNOSIS — C53.9 CERVICAL CANCER, FIGO STAGE IVB (MULTI): ICD-10-CM

## 2024-04-18 DIAGNOSIS — Z79.891 ENCOUNTER FOR LONG-TERM USE OF OPIATE ANALGESIC: ICD-10-CM

## 2024-04-18 RX ORDER — OXYCODONE HYDROCHLORIDE 5 MG/1
5 TABLET ORAL 3 TIMES DAILY
Qty: 90 TABLET | Refills: 0 | Status: SHIPPED | OUTPATIENT
Start: 2024-04-18 | End: 2024-06-11 | Stop reason: SDUPTHER

## 2024-04-18 NOTE — TELEPHONE ENCOUNTER
Patient called refill line to request Oxycodone refill. OARRS reviewed, no issues. Last fill 3/19/24. CSA/UDS up to date.

## 2024-04-24 ENCOUNTER — OFFICE VISIT (OUTPATIENT)
Dept: PRIMARY CARE | Facility: CLINIC | Age: 64
End: 2024-04-24
Payer: COMMERCIAL

## 2024-04-24 VITALS
WEIGHT: 198 LBS | OXYGEN SATURATION: 97 % | HEART RATE: 71 BPM | SYSTOLIC BLOOD PRESSURE: 119 MMHG | BODY MASS INDEX: 31.82 KG/M2 | DIASTOLIC BLOOD PRESSURE: 73 MMHG | HEIGHT: 66 IN

## 2024-04-24 DIAGNOSIS — C53.9 CERVICAL CANCER, FIGO STAGE IVA (MULTI): ICD-10-CM

## 2024-04-24 DIAGNOSIS — I10 ESSENTIAL (PRIMARY) HYPERTENSION: Primary | ICD-10-CM

## 2024-04-24 DIAGNOSIS — E87.5 HYPERKALEMIA: ICD-10-CM

## 2024-04-24 DIAGNOSIS — N18.4 CHRONIC RENAL DISEASE, STAGE IV (MULTI): ICD-10-CM

## 2024-04-24 DIAGNOSIS — M62.838 MUSCLE SPASM: ICD-10-CM

## 2024-04-24 DIAGNOSIS — N18.30 CRI (CHRONIC RENAL INSUFFICIENCY), STAGE 3 (MODERATE) (MULTI): ICD-10-CM

## 2024-04-24 DIAGNOSIS — R79.89 ABNORMAL TSH: ICD-10-CM

## 2024-04-24 DIAGNOSIS — I77.810 THORACIC AORTIC ECTASIA (CMS-HCC): ICD-10-CM

## 2024-04-24 PROCEDURE — 3078F DIAST BP <80 MM HG: CPT | Performed by: INTERNAL MEDICINE

## 2024-04-24 PROCEDURE — 99214 OFFICE O/P EST MOD 30 MIN: CPT | Performed by: INTERNAL MEDICINE

## 2024-04-24 PROCEDURE — 3074F SYST BP LT 130 MM HG: CPT | Performed by: INTERNAL MEDICINE

## 2024-04-24 RX ORDER — LEVOTHYROXINE SODIUM 25 UG/1
25 TABLET ORAL DAILY
Qty: 90 TABLET | Refills: 3 | Status: SHIPPED | OUTPATIENT
Start: 2024-04-24

## 2024-04-24 RX ORDER — TIZANIDINE 4 MG/1
4 TABLET ORAL 3 TIMES DAILY
Qty: 270 TABLET | Refills: 1 | Status: SHIPPED | OUTPATIENT
Start: 2024-04-24

## 2024-04-24 RX ORDER — TRAZODONE HYDROCHLORIDE 50 MG/1
50 TABLET ORAL NIGHTLY PRN
Qty: 90 TABLET | Refills: 1 | Status: SHIPPED | OUTPATIENT
Start: 2024-04-24

## 2024-04-24 NOTE — PROGRESS NOTES
"Subjective   Patient ID: Shelbi Delaney is a 64 y.o. female who presents for Med Refill and Follow-up (Swallowing legs due to chemo).    HPI     Review of Systems    Objective   /73 (BP Location: Right arm, Patient Position: Sitting, BP Cuff Size: Adult)   Pulse 71   Ht 1.676 m (5' 6\")   Wt 89.8 kg (198 lb)   SpO2 97%   BMI 31.96 kg/m²     Physical Exam    Assessment/Plan          "

## 2024-04-24 NOTE — PROGRESS NOTES
"Subjective   Patient ID: Shelbi Delaney is a 64 y.o. female who presents for Med Refill and Follow-up (Swallowing legs due to chemo).    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    Review of Systems   Constitutional: Negative.    HENT: Negative.     Eyes: Negative.    Respiratory: Negative.     Cardiovascular: Negative.    Gastrointestinal: Negative.    Endocrine: Negative.    Genitourinary: Negative.    Musculoskeletal: Negative.    Skin: Negative.    Allergic/Immunologic: Negative.    Neurological: Negative.    Hematological: Negative.    Psychiatric/Behavioral: Negative.         Objective   /73 (BP Location: Right arm, Patient Position: Sitting, BP Cuff Size: Adult)   Pulse 71   Ht 1.676 m (5' 6\")   Wt 89.8 kg (198 lb)   SpO2 97%   BMI 31.96 kg/m²     Physical Exam  Constitutional:       Appearance: Normal appearance. She is normal weight.   HENT:      Right Ear: Tympanic membrane normal.      Left Ear: Tympanic membrane and ear canal normal.      Nose: Nose normal.   Neck:      Vascular: No carotid bruit.   Cardiovascular:      Rate and Rhythm: Normal rate.   Pulmonary:      Effort: No respiratory distress.      Breath sounds: No stridor. No wheezing.   Abdominal:      Palpations: Abdomen is soft.      Tenderness: There is no abdominal tenderness. There is no guarding or rebound.   Skin:     Coloration: Skin is not jaundiced.      Findings: No bruising.   Neurological:      General: No focal deficit present.      Mental Status: She is alert and " oriented to person, place, and time.   Psychiatric:         Mood and Affect: Mood normal.         Assessment/Plan    patient will be referred to nephrology clinic regarding stage IV chronic kidney disease.  She will continue to follow-up with oncology clinic regarding her history of cervical cancer.  She will continue to follow-up with Dr. Uriarte in 6 months for follow-up visit

## 2024-04-26 ASSESSMENT — ENCOUNTER SYMPTOMS
CARDIOVASCULAR NEGATIVE: 1
EYES NEGATIVE: 1
ALLERGIC/IMMUNOLOGIC NEGATIVE: 1
RESPIRATORY NEGATIVE: 1
CONSTITUTIONAL NEGATIVE: 1
MUSCULOSKELETAL NEGATIVE: 1
ENDOCRINE NEGATIVE: 1
NEUROLOGICAL NEGATIVE: 1
HEMATOLOGIC/LYMPHATIC NEGATIVE: 1
GASTROINTESTINAL NEGATIVE: 1
PSYCHIATRIC NEGATIVE: 1

## 2024-05-01 ENCOUNTER — LAB (OUTPATIENT)
Dept: LAB | Facility: LAB | Age: 64
End: 2024-05-01
Payer: COMMERCIAL

## 2024-05-01 DIAGNOSIS — C53.9 CERVICAL CANCER, FIGO STAGE IVA (MULTI): ICD-10-CM

## 2024-05-01 LAB
ALBUMIN SERPL BCP-MCNC: 3.4 G/DL (ref 3.4–5)
ALP SERPL-CCNC: 66 U/L (ref 33–136)
ALT SERPL W P-5'-P-CCNC: 11 U/L (ref 7–45)
ANION GAP SERPL CALC-SCNC: 15 MMOL/L (ref 10–20)
AST SERPL W P-5'-P-CCNC: 20 U/L (ref 9–39)
BILIRUB SERPL-MCNC: 0.6 MG/DL (ref 0–1.2)
BUN SERPL-MCNC: 29 MG/DL (ref 6–23)
CALCIUM SERPL-MCNC: 8.4 MG/DL (ref 8.6–10.6)
CHLORIDE SERPL-SCNC: 104 MMOL/L (ref 98–107)
CO2 SERPL-SCNC: 25 MMOL/L (ref 21–32)
CREAT SERPL-MCNC: 2.02 MG/DL (ref 0.5–1.05)
EGFRCR SERPLBLD CKD-EPI 2021: 27 ML/MIN/1.73M*2
GLUCOSE SERPL-MCNC: 102 MG/DL (ref 74–99)
POTASSIUM SERPL-SCNC: 5 MMOL/L (ref 3.5–5.3)
PROT SERPL-MCNC: 6 G/DL (ref 6.4–8.2)
SODIUM SERPL-SCNC: 139 MMOL/L (ref 136–145)

## 2024-05-01 PROCEDURE — 85025 COMPLETE CBC W/AUTO DIFF WBC: CPT

## 2024-05-01 PROCEDURE — 80053 COMPREHEN METABOLIC PANEL: CPT

## 2024-05-02 ENCOUNTER — INFUSION (OUTPATIENT)
Dept: HEMATOLOGY/ONCOLOGY | Facility: CLINIC | Age: 64
End: 2024-05-02
Payer: COMMERCIAL

## 2024-05-02 ENCOUNTER — OFFICE VISIT (OUTPATIENT)
Dept: GYNECOLOGIC ONCOLOGY | Facility: CLINIC | Age: 64
End: 2024-05-02
Payer: COMMERCIAL

## 2024-05-02 VITALS
HEART RATE: 57 BPM | DIASTOLIC BLOOD PRESSURE: 51 MMHG | WEIGHT: 203.93 LBS | OXYGEN SATURATION: 99 % | BODY MASS INDEX: 32.91 KG/M2 | RESPIRATION RATE: 16 BRPM | SYSTOLIC BLOOD PRESSURE: 146 MMHG | TEMPERATURE: 97.8 F

## 2024-05-02 DIAGNOSIS — N13.9 OBSTRUCTIVE UROPATHY: ICD-10-CM

## 2024-05-02 DIAGNOSIS — Z51.11 ENCOUNTER FOR ANTINEOPLASTIC CHEMOTHERAPY AND IMMUNOTHERAPY: ICD-10-CM

## 2024-05-02 DIAGNOSIS — C53.9 CERVICAL CANCER, FIGO STAGE IVA (MULTI): Primary | ICD-10-CM

## 2024-05-02 DIAGNOSIS — Z51.12 ENCOUNTER FOR ANTINEOPLASTIC CHEMOTHERAPY AND IMMUNOTHERAPY: ICD-10-CM

## 2024-05-02 DIAGNOSIS — C53.9 CERVICAL CANCER, FIGO STAGE IVA (MULTI): ICD-10-CM

## 2024-05-02 DIAGNOSIS — R60.0 BILATERAL LOWER EXTREMITY EDEMA: ICD-10-CM

## 2024-05-02 DIAGNOSIS — T83.512D URINARY TRACT INFECTION ASSOCIATED WITH NEPHROSTOMY CATHETER, SUBSEQUENT ENCOUNTER: ICD-10-CM

## 2024-05-02 DIAGNOSIS — N39.0 URINARY TRACT INFECTION ASSOCIATED WITH NEPHROSTOMY CATHETER, SUBSEQUENT ENCOUNTER: ICD-10-CM

## 2024-05-02 LAB
BASOPHILS # BLD AUTO: 0.08 X10*3/UL (ref 0–0.1)
BASOPHILS NFR BLD AUTO: 0.7 %
EOSINOPHIL # BLD AUTO: 0.06 X10*3/UL (ref 0–0.7)
EOSINOPHIL NFR BLD AUTO: 0.5 %
ERYTHROCYTE [DISTWIDTH] IN BLOOD BY AUTOMATED COUNT: 16.8 % (ref 11.5–14.5)
HCT VFR BLD AUTO: 28.9 % (ref 36–46)
HGB BLD-MCNC: 8.9 G/DL (ref 12–16)
IMM GRANULOCYTES # BLD AUTO: 0.13 X10*3/UL (ref 0–0.7)
IMM GRANULOCYTES NFR BLD AUTO: 1.1 % (ref 0–0.9)
LYMPHOCYTES # BLD AUTO: 0.44 X10*3/UL (ref 1.2–4.8)
LYMPHOCYTES NFR BLD AUTO: 3.8 %
MCH RBC QN AUTO: 32 PG (ref 26–34)
MCHC RBC AUTO-ENTMCNC: 30.8 G/DL (ref 32–36)
MCV RBC AUTO: 104 FL (ref 80–100)
MONOCYTES # BLD AUTO: 0.25 X10*3/UL (ref 0.1–1)
MONOCYTES NFR BLD AUTO: 2.1 %
NEUTROPHILS # BLD AUTO: 10.69 X10*3/UL (ref 1.2–7.7)
NEUTROPHILS NFR BLD AUTO: 91.8 %
NRBC BLD-RTO: 0 /100 WBCS (ref 0–0)
PLATELET # BLD AUTO: 314 X10*3/UL (ref 150–450)
RBC # BLD AUTO: 2.78 X10*6/UL (ref 4–5.2)
WBC # BLD AUTO: 11.7 X10*3/UL (ref 4.4–11.3)

## 2024-05-02 PROCEDURE — 96375 TX/PRO/DX INJ NEW DRUG ADDON: CPT | Mod: INF

## 2024-05-02 PROCEDURE — 99215 OFFICE O/P EST HI 40 MIN: CPT | Performed by: STUDENT IN AN ORGANIZED HEALTH CARE EDUCATION/TRAINING PROGRAM

## 2024-05-02 PROCEDURE — 96413 CHEMO IV INFUSION 1 HR: CPT

## 2024-05-02 PROCEDURE — 3078F DIAST BP <80 MM HG: CPT | Performed by: STUDENT IN AN ORGANIZED HEALTH CARE EDUCATION/TRAINING PROGRAM

## 2024-05-02 PROCEDURE — 2500000004 HC RX 250 GENERAL PHARMACY W/ HCPCS (ALT 636 FOR OP/ED): Performed by: STUDENT IN AN ORGANIZED HEALTH CARE EDUCATION/TRAINING PROGRAM

## 2024-05-02 PROCEDURE — 3077F SYST BP >= 140 MM HG: CPT | Performed by: STUDENT IN AN ORGANIZED HEALTH CARE EDUCATION/TRAINING PROGRAM

## 2024-05-02 RX ORDER — PROCHLORPERAZINE MALEATE 10 MG
10 TABLET ORAL EVERY 6 HOURS PRN
Status: DISCONTINUED | OUTPATIENT
Start: 2024-05-02 | End: 2024-05-02 | Stop reason: HOSPADM

## 2024-05-02 RX ORDER — ALBUTEROL SULFATE 0.83 MG/ML
3 SOLUTION RESPIRATORY (INHALATION) AS NEEDED
Status: DISCONTINUED | OUTPATIENT
Start: 2024-05-02 | End: 2024-05-02 | Stop reason: HOSPADM

## 2024-05-02 RX ORDER — HEPARIN SODIUM,PORCINE/PF 10 UNIT/ML
50 SYRINGE (ML) INTRAVENOUS AS NEEDED
Status: DISCONTINUED | OUTPATIENT
Start: 2024-05-02 | End: 2024-05-02 | Stop reason: HOSPADM

## 2024-05-02 RX ORDER — EPINEPHRINE 0.3 MG/.3ML
0.3 INJECTION SUBCUTANEOUS EVERY 5 MIN PRN
Status: DISCONTINUED | OUTPATIENT
Start: 2024-05-02 | End: 2024-05-02 | Stop reason: HOSPADM

## 2024-05-02 RX ORDER — DEXAMETHASONE IN 0.9 % SOD CHL 20 MG/50ML
20 INTRAVENOUS SOLUTION, PIGGYBACK (ML) INTRAVENOUS ONCE
Status: COMPLETED | OUTPATIENT
Start: 2024-05-02 | End: 2024-05-02

## 2024-05-02 RX ORDER — ONDANSETRON HYDROCHLORIDE 2 MG/ML
8 INJECTION, SOLUTION INTRAVENOUS ONCE
Status: CANCELLED | OUTPATIENT
Start: 2024-05-02

## 2024-05-02 RX ORDER — PROCHLORPERAZINE MALEATE 10 MG
10 TABLET ORAL EVERY 6 HOURS PRN
Status: CANCELLED | OUTPATIENT
Start: 2024-05-02

## 2024-05-02 RX ORDER — PROCHLORPERAZINE EDISYLATE 5 MG/ML
10 INJECTION INTRAMUSCULAR; INTRAVENOUS EVERY 6 HOURS PRN
Status: CANCELLED | OUTPATIENT
Start: 2024-05-02

## 2024-05-02 RX ORDER — HEPARIN 100 UNIT/ML
500 SYRINGE INTRAVENOUS AS NEEDED
Status: DISCONTINUED | OUTPATIENT
Start: 2024-05-02 | End: 2024-05-02 | Stop reason: HOSPADM

## 2024-05-02 RX ORDER — HEPARIN 100 UNIT/ML
500 SYRINGE INTRAVENOUS AS NEEDED
Status: CANCELLED | OUTPATIENT
Start: 2024-05-02

## 2024-05-02 RX ORDER — HEPARIN SODIUM,PORCINE/PF 10 UNIT/ML
50 SYRINGE (ML) INTRAVENOUS AS NEEDED
Status: CANCELLED | OUTPATIENT
Start: 2024-05-02

## 2024-05-02 RX ORDER — FAMOTIDINE 10 MG/ML
20 INJECTION INTRAVENOUS ONCE AS NEEDED
Status: CANCELLED | OUTPATIENT
Start: 2024-05-02

## 2024-05-02 RX ORDER — ONDANSETRON HYDROCHLORIDE 2 MG/ML
8 INJECTION, SOLUTION INTRAVENOUS ONCE
Status: COMPLETED | OUTPATIENT
Start: 2024-05-02 | End: 2024-05-02

## 2024-05-02 RX ORDER — PROCHLORPERAZINE EDISYLATE 5 MG/ML
10 INJECTION INTRAMUSCULAR; INTRAVENOUS EVERY 6 HOURS PRN
Status: DISCONTINUED | OUTPATIENT
Start: 2024-05-02 | End: 2024-05-02 | Stop reason: HOSPADM

## 2024-05-02 RX ORDER — ALBUTEROL SULFATE 0.83 MG/ML
3 SOLUTION RESPIRATORY (INHALATION) AS NEEDED
Status: CANCELLED | OUTPATIENT
Start: 2024-05-02

## 2024-05-02 RX ORDER — DIPHENHYDRAMINE HYDROCHLORIDE 50 MG/ML
50 INJECTION INTRAMUSCULAR; INTRAVENOUS AS NEEDED
Status: DISCONTINUED | OUTPATIENT
Start: 2024-05-02 | End: 2024-05-02 | Stop reason: HOSPADM

## 2024-05-02 RX ORDER — DIPHENHYDRAMINE HYDROCHLORIDE 50 MG/ML
50 INJECTION INTRAMUSCULAR; INTRAVENOUS AS NEEDED
Status: CANCELLED | OUTPATIENT
Start: 2024-05-02

## 2024-05-02 RX ORDER — FAMOTIDINE 10 MG/ML
20 INJECTION INTRAVENOUS ONCE AS NEEDED
Status: DISCONTINUED | OUTPATIENT
Start: 2024-05-02 | End: 2024-05-02 | Stop reason: HOSPADM

## 2024-05-02 RX ORDER — EPINEPHRINE 0.3 MG/.3ML
0.3 INJECTION SUBCUTANEOUS EVERY 5 MIN PRN
Status: CANCELLED | OUTPATIENT
Start: 2024-05-02

## 2024-05-02 RX ADMIN — ONDANSETRON 8 MG: 2 INJECTION INTRAMUSCULAR; INTRAVENOUS at 10:54

## 2024-05-02 RX ADMIN — SODIUM CHLORIDE, PRESERVATIVE FREE 500 UNITS: 5 INJECTION INTRAVENOUS at 12:34

## 2024-05-02 RX ADMIN — DOCETAXEL 160 MG: 20 INJECTION, SOLUTION, CONCENTRATE INTRAVENOUS at 11:13

## 2024-05-02 RX ADMIN — DEXAMETHASONE SODIUM PHOSPHATE 20 MG: 10 INJECTION, SOLUTION INTRAMUSCULAR; INTRAVENOUS at 10:54

## 2024-05-02 ASSESSMENT — PAIN SCALES - GENERAL: PAINLEVEL: 6

## 2024-05-02 NOTE — PROGRESS NOTES
Patient ID: Shelbi Delaney is a 64 y.o. female.  Referring Physician: No referring provider defined for this encounter.  Primary Care Provider: Sonja Uriarte MD    Subjective    Patient presents today in follow-up evaluation for C#3 of Taxotere for recurrent cervical cancer. Increasing bothersome lower extremity pain and swelling since last assessment - increased on right. Concerned with interval weight gain due to fluid retention. Continues with steroids after treatment for edema related to Taxotere. Denies abdominopelvic pain, nausea/vomiting, fevers, chills, chest pain or shortness of breath. Clear urine via bilateral nephrostomies and continues with self-care without increased redness/drainage. No progressive facial rashes since last assessment. Persistent thin pink vaginal discharge; no bleeding - stable. Having regular bowel movements no issues. Persistent numbness/tingling in hands and feet on Gabapentin 300mg TID (reduced by PCP in setting of renal insufficiency). No recent falls. No other concerns/complaints. Denies interim hospitalizations since last assessment.     A comprehensive review of systems was performed and otherwise negative.   - R PCN changed 3/25 (dislodged) --> 4/8  - L PCN changed 3/12      Objective    BSA: 2.08 meters squared  /51 (BP Location: Left arm, Patient Position: Sitting, BP Cuff Size: Large adult long)   Pulse 57   Temp 36.6 °C (97.8 °F) (Temporal)   Resp 16   Wt 92.5 kg (203 lb 14.8 oz)   SpO2 99%   BMI 32.91 kg/m²     Wt Readings from Last 3 Encounters:   05/02/24 92.5 kg (203 lb 14.8 oz)   04/24/24 89.8 kg (198 lb)   04/11/24 85.6 kg (188 lb 11.4 oz)      Physical Exam  Vitals and nursing note reviewed. Exam conducted with a chaperone present.   Constitutional:       General: She is not in acute distress.     Appearance: Normal appearance.   HENT:      Head: Normocephalic and atraumatic.      Mouth/Throat:      Mouth: Mucous membranes are moist.      Pharynx: No  oropharyngeal exudate or posterior oropharyngeal erythema.   Eyes:      Extraocular Movements: Extraocular movements intact.      Conjunctiva/sclera: Conjunctivae normal.      Pupils: Pupils are equal, round, and reactive to light.   Neck:      Thyroid: No thyroid mass or thyromegaly.   Cardiovascular:      Rate and Rhythm: Normal rate and regular rhythm.      Pulses: Normal pulses.      Heart sounds: Normal heart sounds.   Pulmonary:      Effort: Pulmonary effort is normal.      Breath sounds: Normal breath sounds. No wheezing, rhonchi or rales.   Abdominal:      General: Bowel sounds are normal. There is no distension.      Palpations: Abdomen is soft. There is no mass.      Tenderness: There is no abdominal tenderness.      Hernia: No hernia is present.   Genitourinary:     Comments: Bilateral nephrostomies in place - clear urine in tubing  Musculoskeletal:         General: No tenderness. Normal range of motion.      Cervical back: Normal range of motion and neck supple. No tenderness.      Right lower leg: Edema present.      Left lower leg: Edema present.      Comments: Chronic BLE edema; compression dressings in place   Skin:     General: Skin is warm.      Findings: Rash present. No lesion.      Comments: Interval resolution of rash on face, neck, anterior chest, bilateral upper extremities, abdomen and flanks including bilateral nephrostomy sites - no purulent drainage/debris.   Neurological:      General: No focal deficit present.      Mental Status: She is alert and oriented to person, place, and time.   Psychiatric:         Mood and Affect: Mood normal.         Behavior: Behavior normal.       Performance Status:  Symptomatic; fully ambulatory    Assessment/Plan   64yo with stage RONIT moderately differentiated SCC of cervix with progressive disease with obstructive uropathy with bilateral PCNs in place. Grade 2 mucositis improved and grade 2 CIPN; fluid retention. ECOG 1.   Oncology History Overview Note    Stage RONIT grade 2 squamous cell carcinoma of cervix  1/20/23: TURBT - poorly differentiated squamaous cell carcinoma; CPS10.  review with history of cervical mass consistent with GYN primary  - Extension to rectal mucosa, pubic ramus with ?reactive mediastinal LN   - Bilateral nephrostomy placement   2/16/23 - 6/1/23: Carbo AUC5/Taxol 175 +Avastin 15mg/kg, Pembrolizumab 200mg/m2  - C2-C4 +Avastin - further treatments held due to progressive gr2 proteinuria   - Grade 3 anemia declining transfusion,   7/20/23 - 12/18/23: Pembrolizumab 200mg/m2, progressive disease  1/11/24 - 3/14/24: Tivdak x3, progressive disease   - C1: 0.9mg/kg in setting of CrCl < 30    Molecular Testing  PLD1: CPS 10  1/2024 Tempus xT - PIK3CA p.E726K missense (gain): consider alpelisib  Loss of function: BCORL1, KMT2C, NSD1, KMT2D, RAD21  TMB 7.9mB - RACHELE  - Her2 pending 2/21/24     Cervical cancer, FIGO stage RONIT (Multi)   3/21/2022 Cancer Staged    Staging form: Cervix Uteri, AJCC Version 9, Clinical stage from 3/21/2022: FIGO Stage RONIT (cT4, cN2, cM0) - Signed by Adrianna Jefferson MD on 10/12/2023, Prognostic indicators: CPS 10     7/20/2023 - 11/30/2023 Chemotherapy    Pembrolizumab, 21 Day Cycles     1/12/2024 - 2/22/2024 Chemotherapy    Tisotumab vedotin, 21 Day Cycles     3/21/2024 -  Chemotherapy    DOCEtaxel, 21 Day Cycles     3/13/2025 - 3/13/2025 Chemotherapy    PACLitaxel / CARBOplatin, 21 Day Cycles - Gyn     3/13/2025 - 3/13/2025 Chemotherapy    PACLitaxel / CARBOplatin, 21 Day Cycles - Gyn        Diagnoses and all orders for this visit:  Encounter for antineoplastic chemotherapy and immunotherapy  Cervical cancer, FIGO stage RONIT (CMS/HCC)  - Pretreatment labs reviewed with leukocytosis; pending repeat CBC today  - Grade 2 mucositis resolved; grade 1 CIPN with dose reduction to 80mg/m2 starting at C2  - Follow up in 3 weeks/sooner PRN  - Anticipate CT after C#3  - Follow up Her2 status  - Continue to monitor for recurrence  Urinary  tract infection associated with nephrostomy catheter, subsequent encounter  - S/p treatment for Pseudomonas UTI; continue to monitor for signs/symptoms of progressive infection due to increased risk of complex UTI  Lower extremity swelling  - Attributed to chemotherapy; follow up bilateral LE Duplex to rule out DVT  Obstructive uropathy  - Most recent exchanges 4/8 (R); 3/12 (L)     Adrianna Jefferson MD

## 2024-05-07 ENCOUNTER — OFFICE VISIT (OUTPATIENT)
Dept: WOUND CARE | Facility: HOSPITAL | Age: 64
End: 2024-05-07
Payer: COMMERCIAL

## 2024-05-07 PROCEDURE — 99214 OFFICE O/P EST MOD 30 MIN: CPT | Mod: 25

## 2024-05-07 PROCEDURE — 29580 STRAPPING UNNA BOOT: CPT

## 2024-05-13 ENCOUNTER — APPOINTMENT (OUTPATIENT)
Dept: NEPHROLOGY | Facility: CLINIC | Age: 64
End: 2024-05-13
Payer: COMMERCIAL

## 2024-05-14 ENCOUNTER — OFFICE VISIT (OUTPATIENT)
Dept: WOUND CARE | Facility: HOSPITAL | Age: 64
End: 2024-05-14
Payer: COMMERCIAL

## 2024-05-14 PROCEDURE — 29580 STRAPPING UNNA BOOT: CPT

## 2024-05-16 ENCOUNTER — PREP FOR PROCEDURE (OUTPATIENT)
Dept: RADIOLOGY | Facility: HOSPITAL | Age: 64
End: 2024-05-16
Payer: COMMERCIAL

## 2024-05-16 DIAGNOSIS — N13.9 URINARY TRACT OBSTRUCTION: Primary | ICD-10-CM

## 2024-05-17 ENCOUNTER — APPOINTMENT (OUTPATIENT)
Dept: HEMATOLOGY/ONCOLOGY | Facility: HOSPITAL | Age: 64
End: 2024-05-17
Payer: COMMERCIAL

## 2024-05-20 ENCOUNTER — APPOINTMENT (OUTPATIENT)
Dept: RADIOLOGY | Facility: HOSPITAL | Age: 64
End: 2024-05-20
Payer: COMMERCIAL

## 2024-05-20 ENCOUNTER — LAB (OUTPATIENT)
Dept: LAB | Facility: LAB | Age: 64
End: 2024-05-20
Payer: COMMERCIAL

## 2024-05-20 ENCOUNTER — HOSPITAL ENCOUNTER (OUTPATIENT)
Dept: RADIOLOGY | Facility: CLINIC | Age: 64
Discharge: HOME | End: 2024-05-20
Payer: COMMERCIAL

## 2024-05-20 DIAGNOSIS — C53.9 CERVICAL CANCER, FIGO STAGE IVA (MULTI): ICD-10-CM

## 2024-05-20 LAB
ALBUMIN SERPL BCP-MCNC: 3.2 G/DL (ref 3.4–5)
ALP SERPL-CCNC: 75 U/L (ref 33–136)
ALT SERPL W P-5'-P-CCNC: 7 U/L (ref 7–45)
ANION GAP SERPL CALC-SCNC: 15 MMOL/L (ref 10–20)
AST SERPL W P-5'-P-CCNC: 16 U/L (ref 9–39)
BASOPHILS # BLD AUTO: 0.13 X10*3/UL (ref 0–0.1)
BASOPHILS NFR BLD AUTO: 1.4 %
BILIRUB SERPL-MCNC: 0.4 MG/DL (ref 0–1.2)
BUN SERPL-MCNC: 23 MG/DL (ref 6–23)
CALCIUM SERPL-MCNC: 8.1 MG/DL (ref 8.6–10.6)
CHLORIDE SERPL-SCNC: 105 MMOL/L (ref 98–107)
CO2 SERPL-SCNC: 25 MMOL/L (ref 21–32)
CREAT SERPL-MCNC: 1.97 MG/DL (ref 0.5–1.05)
EGFRCR SERPLBLD CKD-EPI 2021: 28 ML/MIN/1.73M*2
EOSINOPHIL # BLD AUTO: 0.05 X10*3/UL (ref 0–0.7)
EOSINOPHIL NFR BLD AUTO: 0.6 %
ERYTHROCYTE [DISTWIDTH] IN BLOOD BY AUTOMATED COUNT: 19.4 % (ref 11.5–14.5)
GLUCOSE SERPL-MCNC: 77 MG/DL (ref 74–99)
HCT VFR BLD AUTO: 28.3 % (ref 36–46)
HGB BLD-MCNC: 8.1 G/DL (ref 12–16)
IMM GRANULOCYTES # BLD AUTO: 0.23 X10*3/UL (ref 0–0.7)
IMM GRANULOCYTES NFR BLD AUTO: 2.5 % (ref 0–0.9)
LYMPHOCYTES # BLD AUTO: 1.16 X10*3/UL (ref 1.2–4.8)
LYMPHOCYTES NFR BLD AUTO: 12.8 %
MCH RBC QN AUTO: 31 PG (ref 26–34)
MCHC RBC AUTO-ENTMCNC: 28.6 G/DL (ref 32–36)
MCV RBC AUTO: 108 FL (ref 80–100)
MONOCYTES # BLD AUTO: 0.88 X10*3/UL (ref 0.1–1)
MONOCYTES NFR BLD AUTO: 9.7 %
NEUTROPHILS # BLD AUTO: 6.63 X10*3/UL (ref 1.2–7.7)
NEUTROPHILS NFR BLD AUTO: 73 %
NRBC BLD-RTO: 0 /100 WBCS (ref 0–0)
PLATELET # BLD AUTO: 390 X10*3/UL (ref 150–450)
POTASSIUM SERPL-SCNC: 5.3 MMOL/L (ref 3.5–5.3)
PROT SERPL-MCNC: 6.4 G/DL (ref 6.4–8.2)
RBC # BLD AUTO: 2.61 X10*6/UL (ref 4–5.2)
SODIUM SERPL-SCNC: 140 MMOL/L (ref 136–145)
WBC # BLD AUTO: 9.1 X10*3/UL (ref 4.4–11.3)

## 2024-05-20 PROCEDURE — 74176 CT ABD & PELVIS W/O CONTRAST: CPT | Performed by: RADIOLOGY

## 2024-05-20 PROCEDURE — 74176 CT ABD & PELVIS W/O CONTRAST: CPT

## 2024-05-20 PROCEDURE — 80053 COMPREHEN METABOLIC PANEL: CPT

## 2024-05-20 PROCEDURE — 85025 COMPLETE CBC W/AUTO DIFF WBC: CPT

## 2024-05-20 PROCEDURE — 71250 CT THORAX DX C-: CPT | Performed by: RADIOLOGY

## 2024-05-21 ENCOUNTER — OFFICE VISIT (OUTPATIENT)
Dept: WOUND CARE | Facility: HOSPITAL | Age: 64
End: 2024-05-21
Payer: COMMERCIAL

## 2024-05-21 NOTE — TUMOR BOARD NOTE
Gynecologic Oncology Tumor Board 2024    Specialties Present: Gyn Onc, Radiology, Genetics, Radiation oncology, Pathology, Nurse Navigator, Research    Shelbi Delaney  64 y.o.    1960  MRN [id number]    Provider: Adrianna Jefferson MD  Disease Site/Stage: Cervical, Recurrent  Pathology: N/A  Prior Therapy:  Carbo/Taxol + Avastin + Pembrolizumab x 6 cycles (2023 - 2023, Avastin held after C4 due to G2 proteinuria); Pembrolizumab (2023 - 2023); Tivdak x3 cycles (2024 - 2024); Docetaxel x4 cycles (2024 - Current)  Impression: 63yo with recurrent squamous cell carcinoma of the cervix with mixed response on Docetaxel.     We reviewed previous medical and familial history, history of present illness, and recent lab results along with all available histopathologic and imaging studies. The tumor board considered available treatment options and made the following recommendations.    Recommendations:     Continue docetaxel. Follow up with path regarding HER2 testing.     Clinical Trial Consideration: None Available    National site-specific guidelines were discussed with respect to the case. It is recognized that there may be additional factors not discussed in the Review Board discussion that can influence management decisions, and alternative management options which fall within the standard of care. Accordingly the final treatment disposition will be determined by the patient, in the context of an informed discussion with their providers. The actual prescribed management or treatment plan may or may not be consistent with the Review Board recommendations.

## 2024-05-23 ENCOUNTER — INFUSION (OUTPATIENT)
Dept: HEMATOLOGY/ONCOLOGY | Facility: CLINIC | Age: 64
End: 2024-05-23
Payer: COMMERCIAL

## 2024-05-23 ENCOUNTER — OFFICE VISIT (OUTPATIENT)
Dept: GYNECOLOGIC ONCOLOGY | Facility: CLINIC | Age: 64
End: 2024-05-23
Payer: COMMERCIAL

## 2024-05-23 VITALS
SYSTOLIC BLOOD PRESSURE: 144 MMHG | BODY MASS INDEX: 31.62 KG/M2 | RESPIRATION RATE: 16 BRPM | TEMPERATURE: 97.5 F | HEART RATE: 64 BPM | WEIGHT: 195.88 LBS | DIASTOLIC BLOOD PRESSURE: 79 MMHG | OXYGEN SATURATION: 99 %

## 2024-05-23 VITALS — HEIGHT: 66 IN | BODY MASS INDEX: 31.56 KG/M2

## 2024-05-23 DIAGNOSIS — N13.9 OBSTRUCTIVE UROPATHY: ICD-10-CM

## 2024-05-23 DIAGNOSIS — Z51.11 ENCOUNTER FOR ANTINEOPLASTIC CHEMOTHERAPY AND IMMUNOTHERAPY: ICD-10-CM

## 2024-05-23 DIAGNOSIS — C53.9 CERVICAL CANCER, FIGO STAGE IVA (MULTI): Primary | ICD-10-CM

## 2024-05-23 DIAGNOSIS — C53.9 CERVICAL CANCER, FIGO STAGE IVA (MULTI): ICD-10-CM

## 2024-05-23 DIAGNOSIS — R60.0 BILATERAL LOWER EXTREMITY EDEMA: ICD-10-CM

## 2024-05-23 DIAGNOSIS — Z51.12 ENCOUNTER FOR ANTINEOPLASTIC CHEMOTHERAPY AND IMMUNOTHERAPY: ICD-10-CM

## 2024-05-23 PROCEDURE — 96413 CHEMO IV INFUSION 1 HR: CPT

## 2024-05-23 PROCEDURE — 99215 OFFICE O/P EST HI 40 MIN: CPT | Performed by: STUDENT IN AN ORGANIZED HEALTH CARE EDUCATION/TRAINING PROGRAM

## 2024-05-23 PROCEDURE — 96375 TX/PRO/DX INJ NEW DRUG ADDON: CPT | Mod: INF

## 2024-05-23 PROCEDURE — 3077F SYST BP >= 140 MM HG: CPT | Performed by: STUDENT IN AN ORGANIZED HEALTH CARE EDUCATION/TRAINING PROGRAM

## 2024-05-23 PROCEDURE — 3078F DIAST BP <80 MM HG: CPT | Performed by: STUDENT IN AN ORGANIZED HEALTH CARE EDUCATION/TRAINING PROGRAM

## 2024-05-23 PROCEDURE — 2500000004 HC RX 250 GENERAL PHARMACY W/ HCPCS (ALT 636 FOR OP/ED): Performed by: STUDENT IN AN ORGANIZED HEALTH CARE EDUCATION/TRAINING PROGRAM

## 2024-05-23 PROCEDURE — 99215 OFFICE O/P EST HI 40 MIN: CPT | Mod: 25 | Performed by: STUDENT IN AN ORGANIZED HEALTH CARE EDUCATION/TRAINING PROGRAM

## 2024-05-23 RX ORDER — PROCHLORPERAZINE MALEATE 10 MG
10 TABLET ORAL EVERY 6 HOURS PRN
Status: DISCONTINUED | OUTPATIENT
Start: 2024-05-23 | End: 2024-05-23 | Stop reason: HOSPADM

## 2024-05-23 RX ORDER — ALBUTEROL SULFATE 0.83 MG/ML
3 SOLUTION RESPIRATORY (INHALATION) AS NEEDED
Status: CANCELLED | OUTPATIENT
Start: 2024-05-23

## 2024-05-23 RX ORDER — DEXAMETHASONE IN 0.9 % SOD CHL 20 MG/50ML
20 INTRAVENOUS SOLUTION, PIGGYBACK (ML) INTRAVENOUS ONCE
Status: COMPLETED | OUTPATIENT
Start: 2024-05-23 | End: 2024-05-23

## 2024-05-23 RX ORDER — HEPARIN SODIUM,PORCINE/PF 10 UNIT/ML
50 SYRINGE (ML) INTRAVENOUS AS NEEDED
OUTPATIENT
Start: 2024-05-23

## 2024-05-23 RX ORDER — ALBUTEROL SULFATE 0.83 MG/ML
3 SOLUTION RESPIRATORY (INHALATION) AS NEEDED
Status: DISCONTINUED | OUTPATIENT
Start: 2024-05-23 | End: 2024-05-23 | Stop reason: HOSPADM

## 2024-05-23 RX ORDER — PROCHLORPERAZINE EDISYLATE 5 MG/ML
10 INJECTION INTRAMUSCULAR; INTRAVENOUS EVERY 6 HOURS PRN
Status: DISCONTINUED | OUTPATIENT
Start: 2024-05-23 | End: 2024-05-23 | Stop reason: HOSPADM

## 2024-05-23 RX ORDER — EPINEPHRINE 0.3 MG/.3ML
0.3 INJECTION SUBCUTANEOUS EVERY 5 MIN PRN
Status: DISCONTINUED | OUTPATIENT
Start: 2024-05-23 | End: 2024-05-23 | Stop reason: HOSPADM

## 2024-05-23 RX ORDER — FAMOTIDINE 10 MG/ML
20 INJECTION INTRAVENOUS ONCE AS NEEDED
Status: CANCELLED | OUTPATIENT
Start: 2024-05-23

## 2024-05-23 RX ORDER — PROCHLORPERAZINE EDISYLATE 5 MG/ML
10 INJECTION INTRAMUSCULAR; INTRAVENOUS EVERY 6 HOURS PRN
Status: CANCELLED | OUTPATIENT
Start: 2024-05-23

## 2024-05-23 RX ORDER — HEPARIN 100 UNIT/ML
500 SYRINGE INTRAVENOUS AS NEEDED
OUTPATIENT
Start: 2024-05-23

## 2024-05-23 RX ORDER — ONDANSETRON HYDROCHLORIDE 2 MG/ML
8 INJECTION, SOLUTION INTRAVENOUS ONCE
Status: COMPLETED | OUTPATIENT
Start: 2024-05-23 | End: 2024-05-23

## 2024-05-23 RX ORDER — FAMOTIDINE 10 MG/ML
20 INJECTION INTRAVENOUS ONCE AS NEEDED
Status: DISCONTINUED | OUTPATIENT
Start: 2024-05-23 | End: 2024-05-23 | Stop reason: HOSPADM

## 2024-05-23 RX ORDER — EPINEPHRINE 0.3 MG/.3ML
0.3 INJECTION SUBCUTANEOUS EVERY 5 MIN PRN
Status: CANCELLED | OUTPATIENT
Start: 2024-05-23

## 2024-05-23 RX ORDER — HEPARIN 100 UNIT/ML
500 SYRINGE INTRAVENOUS AS NEEDED
Status: DISCONTINUED | OUTPATIENT
Start: 2024-05-23 | End: 2024-05-23 | Stop reason: HOSPADM

## 2024-05-23 RX ORDER — PROCHLORPERAZINE MALEATE 10 MG
10 TABLET ORAL EVERY 6 HOURS PRN
Status: CANCELLED | OUTPATIENT
Start: 2024-05-23

## 2024-05-23 RX ORDER — DIPHENHYDRAMINE HYDROCHLORIDE 50 MG/ML
50 INJECTION INTRAMUSCULAR; INTRAVENOUS AS NEEDED
Status: DISCONTINUED | OUTPATIENT
Start: 2024-05-23 | End: 2024-05-23 | Stop reason: HOSPADM

## 2024-05-23 RX ORDER — ONDANSETRON HYDROCHLORIDE 2 MG/ML
8 INJECTION, SOLUTION INTRAVENOUS ONCE
Status: CANCELLED | OUTPATIENT
Start: 2024-05-23

## 2024-05-23 RX ORDER — HEPARIN SODIUM,PORCINE/PF 10 UNIT/ML
50 SYRINGE (ML) INTRAVENOUS AS NEEDED
Status: DISCONTINUED | OUTPATIENT
Start: 2024-05-23 | End: 2024-05-23 | Stop reason: HOSPADM

## 2024-05-23 RX ORDER — DIPHENHYDRAMINE HYDROCHLORIDE 50 MG/ML
50 INJECTION INTRAMUSCULAR; INTRAVENOUS AS NEEDED
Status: CANCELLED | OUTPATIENT
Start: 2024-05-23

## 2024-05-23 RX ADMIN — DOCETAXEL 160 MG: 20 INJECTION, SOLUTION, CONCENTRATE INTRAVENOUS at 13:36

## 2024-05-23 RX ADMIN — ONDANSETRON 8 MG: 2 INJECTION INTRAMUSCULAR; INTRAVENOUS at 13:10

## 2024-05-23 RX ADMIN — DEXAMETHASONE SODIUM PHOSPHATE 20 MG: 10 INJECTION, SOLUTION INTRAMUSCULAR; INTRAVENOUS at 13:10

## 2024-05-23 ASSESSMENT — PAIN SCALES - GENERAL: PAINLEVEL: 0-NO PAIN

## 2024-05-23 NOTE — PROGRESS NOTES
Patient ID: Shelbi Delaney is a 64 y.o. female.  Referring Physician: No referring provider defined for this encounter.  Primary Care Provider: Sonja Uriarte MD    Subjective    Patient presents today for results discussion and evaluation for C#4 of Taxotere. Marked improvement in lower extremity swelling since starting uniboot therapy. Continues with steroids after treatment for edema related to Taxotere. Denies abdominopelvic pain, nausea/vomiting, fevers, chills, chest pain or shortness of breath. Clear urine via bilateral nephrostomies and continues with self-care without increased redness/drainage. No progressive facial rashes since last assessment. Persistent thin pink vaginal discharge; no bleeding - stable. Increased voiding via urethra without bleeding since R internalized stent. Having regular bowel movements no issues. Persistent numbness/tingling in hands and feet on Gabapentin 300mg TID (reduced by PCP in setting of renal insufficiency). No recent falls. No other concerns/complaints. Denies interim hospitalizations since last assessment.     A comprehensive review of systems was performed and otherwise negative.   - R PCN changed 5/16 with internalized stent   - L PCN changed 5/16      Objective    BSA: 2.03 meters squared  /79 (BP Location: Left arm, Patient Position: Sitting, BP Cuff Size: Adult long)   Pulse 64   Temp 36.4 °C (97.5 °F) (Temporal)   Resp 16   Wt 88.9 kg (195 lb 14.1 oz)   SpO2 99%   BMI 31.62 kg/m²     Wt Readings from Last 3 Encounters:   05/23/24 88.9 kg (195 lb 14.1 oz)   05/02/24 92.5 kg (203 lb 14.8 oz)   04/24/24 89.8 kg (198 lb)      Physical Exam  Vitals and nursing note reviewed. Exam conducted with a chaperone present.   Constitutional:       General: She is not in acute distress.     Appearance: Normal appearance.   HENT:      Head: Normocephalic and atraumatic.      Mouth/Throat:      Mouth: Mucous membranes are moist.      Pharynx: No oropharyngeal exudate or  posterior oropharyngeal erythema.   Eyes:      Extraocular Movements: Extraocular movements intact.      Conjunctiva/sclera: Conjunctivae normal.      Pupils: Pupils are equal, round, and reactive to light.   Neck:      Thyroid: No thyroid mass or thyromegaly.   Cardiovascular:      Rate and Rhythm: Normal rate and regular rhythm.      Pulses: Normal pulses.      Heart sounds: Normal heart sounds.   Pulmonary:      Effort: Pulmonary effort is normal.      Breath sounds: Normal breath sounds. No wheezing, rhonchi or rales.   Abdominal:      General: Bowel sounds are normal. There is no distension.      Palpations: Abdomen is soft. There is no mass.      Tenderness: There is no abdominal tenderness.      Hernia: No hernia is present.   Genitourinary:     Comments: Bilateral nephrostomies in place - clear urine in tubing  Musculoskeletal:         General: No tenderness. Normal range of motion.      Cervical back: Normal range of motion and neck supple. No tenderness.      Right lower leg: Edema present.      Left lower leg: Edema present.      Comments: Chronic BLE edema; compression dressings in place   Skin:     General: Skin is warm.      Findings: Rash present. No lesion.      Comments: Interval resolution of rash on face, neck, anterior chest, bilateral upper extremities, abdomen and flanks including bilateral nephrostomy sites - no purulent drainage/debris.   Neurological:      General: No focal deficit present.      Mental Status: She is alert and oriented to person, place, and time.   Psychiatric:         Mood and Affect: Mood normal.         Behavior: Behavior normal.     Performance Status:  Symptomatic; fully ambulatory    Assessment/Plan   64yo with stage RONIT moderately differentiated SCC of cervix with stable disease on Docetaxel; obstructive uropathy with bilateral PCNs in place. Grade 2 mucositis improved and grade 2 CIPN; fluid retention. ECOG 1.   Oncology History Overview Note   Stage RONIT grade 2  squamous cell carcinoma of cervix  1/20/23: TURBT - poorly differentiated squamaous cell carcinoma; CPS10.  review with history of cervical mass consistent with GYN primary  - Extension to rectal mucosa, pubic ramus with ?reactive mediastinal LN   - Bilateral nephrostomy placement   2/16/23 - 6/1/23: Carbo AUC5/Taxol 175 +Avastin 15mg/kg, Pembrolizumab 200mg/m2  - C2-C4 +Avastin - further treatments held due to progressive gr2 proteinuria   - Grade 3 anemia declining transfusion,   7/20/23 - 12/18/23: Pembrolizumab 200mg/m2, progressive disease  1/11/24 - 3/14/24: Tivdak x3, progressive disease   - C1: 0.9mg/kg in setting of CrCl < 30    Molecular Testing  PLD1: CPS 10  1/2024 Tempus xT - PIK3CA p.E726K missense (gain): consider alpelisib  Loss of function: BCORL1, KMT2C, NSD1, KMT2D, RAD21  TMB 7.9mB - RACHELE  - Her2 pending 2/21/24     Cervical cancer, FIGO stage RONIT (Multi)   3/21/2022 Cancer Staged    Staging form: Cervix Uteri, AJCC Version 9, Clinical stage from 3/21/2022: FIGO Stage RONIT (cT4, cN2, cM0) - Signed by Adrianna Jefferson MD on 10/12/2023, Prognostic indicators: CPS 10     7/20/2023 - 11/30/2023 Chemotherapy    Pembrolizumab, 21 Day Cycles     1/12/2024 - 2/22/2024 Chemotherapy    Tisotumab vedotin, 21 Day Cycles     3/21/2024 -  Chemotherapy    DOCEtaxel, 21 Day Cycles     3/13/2025 - 3/13/2025 Chemotherapy    PACLitaxel / CARBOplatin, 21 Day Cycles - Gyn     3/13/2025 - 3/13/2025 Chemotherapy    PACLitaxel / CARBOplatin, 21 Day Cycles - Gyn        Diagnoses and all orders for this visit:  Encounter for antineoplastic chemotherapy and immunotherapy  Cervical cancer, FIGO stage RONIT (CMS/HCC)  - Stable disease since last assessment; improved lower extremity edema with supportive therapies  - Grade 2 mucositis resolved; grade 1 CIPN with dose reduction to 80mg/m2 starting at C2  - Follow up in 3 weeks/sooner PRN  - Follow up Her2 status  Urinary tract infection associated with nephrostomy catheter,  subsequent encounter  Obstructive uropathy  - S/p treatment for Pseudomonas UTI; continue to monitor for signs/symptoms of progressive infection due to increased risk of complex UTI  - Most recent PCN exchange 5/16  Lower extremity swelling  - Attributed to chemotherapy on Taxotere  - Continues to follow with local wound therapy and uniboot treatment     Adrianna Jefferson MD

## 2024-05-24 ENCOUNTER — TUMOR BOARD CONFERENCE (OUTPATIENT)
Dept: HEMATOLOGY/ONCOLOGY | Facility: HOSPITAL | Age: 64
End: 2024-05-24
Payer: COMMERCIAL

## 2024-05-24 DIAGNOSIS — C53.9 CERVICAL CANCER, FIGO STAGE IVA (MULTI): ICD-10-CM

## 2024-05-28 ENCOUNTER — OFFICE VISIT (OUTPATIENT)
Dept: WOUND CARE | Facility: HOSPITAL | Age: 64
End: 2024-05-28
Payer: COMMERCIAL

## 2024-05-28 PROCEDURE — 29580 STRAPPING UNNA BOOT: CPT

## 2024-05-31 ENCOUNTER — TELEPHONE (OUTPATIENT)
Dept: GYNECOLOGIC ONCOLOGY | Facility: HOSPITAL | Age: 64
End: 2024-05-31

## 2024-05-31 ENCOUNTER — APPOINTMENT (OUTPATIENT)
Dept: HEMATOLOGY/ONCOLOGY | Facility: HOSPITAL | Age: 64
End: 2024-05-31
Payer: COMMERCIAL

## 2024-05-31 NOTE — TELEPHONE ENCOUNTER
Called patient after nurse Sonja Rodriguez received the following email:     Shelbi العلي wants to ask you about menstual like cramps last 2 days and darker urine since her infusion ,thanks Debbie.    Left message to please call office today before 4:30.

## 2024-06-04 ENCOUNTER — OFFICE VISIT (OUTPATIENT)
Dept: WOUND CARE | Facility: HOSPITAL | Age: 64
End: 2024-06-04
Payer: COMMERCIAL

## 2024-06-04 ENCOUNTER — APPOINTMENT (OUTPATIENT)
Dept: PREADMISSION TESTING | Facility: HOSPITAL | Age: 64
End: 2024-06-04
Payer: COMMERCIAL

## 2024-06-04 PROCEDURE — 99213 OFFICE O/P EST LOW 20 MIN: CPT

## 2024-06-10 DIAGNOSIS — C53.9 CERVICAL CANCER, FIGO STAGE IVA (MULTI): ICD-10-CM

## 2024-06-10 DIAGNOSIS — Z79.891 ENCOUNTER FOR LONG-TERM USE OF OPIATE ANALGESIC: ICD-10-CM

## 2024-06-10 DIAGNOSIS — G89.3 CANCER RELATED PAIN: ICD-10-CM

## 2024-06-10 DIAGNOSIS — M48.062 LUMBAR STENOSIS WITH NEUROGENIC CLAUDICATION: ICD-10-CM

## 2024-06-10 RX ORDER — GABAPENTIN 300 MG/1
900 CAPSULE ORAL 3 TIMES DAILY
Qty: 270 CAPSULE | Refills: 0 | OUTPATIENT
Start: 2024-06-10

## 2024-06-11 ENCOUNTER — OFFICE VISIT (OUTPATIENT)
Dept: WOUND CARE | Facility: HOSPITAL | Age: 64
End: 2024-06-11
Payer: COMMERCIAL

## 2024-06-11 ENCOUNTER — LAB (OUTPATIENT)
Dept: LAB | Facility: LAB | Age: 64
End: 2024-06-11
Payer: COMMERCIAL

## 2024-06-11 DIAGNOSIS — C53.9 CERVICAL CANCER, FIGO STAGE IVA (MULTI): ICD-10-CM

## 2024-06-11 DIAGNOSIS — Z79.891 ENCOUNTER FOR LONG-TERM USE OF OPIATE ANALGESIC: ICD-10-CM

## 2024-06-11 DIAGNOSIS — C53.9 CERVICAL CANCER, FIGO STAGE IVB (MULTI): ICD-10-CM

## 2024-06-11 LAB
ALBUMIN SERPL BCP-MCNC: 3.3 G/DL (ref 3.4–5)
ALP SERPL-CCNC: 71 U/L (ref 33–136)
ALT SERPL W P-5'-P-CCNC: 6 U/L (ref 7–45)
ANION GAP SERPL CALC-SCNC: 15 MMOL/L (ref 10–20)
AST SERPL W P-5'-P-CCNC: 17 U/L (ref 9–39)
BASOPHILS # BLD AUTO: 0.11 X10*3/UL (ref 0–0.1)
BASOPHILS NFR BLD AUTO: 1.4 %
BILIRUB SERPL-MCNC: 0.4 MG/DL (ref 0–1.2)
BUN SERPL-MCNC: 24 MG/DL (ref 6–23)
CALCIUM SERPL-MCNC: 8.2 MG/DL (ref 8.6–10.6)
CHLORIDE SERPL-SCNC: 106 MMOL/L (ref 98–107)
CO2 SERPL-SCNC: 25 MMOL/L (ref 21–32)
CREAT SERPL-MCNC: 2.03 MG/DL (ref 0.5–1.05)
EGFRCR SERPLBLD CKD-EPI 2021: 27 ML/MIN/1.73M*2
EOSINOPHIL # BLD AUTO: 0.04 X10*3/UL (ref 0–0.7)
EOSINOPHIL NFR BLD AUTO: 0.5 %
ERYTHROCYTE [DISTWIDTH] IN BLOOD BY AUTOMATED COUNT: 20 % (ref 11.5–14.5)
GLUCOSE SERPL-MCNC: 87 MG/DL (ref 74–99)
HCT VFR BLD AUTO: 26.9 % (ref 36–46)
HGB BLD-MCNC: 8.2 G/DL (ref 12–16)
IMM GRANULOCYTES # BLD AUTO: 0.27 X10*3/UL (ref 0–0.7)
IMM GRANULOCYTES NFR BLD AUTO: 3.4 % (ref 0–0.9)
LYMPHOCYTES # BLD AUTO: 1.12 X10*3/UL (ref 1.2–4.8)
LYMPHOCYTES NFR BLD AUTO: 14.2 %
MCH RBC QN AUTO: 32.2 PG (ref 26–34)
MCHC RBC AUTO-ENTMCNC: 30.5 G/DL (ref 32–36)
MCV RBC AUTO: 106 FL (ref 80–100)
MONOCYTES # BLD AUTO: 0.72 X10*3/UL (ref 0.1–1)
MONOCYTES NFR BLD AUTO: 9.1 %
NEUTROPHILS # BLD AUTO: 5.65 X10*3/UL (ref 1.2–7.7)
NEUTROPHILS NFR BLD AUTO: 71.4 %
NRBC BLD-RTO: 0 /100 WBCS (ref 0–0)
PLATELET # BLD AUTO: 393 X10*3/UL (ref 150–450)
POTASSIUM SERPL-SCNC: 5 MMOL/L (ref 3.5–5.3)
PROT SERPL-MCNC: 6.1 G/DL (ref 6.4–8.2)
RBC # BLD AUTO: 2.55 X10*6/UL (ref 4–5.2)
SODIUM SERPL-SCNC: 141 MMOL/L (ref 136–145)
WBC # BLD AUTO: 7.9 X10*3/UL (ref 4.4–11.3)

## 2024-06-11 PROCEDURE — 85025 COMPLETE CBC W/AUTO DIFF WBC: CPT

## 2024-06-11 PROCEDURE — 99214 OFFICE O/P EST MOD 30 MIN: CPT

## 2024-06-11 PROCEDURE — 80053 COMPREHEN METABOLIC PANEL: CPT

## 2024-06-11 RX ORDER — OXYCODONE HYDROCHLORIDE 5 MG/1
5 TABLET ORAL 3 TIMES DAILY
Qty: 90 TABLET | Refills: 0 | Status: SHIPPED | OUTPATIENT
Start: 2024-06-11

## 2024-06-13 ENCOUNTER — OFFICE VISIT (OUTPATIENT)
Dept: GYNECOLOGIC ONCOLOGY | Facility: CLINIC | Age: 64
End: 2024-06-13
Payer: COMMERCIAL

## 2024-06-13 ENCOUNTER — APPOINTMENT (OUTPATIENT)
Dept: HEMATOLOGY/ONCOLOGY | Facility: CLINIC | Age: 64
End: 2024-06-13
Payer: COMMERCIAL

## 2024-06-13 ENCOUNTER — INFUSION (OUTPATIENT)
Dept: HEMATOLOGY/ONCOLOGY | Facility: CLINIC | Age: 64
End: 2024-06-13
Payer: COMMERCIAL

## 2024-06-13 VITALS
TEMPERATURE: 96.8 F | RESPIRATION RATE: 18 BRPM | WEIGHT: 190.59 LBS | HEART RATE: 75 BPM | DIASTOLIC BLOOD PRESSURE: 67 MMHG | BODY MASS INDEX: 30.7 KG/M2 | OXYGEN SATURATION: 95 % | SYSTOLIC BLOOD PRESSURE: 121 MMHG

## 2024-06-13 DIAGNOSIS — C53.9 CERVICAL CANCER, FIGO STAGE IVA (MULTI): Primary | ICD-10-CM

## 2024-06-13 DIAGNOSIS — R60.0 BILATERAL LOWER EXTREMITY EDEMA: ICD-10-CM

## 2024-06-13 DIAGNOSIS — Z51.11 ENCOUNTER FOR ANTINEOPLASTIC CHEMOTHERAPY AND IMMUNOTHERAPY: ICD-10-CM

## 2024-06-13 DIAGNOSIS — Z51.12 ENCOUNTER FOR ANTINEOPLASTIC CHEMOTHERAPY AND IMMUNOTHERAPY: ICD-10-CM

## 2024-06-13 DIAGNOSIS — N13.9 OBSTRUCTIVE UROPATHY: ICD-10-CM

## 2024-06-13 DIAGNOSIS — C53.9 CERVICAL CANCER, FIGO STAGE IVA (MULTI): ICD-10-CM

## 2024-06-13 DIAGNOSIS — G89.3 CANCER RELATED PAIN: ICD-10-CM

## 2024-06-13 PROCEDURE — 1036F TOBACCO NON-USER: CPT | Performed by: STUDENT IN AN ORGANIZED HEALTH CARE EDUCATION/TRAINING PROGRAM

## 2024-06-13 PROCEDURE — 2500000004 HC RX 250 GENERAL PHARMACY W/ HCPCS (ALT 636 FOR OP/ED): Performed by: STUDENT IN AN ORGANIZED HEALTH CARE EDUCATION/TRAINING PROGRAM

## 2024-06-13 PROCEDURE — 99214 OFFICE O/P EST MOD 30 MIN: CPT | Mod: 25 | Performed by: STUDENT IN AN ORGANIZED HEALTH CARE EDUCATION/TRAINING PROGRAM

## 2024-06-13 PROCEDURE — 96413 CHEMO IV INFUSION 1 HR: CPT

## 2024-06-13 PROCEDURE — 96375 TX/PRO/DX INJ NEW DRUG ADDON: CPT | Mod: INF

## 2024-06-13 PROCEDURE — 3074F SYST BP LT 130 MM HG: CPT | Performed by: STUDENT IN AN ORGANIZED HEALTH CARE EDUCATION/TRAINING PROGRAM

## 2024-06-13 PROCEDURE — 3078F DIAST BP <80 MM HG: CPT | Performed by: STUDENT IN AN ORGANIZED HEALTH CARE EDUCATION/TRAINING PROGRAM

## 2024-06-13 PROCEDURE — 99214 OFFICE O/P EST MOD 30 MIN: CPT | Performed by: STUDENT IN AN ORGANIZED HEALTH CARE EDUCATION/TRAINING PROGRAM

## 2024-06-13 RX ORDER — PROCHLORPERAZINE MALEATE 10 MG
10 TABLET ORAL EVERY 6 HOURS PRN
Status: CANCELLED | OUTPATIENT
Start: 2024-06-13

## 2024-06-13 RX ORDER — DIPHENHYDRAMINE HYDROCHLORIDE 50 MG/ML
50 INJECTION INTRAMUSCULAR; INTRAVENOUS AS NEEDED
Status: CANCELLED | OUTPATIENT
Start: 2024-06-13

## 2024-06-13 RX ORDER — ONDANSETRON HYDROCHLORIDE 2 MG/ML
8 INJECTION, SOLUTION INTRAVENOUS ONCE
Status: CANCELLED | OUTPATIENT
Start: 2024-06-13

## 2024-06-13 RX ORDER — DEXAMETHASONE IN 0.9 % SOD CHL 20 MG/50ML
20 INTRAVENOUS SOLUTION, PIGGYBACK (ML) INTRAVENOUS ONCE
Status: COMPLETED | OUTPATIENT
Start: 2024-06-13 | End: 2024-06-13

## 2024-06-13 RX ORDER — HEPARIN SODIUM,PORCINE/PF 10 UNIT/ML
50 SYRINGE (ML) INTRAVENOUS AS NEEDED
OUTPATIENT
Start: 2024-06-13

## 2024-06-13 RX ORDER — EPINEPHRINE 0.3 MG/.3ML
0.3 INJECTION SUBCUTANEOUS EVERY 5 MIN PRN
Status: CANCELLED | OUTPATIENT
Start: 2024-06-13

## 2024-06-13 RX ORDER — FAMOTIDINE 10 MG/ML
20 INJECTION INTRAVENOUS ONCE AS NEEDED
Status: DISCONTINUED | OUTPATIENT
Start: 2024-06-13 | End: 2024-06-13 | Stop reason: HOSPADM

## 2024-06-13 RX ORDER — EPINEPHRINE 0.3 MG/.3ML
0.3 INJECTION SUBCUTANEOUS EVERY 5 MIN PRN
Status: DISCONTINUED | OUTPATIENT
Start: 2024-06-13 | End: 2024-06-13 | Stop reason: HOSPADM

## 2024-06-13 RX ORDER — PROCHLORPERAZINE MALEATE 10 MG
10 TABLET ORAL EVERY 6 HOURS PRN
Status: DISCONTINUED | OUTPATIENT
Start: 2024-06-13 | End: 2024-06-13 | Stop reason: HOSPADM

## 2024-06-13 RX ORDER — PROCHLORPERAZINE EDISYLATE 5 MG/ML
10 INJECTION INTRAMUSCULAR; INTRAVENOUS EVERY 6 HOURS PRN
Status: CANCELLED | OUTPATIENT
Start: 2024-06-13

## 2024-06-13 RX ORDER — FAMOTIDINE 10 MG/ML
20 INJECTION INTRAVENOUS ONCE AS NEEDED
Status: CANCELLED | OUTPATIENT
Start: 2024-06-13

## 2024-06-13 RX ORDER — PROCHLORPERAZINE EDISYLATE 5 MG/ML
10 INJECTION INTRAMUSCULAR; INTRAVENOUS EVERY 6 HOURS PRN
Status: DISCONTINUED | OUTPATIENT
Start: 2024-06-13 | End: 2024-06-13 | Stop reason: HOSPADM

## 2024-06-13 RX ORDER — ALBUTEROL SULFATE 0.83 MG/ML
3 SOLUTION RESPIRATORY (INHALATION) AS NEEDED
Status: DISCONTINUED | OUTPATIENT
Start: 2024-06-13 | End: 2024-06-13 | Stop reason: HOSPADM

## 2024-06-13 RX ORDER — ONDANSETRON HYDROCHLORIDE 2 MG/ML
8 INJECTION, SOLUTION INTRAVENOUS ONCE
Status: COMPLETED | OUTPATIENT
Start: 2024-06-13 | End: 2024-06-13

## 2024-06-13 RX ORDER — ALBUTEROL SULFATE 0.83 MG/ML
3 SOLUTION RESPIRATORY (INHALATION) AS NEEDED
Status: CANCELLED | OUTPATIENT
Start: 2024-06-13

## 2024-06-13 RX ORDER — HEPARIN 100 UNIT/ML
500 SYRINGE INTRAVENOUS AS NEEDED
Status: DISCONTINUED | OUTPATIENT
Start: 2024-06-13 | End: 2024-06-13 | Stop reason: HOSPADM

## 2024-06-13 RX ORDER — HEPARIN 100 UNIT/ML
500 SYRINGE INTRAVENOUS AS NEEDED
OUTPATIENT
Start: 2024-06-13

## 2024-06-13 RX ORDER — DIPHENHYDRAMINE HYDROCHLORIDE 50 MG/ML
50 INJECTION INTRAMUSCULAR; INTRAVENOUS AS NEEDED
Status: DISCONTINUED | OUTPATIENT
Start: 2024-06-13 | End: 2024-06-13 | Stop reason: HOSPADM

## 2024-06-13 ASSESSMENT — PAIN SCALES - GENERAL: PAINLEVEL: 0-NO PAIN

## 2024-06-13 NOTE — PROGRESS NOTES
Patient ID: Shelbi Delaney is a 64 y.o. female.  Referring Physician: No referring provider defined for this encounter.  Primary Care Provider: Sonja Uriarte MD    Subjective    Patient presents today for results discussion and evaluation for C#5 of Taxotere. Reports episode of vaginal bleeding after last treatment - small passage of clots; lasted a few days and resolved. Increased concentration of urine with nephrotomies in place; continues voiding via urethra without bleeding since R internalized stent. Reports increased red color to R nephrostomy output. Denies nausea/vomiting, fevers, chills, chest pain or shortness of breath. No increased redness/drainage at PCN sites. No recurrent facial rashes since last assessment. Having regular bowel movements no issues. Persistent numbness/tingling in hands and feet on Gabapentin 300mg TID (reduced by PCP in setting of renal insufficiency). No recent falls. No other concerns/complaints. Denies interim hospitalizations since last assessment.     A comprehensive review of systems was performed and otherwise negative.   - R +L PCN to be changed 6/18      Objective    BSA: 2.01 meters squared  /67 (BP Location: Left arm, Patient Position: Sitting, BP Cuff Size: Adult long)   Pulse 75   Temp 36 °C (96.8 °F) (Temporal)   Resp 18   Wt 86.4 kg (190 lb 9.4 oz)   SpO2 95%   BMI 30.70 kg/m²     Wt Readings from Last 3 Encounters:   05/23/24 88.9 kg (195 lb 14.1 oz)   05/02/24 92.5 kg (203 lb 14.8 oz)   04/24/24 89.8 kg (198 lb)      Physical Exam  Vitals and nursing note reviewed. Exam conducted with a chaperone present.   Constitutional:       General: She is not in acute distress.     Appearance: Normal appearance.   HENT:      Head: Normocephalic and atraumatic.      Mouth/Throat:      Mouth: Mucous membranes are moist.      Pharynx: No oropharyngeal exudate or posterior oropharyngeal erythema.   Eyes:      Extraocular Movements: Extraocular movements intact.       Conjunctiva/sclera: Conjunctivae normal.      Pupils: Pupils are equal, round, and reactive to light.   Neck:      Thyroid: No thyroid mass or thyromegaly.   Cardiovascular:      Rate and Rhythm: Normal rate and regular rhythm.      Pulses: Normal pulses.      Heart sounds: Normal heart sounds.   Pulmonary:      Effort: Pulmonary effort is normal.      Breath sounds: Normal breath sounds. No wheezing, rhonchi or rales.   Abdominal:      General: Bowel sounds are normal. There is no distension.      Palpations: Abdomen is soft. There is no mass.      Tenderness: There is no abdominal tenderness.      Hernia: No hernia is present.   Genitourinary:     Comments: Bilateral nephrostomies in place - clear urine in tubing  Musculoskeletal:         General: No tenderness. Normal range of motion.      Cervical back: Normal range of motion and neck supple. No tenderness.      Right lower leg: Edema present.      Left lower leg: Edema present.      Comments: Chronic BLE edema; compression dressings in place   Skin:     General: Skin is warm.      Findings: Rash present. No lesion.      Comments: Interval resolution of rash on face, neck, anterior chest, bilateral upper extremities, abdomen and flanks including bilateral nephrostomy sites - no purulent drainage/debris.   Neurological:      General: No focal deficit present.      Mental Status: She is alert and oriented to person, place, and time.   Psychiatric:         Mood and Affect: Mood normal.         Behavior: Behavior normal.       Results for orders placed or performed in visit on 06/11/24 (from the past 96 hour(s))   CBC and Auto Differential   Result Value Ref Range    WBC 7.9 4.4 - 11.3 x10*3/uL    nRBC 0.0 0.0 - 0.0 /100 WBCs    RBC 2.55 (L) 4.00 - 5.20 x10*6/uL    Hemoglobin 8.2 (L) 12.0 - 16.0 g/dL    Hematocrit 26.9 (L) 36.0 - 46.0 %     (H) 80 - 100 fL    MCH 32.2 26.0 - 34.0 pg    MCHC 30.5 (L) 32.0 - 36.0 g/dL    RDW 20.0 (H) 11.5 - 14.5 %    Platelets  393 150 - 450 x10*3/uL    Neutrophils % 71.4 40.0 - 80.0 %    Immature Granulocytes %, Automated 3.4 (H) 0.0 - 0.9 %    Lymphocytes % 14.2 13.0 - 44.0 %    Monocytes % 9.1 2.0 - 10.0 %    Eosinophils % 0.5 0.0 - 6.0 %    Basophils % 1.4 0.0 - 2.0 %    Neutrophils Absolute 5.65 1.20 - 7.70 x10*3/uL    Immature Granulocytes Absolute, Automated 0.27 0.00 - 0.70 x10*3/uL    Lymphocytes Absolute 1.12 (L) 1.20 - 4.80 x10*3/uL    Monocytes Absolute 0.72 0.10 - 1.00 x10*3/uL    Eosinophils Absolute 0.04 0.00 - 0.70 x10*3/uL    Basophils Absolute 0.11 (H) 0.00 - 0.10 x10*3/uL   Comprehensive metabolic panel   Result Value Ref Range    Glucose 87 74 - 99 mg/dL    Sodium 141 136 - 145 mmol/L    Potassium 5.0 3.5 - 5.3 mmol/L    Chloride 106 98 - 107 mmol/L    Bicarbonate 25 21 - 32 mmol/L    Anion Gap 15 10 - 20 mmol/L    Urea Nitrogen 24 (H) 6 - 23 mg/dL    Creatinine 2.03 (H) 0.50 - 1.05 mg/dL    eGFR 27 (L) >60 mL/min/1.73m*2    Calcium 8.2 (L) 8.6 - 10.6 mg/dL    Albumin 3.3 (L) 3.4 - 5.0 g/dL    Alkaline Phosphatase 71 33 - 136 U/L    Total Protein 6.1 (L) 6.4 - 8.2 g/dL    AST 17 9 - 39 U/L    Bilirubin, Total 0.4 0.0 - 1.2 mg/dL    ALT 6 (L) 7 - 45 U/L     Performance Status:  Symptomatic; fully ambulatory    Assessment/Plan   64yo with stage RONIT moderately differentiated SCC of cervix with stable disease on Docetaxel; obstructive uropathy with bilateral PCNs in place. Grade 2 mucositis improved and grade 2 CIPN; fluid retention. ECOG 1.   Oncology History Overview Note   Stage RONIT grade 2 squamous cell carcinoma of cervix  1/20/23: TURBT - poorly differentiated squamaous cell carcinoma; CPS10.  review with history of cervical mass consistent with GYN primary  - Extension to rectal mucosa, pubic ramus with ?reactive mediastinal LN   - Bilateral nephrostomy placement   2/16/23 - 6/1/23: Carbo AUC5/Taxol 175 +Avastin 15mg/kg, Pembrolizumab 200mg/m2  - C2-C4 +Avastin - further treatments held due to progressive gr2  proteinuria   - Grade 3 anemia declining transfusion,   7/20/23 - 12/18/23: Pembrolizumab 200mg/m2, progressive disease  1/11/24 - 3/14/24: Tivdak x3, progressive disease   - C1: 0.9mg/kg in setting of CrCl < 30    Molecular Testing  PLD1: CPS 10  1/2024 Tempus xT - PIK3CA p.E726K missense (gain): consider alpelisib  Loss of function: BCORL1, KMT2C, NSD1, KMT2D, RAD21  TMB 7.9mB - RACHELE  - Her2 pending 2/21/24     Cervical cancer, FIGO stage RONIT (Multi)   3/21/2022 Cancer Staged    Staging form: Cervix Uteri, AJCC Version 9, Clinical stage from 3/21/2022: FIGO Stage RONIT (cT4, cN2, cM0) - Signed by Adrianna Jefferson MD on 10/12/2023, Prognostic indicators: CPS 10     7/20/2023 - 11/30/2023 Chemotherapy    Pembrolizumab, 21 Day Cycles     1/12/2024 - 2/22/2024 Chemotherapy    Tisotumab vedotin, 21 Day Cycles     3/21/2024 -  Chemotherapy    DOCEtaxel, 21 Day Cycles     3/13/2025 - 3/13/2025 Chemotherapy    PACLitaxel / CARBOplatin, 21 Day Cycles - Gyn     3/13/2025 - 3/13/2025 Chemotherapy    PACLitaxel / CARBOplatin, 21 Day Cycles - Gyn        Diagnoses and all orders for this visit:  Encounter for antineoplastic chemotherapy and immunotherapy  Cervical cancer, FIGO stage RONIT (CMS/HCC)  - Stable disease since last assessment; improved lower extremity edema with supportive therapies  - Grade 2 mucositis resolved; grade 1 CIPN with dose reduction to 80mg/m2 starting at C2  - Follow up in 3 weeks/sooner PRN with CT after C6   Urinary tract infection associated with nephrostomy catheter, subsequent encounter  Obstructive uropathy  - S/p treatment for Pseudomonas UTI; continue to monitor for signs/symptoms of progressive infection due to increased risk of complex UTI  - Most recent PCN exchange 5/16; due for change 6/18  Lower extremity swelling  - Attributed to chemotherapy on Taxotere  - Continues to follow with local wound therapy and uniboot treatment     Adrianna Jefferson MD

## 2024-06-13 NOTE — PROGRESS NOTES
Pt here for docetaxel infusion, tolerated well. Discharged in stable condition, no further needs at this time. Has schedule for follow up.

## 2024-06-18 ENCOUNTER — HOSPITAL ENCOUNTER (OUTPATIENT)
Dept: CARDIOLOGY | Facility: HOSPITAL | Age: 64
Discharge: HOME | End: 2024-06-18
Payer: COMMERCIAL

## 2024-06-18 ENCOUNTER — APPOINTMENT (OUTPATIENT)
Dept: WOUND CARE | Facility: HOSPITAL | Age: 64
End: 2024-06-18
Payer: COMMERCIAL

## 2024-06-18 VITALS
TEMPERATURE: 98.4 F | HEART RATE: 63 BPM | OXYGEN SATURATION: 100 % | SYSTOLIC BLOOD PRESSURE: 129 MMHG | RESPIRATION RATE: 15 BRPM | DIASTOLIC BLOOD PRESSURE: 73 MMHG

## 2024-06-18 DIAGNOSIS — N13.9 URINARY TRACT OBSTRUCTION: ICD-10-CM

## 2024-06-18 LAB
POCT INTERNATIONAL NORMALIZATION RATIO: 1
POCT PROTHROMBIN TIME: 12.6 SECONDS

## 2024-06-18 PROCEDURE — 7100000009 HC PHASE TWO TIME - INITIAL BASE CHARGE

## 2024-06-18 PROCEDURE — C1769 GUIDE WIRE: HCPCS

## 2024-06-18 PROCEDURE — 99152 MOD SED SAME PHYS/QHP 5/>YRS: CPT | Performed by: RADIOLOGY

## 2024-06-18 PROCEDURE — 2720000007 HC OR 272 NO HCPCS

## 2024-06-18 PROCEDURE — 2780000003 HC OR 278 NO HCPCS

## 2024-06-18 PROCEDURE — 99153 MOD SED SAME PHYS/QHP EA: CPT | Performed by: RADIOLOGY

## 2024-06-18 PROCEDURE — 99152 MOD SED SAME PHYS/QHP 5/>YRS: CPT

## 2024-06-18 PROCEDURE — C1894 INTRO/SHEATH, NON-LASER: HCPCS

## 2024-06-18 PROCEDURE — 2550000001 HC RX 255 CONTRASTS: Performed by: RADIOLOGY

## 2024-06-18 PROCEDURE — 2500000005 HC RX 250 GENERAL PHARMACY W/O HCPCS: Performed by: RADIOLOGY

## 2024-06-18 PROCEDURE — C1729 CATH, DRAINAGE: HCPCS

## 2024-06-18 PROCEDURE — 50387 CHANGE NEPHROURETERAL CATH: CPT

## 2024-06-18 PROCEDURE — 50432 PLMT NEPHROSTOMY CATHETER: CPT

## 2024-06-18 PROCEDURE — 7100000010 HC PHASE TWO TIME - EACH INCREMENTAL 1 MINUTE

## 2024-06-18 PROCEDURE — 99153 MOD SED SAME PHYS/QHP EA: CPT

## 2024-06-18 PROCEDURE — C2625 STENT, NON-COR, TEM W/DEL SY: HCPCS

## 2024-06-18 PROCEDURE — 2500000004 HC RX 250 GENERAL PHARMACY W/ HCPCS (ALT 636 FOR OP/ED): Performed by: RADIOLOGY

## 2024-06-18 RX ORDER — DEXTROSE MONOHYDRATE AND SODIUM CHLORIDE 5; .45 G/100ML; G/100ML
50 INJECTION, SOLUTION INTRAVENOUS CONTINUOUS
Status: DISCONTINUED | OUTPATIENT
Start: 2024-06-18 | End: 2024-06-19 | Stop reason: HOSPADM

## 2024-06-18 RX ORDER — FENTANYL CITRATE 50 UG/ML
INJECTION, SOLUTION INTRAMUSCULAR; INTRAVENOUS AS NEEDED
Status: DISCONTINUED | OUTPATIENT
Start: 2024-06-18 | End: 2024-06-18 | Stop reason: HOSPADM

## 2024-06-18 RX ORDER — LIDOCAINE HYDROCHLORIDE 10 MG/ML
INJECTION, SOLUTION EPIDURAL; INFILTRATION; INTRACAUDAL; PERINEURAL AS NEEDED
Status: DISCONTINUED | OUTPATIENT
Start: 2024-06-18 | End: 2024-06-18 | Stop reason: HOSPADM

## 2024-06-18 RX ORDER — MIDAZOLAM HYDROCHLORIDE 1 MG/ML
INJECTION, SOLUTION INTRAMUSCULAR; INTRAVENOUS AS NEEDED
Status: DISCONTINUED | OUTPATIENT
Start: 2024-06-18 | End: 2024-06-18 | Stop reason: HOSPADM

## 2024-06-18 RX ORDER — IODIXANOL 320 MG/ML
INJECTION, SOLUTION INTRAVASCULAR AS NEEDED
Status: DISCONTINUED | OUTPATIENT
Start: 2024-06-18 | End: 2024-06-18 | Stop reason: HOSPADM

## 2024-06-18 ASSESSMENT — PAIN SCALES - GENERAL
PAINLEVEL_OUTOF10: 0 - NO PAIN

## 2024-06-18 ASSESSMENT — PAIN - FUNCTIONAL ASSESSMENT
PAIN_FUNCTIONAL_ASSESSMENT: 0-10
PAIN_FUNCTIONAL_ASSESSMENT: 0-10

## 2024-06-18 ASSESSMENT — ENCOUNTER SYMPTOMS
DEPRESSION: 0
OCCASIONAL FEELINGS OF UNSTEADINESS: 1
LOSS OF SENSATION IN FEET: 0

## 2024-06-18 NOTE — PRE-PROCEDURE NOTE
Interventional Radiology Preprocedure Note    Indication for procedure: The encounter diagnosis was Urinary tract obstruction.    Relevant review of systems: NA    Relevant Labs:   Lab Results   Component Value Date    CREATININE 2.03 (H) 06/11/2024    EGFR 27 (L) 06/11/2024    INR 1.0 06/18/2024    PROTIME 12.6 06/18/2024       Planned Sedation/Anesthesia: Moderate    Airway assessment: normal    Directed physical examination:    Aox3  No increased work of breathing.   No acute distress      Mallampati: II (hard and soft palate, upper portion of tonsils and uvula visible)    ASA Score: ASA 2 - Patient with mild systemic disease with no functional limitations    Benefits, risks and alternatives of procedure and planned sedation have been discussed with the patient and/or their representative. All questions answered and they agree to proceed.

## 2024-06-18 NOTE — PROCEDURES
Interventional Radiology Brief Postprocedure Note    Attending: Kamini Roberson MD    Assistant:   Staff Role   Joni Luciano, RN Circulator   Omar Mott, RT Documenter   Kamini Roberson MD Radiologist   Acacia Hernandez, RT Scrub Person       Diagnosis:   1. Urinary tract obstruction  IR  stent exchange    IR  stent exchange    IR  nephroureteral placement    IR  nephroureteral placement          Description of procedure: IR  stent exchange  IR  nephroureteral placementright PCNU exchange (26 cm 8Fr). Left PCN 8 Fr exchange. High grade obstruction distal ureter, patient had significant pain with contrast injetion/distension, decision made to only exchange PCN rather than convert to PCNU    Timeout:  Yes    Procedure Area: Procedure Area     Anesthesia:   Conscious Sedation    Complications: None    Estimated Blood Loss: none    Medications (Filter: Administrations occurring from 0845 to 0940 on 06/18/24) As of 06/18/24 0940      oxygen (O2) therapy (L/min) Total volume:  Not documented*   *Total volume has not been documented. View each administration to see the amount administered.     Date/Time Rate/Dose/Volume Action       06/18/24  0854 3 L/min - 180,000 mL/hr New Bag               midazolam (Versed) injection (mg) Total dose:  1 mg      Date/Time Rate/Dose/Volume Action       06/18/24  0918 1 mg Given               fentaNYL PF (Sublimaze) injection (mcg) Total dose:  50 mcg      Date/Time Rate/Dose/Volume Action       06/18/24  0918 50 mcg Given               lidocaine PF (Xylocaine) 10 mg/mL (1 %) injection (mL) Total volume:  4 mL      Date/Time Rate/Dose/Volume Action       06/18/24  0920 2 mL Given      0927 2 mL Given                   No specimens collected      See detailed result report with images in PACS.    The patient tolerated the procedure well without incident or complication and is in stable condition.

## 2024-06-18 NOTE — Clinical Note
Patient Clipped and Prepped: bilateral flank. Prepped with ChloraPrep, a minimum of 3 minute dry time, longer if needed, no pooling noted, patient draped in sterile fashion.

## 2024-06-19 ENCOUNTER — TELEPHONE (OUTPATIENT)
Dept: GYNECOLOGIC ONCOLOGY | Facility: HOSPITAL | Age: 64
End: 2024-06-19
Payer: COMMERCIAL

## 2024-06-19 ASSESSMENT — PAIN SCALES - GENERAL
PAINLEVEL_OUTOF10: 0 - NO PAIN
PAINLEVEL_OUTOF10: 0 - NO PAIN

## 2024-06-19 NOTE — TELEPHONE ENCOUNTER
Patient's  reported that patient had pain after this recent neph tube exchange. She was put under twilight anesthesia but could feel pain during the procedure. He is asking if she can be put to sleep. Discussed that he will need to request this from the doctor doing the procedure. He reports patient felt light headed today. He gave her an iron pill and a protein shake. Reinforced the protein shake is good to take and to increase her fluid intake such as gatorade, powerade, propel, popsicles, jello, soup, etc. She is having acid reflux and discussed that she can take pepcid over the counter.

## 2024-06-21 DIAGNOSIS — M48.062 LUMBAR STENOSIS WITH NEUROGENIC CLAUDICATION: ICD-10-CM

## 2024-06-21 DIAGNOSIS — Z79.891 ENCOUNTER FOR LONG-TERM USE OF OPIATE ANALGESIC: ICD-10-CM

## 2024-06-21 DIAGNOSIS — C53.9 CERVICAL CANCER, FIGO STAGE IVA (MULTI): ICD-10-CM

## 2024-06-21 DIAGNOSIS — G89.3 CANCER RELATED PAIN: ICD-10-CM

## 2024-06-21 RX ORDER — GABAPENTIN 300 MG/1
900 CAPSULE ORAL 3 TIMES DAILY
Qty: 270 CAPSULE | Refills: 0 | OUTPATIENT
Start: 2024-06-21

## 2024-06-25 DIAGNOSIS — C53.9 CERVICAL CANCER, FIGO STAGE IVA (MULTI): Primary | ICD-10-CM

## 2024-06-27 ENCOUNTER — TELEMEDICINE (OUTPATIENT)
Dept: GYNECOLOGIC ONCOLOGY | Facility: CLINIC | Age: 64
End: 2024-06-27
Payer: COMMERCIAL

## 2024-06-27 DIAGNOSIS — N13.1 ACQUIRED HYDRONEPHROSIS DUE TO OBSTRUCTION OF URETER: ICD-10-CM

## 2024-06-27 DIAGNOSIS — Z51.11 ENCOUNTER FOR ANTINEOPLASTIC CHEMOTHERAPY AND IMMUNOTHERAPY: ICD-10-CM

## 2024-06-27 DIAGNOSIS — Z51.12 ENCOUNTER FOR ANTINEOPLASTIC CHEMOTHERAPY AND IMMUNOTHERAPY: ICD-10-CM

## 2024-06-27 DIAGNOSIS — C53.9 CERVICAL CANCER, FIGO STAGE IVA (MULTI): Primary | ICD-10-CM

## 2024-06-27 PROCEDURE — 1036F TOBACCO NON-USER: CPT | Performed by: STUDENT IN AN ORGANIZED HEALTH CARE EDUCATION/TRAINING PROGRAM

## 2024-06-27 PROCEDURE — 99442 PR PHYS/QHP TELEPHONE EVALUATION 11-20 MIN: CPT | Performed by: STUDENT IN AN ORGANIZED HEALTH CARE EDUCATION/TRAINING PROGRAM

## 2024-06-27 NOTE — PROGRESS NOTES
Patient ID: Shelbi Delaney is a 64 y.o. female.  Referring Physician: No referring provider defined for this encounter.  Primary Care Provider: Sonja Uriarte MD    Subjective    Patient presents today for results discussion and evaluation for C#6 of Taxotere. Reports overall doing well since last assessment with minimal interval changes - no further episodes of vaginal bleeding with stable thin pink discharge. Nephrostomy site without issues/concerns. No recurrent facial rashes since last assessment. Having regular bowel movements no issues. Persistent numbness/tingling in hands and feet on Gabapentin 300mg TID. No recent falls. No other concerns/complaints. Denies interim hospitalizations since last assessment.     A comprehensive review of systems was performed and otherwise negative.   - R +L PCN to be changed 6/18      Objective    BSA: There is no height or weight on file to calculate BSA.  There were no vitals taken for this visit.    Wt Readings from Last 3 Encounters:   06/13/24 86.4 kg (190 lb 9.4 oz)   05/23/24 88.9 kg (195 lb 14.1 oz)   05/02/24 92.5 kg (203 lb 14.8 oz)     General:   alert and oriented, in no acute distress   Cardiovascular: no apparent distress    Lungs: no increased work of breathing   Neurologic:  grossly intact, no deficits     No results found for this or any previous visit (from the past 96 hour(s)).    Performance Status:  Symptomatic; fully ambulatory    Assessment/Plan   64yo with stage RONIT moderately differentiated SCC of cervix with stable disease on Docetaxel; obstructive uropathy with PCNs in place. Grade 2 mucositis improved and grade 2 CIPN; fluid retention. ECOG 1.   Oncology History Overview Note   Stage RONIT grade 2 squamous cell carcinoma of cervix  1/20/23: TURBT - poorly differentiated squamaous cell carcinoma; CPS10.  review with history of cervical mass consistent with GYN primary  - Extension to rectal mucosa, pubic ramus with ?reactive mediastinal LN   -  Bilateral nephrostomy placement   2/16/23 - 6/1/23: Carbo AUC5/Taxol 175 +Avastin 15mg/kg, Pembrolizumab 200mg/m2  - C2-C4 +Avastin - further treatments held due to progressive gr2 proteinuria   - Grade 3 anemia declining transfusion,   7/20/23 - 12/18/23: Pembrolizumab 200mg/m2, progressive disease  1/11/24 - 3/14/24: Tivdak x3, progressive disease   - C1: 0.9mg/kg in setting of CrCl < 30    Molecular Testing  PLD1: CPS 10  1/2024 Tempus xT - PIK3CA p.[id number] missense (gain): consider alpelisib  Loss of function: BCORL1, KMT2C, NSD1, KMT2D, RAD21  TMB 7.9mB - RACHELE  - Her2 pending 2/21/24     Cervical cancer, FIGO stage RONIT (Multi)   3/21/2022 Cancer Staged    Staging form: Cervix Uteri, AJCC Version 9, Clinical stage from 3/21/2022: FIGO Stage RONIT (cT4, cN2, cM0) - Signed by Adrianna Jefferson MD on 10/12/2023, Prognostic indicators: CPS 10     7/20/2023 - 11/30/2023 Chemotherapy    Pembrolizumab, 21 Day Cycles     1/12/2024 - 2/22/2024 Chemotherapy    Tisotumab vedotin, 21 Day Cycles     3/21/2024 -  Chemotherapy    DOCEtaxel, 21 Day Cycles     3/13/2025 - 3/13/2025 Chemotherapy    PACLitaxel / CARBOplatin, 21 Day Cycles - Gyn     3/13/2025 - 3/13/2025 Chemotherapy    PACLitaxel / CARBOplatin, 21 Day Cycles - Gyn        Diagnoses and all orders for this visit:  Encounter for antineoplastic chemotherapy and immunotherapy  Cervical cancer, FIGO stage RONIT (CMS/HCC)  - Stable disease since last assessment; improved lower extremity edema with supportive therapies  - Grade 2 mucositis resolved; grade 1 CIPN with dose reduction to 80mg/m2 starting at C2  - Follow up in 3 weeks/sooner PRN with CT planned C6   Urinary tract infection associated with nephrostomy catheter, subsequent encounter  Obstructive uropathy  - S/p treatment for Pseudomonas UTI; continue to monitor for signs/symptoms of progressive infection due to increased risk of complex UTI  - Most recent PCN exchange 6/18 (left) - unable to internalize   Lower extremity  swelling  - Attributed to chemotherapy on Taxotere  - Continues to follow with local wound therapy and uniboot treatment     Approximately 11 min spent in discussion with patient and coordination of care.     Adrianna Jefferson MD

## 2024-07-02 ENCOUNTER — LAB (OUTPATIENT)
Dept: LAB | Facility: LAB | Age: 64
End: 2024-07-02
Payer: COMMERCIAL

## 2024-07-02 DIAGNOSIS — C53.9 CERVICAL CANCER, FIGO STAGE IVA (MULTI): ICD-10-CM

## 2024-07-02 LAB
ALBUMIN SERPL BCP-MCNC: 3.5 G/DL (ref 3.4–5)
ALP SERPL-CCNC: 57 U/L (ref 33–136)
ALT SERPL W P-5'-P-CCNC: 7 U/L (ref 7–45)
ANION GAP SERPL CALC-SCNC: 14 MMOL/L (ref 10–20)
AST SERPL W P-5'-P-CCNC: 25 U/L (ref 9–39)
BASOPHILS # BLD AUTO: 0.13 X10*3/UL (ref 0–0.1)
BASOPHILS NFR BLD AUTO: 1.4 %
BILIRUB SERPL-MCNC: 0.3 MG/DL (ref 0–1.2)
BUN SERPL-MCNC: 28 MG/DL (ref 6–23)
CALCIUM SERPL-MCNC: 8.9 MG/DL (ref 8.6–10.6)
CHLORIDE SERPL-SCNC: 103 MMOL/L (ref 98–107)
CO2 SERPL-SCNC: 26 MMOL/L (ref 21–32)
CREAT SERPL-MCNC: 2.26 MG/DL (ref 0.5–1.05)
EGFRCR SERPLBLD CKD-EPI 2021: 24 ML/MIN/1.73M*2
EOSINOPHIL # BLD AUTO: 0.07 X10*3/UL (ref 0–0.7)
EOSINOPHIL NFR BLD AUTO: 0.8 %
ERYTHROCYTE [DISTWIDTH] IN BLOOD BY AUTOMATED COUNT: 19.5 % (ref 11.5–14.5)
GLUCOSE SERPL-MCNC: 82 MG/DL (ref 74–99)
HCT VFR BLD AUTO: 30 % (ref 36–46)
HGB BLD-MCNC: 9 G/DL (ref 12–16)
IMM GRANULOCYTES # BLD AUTO: 0.13 X10*3/UL (ref 0–0.7)
IMM GRANULOCYTES NFR BLD AUTO: 1.4 % (ref 0–0.9)
LYMPHOCYTES # BLD AUTO: 0.86 X10*3/UL (ref 1.2–4.8)
LYMPHOCYTES NFR BLD AUTO: 9.4 %
MCH RBC QN AUTO: 32.3 PG (ref 26–34)
MCHC RBC AUTO-ENTMCNC: 30 G/DL (ref 32–36)
MCV RBC AUTO: 108 FL (ref 80–100)
MONOCYTES # BLD AUTO: 1.01 X10*3/UL (ref 0.1–1)
MONOCYTES NFR BLD AUTO: 11.1 %
NEUTROPHILS # BLD AUTO: 6.91 X10*3/UL (ref 1.2–7.7)
NEUTROPHILS NFR BLD AUTO: 75.9 %
NRBC BLD-RTO: 0 /100 WBCS (ref 0–0)
PLATELET # BLD AUTO: 371 X10*3/UL (ref 150–450)
POTASSIUM SERPL-SCNC: 5.1 MMOL/L (ref 3.5–5.3)
PROT SERPL-MCNC: 6.3 G/DL (ref 6.4–8.2)
RBC # BLD AUTO: 2.79 X10*6/UL (ref 4–5.2)
SODIUM SERPL-SCNC: 138 MMOL/L (ref 136–145)
WBC # BLD AUTO: 9.1 X10*3/UL (ref 4.4–11.3)

## 2024-07-02 PROCEDURE — 85025 COMPLETE CBC W/AUTO DIFF WBC: CPT

## 2024-07-02 PROCEDURE — 80053 COMPREHEN METABOLIC PANEL: CPT

## 2024-07-03 DIAGNOSIS — Z79.891 ENCOUNTER FOR LONG-TERM USE OF OPIATE ANALGESIC: ICD-10-CM

## 2024-07-03 DIAGNOSIS — C53.9 CERVICAL CANCER, FIGO STAGE IVA (MULTI): ICD-10-CM

## 2024-07-03 DIAGNOSIS — M48.062 LUMBAR STENOSIS WITH NEUROGENIC CLAUDICATION: ICD-10-CM

## 2024-07-03 DIAGNOSIS — G89.3 CANCER RELATED PAIN: ICD-10-CM

## 2024-07-03 RX ORDER — ALBUTEROL SULFATE 0.83 MG/ML
3 SOLUTION RESPIRATORY (INHALATION) AS NEEDED
Status: CANCELLED | OUTPATIENT
Start: 2024-07-04

## 2024-07-03 RX ORDER — DIPHENHYDRAMINE HYDROCHLORIDE 50 MG/ML
50 INJECTION INTRAMUSCULAR; INTRAVENOUS AS NEEDED
Status: CANCELLED | OUTPATIENT
Start: 2024-07-04

## 2024-07-03 RX ORDER — ONDANSETRON HYDROCHLORIDE 2 MG/ML
8 INJECTION, SOLUTION INTRAVENOUS ONCE
Status: CANCELLED | OUTPATIENT
Start: 2024-07-04

## 2024-07-03 RX ORDER — PROCHLORPERAZINE MALEATE 10 MG
10 TABLET ORAL EVERY 6 HOURS PRN
Status: CANCELLED | OUTPATIENT
Start: 2024-07-04

## 2024-07-03 RX ORDER — FAMOTIDINE 10 MG/ML
20 INJECTION INTRAVENOUS ONCE AS NEEDED
Status: CANCELLED | OUTPATIENT
Start: 2024-07-04

## 2024-07-03 RX ORDER — EPINEPHRINE 0.3 MG/.3ML
0.3 INJECTION SUBCUTANEOUS EVERY 5 MIN PRN
Status: CANCELLED | OUTPATIENT
Start: 2024-07-04

## 2024-07-03 RX ORDER — GABAPENTIN 300 MG/1
900 CAPSULE ORAL 3 TIMES DAILY
Qty: 270 CAPSULE | Refills: 0 | OUTPATIENT
Start: 2024-07-03

## 2024-07-03 RX ORDER — PROCHLORPERAZINE EDISYLATE 5 MG/ML
10 INJECTION INTRAMUSCULAR; INTRAVENOUS EVERY 6 HOURS PRN
Status: CANCELLED | OUTPATIENT
Start: 2024-07-04

## 2024-07-05 ENCOUNTER — INFUSION (OUTPATIENT)
Dept: HEMATOLOGY/ONCOLOGY | Facility: CLINIC | Age: 64
End: 2024-07-05
Payer: COMMERCIAL

## 2024-07-05 VITALS
SYSTOLIC BLOOD PRESSURE: 133 MMHG | WEIGHT: 189.93 LBS | HEART RATE: 88 BPM | BODY MASS INDEX: 30.6 KG/M2 | RESPIRATION RATE: 18 BRPM | OXYGEN SATURATION: 95 % | DIASTOLIC BLOOD PRESSURE: 79 MMHG | TEMPERATURE: 96.4 F

## 2024-07-05 DIAGNOSIS — C53.9 CERVICAL CANCER, FIGO STAGE IVA (MULTI): ICD-10-CM

## 2024-07-05 PROCEDURE — 2500000004 HC RX 250 GENERAL PHARMACY W/ HCPCS (ALT 636 FOR OP/ED): Performed by: STUDENT IN AN ORGANIZED HEALTH CARE EDUCATION/TRAINING PROGRAM

## 2024-07-05 PROCEDURE — 96413 CHEMO IV INFUSION 1 HR: CPT

## 2024-07-05 PROCEDURE — 96375 TX/PRO/DX INJ NEW DRUG ADDON: CPT | Mod: INF

## 2024-07-05 RX ORDER — PROCHLORPERAZINE MALEATE 10 MG
10 TABLET ORAL EVERY 6 HOURS PRN
Status: DISCONTINUED | OUTPATIENT
Start: 2024-07-05 | End: 2024-07-05 | Stop reason: HOSPADM

## 2024-07-05 RX ORDER — HEPARIN SODIUM,PORCINE/PF 10 UNIT/ML
50 SYRINGE (ML) INTRAVENOUS AS NEEDED
Status: DISCONTINUED | OUTPATIENT
Start: 2024-07-05 | End: 2024-07-05 | Stop reason: HOSPADM

## 2024-07-05 RX ORDER — HEPARIN SODIUM,PORCINE/PF 10 UNIT/ML
50 SYRINGE (ML) INTRAVENOUS AS NEEDED
OUTPATIENT
Start: 2024-07-05

## 2024-07-05 RX ORDER — FAMOTIDINE 10 MG/ML
20 INJECTION INTRAVENOUS ONCE AS NEEDED
Status: DISCONTINUED | OUTPATIENT
Start: 2024-07-05 | End: 2024-07-05 | Stop reason: HOSPADM

## 2024-07-05 RX ORDER — ONDANSETRON HYDROCHLORIDE 2 MG/ML
8 INJECTION, SOLUTION INTRAVENOUS ONCE
Status: COMPLETED | OUTPATIENT
Start: 2024-07-05 | End: 2024-07-05

## 2024-07-05 RX ORDER — ALBUTEROL SULFATE 0.83 MG/ML
3 SOLUTION RESPIRATORY (INHALATION) AS NEEDED
Status: DISCONTINUED | OUTPATIENT
Start: 2024-07-05 | End: 2024-07-05 | Stop reason: HOSPADM

## 2024-07-05 RX ORDER — DIPHENHYDRAMINE HYDROCHLORIDE 50 MG/ML
50 INJECTION INTRAMUSCULAR; INTRAVENOUS AS NEEDED
Status: DISCONTINUED | OUTPATIENT
Start: 2024-07-05 | End: 2024-07-05 | Stop reason: HOSPADM

## 2024-07-05 RX ORDER — PROCHLORPERAZINE EDISYLATE 5 MG/ML
10 INJECTION INTRAMUSCULAR; INTRAVENOUS EVERY 6 HOURS PRN
Status: DISCONTINUED | OUTPATIENT
Start: 2024-07-05 | End: 2024-07-05 | Stop reason: HOSPADM

## 2024-07-05 RX ORDER — HEPARIN 100 UNIT/ML
500 SYRINGE INTRAVENOUS AS NEEDED
Status: DISCONTINUED | OUTPATIENT
Start: 2024-07-05 | End: 2024-07-05 | Stop reason: HOSPADM

## 2024-07-05 RX ORDER — EPINEPHRINE 0.3 MG/.3ML
0.3 INJECTION SUBCUTANEOUS EVERY 5 MIN PRN
Status: DISCONTINUED | OUTPATIENT
Start: 2024-07-05 | End: 2024-07-05 | Stop reason: HOSPADM

## 2024-07-05 RX ORDER — DEXAMETHASONE IN 0.9 % SOD CHL 20 MG/50ML
20 INTRAVENOUS SOLUTION, PIGGYBACK (ML) INTRAVENOUS ONCE
Status: COMPLETED | OUTPATIENT
Start: 2024-07-05 | End: 2024-07-05

## 2024-07-05 RX ORDER — HEPARIN 100 UNIT/ML
500 SYRINGE INTRAVENOUS AS NEEDED
OUTPATIENT
Start: 2024-07-05

## 2024-07-05 ASSESSMENT — PAIN SCALES - GENERAL: PAINLEVEL: 0-NO PAIN

## 2024-07-10 DIAGNOSIS — C53.9 CERVICAL CANCER, FIGO STAGE IVB (MULTI): ICD-10-CM

## 2024-07-10 DIAGNOSIS — Z79.891 ENCOUNTER FOR LONG-TERM USE OF OPIATE ANALGESIC: ICD-10-CM

## 2024-07-10 RX ORDER — OXYCODONE HYDROCHLORIDE 5 MG/1
5 TABLET ORAL 3 TIMES DAILY
Qty: 90 TABLET | Refills: 0 | Status: SHIPPED | OUTPATIENT
Start: 2024-07-10

## 2024-07-10 NOTE — TELEPHONE ENCOUNTER
Patient requesting oxycodone refill, OARRS reviewed, no issues. CSA/UDS up to date. Last fill 6/11/24. Called and left voicemail for patient re: reminded her that we need to see her for a follow up before next refill. Advised her to see if one of her oncology physicians could take over her oxycodone rx since she sees them often. Provided her with appointment and nurse phone #'s to schedule follow up.

## 2024-07-11 ENCOUNTER — HOSPITAL ENCOUNTER (EMERGENCY)
Facility: HOSPITAL | Age: 64
Discharge: HOME | End: 2024-07-11
Payer: COMMERCIAL

## 2024-07-11 ENCOUNTER — APPOINTMENT (OUTPATIENT)
Dept: CARDIOLOGY | Facility: HOSPITAL | Age: 64
End: 2024-07-11
Payer: COMMERCIAL

## 2024-07-11 VITALS
OXYGEN SATURATION: 100 % | HEIGHT: 66 IN | WEIGHT: 185 LBS | TEMPERATURE: 98.4 F | HEART RATE: 82 BPM | SYSTOLIC BLOOD PRESSURE: 144 MMHG | RESPIRATION RATE: 15 BRPM | BODY MASS INDEX: 29.73 KG/M2 | DIASTOLIC BLOOD PRESSURE: 87 MMHG

## 2024-07-11 DIAGNOSIS — T83.022A NEPHROSTOMY TUBE DISPLACED (CMS-HCC): Primary | ICD-10-CM

## 2024-07-11 PROCEDURE — C2625 STENT, NON-COR, TEM W/DEL SY: HCPCS

## 2024-07-11 PROCEDURE — 2500000005 HC RX 250 GENERAL PHARMACY W/O HCPCS: Performed by: RADIOLOGY

## 2024-07-11 PROCEDURE — 50387 CHANGE NEPHROURETERAL CATH: CPT | Performed by: RADIOLOGY

## 2024-07-11 PROCEDURE — 2780000003 HC OR 278 NO HCPCS

## 2024-07-11 PROCEDURE — 99284 EMERGENCY DEPT VISIT MOD MDM: CPT | Mod: 25

## 2024-07-11 PROCEDURE — 2550000001 HC RX 255 CONTRASTS: Performed by: RADIOLOGY

## 2024-07-11 PROCEDURE — 2720000007 HC OR 272 NO HCPCS

## 2024-07-11 PROCEDURE — C1769 GUIDE WIRE: HCPCS

## 2024-07-11 PROCEDURE — 50387 CHANGE NEPHROURETERAL CATH: CPT

## 2024-07-11 RX ORDER — LIDOCAINE HYDROCHLORIDE 10 MG/ML
INJECTION, SOLUTION EPIDURAL; INFILTRATION; INTRACAUDAL; PERINEURAL AS NEEDED
Status: DISCONTINUED | OUTPATIENT
Start: 2024-07-11 | End: 2024-07-11 | Stop reason: HOSPADM

## 2024-07-11 RX ORDER — IODIXANOL 320 MG/ML
INJECTION, SOLUTION INTRAVASCULAR AS NEEDED
Status: DISCONTINUED | OUTPATIENT
Start: 2024-07-11 | End: 2024-07-11 | Stop reason: HOSPADM

## 2024-07-11 RX ADMIN — LIDOCAINE HYDROCHLORIDE 5 ML: 10 INJECTION, SOLUTION EPIDURAL; INFILTRATION; INTRACAUDAL; PERINEURAL at 14:26

## 2024-07-11 RX ADMIN — IODIXANOL 5 ML: 320 INJECTION, SOLUTION INTRAVASCULAR at 14:50

## 2024-07-11 ASSESSMENT — LIFESTYLE VARIABLES
EVER FELT BAD OR GUILTY ABOUT YOUR DRINKING: NO
HAVE YOU EVER FELT YOU SHOULD CUT DOWN ON YOUR DRINKING: NO
EVER HAD A DRINK FIRST THING IN THE MORNING TO STEADY YOUR NERVES TO GET RID OF A HANGOVER: NO
TOTAL SCORE: 0
HAVE PEOPLE ANNOYED YOU BY CRITICIZING YOUR DRINKING: NO

## 2024-07-11 ASSESSMENT — PAIN DESCRIPTION - PAIN TYPE: TYPE: ACUTE PAIN

## 2024-07-11 ASSESSMENT — PAIN - FUNCTIONAL ASSESSMENT
PAIN_FUNCTIONAL_ASSESSMENT: 0-10
PAIN_FUNCTIONAL_ASSESSMENT: 0-10

## 2024-07-11 ASSESSMENT — PAIN SCALES - GENERAL
PAINLEVEL_OUTOF10: 5 - MODERATE PAIN
PAINLEVEL_OUTOF10: 6

## 2024-07-11 NOTE — DISCHARGE INSTRUCTIONS
Follow-up closely with your primary care provider in the following week.  Follow-up closely with your oncologist interventional radiology as needed.

## 2024-07-11 NOTE — ED PROVIDER NOTES
HPI   Chief Complaint   Patient presents with    tube change     Presents to ED for a nephrostomy tube change.  States around 1100 this am, the tube got caught on a drawer and pulled out.         Patient has a chronic nephrostomy tube placed by patient is a 64-year-old female presents emergency department for evaluation of dislodged nephrostomy tube.  Patient has history of cervical cancer currently undergoing chemotherapy and multiple treatments with hematology oncology.  Patient has chronic nephrostomy tube placed by interventional radiology and has had multiple exchanges.  She states that around 11 AM this morning the tubing got caught on a door and the tube came somewhat dislodged.  She has no significant pain at this time, but the tube has been slightly pulled out.  She feels relatively well otherwise and has no other complaints.      History provided by:  Patient   used: No                        [employer] Coma Scale Score: 15                     Patient History   Past Medical History:   Diagnosis Date    Abdominal distension (gaseous) 11/11/2022    Abdominal bloating    Anxiety     Body mass index (BMI)30.0-30.9, adult 08/07/2019    BMI 30.0-30.9,adult    Cervix cancer (Multi)     Chemotherapy-induced peripheral neuropathy (Multi)     Chronic kidney disease     Encounter for screening for malignant neoplasm of colon     Encounter for screening colonoscopy    Hypertension     Hypothyroidism     Personal history of other specified conditions     History of snoring    Tachycardia, unspecified     Tachycardia     Past Surgical History:   Procedure Laterality Date    CT GUIDED IMAGING FOR NEEDLE PLACEMENT  01/12/2023    CT GUIDED IMAGING FOR NEEDLE PLACEMENT [facility]    IR  NEPHROURETERAL PLACEMENT Bilateral 6/18/2024    IR  NEPHROURETERAL PLACEMENT 6/18/2024 [facility]    IR NEPHROSTOMY TUBE EXCHANGE  10/20/2023    IR NEPHROSTOMY TUBE EXCHANGE 10/20/2023 MD [facility] Berman  [facility]    IR NEPHROSTOMY TUBE EXCHANGE  2023    IR NEPHROSTOMY TUBE EXCHANGE 2023 [facility]    [facility]      NEPHROSTOMY      ORIF TIBIA FRACTURE Right     TRANSURETHRAL RESECTION OF BLADDER TUMOR      US GUIDED PERCUTANEOUS PLACEMENT  2022    US GUIDED PERCUTANEOUS PLACEMENT [facility]    US GUIDED PERCUTANEOUS PLACEMENT  2023    US GUIDED PERCUTANEOUS PLACEMENT [facility]     Family History   Problem Relation Name Age of Onset    No Known Problems Mother      Other (Acute myocardial infarction) Father       Social History     Tobacco Use    Smoking status: Former     Current packs/day: 0.00     Average packs/day: 0.2 packs/day for 42.9 years (10.7 ttl pk-yrs)     Types: Cigarettes     Start date:      Quit date: 2019     Years since quittin.6     Passive exposure: Past    Smokeless tobacco: Never   Vaping Use    Vaping status: Never Used   Substance Use Topics    Alcohol use: Not Currently     Alcohol/week: 8.0 standard drinks of alcohol     Types: 4 Shots of liquor, 4 Standard drinks or equivalent per week     Comment: quit 2019    Drug use: Never       Physical Exam   ED Triage Vitals [24 1257]   Temperature Heart Rate Respirations BP   36.9 °C (98.4 °F) 81 18 120/74      Pulse Ox Temp Source Heart Rate Source Patient Position   98 % Temporal Monitor --      BP Location FiO2 (%)     -- --       Physical Exam  Constitutional:       Appearance: Normal appearance.   Cardiovascular:      Rate and Rhythm: Normal rate and regular rhythm.   Pulmonary:      Effort: Pulmonary effort is normal.      Breath sounds: Normal breath sounds.   Abdominal:      General: Abdomen is flat.      Palpations: Abdomen is soft.      Tenderness: There is no abdominal tenderness.      Comments: Nephrostomy tube in place with no significant surrounding tenderness to palpation with evidence of partial dislodgment.   Musculoskeletal:         General: Normal range of  motion.   Skin:     General: Skin is warm and dry.   Neurological:      General: No focal deficit present.      Mental Status: She is alert and oriented to person, place, and time.         ED Course & MDM   Diagnoses as of 07/11/24 2246   Nephrostomy tube displaced (CMS-[facility])       Medical Decision Making  Patient is a 64-year-old female presents emergency department for dislodged nephrostomy tube.    Lab work and scans not done at today's visit.    Medications not given at today's visit    I saw this patient independently.  I received call from patient's interventional radiologist Dr. Holloway is very familiar with patient and has done multiple nephrostomy tube exchanges.  He request that patient be immediately taken to IR suite for placement of nephrostomy tube.  Patient went to interventional radiology and had exchange of her nephrostomy tube and confirmation that tube is back in place.  She ultimately is stable for discharge to follow-up closely outpatient with her oncologist and primary care provider.  Patient is agreeable to plan of discharge at this time.  Emergent pathologies were considered for this patient, although I have low suspicion for anything acutely emergent given patient's clinical presentation, history, physical exam, stable vital signs, and relatively unremarkable workup.  Discharging patient home is reasonable plan of care for outpatient management.    Patient was counseled on clinical impression, expectations, and plan.  Patient was educated to follow-up with PCP in the following 1-2 days.  All questions from patient were answered. They elicited understanding and were agreeable to course of treatment.  Patient was discharged in stable condition and given strict return precautions.    ** Disclaimer:  Parts of this document were written utilizing a voice to text dictation software.  Note may contain minor transcription or typographical errors that were inadvertently transcribed by the computer  software.        Procedure  Procedures     Renae Boston PA-C  07/11/24 4927

## 2024-07-16 ENCOUNTER — HOSPITAL ENCOUNTER (OUTPATIENT)
Dept: RADIOLOGY | Facility: CLINIC | Age: 64
Discharge: HOME | End: 2024-07-16
Payer: COMMERCIAL

## 2024-07-16 DIAGNOSIS — C53.9 CERVICAL CANCER, FIGO STAGE IVA (MULTI): ICD-10-CM

## 2024-07-16 PROCEDURE — 71250 CT THORAX DX C-: CPT

## 2024-07-17 DIAGNOSIS — C53.9 CERVICAL CANCER, FIGO STAGE IVA (MULTI): ICD-10-CM

## 2024-07-17 DIAGNOSIS — G89.3 CANCER RELATED PAIN: ICD-10-CM

## 2024-07-17 DIAGNOSIS — Z79.891 ENCOUNTER FOR LONG-TERM USE OF OPIATE ANALGESIC: ICD-10-CM

## 2024-07-17 DIAGNOSIS — M48.062 LUMBAR STENOSIS WITH NEUROGENIC CLAUDICATION: ICD-10-CM

## 2024-07-17 NOTE — PROGRESS NOTES
Patient ID: Shelbi Delaney is a 64 y.o. female.  Referring Physician: No referring provider defined for this encounter.  Primary Care Provider: Sonja Uriarte MD    Virtual or Telephone Consent    A telephone visit (audio only) between the patient (at the originating site) and the provider (at the distant site) was utilized to provide this telehealth service.   Verbal consent was requested and obtained from Shelbi Delaney on this date, 07/18/24 for a telehealth visit.     Subjective    Patient presents today for results discussion and evaluation for imaging review. She notes she had more pain in her lower abdomen just above her waist line about 1 week after treatment, this was cramping pain. Notes decreased appetite. Some constipation which is improved with miralax.     A comprehensive review of systems was performed and otherwise negative.   - R +L PCN to be changed 6/18      Objective    BSA: There is no height or weight on file to calculate BSA.  There were no vitals taken for this visit.    Wt Readings from Last 3 Encounters:   07/11/24 83.9 kg (185 lb)   07/05/24 86.1 kg (189 lb 14.8 oz)   06/13/24 86.4 kg (190 lb 9.4 oz)     General:   alert and oriented, in no acute distress   Cardiovascular: no apparent distress    Lungs: no increased work of breathing   Neurologic:  grossly intact, no deficits     No results found for this or any previous visit (from the past 96 hour(s)).  CT chest abdomen pelvis wo IV contrast  Narrative: Interpreted By:  Ruby Recio and Mercado Amiel   STUDY:  CT CHEST ABDOMEN PELVIS WO CONTRAST;  7/16/2024 1:36 pm      INDICATION:  Signs/Symptoms:restaging on chemo. Per EMR:  Signs/Symptoms:restaging on chemo. 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 Patient is CT restaging  done on this examination comparison to  prior on 05/20/2024 which  showed mixed response on docetaxel (3/2024 - current)..      COMPARISON:  None.      ACCESSION NUMBER(S):  [id number]      ORDERING CLINICIAN:  RADHA JOSEPH      TECHNIQUE:  CT of the chest, abdomen and pelvis was performed. Contiguous axial  images were obtained at 3 mm slice thickness through the chest,  abdomen, and pelvis with coronal and sagittal reconstruction.      75 ml of contrast Omnipaque 350 were administered intravenously  without immediate complication.      FINDINGS:  CHEST:      LUNGS/PLEURA/AIRWAYS:  Interval development of ground-glass to consolidative nodular  opacification bilaterally in the peribronchial as well as  centrilobular distribution. For example, along the right minor  fissure (series 202, image 150 and series 202, image 167), right  major fissure (series 202, image 150), and the right upper lobe  anterior subpleural region (series 202, image 115). There is an area  of increased opacification along the left fissure (series 202, image  154) which may represent intrathoracic lymph node enlargement. New  right upper lobe apical subpleural nodule (series 2, image 115)  measuring 0.6 cm.      HEART:  The heart is nonenlarged without apparent wall thickening. There is  no pericardial effusion.      VESSELS:  The main pulmonary artery is enlarged and stable in size measuring  3.5 cm, previously 3.4 cm. No significant coronary artery  calcifications. No significant calcified atherosclerosis of the  thoracic aorta.      MEDIASTINUM/ISIDRO:  Stable calcified subcentimeter mediastinal, left supraclavicular, and  subcarinal lymph nodes, similar to prior. The visualized esophagus is  normal in appearance.      CHEST WALL:  Right chest wall MediPort visualized in terminates in the proximal  SVC, similar to prior. The visualized thyroid is unremarkable.          ABDOMEN:      LIVER:  The liver is nonenlarged with normal, homogeneous attenuation. No  focal hepatic lesions  are identified.      BILE DUCTS:  The intrahepatic and extrahepatic bile ducts are nondilated.      GALLBLADDER:  The gallbladder is distended without evidence of radiopaque  cholelithiasis or cholecystitis.      PANCREAS:  The pancreas is of normal attenuation without ductal dilatation or  masses.      SPLEEN:  The spleen is nonenlarged with homogeneous attenuation.      ADRENAL GLANDS:  The bilateral adrenal glands are normal in appearance.      KIDNEYS AND URETERS:  The left kidney is atrophied and smaller compared to the right  kidney. Stable placement of left and right percutaneous nephrostomy  tubes. There is no left hydronephrosis. Unchanged mild right  hydronephrosis.. No evidence of nephroureterolithiasis.          PELVIS:      BLADDER:  The urinary bladder is decompressed, limiting evaluation.      REPRODUCTIVE ORGANS:  Enlargement of uterine mass with peripheral rim of calcification now  measuring 6.8 x 5.2 (AP) x 7.5 (craniocaudal) cm, previously 5.4 x  4.9 (AP) x 5.8 cm. The right adnexal cyst (series 201, image 170) is  stable in size measuring 5.6 x 4.7 (AP) cm, previously 5.4 x 5.5 cm.  Similarly, the posterior left cyst (series 201, image 165) is stable  in size measuring 3.1 x 2.4 cm, previously 3.1 x 2.5 cm. Of note,  there is re-demonstration of oval-shaped right adnexal fluid density  containing air (series 201, image 163) that is not well characterized  however may represent a bowel loop that is tethered to the uterine  mass and remains stable compared to prior scan.      BOWEL:  The stomach is unremarkable without wall thickening. The small and  large bowel are of normal course and caliber. No evidence of bowel  wall thickening. Appendix is not definitively visualized, however  there is no pericecal stranding or fluid collection.      VESSELS:  The abdominal aorta is normal without aneurysm or ectasia. The  inferior vena cava is normal in appearance.  Mild calcified  atherosclerosis of the  abdominal aorta and its branches.      PERITONEUM/RETROPERITONEUM/LYMPH NODES:  No free fluid or free air within  the peritoneum. No fluid  collections are visualized. Re-demonstration of intra-abdominal and  inguinal lymphadenopathy that is stable to mildly increased in size.  For example, the right inguinal lymph node (series 201, image 200)  measures 3.5 x 1.9 cm, previously 3.1 x 1.9 cm. Similarly, left  inguinal lymph node (series 201, image 180) measures 3.4 x 1.7 cm,  previously 3.3 x 1.8 cm. The para-aortic lymph node (series 201,  image 107) is increased in size measuring 2.1 x 1.8 cm, previously  2.1 x 1.5 cm.      OSSEOUS STRUCTURES:  No suspicious osseous lesions or acute traumatic injury.  Mild  discogenic degenerative changes of the thoracolumbar spine.  Re-demonstration of right inferior pubic rami sclerosis, similar to  prior scan.      SOFT TISSUES/ABDOMINAL WALL:  The soft tissues of the abdominal wall are unremarkable.      Impression: Cervical cancer restaging scan compared to 05/20/2024 CT.  1. Interval increase in tumor burden with interval increase in size  of a peripherally calcified uterine mass as well as mild overall  interval increase of retroperitoneum and pelvic lymphadenopathy.  2. Interval development of upper lobe predominant centrilobular and  peribronchial nodular densities, predominantly in the right upper  lobe, which may represent treatment-related pneumonitis. Recommend  attention on follow-up to rule out underlying metastasis.  3. Additional stable chronic findings as describe above.      I personally reviewed the image(s)/study and resident interpretation  as stated by Shahriar Barney MD (Resident Physician). I agree with the  findings as stated. This study was interpreted at [facility], [place], [place].      MACRO:  None.      Signed by: Ruby Recio 7/16/2024 10:56 PM  Dictation workstation:   [id number]      Performance  Status:  Symptomatic; fully ambulatory    Assessment/Plan   62yo with stage RONIT moderately differentiated SCC of cervix with progressive disease on Docetaxel; obstructive uropathy with PCNs in place. Grade 2 mucositis improved and grade 2 CIPN; fluid retention. ECOG 1.   Oncology History Overview Note   Stage RONIT grade 2 squamous cell carcinoma of cervix  1/20/23: TURBT - poorly differentiated squamaous cell carcinoma; CPS10. [facility] review with history of cervical mass consistent with GYN primary  - Extension to rectal mucosa, pubic ramus with ?reactive mediastinal LN   - Bilateral nephrostomy placement   2/16/23 - 6/1/23: Carbo AUC5/Taxol 175 +Avastin 15mg/kg, Pembrolizumab 200mg/m2  - C2-C4 +Avastin - further treatments held due to progressive gr2 proteinuria   - Grade 3 anemia declining transfusion,   7/20/23 - 12/18/23: Pembrolizumab 200mg/m2, progressive disease  1/11/24 - 3/14/24: Tivdak x3, progressive disease   - C1: 0.9mg/kg in setting of CrCl < 30  3/2024-7/2024: Taxotere, progressive disease    Molecular Testing  PLD1: CPS 10  1/2024 Tempus xT - PIK3CA p.[id number] missense (gain): consider alpelisib  Loss of function: BCORL1, KMT2C, NSD1, KMT2D, RAD21  TMB 7.9mB - RACHELE  - Her2 pending 2/21/24     Cervical cancer, FIGO stage RONIT (Multi)   3/21/2022 Cancer Staged    Staging form: Cervix Uteri, AJCC Version 9, Clinical stage from 3/21/2022: FIGO Stage RONIT (cT4, cN2, cM0) - Signed by Adrianna Jefferson MD on 10/12/2023, Prognostic indicators: CPS 10     7/20/2023 - 11/30/2023 Chemotherapy    Pembrolizumab, 21 Day Cycles     1/12/2024 - 2/22/2024 Chemotherapy    Tisotumab vedotin, 21 Day Cycles     3/21/2024 - 7/5/2024 Chemotherapy    DOCEtaxel, 21 Day Cycles     8/1/2024 -  Chemotherapy    Topotecan (Weekly), 28 Day Cycles      3/13/2025 - 3/13/2025 Chemotherapy    PACLitaxel / CARBOplatin, 21 Day Cycles - Gyn     3/13/2025 - 3/13/2025 Chemotherapy    PACLitaxel / CARBOplatin, 21 Day Cycles - Gyn        Diagnoses and all  orders for this visit:  Encounter for antineoplastic chemotherapy and immunotherapy  Cervical cancer, FIGO stage RONIT (CMS/HCC)  - Stable disease since last assessment; improved lower extremity edema with supportive therapies  - Grade 2 mucositis resolved; grade 1 CIPN with dose reduction to 80mg/m2 starting at C2  - CT reviewed and showing progressive disease  - Discussed treatment options including clinical trial (phase I), topotecan, best supportive care   - She is interested in topotecan   Urinary tract infection associated with nephrostomy catheter, subsequent encounter  Obstructive uropathy  - S/p treatment for Pseudomonas UTI; continue to monitor for signs/symptoms of progressive infection due to increased risk of complex UTI  - Most recent PCN exchange 6/18 (left) - unable to internalize   Lower extremity swelling  - Attributed to chemotherapy on Taxotere  - Continues to follow with local wound therapy and uniboot treatment     I spent 10 minutes virtually or in a telephone with this patient and/or family. More than 50% of the time was spent in counseling and/or coordination of care.

## 2024-07-18 ENCOUNTER — TELEMEDICINE (OUTPATIENT)
Dept: GYNECOLOGIC ONCOLOGY | Facility: CLINIC | Age: 64
End: 2024-07-18
Payer: COMMERCIAL

## 2024-07-18 DIAGNOSIS — C53.9 CERVICAL CANCER, FIGO STAGE IVA (MULTI): Primary | ICD-10-CM

## 2024-07-18 DIAGNOSIS — G89.3 CANCER RELATED PAIN: ICD-10-CM

## 2024-07-18 PROCEDURE — 99215 OFFICE O/P EST HI 40 MIN: CPT | Performed by: STUDENT IN AN ORGANIZED HEALTH CARE EDUCATION/TRAINING PROGRAM

## 2024-07-18 RX ORDER — GABAPENTIN 300 MG/1
900 CAPSULE ORAL 3 TIMES DAILY
Qty: 270 CAPSULE | Refills: 0 | OUTPATIENT
Start: 2024-07-18

## 2024-07-22 ENCOUNTER — ONCOLOGY MEDICATION OUTREACH (OUTPATIENT)
Dept: GYNECOLOGIC ONCOLOGY | Facility: HOSPITAL | Age: 64
End: 2024-07-22
Payer: COMMERCIAL

## 2024-07-22 NOTE — PROGRESS NOTES
ONCOLOGY CLINICAL PHARMACY NOTE     Subjective  Shelbi Delaney is a 64 y.o. female with cervical cancer, called for education.        Treatment history  Treatment Details   Treatment goal [No plan goal]   Plan Name Venous Access Orders   Status Active   Start Date 10/12/2023   End Date Until discontinued   Provider Adrianna Jefferson MD   Chemotherapy [No matching medication found in this treatment plan]     Treatment Details   Treatment goal [No plan goal]   Plan Name Pembrolizumab, 21 Day Cycles   Status Inactive   Start Date 7/20/2023   End Date 11/30/2023   Provider Adrianna Jefferson MD   Chemotherapy pembrolizumab (Keytruda) 200 mg in sodium chloride 0.9% 100 mL IV, 200 mg, intravenous, Once, 7 of 17 cycles  Administration: 200 mg (10/12/2023), 200 mg (11/2/2023), 200 mg (11/30/2023)       Treatment Details   Treatment goal Palliative   Plan Name Tisotumab vedotin, 21 Day Cycles   Status Inactive   Start Date 1/12/2024   End Date 2/22/2024   Provider Adrianna Jefferson MD   Chemotherapy tisotumab vedotin (Tivdak) 74 mg in sodium chloride 0.9% 65 mL IV, 0.9 mg/kg = 74 mg (45 % of original dose 2 mg/kg), intravenous, Once, 3 of 17 cycles  Dose modification: 0.9 mg/kg (original dose 2 mg/kg, Cycle 1, Reason: Abnormal Renal Function, Comment: reduced CrCl), 1.3 mg/kg (original dose 2 mg/kg, Cycle 2, Reason: Other (See Comments), Comment: Reduced GFR)  Administration: 74 mg (1/12/2024), 108 mg (2/1/2024), 108 mg (2/22/2024)       Treatment Details   Treatment goal Palliative   Plan Name DOCEtaxel, 21 Day Cycles   Status Inactive   Start Date 3/21/2024   End Date 7/5/2024   Provider Adrianna Jefferson MD   Chemotherapy DOCEtaxeL (Taxotere) 200 mg in dextrose 5 % in water (D5W) 557 mL IV, 100 mg/m2 = 200 mg, intravenous, Once, 6 of 17 cycles  Dose modification: 80 mg/m2 (original dose 100 mg/m2, Cycle 2)  Administration: 200 mg (3/21/2024), 160 mg (4/11/2024), 160 mg (5/2/2024), 160 mg (5/23/2024), 160 mg (6/13/2024), 160 mg  (7/5/2024)       Treatment Details   Treatment goal Palliative   Plan Name PACLitaxel / CARBOplatin, 21 Day Cycles - Gyn   Status Inactive   Start Date    End Date    Provider Adrianna Jefferson MD   Chemotherapy fosaprepitant (Emend) 150 mg in sodium chloride 0.9% 250 mL IV, 150 mg, intravenous, Once, 0 of 6 cycles    CARBOplatin (Paraplatin) in sodium chloride 0.9% 100 mL IV, , intravenous, Once, 0 of 6 cycles    PACLitaxeL (Taxol) 175 mg/m2 in dextrose 5 % in water (D5W) 500 mL IV, 175 mg/m2, intravenous, Once, 0 of 6 cycles    palonosetron (Aloxi) injection 250 mcg, 0.25 mg, intravenous, Once, 0 of 6 cycles       Treatment Details   Treatment goal Palliative   Plan Name PACLitaxel / CARBOplatin, 21 Day Cycles - Gyn   Status Inactive   Start Date    End Date    Provider Adrianna Jefferson MD   Chemotherapy fosaprepitant (Emend) 150 mg in sodium chloride 0.9% 250 mL IV, 150 mg, intravenous, Once, 0 of 6 cycles    CARBOplatin (Paraplatin) in sodium chloride 0.9% 100 mL IV, , intravenous, Once, 0 of 6 cycles    PACLitaxeL (Taxol) 348 mg in dextrose 5 % in water (D5W) 308 mL IV, 175 mg/m2, intravenous, Once, 0 of 6 cycles    palonosetron (Aloxi) injection 250 mcg, 0.25 mg, intravenous, Once, 0 of 6 cycles       Treatment Details   Treatment goal [No plan goal]   Plan Name Topotecan (Daily), 28 Day Cycles - Cervical    Status Inactive   Start Date    End Date    Provider Ashanti Fisher MD   Chemotherapy topotecan (Hycamtin) 2.95 mg in dextrose 5% 102.95 mL IV, 1.5 mg/m2 = 2.95 mg, intravenous, Once, 0 of 12 cycles    pegfilgrastim-cbqv (Udenyca) injection 6 mg, 6 mg, subcutaneous, Once, 0 of 12 cycles       Treatment Details   Treatment goal [No plan goal]   Plan Name Topotecan (Weekly), 28 Day Cycles    Status Active   Start Date 8/1/2024 (Planned)   End Date 6/19/2025 (Planned)   Provider Ashanti Fisher MD   Chemotherapy topotecan (Hycamtin) 7.9 mg in dextrose 5% 107.9 mL IV, 4 mg/m2 = 7.9 mg, intravenous, Once, 0  of 12 cycles          Objective  There were no vitals taken for this visit.  Lab Results   Component Value Date    WBC 9.1 07/02/2024    HGB 9.0 (L) 07/02/2024    HCT 30.0 (L) 07/02/2024     (H) 07/02/2024     07/02/2024      Lab Results   Component Value Date    GLUCOSE 82 07/02/2024    CALCIUM 8.9 07/02/2024     07/02/2024    K 5.1 07/02/2024    CO2 26 07/02/2024     07/02/2024    BUN 28 (H) 07/02/2024    CREATININE 2.26 (H) 07/02/2024     Lab Results   Component Value Date    ALT 7 07/02/2024    AST 25 07/02/2024    ALKPHOS 57 07/02/2024    BILITOT 0.3 07/02/2024       Allergies and Medications   Allergies   Allergen Reactions    L-Lysine [Lysine] Rash     Head to toe red rash    Adhesive Rash     Skin irritation and rash        Current Outpatient Medications:     calcium carbonate (Calcium 600) 600 mg calcium (1,500 mg) tablet, Take 600 mg by mouth once daily., Disp: , Rfl:     dexAMETHasone (Decadron) 4 mg tablet, Take 2 tablets (8 mg) by mouth 2 times a day. Take on the day before, day of and day after treatment., Disp: 36 tablet, Rfl: 3    dexAMETHasone 0.5 mg/5 mL oral liquid, Take 10 mL (1 mg) by mouth 2 times a day as needed (mucositis). Swish and spit. Do not swallow, Disp: 100 mL, Rfl: 0    DULoxetine (Cymbalta) 20 mg DR capsule, Take 1 capsule (20 mg) by mouth once daily. Do not crush or chew., Disp: 30 capsule, Rfl: 11    gabapentin (Neurontin) 300 mg capsule, Take 3 capsules (900 mg) by mouth 3 times a day., Disp: 270 capsule, Rfl: 2    hydrOXYzine HCL (Atarax) 25 mg tablet, Take 1 tablet (25 mg) by mouth 3 times a day as needed for anxiety., Disp: , Rfl:     levothyroxine (Synthroid, Levoxyl) 25 mcg tablet, Take 1 tablet (25 mcg) by mouth once daily., Disp: 90 tablet, Rfl: 3    magic mouthwash (lidocaine, diphenhydrAMINE, Maalox 1:1:1), Swish and spit 10 mL every 6 hours if needed for mucositis., Disp: 250 mL, Rfl: 2    metoprolol tartrate (Lopressor) 50 mg tablet, take 0.5  tablets by mouth 2 times a day, Disp: 30 tablet, Rfl: 5    naloxone (Narcan) 4 mg/0.1 mL nasal spray, Administer 1 spray (4 mg) into affected nostril(s) if needed for opioid reversal. May repeat every 2-3 minutes if needed, alternating nostrils, until medical assistance becomes available., Disp: , Rfl:     nystatin (Mycostatin) cream, Apply 1 Application topically 2 times a day., Disp: , Rfl:     ondansetron (Zofran) 8 mg tablet, Take 1 tablet (8 mg) by mouth every 8 hours if needed for nausea or vomiting., Disp: 30 tablet, Rfl: 5    oxyCODONE (Roxicodone) 5 mg immediate release tablet, Take 1 tablet (5 mg) by mouth 3 times a day. As needed for pain, Disp: 90 tablet, Rfl: 0    prochlorperazine (Compazine) 10 mg tablet, Take 1 tablet (10 mg) by mouth every 6 hours if needed for nausea or vomiting., Disp: 30 tablet, Rfl: 5    tiZANidine (Zanaflex) 4 mg tablet, Take 1 tablet (4 mg) by mouth 3 times a day., Disp: 270 tablet, Rfl: 1    traZODone (Desyrel) 50 mg tablet, Take 1 tablet (50 mg) by mouth as needed at bedtime for sleep., Disp: 90 tablet, Rfl: 1    Assessment and Plan  Shelbi Delaney is a 64 y.o. female with cervical cancer, to be treated with topotecan.    Chemotherapy  Education: Reviewed drug, dose, frequency, administration, treatment cycle, duration of therapy, and missed doses. Counseled on potential side effects including but not limited to chemotherapy side effects: neutropenia, infection risk, anemia, fatigue, weakness, low energy, thrombocytopenia, bleeding/bruising, n/v, diarrhea, and mucositis.  Other toxicities reviewed include risk of lung toxicity and signs of bleeding. Discussed techniques to mitigate severity of side effects such as blood count checks, temperature checks, electrolyte monitoring, antiemetic use, loperamide use with max dose of 8 tabs per 24 hours, and staying hydrated if having diarrhea.   Unable to provide handouts due to virtual nature of encounter. Discussed role of neulasta  with count suppression risk. Patient had questions about side-effecs. All questions answered and contact information was given to patient.    Drug-drug interactions: no major interactions  Dose adjustments: patients with existing renal function ~27 ml/min. Reached out to provider regarding dose adjustments and also potential up front use of growth factor.   Time spent on patient care: 30 minutes.              Coleman Moon, ArvindD

## 2024-07-23 ENCOUNTER — TELEPHONE (OUTPATIENT)
Dept: GYNECOLOGIC ONCOLOGY | Facility: HOSPITAL | Age: 64
End: 2024-07-23
Payer: COMMERCIAL

## 2024-07-23 NOTE — TELEPHONE ENCOUNTER
Plan is for weekly Topotecan and On Pro Neulasta on day 15. Call to patient and  and reviewed possible side effects, schedule, possible side effects of Neulasta, the use of Claritin to prevent bone pain. Patient will have virtual visit with Dr. Fisher on 7/25/24 for consent and to start next week. Pretreatment labs next week once treatment date scheduled.

## 2024-07-24 NOTE — PROGRESS NOTES
"Patient ID: Shelbi Delaney is a 64 y.o. female.  Referring Physician: No referring provider defined for this encounter.  Primary Care Provider: Sonja Uriarte MD    Virtual or Telephone Consent    A telephone visit (audio only) between the patient (at the originating site) and the provider (at the distant site) was utilized to provide this telehealth service.   Verbal consent was requested and obtained from Shelbi Delaney on this date, 07/25/24 for a telehealth visit.     Subjective    Patient presents today for chemo consent. Feeling a little more tired and \"loopy\" today. Ready to get started on treatment.     A comprehensive review of systems was performed and otherwise negative.   - R +L PCN to be changed 6/18      Objective    BSA: There is no height or weight on file to calculate BSA.  There were no vitals taken for this visit.    Wt Readings from Last 3 Encounters:   07/11/24 83.9 kg (185 lb)   07/05/24 86.1 kg (189 lb 14.8 oz)   06/13/24 86.4 kg (190 lb 9.4 oz)     General:   alert and oriented, in no acute distress   Cardiovascular: no apparent distress    Lungs: no increased work of breathing   Neurologic:  grossly intact, no deficits     No results found for this or any previous visit (from the past 96 hour(s)).  CT chest abdomen pelvis wo IV contrast  Narrative: Interpreted By:  Ruby Recio and Mercado Amiel   STUDY:  CT CHEST ABDOMEN PELVIS WO CONTRAST;  7/16/2024 1:36 pm      INDICATION:  Signs/Symptoms:restaging on chemo. Per EMR:  Signs/Symptoms:restaging on chemo. 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 Patient is CT restaging  done on this examination comparison to prior on 05/20/2024 which  showed mixed response on docetaxel (3/2024 - current)..      COMPARISON:  None.      ACCESSION NUMBER(S):  [id number]      ORDERING " CLINICIAN:  RADHA JOSEPH      TECHNIQUE:  CT of the chest, abdomen and pelvis was performed. Contiguous axial  images were obtained at 3 mm slice thickness through the chest,  abdomen, and pelvis with coronal and sagittal reconstruction.      75 ml of contrast Omnipaque 350 were administered intravenously  without immediate complication.      FINDINGS:  CHEST:      LUNGS/PLEURA/AIRWAYS:  Interval development of ground-glass to consolidative nodular  opacification bilaterally in the peribronchial as well as  centrilobular distribution. For example, along the right minor  fissure (series 202, image 150 and series 202, image 167), right  major fissure (series 202, image 150), and the right upper lobe  anterior subpleural region (series 202, image 115). There is an area  of increased opacification along the left fissure (series 202, image  154) which may represent intrathoracic lymph node enlargement. New  right upper lobe apical subpleural nodule (series 2, image 115)  measuring 0.6 cm.      HEART:  The heart is nonenlarged without apparent wall thickening. There is  no pericardial effusion.      VESSELS:  The main pulmonary artery is enlarged and stable in size measuring  3.5 cm, previously 3.4 cm. No significant coronary artery  calcifications. No significant calcified atherosclerosis of the  thoracic aorta.      MEDIASTINUM/ISIDRO:  Stable calcified subcentimeter mediastinal, left supraclavicular, and  subcarinal lymph nodes, similar to prior. The visualized esophagus is  normal in appearance.      CHEST WALL:  Right chest wall MediPort visualized in terminates in the proximal  SVC, similar to prior. The visualized thyroid is unremarkable.          ABDOMEN:      LIVER:  The liver is nonenlarged with normal, homogeneous attenuation. No  focal hepatic lesions are identified.      BILE DUCTS:  The intrahepatic and extrahepatic bile ducts are nondilated.      GALLBLADDER:  The gallbladder is distended without evidence of  radiopaque  cholelithiasis or cholecystitis.      PANCREAS:  The pancreas is of normal attenuation without ductal dilatation or  masses.      SPLEEN:  The spleen is nonenlarged with homogeneous attenuation.      ADRENAL GLANDS:  The bilateral adrenal glands are normal in appearance.      KIDNEYS AND URETERS:  The left kidney is atrophied and smaller compared to the right  kidney. Stable placement of left and right percutaneous nephrostomy  tubes. There is no left hydronephrosis. Unchanged mild right  hydronephrosis.. No evidence of nephroureterolithiasis.          PELVIS:      BLADDER:  The urinary bladder is decompressed, limiting evaluation.      REPRODUCTIVE ORGANS:  Enlargement of uterine mass with peripheral rim of calcification now  measuring 6.8 x 5.2 (AP) x 7.5 (craniocaudal) cm, previously 5.4 x  4.9 (AP) x 5.8 cm. The right adnexal cyst (series 201, image 170) is  stable in size measuring 5.6 x 4.7 (AP) cm, previously 5.4 x 5.5 cm.  Similarly, the posterior left cyst (series 201, image 165) is stable  in size measuring 3.1 x 2.4 cm, previously 3.1 x 2.5 cm. Of note,  there is re-demonstration of oval-shaped right adnexal fluid density  containing air (series 201, image 163) that is not well characterized  however may represent a bowel loop that is tethered to the uterine  mass and remains stable compared to prior scan.      BOWEL:  The stomach is unremarkable without wall thickening. The small and  large bowel are of normal course and caliber. No evidence of bowel  wall thickening. Appendix is not definitively visualized, however  there is no pericecal stranding or fluid collection.      VESSELS:  The abdominal aorta is normal without aneurysm or ectasia. The  inferior vena cava is normal in appearance.  Mild calcified  atherosclerosis of the abdominal aorta and its branches.      PERITONEUM/RETROPERITONEUM/LYMPH NODES:  No free fluid or free air within  the peritoneum. No fluid  collections are  visualized. Re-demonstration of intra-abdominal and  inguinal lymphadenopathy that is stable to mildly increased in size.  For example, the right inguinal lymph node (series 201, image 200)  measures 3.5 x 1.9 cm, previously 3.1 x 1.9 cm. Similarly, left  inguinal lymph node (series 201, image 180) measures 3.4 x 1.7 cm,  previously 3.3 x 1.8 cm. The para-aortic lymph node (series 201,  image 107) is increased in size measuring 2.1 x 1.8 cm, previously  2.1 x 1.5 cm.      OSSEOUS STRUCTURES:  No suspicious osseous lesions or acute traumatic injury.  Mild  discogenic degenerative changes of the thoracolumbar spine.  Re-demonstration of right inferior pubic rami sclerosis, similar to  prior scan.      SOFT TISSUES/ABDOMINAL WALL:  The soft tissues of the abdominal wall are unremarkable.      Impression: Cervical cancer restaging scan compared to 05/20/2024 CT.  1. Interval increase in tumor burden with interval increase in size  of a peripherally calcified uterine mass as well as mild overall  interval increase of retroperitoneum and pelvic lymphadenopathy.  2. Interval development of upper lobe predominant centrilobular and  peribronchial nodular densities, predominantly in the right upper  lobe, which may represent treatment-related pneumonitis. Recommend  attention on follow-up to rule out underlying metastasis.  3. Additional stable chronic findings as describe above.      I personally reviewed the image(s)/study and resident interpretation  as stated by Shahriar Barney MD (Resident Physician). I agree with the  findings as stated. This study was interpreted at [facility], [place], [place].      MACRO:  None.      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 Grade 2 mucositis improved and grade 2 CIPN; fluid retention. ECOG 1.   Oncology History Overview Note   Stage RONIT grade 2 squamous cell carcinoma of cervix  1/20/23: TURBT - poorly differentiated squamaous cell carcinoma; CPS10. [facility] review with history of cervical mass consistent with GYN primary  - Extension to rectal mucosa, pubic ramus with ?reactive mediastinal LN   - Bilateral nephrostomy placement   2/16/23 - 6/1/23: Carbo AUC5/Taxol 175 +Avastin 15mg/kg, Pembrolizumab 200mg/m2  - C2-C4 +Avastin - further treatments held due to progressive gr2 proteinuria   - Grade 3 anemia declining transfusion,   7/20/23 - 12/18/23: Pembrolizumab 200mg/m2, progressive disease  1/11/24 - 3/14/24: Tivdak x3, progressive disease   - C1: 0.9mg/kg in setting of CrCl < 30  3/2024-7/2024: Taxotere, progressive disease    Molecular Testing  PLD1: CPS 10  1/2024 Tempus xT - PIK3CA p.[id number] missense (gain): consider alpelisib  Loss of function: BCORL1, KMT2C, NSD1, KMT2D, RAD21  TMB 7.9mB - RACHELE  - Her2 Neg     Cervical cancer, FIGO stage RONIT (Multi)   3/21/2022 Cancer Staged    Staging form: Cervix Uteri, AJCC Version 9, Clinical stage from 3/21/2022: FIGO Stage RONIT (cT4, cN2, cM0) - Signed by Adrianna Jefferson MD on 10/12/2023, Prognostic indicators: CPS 10     7/20/2023 - 11/30/2023 Chemotherapy    Pembrolizumab, 21 Day Cycles     1/12/2024 - 2/22/2024 Chemotherapy    Tisotumab vedotin, 21 Day Cycles     3/21/2024 - 7/5/2024 Chemotherapy    DOCEtaxel, 21 Day Cycles     8/1/2024 -  Chemotherapy    Topotecan (Weekly), 28 Day Cycles      3/13/2025 - 3/13/2025 Chemotherapy    PACLitaxel / CARBOplatin, 21 Day Cycles - Gyn     3/13/2025 - 3/13/2025 Chemotherapy    PACLitaxel / CARBOplatin, 21 Day Cycles - Gyn        Diagnoses and all orders for this visit:  Encounter for antineoplastic chemotherapy and immunotherapy  Cervical cancer, FIGO stage RONIT (CMS/HCC)  - Stable disease since last assessment; improved lower  extremity edema with supportive therapies  - Grade 2 mucositis resolved; grade 1 CIPN with dose reduction to 80mg/m2 starting at C2  - CT reviewed and showing progressive disease  - Discussed treatment options including clinical trial (phase I), topotecan, best supportive care   - She is interested in topotecan, chemo consent completed virtually today, she will sign the consent at her first infusion   Urinary tract infection associated with nephrostomy catheter, subsequent encounter  Obstructive uropathy  - S/p treatment for Pseudomonas UTI; continue to monitor for signs/symptoms of progressive infection due to increased risk of complex UTI  - Most recent PCN exchange 6/18 (left) - unable to internalize   Lower extremity swelling  - Attributed to chemotherapy on Taxotere  - Continues to follow with local wound therapy and uniboot treatment     I spent 5 minutes virtually or in a telephone with this patient and/or family. More than 50% of the time was spent in counseling and/or coordination of care.

## 2024-07-25 ENCOUNTER — TELEMEDICINE (OUTPATIENT)
Dept: GYNECOLOGIC ONCOLOGY | Facility: CLINIC | Age: 64
End: 2024-07-25
Payer: COMMERCIAL

## 2024-07-25 ENCOUNTER — APPOINTMENT (OUTPATIENT)
Dept: HEMATOLOGY/ONCOLOGY | Facility: CLINIC | Age: 64
End: 2024-07-25
Payer: COMMERCIAL

## 2024-07-25 DIAGNOSIS — Z71.9 ENCOUNTER FOR COUNSELING: ICD-10-CM

## 2024-07-25 DIAGNOSIS — Z51.11 ENCOUNTER FOR ANTINEOPLASTIC CHEMOTHERAPY: ICD-10-CM

## 2024-07-25 DIAGNOSIS — C53.9 CERVICAL CANCER, FIGO STAGE IVA (MULTI): Primary | ICD-10-CM

## 2024-07-25 PROCEDURE — 99215 OFFICE O/P EST HI 40 MIN: CPT | Performed by: STUDENT IN AN ORGANIZED HEALTH CARE EDUCATION/TRAINING PROGRAM

## 2024-07-25 RX ORDER — DIPHENHYDRAMINE HYDROCHLORIDE 50 MG/ML
50 INJECTION INTRAMUSCULAR; INTRAVENOUS AS NEEDED
OUTPATIENT
Start: 2024-08-15

## 2024-07-25 RX ORDER — ONDANSETRON HYDROCHLORIDE 2 MG/ML
8 INJECTION, SOLUTION INTRAVENOUS ONCE
OUTPATIENT
Start: 2024-08-08

## 2024-07-25 RX ORDER — PROCHLORPERAZINE MALEATE 5 MG
10 TABLET ORAL EVERY 6 HOURS PRN
OUTPATIENT
Start: 2024-08-15

## 2024-07-25 RX ORDER — PROCHLORPERAZINE EDISYLATE 5 MG/ML
10 INJECTION INTRAMUSCULAR; INTRAVENOUS EVERY 6 HOURS PRN
OUTPATIENT
Start: 2024-08-01

## 2024-07-25 RX ORDER — PROCHLORPERAZINE MALEATE 5 MG
10 TABLET ORAL EVERY 6 HOURS PRN
OUTPATIENT
Start: 2024-08-01

## 2024-07-25 RX ORDER — PROCHLORPERAZINE EDISYLATE 5 MG/ML
10 INJECTION INTRAMUSCULAR; INTRAVENOUS EVERY 6 HOURS PRN
OUTPATIENT
Start: 2024-08-08

## 2024-07-25 RX ORDER — ONDANSETRON HYDROCHLORIDE 2 MG/ML
8 INJECTION, SOLUTION INTRAVENOUS ONCE
OUTPATIENT
Start: 2024-08-15

## 2024-07-25 RX ORDER — EPINEPHRINE 0.3 MG/.3ML
0.3 INJECTION SUBCUTANEOUS EVERY 5 MIN PRN
OUTPATIENT
Start: 2024-08-15

## 2024-07-25 RX ORDER — EPINEPHRINE 0.3 MG/.3ML
0.3 INJECTION SUBCUTANEOUS EVERY 5 MIN PRN
OUTPATIENT
Start: 2024-08-08

## 2024-07-25 RX ORDER — PROCHLORPERAZINE EDISYLATE 5 MG/ML
10 INJECTION INTRAMUSCULAR; INTRAVENOUS EVERY 6 HOURS PRN
OUTPATIENT
Start: 2024-08-15

## 2024-07-25 RX ORDER — ONDANSETRON HYDROCHLORIDE 2 MG/ML
8 INJECTION, SOLUTION INTRAVENOUS ONCE
OUTPATIENT
Start: 2024-08-01

## 2024-07-25 RX ORDER — ALBUTEROL SULFATE 0.83 MG/ML
3 SOLUTION RESPIRATORY (INHALATION) AS NEEDED
OUTPATIENT
Start: 2024-08-08

## 2024-07-25 RX ORDER — ALBUTEROL SULFATE 0.83 MG/ML
3 SOLUTION RESPIRATORY (INHALATION) AS NEEDED
OUTPATIENT
Start: 2024-08-15

## 2024-07-25 RX ORDER — DIPHENHYDRAMINE HYDROCHLORIDE 50 MG/ML
50 INJECTION INTRAMUSCULAR; INTRAVENOUS AS NEEDED
OUTPATIENT
Start: 2024-08-01

## 2024-07-25 RX ORDER — DIPHENHYDRAMINE HYDROCHLORIDE 50 MG/ML
50 INJECTION INTRAMUSCULAR; INTRAVENOUS AS NEEDED
OUTPATIENT
Start: 2024-08-08

## 2024-07-25 RX ORDER — FAMOTIDINE 10 MG/ML
20 INJECTION INTRAVENOUS ONCE AS NEEDED
OUTPATIENT
Start: 2024-08-01

## 2024-07-25 RX ORDER — PROCHLORPERAZINE MALEATE 5 MG
10 TABLET ORAL EVERY 6 HOURS PRN
OUTPATIENT
Start: 2024-08-08

## 2024-07-25 RX ORDER — ALBUTEROL SULFATE 0.83 MG/ML
3 SOLUTION RESPIRATORY (INHALATION) AS NEEDED
OUTPATIENT
Start: 2024-08-01

## 2024-07-25 RX ORDER — EPINEPHRINE 0.3 MG/.3ML
0.3 INJECTION SUBCUTANEOUS EVERY 5 MIN PRN
OUTPATIENT
Start: 2024-08-01

## 2024-07-25 RX ORDER — FAMOTIDINE 10 MG/ML
20 INJECTION INTRAVENOUS ONCE AS NEEDED
OUTPATIENT
Start: 2024-08-15

## 2024-07-25 RX ORDER — FAMOTIDINE 10 MG/ML
20 INJECTION INTRAVENOUS ONCE AS NEEDED
OUTPATIENT
Start: 2024-08-08

## 2024-07-26 DIAGNOSIS — C53.9 CERVICAL CANCER, FIGO STAGE IVA (MULTI): ICD-10-CM

## 2024-07-30 ENCOUNTER — TELEPHONE (OUTPATIENT)
Dept: GYNECOLOGIC ONCOLOGY | Facility: HOSPITAL | Age: 64
End: 2024-07-30
Payer: COMMERCIAL

## 2024-07-30 NOTE — TELEPHONE ENCOUNTER
Patient's  emailed about patient feeling weak, resting a lot, and not being able to eat much. Recommend focusing on increasing calories in liquid form such as instant carnation breakfast over ice and not drinking from the bottle. Recommend taking small amounts of food frequently. Focus on protein and calories. Recommend protein foods such as hard boiled eggs, scrambled eggs, milk shakes, cheese cubes, cottage cheese, hand full of nuts, peanut butter crackers, yogart, etc. Offered for her to see a dietician when she goes for chemo. She will go for labs tomorrow.

## 2024-07-31 ENCOUNTER — LAB (OUTPATIENT)
Dept: LAB | Facility: LAB | Age: 64
End: 2024-07-31
Payer: COMMERCIAL

## 2024-07-31 DIAGNOSIS — C53.9 CERVICAL CANCER, FIGO STAGE IVA (MULTI): ICD-10-CM

## 2024-07-31 PROCEDURE — 80053 COMPREHEN METABOLIC PANEL: CPT

## 2024-07-31 PROCEDURE — 85025 COMPLETE CBC W/AUTO DIFF WBC: CPT

## 2024-08-01 ENCOUNTER — INFUSION (OUTPATIENT)
Dept: HEMATOLOGY/ONCOLOGY | Facility: CLINIC | Age: 64
End: 2024-08-01
Payer: COMMERCIAL

## 2024-08-01 VITALS
RESPIRATION RATE: 18 BRPM | WEIGHT: 187.94 LBS | HEIGHT: 66 IN | TEMPERATURE: 97.3 F | DIASTOLIC BLOOD PRESSURE: 80 MMHG | HEART RATE: 84 BPM | OXYGEN SATURATION: 96 % | BODY MASS INDEX: 30.2 KG/M2 | SYSTOLIC BLOOD PRESSURE: 121 MMHG

## 2024-08-01 DIAGNOSIS — C53.9 CERVICAL CANCER, FIGO STAGE IVA (MULTI): ICD-10-CM

## 2024-08-01 DIAGNOSIS — C53.9 CERVICAL CANCER, FIGO STAGE IVA (MULTI): Primary | ICD-10-CM

## 2024-08-01 LAB
ALBUMIN SERPL BCP-MCNC: 3.3 G/DL (ref 3.4–5)
ALP SERPL-CCNC: 75 U/L (ref 33–136)
ALT SERPL W P-5'-P-CCNC: 14 U/L (ref 7–45)
ANION GAP SERPL CALC-SCNC: 22 MMOL/L (ref 10–20)
AST SERPL W P-5'-P-CCNC: 41 U/L (ref 9–39)
BASOPHILS # BLD AUTO: 0.09 X10*3/UL (ref 0–0.1)
BASOPHILS NFR BLD AUTO: 1 %
BILIRUB SERPL-MCNC: 0.5 MG/DL (ref 0–1.2)
BUN SERPL-MCNC: 33 MG/DL (ref 6–23)
CALCIUM SERPL-MCNC: 8.4 MG/DL (ref 8.6–10.6)
CHLORIDE SERPL-SCNC: 96 MMOL/L (ref 98–107)
CO2 SERPL-SCNC: 21 MMOL/L (ref 21–32)
CREAT SERPL-MCNC: 2.42 MG/DL (ref 0.5–1.05)
EGFRCR SERPLBLD CKD-EPI 2021: 22 ML/MIN/1.73M*2
EOSINOPHIL # BLD AUTO: 0.33 X10*3/UL (ref 0–0.7)
EOSINOPHIL NFR BLD AUTO: 3.8 %
ERYTHROCYTE [DISTWIDTH] IN BLOOD BY AUTOMATED COUNT: 20 % (ref 11.5–14.5)
GLUCOSE SERPL-MCNC: 117 MG/DL (ref 74–99)
HCT VFR BLD AUTO: 28.2 % (ref 36–46)
HGB BLD-MCNC: 8.7 G/DL (ref 12–16)
IMM GRANULOCYTES # BLD AUTO: 0.03 X10*3/UL (ref 0–0.7)
IMM GRANULOCYTES NFR BLD AUTO: 0.3 % (ref 0–0.9)
LYMPHOCYTES # BLD AUTO: 0.7 X10*3/UL (ref 1.2–4.8)
LYMPHOCYTES NFR BLD AUTO: 8 %
MCH RBC QN AUTO: 32.5 PG (ref 26–34)
MCHC RBC AUTO-ENTMCNC: 30.9 G/DL (ref 32–36)
MCV RBC AUTO: 105 FL (ref 80–100)
MONOCYTES # BLD AUTO: 1.6 X10*3/UL (ref 0.1–1)
MONOCYTES NFR BLD AUTO: 18.2 %
NEUTROPHILS # BLD AUTO: 6.04 X10*3/UL (ref 1.2–7.7)
NEUTROPHILS NFR BLD AUTO: 68.7 %
NRBC BLD-RTO: 0 /100 WBCS (ref 0–0)
PLATELET # BLD AUTO: 268 X10*3/UL (ref 150–450)
POTASSIUM SERPL-SCNC: 5.3 MMOL/L (ref 3.5–5.3)
PROT SERPL-MCNC: 5.5 G/DL (ref 6.4–8.2)
RBC # BLD AUTO: 2.68 X10*6/UL (ref 4–5.2)
SODIUM SERPL-SCNC: 134 MMOL/L (ref 136–145)
WBC # BLD AUTO: 8.8 X10*3/UL (ref 4.4–11.3)

## 2024-08-01 PROCEDURE — 2500000004 HC RX 250 GENERAL PHARMACY W/ HCPCS (ALT 636 FOR OP/ED): Performed by: STUDENT IN AN ORGANIZED HEALTH CARE EDUCATION/TRAINING PROGRAM

## 2024-08-01 PROCEDURE — 96375 TX/PRO/DX INJ NEW DRUG ADDON: CPT | Mod: INF

## 2024-08-01 PROCEDURE — 96413 CHEMO IV INFUSION 1 HR: CPT

## 2024-08-01 RX ORDER — ALBUTEROL SULFATE 0.83 MG/ML
3 SOLUTION RESPIRATORY (INHALATION) AS NEEDED
Status: DISCONTINUED | OUTPATIENT
Start: 2024-08-01 | End: 2024-08-01 | Stop reason: HOSPADM

## 2024-08-01 RX ORDER — FAMOTIDINE 10 MG/ML
20 INJECTION INTRAVENOUS ONCE AS NEEDED
Status: DISCONTINUED | OUTPATIENT
Start: 2024-08-01 | End: 2024-08-01 | Stop reason: HOSPADM

## 2024-08-01 RX ORDER — HEPARIN 100 UNIT/ML
500 SYRINGE INTRAVENOUS AS NEEDED
OUTPATIENT
Start: 2024-08-01

## 2024-08-01 RX ORDER — ONDANSETRON HYDROCHLORIDE 2 MG/ML
8 INJECTION, SOLUTION INTRAVENOUS ONCE
Status: COMPLETED | OUTPATIENT
Start: 2024-08-01 | End: 2024-08-01

## 2024-08-01 RX ORDER — DIPHENHYDRAMINE HYDROCHLORIDE 50 MG/ML
50 INJECTION INTRAMUSCULAR; INTRAVENOUS AS NEEDED
Status: DISCONTINUED | OUTPATIENT
Start: 2024-08-01 | End: 2024-08-01 | Stop reason: HOSPADM

## 2024-08-01 RX ORDER — PROCHLORPERAZINE EDISYLATE 5 MG/ML
10 INJECTION INTRAMUSCULAR; INTRAVENOUS EVERY 6 HOURS PRN
Status: DISCONTINUED | OUTPATIENT
Start: 2024-08-01 | End: 2024-08-01 | Stop reason: HOSPADM

## 2024-08-01 RX ORDER — HEPARIN SODIUM,PORCINE/PF 10 UNIT/ML
50 SYRINGE (ML) INTRAVENOUS AS NEEDED
OUTPATIENT
Start: 2024-08-01

## 2024-08-01 RX ORDER — PROCHLORPERAZINE MALEATE 10 MG
10 TABLET ORAL EVERY 6 HOURS PRN
Status: DISCONTINUED | OUTPATIENT
Start: 2024-08-01 | End: 2024-08-01 | Stop reason: HOSPADM

## 2024-08-01 RX ORDER — HEPARIN 100 UNIT/ML
500 SYRINGE INTRAVENOUS AS NEEDED
Status: DISCONTINUED | OUTPATIENT
Start: 2024-08-01 | End: 2024-08-01 | Stop reason: HOSPADM

## 2024-08-01 RX ORDER — EPINEPHRINE 0.3 MG/.3ML
0.3 INJECTION SUBCUTANEOUS EVERY 5 MIN PRN
Status: DISCONTINUED | OUTPATIENT
Start: 2024-08-01 | End: 2024-08-01 | Stop reason: HOSPADM

## 2024-08-01 RX ORDER — HEPARIN SODIUM,PORCINE/PF 10 UNIT/ML
50 SYRINGE (ML) INTRAVENOUS AS NEEDED
Status: DISCONTINUED | OUTPATIENT
Start: 2024-08-01 | End: 2024-08-01 | Stop reason: HOSPADM

## 2024-08-01 ASSESSMENT — PAIN SCALES - GENERAL: PAINLEVEL: 4

## 2024-08-06 ENCOUNTER — APPOINTMENT (OUTPATIENT)
Dept: PAIN MEDICINE | Facility: CLINIC | Age: 64
End: 2024-08-06
Payer: COMMERCIAL

## 2024-08-06 VITALS — SYSTOLIC BLOOD PRESSURE: 108 MMHG | RESPIRATION RATE: 16 BRPM | DIASTOLIC BLOOD PRESSURE: 70 MMHG | HEART RATE: 74 BPM

## 2024-08-06 DIAGNOSIS — G89.3 CANCER RELATED PAIN: Primary | ICD-10-CM

## 2024-08-06 DIAGNOSIS — Z79.891 ENCOUNTER FOR LONG-TERM USE OF OPIATE ANALGESIC: ICD-10-CM

## 2024-08-06 DIAGNOSIS — C53.9 CERVICAL CANCER, FIGO STAGE IVA (MULTI): ICD-10-CM

## 2024-08-06 PROCEDURE — 99214 OFFICE O/P EST MOD 30 MIN: CPT | Performed by: PHYSICAL MEDICINE & REHABILITATION

## 2024-08-06 PROCEDURE — 3078F DIAST BP <80 MM HG: CPT | Performed by: PHYSICAL MEDICINE & REHABILITATION

## 2024-08-06 PROCEDURE — 3074F SYST BP LT 130 MM HG: CPT | Performed by: PHYSICAL MEDICINE & REHABILITATION

## 2024-08-06 ASSESSMENT — PAIN SCALES - GENERAL: PAINLEVEL: 5

## 2024-08-06 NOTE — PROGRESS NOTES
Subjective   Patient ID: Shelbi Delaney is a 64 y.o. female who presents for Follow-up.  HPI    64-year-old female with PMH of bladder cancer s/p nephrostomy tubes, cervical cancer on chemotherapy, and herniated lumbar disc presenting today for follow-up.  She is currently undergoing active treatment for her cancer as the most recent one was not working anymore so she started a new 1 within the last couple of weeks.  She had a period a couple of months ago with fairly significant increase in pain when she was more regularly taking her oxycodone 3 times per day but it is since improved and she is still taking it up to 3 times per day but probably more sparingly at this time.  Also taking gabapentin which was lowered due to some kidney issues but taking that 300 mg 3 times a day as well as some as needed tizanidine.  Overall from a pain standpoint right now she is doing well enough and happy with her regimen.  Here for follow-up as we do need to see her every 3 months as I did discuss with her.                  OARRS:  Richard Zuniga MD on 8/6/2024  2:14 PM  I have personally reviewed the OARRS report for Shelbi Delaney. I have considered the risks of abuse, dependence, addiction and diversion and I believe that it is clinically appropriate for Shelbi Delaney to be prescribed this medication    Is the patient prescribed a combination of a benzodiazepine and opioid?  No    Last Urine Drug Screen / ordered today: No  Recent Results (from the past 8760 hour(s))   Opiate Confirmation, Urine    Collection Time: 02/27/24  3:03 PM   Result Value Ref Range    6-Acetylmorphine <25 <25 ng/mL    Codeine <50 <50 ng/mL    Hydrocodone <25 <25 ng/mL    Hydromorphone <25 <25 ng/mL    Morphine  <50 <50 ng/mL    Norhydrocodone <25 <25 ng/mL    Noroxycodone 279 (H) <25 ng/mL    Oxycodone 35 (H) <25 ng/mL    Oxymorphone 330 (H) <25 ng/mL   Drug Screen, Urine With Reflex to Confirmation    Collection Time: 02/27/24  3:03 PM   Result Value  Ref Range    Amphetamine Screen, Urine Presumptive Negative Presumptive Negative    Barbiturate Screen, Urine Presumptive Negative Presumptive Negative    Benzodiazepines Screen, Urine Presumptive Negative Presumptive Negative    Cannabinoid Screen, Urine Presumptive Negative Presumptive Negative    Cocaine Metabolite Screen, Urine Presumptive Negative Presumptive Negative    Fentanyl Screen, Urine Presumptive Negative Presumptive Negative    Opiate Screen, Urine Presumptive Negative Presumptive Negative    Oxycodone Screen, Urine Presumptive Positive (A) Presumptive Negative    PCP Screen, Urine Presumptive Negative Presumptive Negative     Results are as expected.     Controlled Substance Agreement:  Date of the Last Agreement: 2/27/24  Reviewed Controlled Substance Agreement including but not limited to the benefits, risks, and alternatives to treatment with a Controlled Substance medication(s).    Monitoring and compliance:    ORT:    PDUQ:    Office Agreement:      Review of Systems     Current Outpatient Medications   Medication Instructions    calcium carbonate (CALCIUM 600) 600 mg, oral, Daily    dexAMETHasone (DECADRON) 8 mg, oral, 2 times daily, Take on the day before, day of and day after treatment.    dexAMETHasone 1 mg, oral, 2 times daily PRN, Swish and spit. Do not swallow    DULoxetine (CYMBALTA) 20 mg, oral, Daily, Do not crush or chew.    gabapentin (NEURONTIN) 900 mg, oral, 3 times daily    hydrOXYzine HCL (Atarax) 25 mg tablet 1 tablet, oral, 3 times daily PRN    levothyroxine (SYNTHROID, LEVOXYL) 25 mcg, oral, Daily    magic mouthwash (lidocaine, diphenhydrAMINE, Maalox 1:1:1) 10 mL, Swish & Spit, Every 6 hours PRN    metoprolol tartrate (Lopressor) 50 mg tablet take 0.5 tablets by mouth 2 times a day    naloxone (Narcan) 4 mg/0.1 mL nasal spray 1 spray, nasal, As needed, May repeat every 2-3 minutes if needed, alternating nostrils, until medical assistance becomes available.    nystatin  (Mycostatin) cream 1 Application, Topical, 2 times daily    ondansetron (ZOFRAN) 8 mg, oral, Every 8 hours PRN    oxyCODONE (ROXICODONE) 5 mg, oral, 3 times daily, As needed for pain    prochlorperazine (COMPAZINE) 10 mg, oral, Every 6 hours PRN    tiZANidine (ZANAFLEX) 4 mg, oral, 3 times daily    traZODone (DESYREL) 50 mg, oral, Nightly PRN        Past Medical History:   Diagnosis Date    Abdominal distension (gaseous) 11/11/2022    Abdominal bloating    Anxiety     Body mass index (BMI)30.0-30.9, adult 08/07/2019    BMI 30.0-30.9,adult    Cervix cancer (Multi)     Chemotherapy-induced peripheral neuropathy (Multi)     Chronic kidney disease     Encounter for screening for malignant neoplasm of colon     Encounter for screening colonoscopy    Hypertension     Hypothyroidism     Personal history of other specified conditions     History of snoring    Tachycardia, unspecified     Tachycardia        Past Surgical History:   Procedure Laterality Date    CT GUIDED IMAGING FOR NEEDLE PLACEMENT  01/12/2023    CT GUIDED IMAGING FOR NEEDLE PLACEMENT [facility]    IR  NEPHROURETERAL PLACEMENT Bilateral 6/18/2024    IR  NEPHROURETERAL PLACEMENT 6/18/2024 [facility]    IR NEPHROSTOMY TUBE EXCHANGE  10/20/2023    IR NEPHROSTOMY TUBE EXCHANGE 10/20/2023 Manuel Marvin MD [facility]    IR NEPHROSTOMY TUBE EXCHANGE  12/19/2023    IR NEPHROSTOMY TUBE EXCHANGE 12/19/2023 [facility]    MEDIPORT      NEPHROSTOMY      ORIF TIBIA FRACTURE Right     TRANSURETHRAL RESECTION OF BLADDER TUMOR      US GUIDED PERCUTANEOUS PLACEMENT  11/14/2022    US GUIDED PERCUTANEOUS PLACEMENT [facility]    US GUIDED PERCUTANEOUS PLACEMENT  08/28/2023    US GUIDED PERCUTANEOUS PLACEMENT [facility]        Family History   Problem Relation Name Age of Onset    No Known Problems Mother      Other (Acute myocardial infarction) Father          Allergies   Allergen Reactions    L-Lysine [Lysine] Rash     Head to toe red rash     Adhesive Rash     Skin irritation and rash         Objective     Vitals:    08/06/24 1412   BP: 108/70   Pulse: 74   Resp: 16        Physical Exam    GENERAL EXAM  Vital Signs: Vital signs to include heart rate, respiration rate, blood pressure, and temperature were reviewed.  General Appearance:  Awake, alert, healthy appearing, well developed, No acute distress.  Head: Normocephalic without evidence of head injury.  Neck: The appearance of the neck was normal without swelling with a midline trachea.  Eyes: The eyelids and eyebrows exhibited no abnormalities.  Pupils were not pin-point.  Sclera was without icterus.  Lungs: Respiration rhythm and depth was normal.  Respiratory movements were normal without labored breathing.  Cardiovascular: No peripheral edema was present.    Neurological: Patient was oriented to time, place, and person.  Speech was normal.  Balance, gait, and stance were unremarkable.    Psychiatric: Appearance was normal with appropriate dress.  Mood was euthymic and affect was normal.  Skin: Affected regions were without ecchymosis or skin lesions.      Assessment/Plan   Diagnoses and all orders for this visit:  Cancer related pain  Cervical cancer, FIGO stage RONIT (Multi)  Encounter for long-term use of opiate analgesic  64-year-old female with PMH of bladder cancer s/p nephrostomy tubes, cervical cancer on chemotherapy, and herniated lumbar disc presenting today for follow-up.  Patient was maintained on fairly low dose of oxycodone 2 to 3/day for this cancer related pain.  As well as also gabapentin and tizanidine.  She is up-to-date on a controlled substance agreement and urine drug screen.  I did reiterate that I do need that we see her in the office once every 3 months.  If her oncologist wishes to take over this medication since she is seeing him or her more frequently that would be reasonable but I am more than happy to continue managing her medications as well.  No changes needed today  but we will continue to monitor and make adjustments as needed in the future.  Plan for today:  - Patient does not need a refill of her oxycodone today but we will refill it over the phone when she needs it in the next couple of weeks likely.  OARRS reviewed no issues.  Up-to-date on CSA and UDS.  - Continue with gabapentin and tizanidine.  - Follow-up in 3 months or sooner for issues.         This note was generated with the aid of dictation software, there may be typos despite my attempts at proofreading.

## 2024-08-07 ENCOUNTER — LAB (OUTPATIENT)
Dept: LAB | Facility: LAB | Age: 64
End: 2024-08-07
Payer: COMMERCIAL

## 2024-08-07 DIAGNOSIS — C53.9 CERVICAL CANCER, FIGO STAGE IVA (MULTI): ICD-10-CM

## 2024-08-07 LAB
BASOPHILS # BLD AUTO: 0.05 X10*3/UL (ref 0–0.1)
BASOPHILS NFR BLD AUTO: 2.3 %
EOSINOPHIL # BLD AUTO: 0.19 X10*3/UL (ref 0–0.7)
EOSINOPHIL NFR BLD AUTO: 8.6 %
ERYTHROCYTE [DISTWIDTH] IN BLOOD BY AUTOMATED COUNT: 18.8 % (ref 11.5–14.5)
HCT VFR BLD AUTO: 28.3 % (ref 36–46)
HGB BLD-MCNC: 8.6 G/DL (ref 12–16)
IMM GRANULOCYTES # BLD AUTO: 0.02 X10*3/UL (ref 0–0.7)
IMM GRANULOCYTES NFR BLD AUTO: 0.9 % (ref 0–0.9)
LYMPHOCYTES # BLD AUTO: 0.44 X10*3/UL (ref 1.2–4.8)
LYMPHOCYTES NFR BLD AUTO: 19.9 %
MCH RBC QN AUTO: 31.9 PG (ref 26–34)
MCHC RBC AUTO-ENTMCNC: 30.4 G/DL (ref 32–36)
MCV RBC AUTO: 105 FL (ref 80–100)
MONOCYTES # BLD AUTO: 0.12 X10*3/UL (ref 0.1–1)
MONOCYTES NFR BLD AUTO: 5.4 %
NEUTROPHILS # BLD AUTO: 1.39 X10*3/UL (ref 1.2–7.7)
NEUTROPHILS NFR BLD AUTO: 62.9 %
NRBC BLD-RTO: 0 /100 WBCS (ref 0–0)
PLATELET # BLD AUTO: 137 X10*3/UL (ref 150–450)
RBC # BLD AUTO: 2.7 X10*6/UL (ref 4–5.2)
WBC # BLD AUTO: 2.2 X10*3/UL (ref 4.4–11.3)

## 2024-08-07 PROCEDURE — 85025 COMPLETE CBC W/AUTO DIFF WBC: CPT

## 2024-08-08 ENCOUNTER — INFUSION (OUTPATIENT)
Dept: HEMATOLOGY/ONCOLOGY | Facility: CLINIC | Age: 64
End: 2024-08-08
Payer: COMMERCIAL

## 2024-08-08 VITALS
OXYGEN SATURATION: 97 % | DIASTOLIC BLOOD PRESSURE: 76 MMHG | HEART RATE: 81 BPM | RESPIRATION RATE: 18 BRPM | SYSTOLIC BLOOD PRESSURE: 121 MMHG | TEMPERATURE: 97.5 F | BODY MASS INDEX: 29.82 KG/M2 | WEIGHT: 185.08 LBS

## 2024-08-08 DIAGNOSIS — C53.9 CERVICAL CANCER, FIGO STAGE IVA (MULTI): ICD-10-CM

## 2024-08-08 PROCEDURE — 96375 TX/PRO/DX INJ NEW DRUG ADDON: CPT | Mod: INF

## 2024-08-08 PROCEDURE — 2500000004 HC RX 250 GENERAL PHARMACY W/ HCPCS (ALT 636 FOR OP/ED): Performed by: STUDENT IN AN ORGANIZED HEALTH CARE EDUCATION/TRAINING PROGRAM

## 2024-08-08 PROCEDURE — 96413 CHEMO IV INFUSION 1 HR: CPT

## 2024-08-08 RX ORDER — HEPARIN SODIUM,PORCINE/PF 10 UNIT/ML
50 SYRINGE (ML) INTRAVENOUS AS NEEDED
Status: DISCONTINUED | OUTPATIENT
Start: 2024-08-08 | End: 2024-08-08 | Stop reason: HOSPADM

## 2024-08-08 RX ORDER — HEPARIN 100 UNIT/ML
500 SYRINGE INTRAVENOUS AS NEEDED
Status: DISCONTINUED | OUTPATIENT
Start: 2024-08-08 | End: 2024-08-08 | Stop reason: HOSPADM

## 2024-08-08 RX ORDER — HEPARIN SODIUM,PORCINE/PF 10 UNIT/ML
50 SYRINGE (ML) INTRAVENOUS AS NEEDED
OUTPATIENT
Start: 2024-08-08

## 2024-08-08 RX ORDER — FAMOTIDINE 10 MG/ML
20 INJECTION INTRAVENOUS ONCE AS NEEDED
Status: DISCONTINUED | OUTPATIENT
Start: 2024-08-08 | End: 2024-08-08 | Stop reason: HOSPADM

## 2024-08-08 RX ORDER — PROCHLORPERAZINE EDISYLATE 5 MG/ML
10 INJECTION INTRAMUSCULAR; INTRAVENOUS EVERY 6 HOURS PRN
Status: DISCONTINUED | OUTPATIENT
Start: 2024-08-08 | End: 2024-08-08 | Stop reason: HOSPADM

## 2024-08-08 RX ORDER — PROCHLORPERAZINE MALEATE 10 MG
10 TABLET ORAL EVERY 6 HOURS PRN
Status: DISCONTINUED | OUTPATIENT
Start: 2024-08-08 | End: 2024-08-08 | Stop reason: HOSPADM

## 2024-08-08 RX ORDER — EPINEPHRINE 0.3 MG/.3ML
0.3 INJECTION SUBCUTANEOUS EVERY 5 MIN PRN
Status: DISCONTINUED | OUTPATIENT
Start: 2024-08-08 | End: 2024-08-08 | Stop reason: HOSPADM

## 2024-08-08 RX ORDER — DIPHENHYDRAMINE HYDROCHLORIDE 50 MG/ML
50 INJECTION INTRAMUSCULAR; INTRAVENOUS AS NEEDED
Status: DISCONTINUED | OUTPATIENT
Start: 2024-08-08 | End: 2024-08-08 | Stop reason: HOSPADM

## 2024-08-08 RX ORDER — HEPARIN 100 UNIT/ML
500 SYRINGE INTRAVENOUS AS NEEDED
OUTPATIENT
Start: 2024-08-08

## 2024-08-08 RX ORDER — ALBUTEROL SULFATE 0.83 MG/ML
3 SOLUTION RESPIRATORY (INHALATION) AS NEEDED
Status: DISCONTINUED | OUTPATIENT
Start: 2024-08-08 | End: 2024-08-08 | Stop reason: HOSPADM

## 2024-08-08 RX ORDER — ONDANSETRON HYDROCHLORIDE 2 MG/ML
8 INJECTION, SOLUTION INTRAVENOUS ONCE
Status: COMPLETED | OUTPATIENT
Start: 2024-08-08 | End: 2024-08-08

## 2024-08-08 ASSESSMENT — PAIN SCALES - GENERAL: PAINLEVEL: 0-NO PAIN

## 2024-08-12 ENCOUNTER — TELEPHONE (OUTPATIENT)
Dept: GYNECOLOGIC ONCOLOGY | Facility: HOSPITAL | Age: 64
End: 2024-08-12
Payer: COMMERCIAL

## 2024-08-12 NOTE — TELEPHONE ENCOUNTER
Patient called with concern that she has had an increase in her usual vaginal bleeding and the color has changed to light to darker red. She has saturated a pad in almost an hour. She sees tiny clots. She is not having cramping. She was instructed to get off of her feet and rest for a half an hour. If she continues to have the same amount of bleeding when she becomes active, she should go to her local ED. She prefers not to come to  as direct admit and will go to [facility] ED. Dr. Fisher notified.

## 2024-08-14 ENCOUNTER — LAB (OUTPATIENT)
Dept: LAB | Facility: LAB | Age: 64
End: 2024-08-14
Payer: COMMERCIAL

## 2024-08-14 ENCOUNTER — TELEPHONE (OUTPATIENT)
Dept: GYNECOLOGIC ONCOLOGY | Facility: HOSPITAL | Age: 64
End: 2024-08-14
Payer: COMMERCIAL

## 2024-08-14 DIAGNOSIS — C53.9 CERVICAL CANCER, FIGO STAGE IVA (MULTI): ICD-10-CM

## 2024-08-14 LAB
BASOPHILS # BLD MANUAL: 0.01 X10*3/UL (ref 0–0.1)
BASOPHILS NFR BLD MANUAL: 1.8 %
EOSINOPHIL # BLD MANUAL: 0.04 X10*3/UL (ref 0–0.7)
EOSINOPHIL NFR BLD MANUAL: 14.3 %
ERYTHROCYTE [DISTWIDTH] IN BLOOD BY AUTOMATED COUNT: 17.3 % (ref 11.5–14.5)
HCT VFR BLD AUTO: 21.9 % (ref 36–46)
HGB BLD-MCNC: 6.8 G/DL (ref 12–16)
IMM GRANULOCYTES # BLD AUTO: 0 X10*3/UL (ref 0–0.7)
IMM GRANULOCYTES NFR BLD AUTO: 0 % (ref 0–0.9)
LYMPHOCYTES # BLD MANUAL: 0.19 X10*3/UL (ref 1.2–4.8)
LYMPHOCYTES NFR BLD MANUAL: 64.3 %
MCH RBC QN AUTO: 31.9 PG (ref 26–34)
MCHC RBC AUTO-ENTMCNC: 31.1 G/DL (ref 32–36)
MCV RBC AUTO: 103 FL (ref 80–100)
MONOCYTES # BLD MANUAL: 0.01 X10*3/UL (ref 0.1–1)
MONOCYTES NFR BLD MANUAL: 1.8 %
NEUTS SEG # BLD MANUAL: 0.01 X10*3/UL (ref 1.2–7)
NEUTS SEG NFR BLD MANUAL: 3.5 %
NRBC BLD-RTO: 0 /100 WBCS (ref 0–0)
PLATELET # BLD AUTO: 29 X10*3/UL (ref 150–450)
RBC # BLD AUTO: 2.13 X10*6/UL (ref 4–5.2)
RBC MORPH BLD: ABNORMAL
TOTAL CELLS COUNTED BLD: 56
VARIANT LYMPHS # BLD MANUAL: 0.04 X10*3/UL (ref 0–0.5)
VARIANT LYMPHS NFR BLD: 14.3 %
WBC # BLD AUTO: 0.3 X10*3/UL (ref 4.4–11.3)

## 2024-08-14 PROCEDURE — 85007 BL SMEAR W/DIFF WBC COUNT: CPT

## 2024-08-14 PROCEDURE — 85027 COMPLETE CBC AUTOMATED: CPT

## 2024-08-14 NOTE — TELEPHONE ENCOUNTER
Patient LM that she is not feeling well. She LM that her bleeding had stopped and she did not need to go to the ED. She reports that she is having diarrhea. Call to patient and had to leave message. LM that Topotecan can cause diarrhea. LM that needed to know how many times in a day and if she has been on any ATBs. If she has had more than 3 times in a day and has not been on ATBs, then she can take an imodium. She was instructed to make sure she is increasing her fluids. Requested that she call back to discuss further.

## 2024-08-15 ENCOUNTER — TELEPHONE (OUTPATIENT)
Dept: GYNECOLOGIC ONCOLOGY | Facility: HOSPITAL | Age: 64
End: 2024-08-15
Payer: COMMERCIAL

## 2024-08-15 ENCOUNTER — APPOINTMENT (OUTPATIENT)
Dept: GYNECOLOGIC ONCOLOGY | Facility: CLINIC | Age: 64
End: 2024-08-15
Payer: COMMERCIAL

## 2024-08-15 ENCOUNTER — TELEPHONE (OUTPATIENT)
Dept: HEMATOLOGY/ONCOLOGY | Facility: CLINIC | Age: 64
End: 2024-08-15
Payer: COMMERCIAL

## 2024-08-15 ENCOUNTER — APPOINTMENT (OUTPATIENT)
Dept: HEMATOLOGY/ONCOLOGY | Facility: CLINIC | Age: 64
End: 2024-08-15
Payer: COMMERCIAL

## 2024-08-15 ENCOUNTER — APPOINTMENT (OUTPATIENT)
Dept: RADIOLOGY | Facility: HOSPITAL | Age: 64
End: 2024-08-15
Payer: COMMERCIAL

## 2024-08-15 ENCOUNTER — HOSPITAL ENCOUNTER (INPATIENT)
Facility: HOSPITAL | Age: 64
LOS: 6 days | Discharge: HOME HEALTH | End: 2024-08-21
Attending: STUDENT IN AN ORGANIZED HEALTH CARE EDUCATION/TRAINING PROGRAM | Admitting: INTERNAL MEDICINE
Payer: COMMERCIAL

## 2024-08-15 ENCOUNTER — HOSPITAL ENCOUNTER (EMERGENCY)
Facility: HOSPITAL | Age: 64
Discharge: OTHER INSTITUTION | End: 2024-08-15
Attending: STUDENT IN AN ORGANIZED HEALTH CARE EDUCATION/TRAINING PROGRAM
Payer: COMMERCIAL

## 2024-08-15 VITALS
BODY MASS INDEX: 29.57 KG/M2 | OXYGEN SATURATION: 94 % | HEART RATE: 71 BPM | DIASTOLIC BLOOD PRESSURE: 67 MMHG | TEMPERATURE: 97.5 F | WEIGHT: 184 LBS | SYSTOLIC BLOOD PRESSURE: 90 MMHG | RESPIRATION RATE: 15 BRPM | HEIGHT: 66 IN

## 2024-08-15 DIAGNOSIS — R50.81 NEUTROPENIC FEVER (CMS-HCC): Primary | ICD-10-CM

## 2024-08-15 DIAGNOSIS — D61.818 PANCYTOPENIA (MULTI): ICD-10-CM

## 2024-08-15 DIAGNOSIS — R60.0 BILATERAL LOWER EXTREMITY EDEMA: ICD-10-CM

## 2024-08-15 DIAGNOSIS — G89.3 CANCER RELATED PAIN: ICD-10-CM

## 2024-08-15 DIAGNOSIS — D64.9 ANEMIA, UNSPECIFIED TYPE: ICD-10-CM

## 2024-08-15 DIAGNOSIS — N17.9 ACUTE KIDNEY INJURY (CMS-HCC): Primary | ICD-10-CM

## 2024-08-15 DIAGNOSIS — E86.0 DEHYDRATION: ICD-10-CM

## 2024-08-15 DIAGNOSIS — D70.9 NEUTROPENIC FEVER (CMS-HCC): Primary | ICD-10-CM

## 2024-08-15 DIAGNOSIS — R19.7 DIARRHEA, UNSPECIFIED TYPE: ICD-10-CM

## 2024-08-15 DIAGNOSIS — C53.9 CERVICAL CANCER, FIGO STAGE IVA (MULTI): ICD-10-CM

## 2024-08-15 LAB
ABO GROUP (TYPE) IN BLOOD: NORMAL
ABO GROUP (TYPE) IN BLOOD: NORMAL
ALBUMIN SERPL BCP-MCNC: 2.8 G/DL (ref 3.4–5)
ALBUMIN SERPL-MCNC: 3.2 G/DL (ref 3.5–5)
ALP BLD-CCNC: 73 U/L (ref 35–125)
ALP SERPL-CCNC: 56 U/L (ref 33–136)
ALT SERPL W P-5'-P-CCNC: 13 U/L (ref 7–45)
ALT SERPL-CCNC: 14 U/L (ref 5–40)
AMORPH CRY #/AREA UR COMP ASSIST: ABNORMAL /HPF
ANION GAP SERPL CALC-SCNC: 16 MMOL/L
ANION GAP SERPL CALC-SCNC: 20 MMOL/L (ref 10–20)
ANTIBODY SCREEN: NORMAL
APPEARANCE UR: ABNORMAL
AST SERPL W P-5'-P-CCNC: 26 U/L (ref 9–39)
AST SERPL-CCNC: 31 U/L (ref 5–40)
BACTERIA #/AREA URNS AUTO: ABNORMAL /HPF
BASOPHILS # BLD AUTO: 0 X10*3/UL (ref 0–0.1)
BASOPHILS # BLD MANUAL: 0 X10*3/UL (ref 0–0.1)
BASOPHILS NFR BLD AUTO: 0 %
BASOPHILS NFR BLD MANUAL: 4 %
BILIRUB SERPL-MCNC: 0.4 MG/DL (ref 0.1–1.2)
BILIRUB SERPL-MCNC: 1 MG/DL (ref 0–1.2)
BILIRUB UR STRIP.AUTO-MCNC: NEGATIVE MG/DL
BLOOD EXPIRATION DATE: NORMAL
BUN SERPL-MCNC: 44 MG/DL (ref 8–25)
BUN SERPL-MCNC: 49 MG/DL (ref 6–23)
CALCIUM SERPL-MCNC: 6.9 MG/DL (ref 8.6–10.6)
CALCIUM SERPL-MCNC: 7.7 MG/DL (ref 8.5–10.4)
CHLORIDE SERPL-SCNC: 91 MMOL/L (ref 97–107)
CHLORIDE SERPL-SCNC: 97 MMOL/L (ref 98–107)
CO2 SERPL-SCNC: 21 MMOL/L (ref 21–32)
CO2 SERPL-SCNC: 22 MMOL/L (ref 24–31)
COLOR UR: YELLOW
CREAT SERPL-MCNC: 3.75 MG/DL (ref 0.5–1.05)
CREAT SERPL-MCNC: 4.1 MG/DL (ref 0.4–1.6)
CREAT UR-MCNC: 108 MG/DL
CREAT UR-MCNC: 108 MG/DL
DISPENSE STATUS: NORMAL
EGFRCR SERPLBLD CKD-EPI 2021: 12 ML/MIN/1.73M*2
EGFRCR SERPLBLD CKD-EPI 2021: 13 ML/MIN/1.73M*2
EOSINOPHIL # BLD AUTO: 0.02 X10*3/UL (ref 0–0.7)
EOSINOPHIL # BLD MANUAL: 0.02 X10*3/UL (ref 0–0.7)
EOSINOPHIL NFR BLD AUTO: 10.5 %
EOSINOPHIL NFR BLD MANUAL: 24 %
ERYTHROCYTE [DISTWIDTH] IN BLOOD BY AUTOMATED COUNT: 17.4 % (ref 11.5–14.5)
ERYTHROCYTE [DISTWIDTH] IN BLOOD BY AUTOMATED COUNT: 19.3 % (ref 11.5–14.5)
GLUCOSE SERPL-MCNC: 131 MG/DL (ref 65–99)
GLUCOSE SERPL-MCNC: 82 MG/DL (ref 74–99)
GLUCOSE UR STRIP.AUTO-MCNC: NORMAL MG/DL
HCT VFR BLD AUTO: 18.4 % (ref 36–46)
HCT VFR BLD AUTO: 20.1 % (ref 36–46)
HGB BLD-MCNC: 6 G/DL (ref 12–16)
HGB BLD-MCNC: 6.9 G/DL (ref 12–16)
IMM GRANULOCYTES # BLD AUTO: 0 X10*3/UL (ref 0–0.7)
IMM GRANULOCYTES # BLD AUTO: 0 X10*3/UL (ref 0–0.7)
IMM GRANULOCYTES NFR BLD AUTO: 0 % (ref 0–0.9)
IMM GRANULOCYTES NFR BLD AUTO: 0 % (ref 0–0.9)
INR PPP: 1.2 (ref 0.9–1.1)
KETONES UR STRIP.AUTO-MCNC: NEGATIVE MG/DL
LACTATE BLDV-SCNC: 1.3 MMOL/L (ref 0.4–2)
LACTATE SERPL-SCNC: 2.1 MMOL/L (ref 0.4–2)
LEUKOCYTE ESTERASE UR QL STRIP.AUTO: ABNORMAL
LYMPHOCYTES # BLD AUTO: 0.14 X10*3/UL (ref 1.2–4.8)
LYMPHOCYTES # BLD MANUAL: 0.06 X10*3/UL (ref 1.2–4.8)
LYMPHOCYTES NFR BLD AUTO: 73.7 %
LYMPHOCYTES NFR BLD MANUAL: 62 %
MCH RBC QN AUTO: 31.2 PG (ref 26–34)
MCH RBC QN AUTO: 32.8 PG (ref 26–34)
MCHC RBC AUTO-ENTMCNC: 32.6 G/DL (ref 32–36)
MCHC RBC AUTO-ENTMCNC: 34.3 G/DL (ref 32–36)
MCV RBC AUTO: 101 FL (ref 80–100)
MCV RBC AUTO: 91 FL (ref 80–100)
MONOCYTES # BLD AUTO: 0.01 X10*3/UL (ref 0.1–1)
MONOCYTES # BLD MANUAL: 0 X10*3/UL (ref 0.1–1)
MONOCYTES NFR BLD AUTO: 5.3 %
MONOCYTES NFR BLD MANUAL: 0 %
NEUTROPHILS # BLD AUTO: 0.02 X10*3/UL (ref 1.2–7.7)
NEUTROPHILS NFR BLD AUTO: 10.5 %
NEUTS SEG # BLD MANUAL: 0 X10*3/UL (ref 1.2–7)
NEUTS SEG NFR BLD MANUAL: 2 %
NITRITE UR QL STRIP.AUTO: NEGATIVE
NRBC BLD-RTO: 0 /100 WBCS (ref 0–0)
NRBC BLD-RTO: 0 /100 WBCS (ref 0–0)
PATH REVIEW-CBC DIFFERENTIAL: NORMAL
PH UR STRIP.AUTO: 6 [PH]
PLATELET # BLD AUTO: 10 X10*3/UL (ref 150–450)
PLATELET # BLD AUTO: 15 X10*3/UL (ref 150–450)
POTASSIUM SERPL-SCNC: 4.1 MMOL/L (ref 3.4–5.1)
POTASSIUM SERPL-SCNC: 4.5 MMOL/L (ref 3.5–5.3)
PRODUCT BLOOD TYPE: 6200
PRODUCT CODE: NORMAL
PROT SERPL-MCNC: 5.4 G/DL (ref 6.4–8.2)
PROT SERPL-MCNC: 6.7 G/DL (ref 5.9–7.9)
PROT UR STRIP.AUTO-MCNC: ABNORMAL MG/DL
PROTHROMBIN TIME: 13.1 SECONDS (ref 9.8–12.8)
RBC # BLD AUTO: 1.83 X10*6/UL (ref 4–5.2)
RBC # BLD AUTO: 2.21 X10*6/UL (ref 4–5.2)
RBC # UR STRIP.AUTO: ABNORMAL /UL
RBC #/AREA URNS AUTO: >20 /HPF
RBC MORPH BLD: ABNORMAL
RBC MORPH BLD: NORMAL
RH FACTOR (ANTIGEN D): NORMAL
RH FACTOR (ANTIGEN D): NORMAL
SODIUM SERPL-SCNC: 129 MMOL/L (ref 133–145)
SODIUM SERPL-SCNC: 133 MMOL/L (ref 136–145)
SODIUM UR-SCNC: 26 MMOL/L
SODIUM/CREAT UR-RTO: 24 MMOL/G CREAT
SP GR UR STRIP.AUTO: 1.01
TOTAL CELLS COUNTED BLD: 50
UNIT ABO: NORMAL
UNIT NUMBER: NORMAL
UNIT RH: NORMAL
UNIT VOLUME: 350
UROBILINOGEN UR STRIP.AUTO-MCNC: NORMAL MG/DL
VARIANT LYMPHS # BLD MANUAL: 0.01 X10*3/UL (ref 0–0.5)
VARIANT LYMPHS NFR BLD: 8 %
WBC # BLD AUTO: 0.1 X10*3/UL (ref 4.4–11.3)
WBC # BLD AUTO: 0.2 X10*3/UL (ref 4.4–11.3)
WBC #/AREA URNS AUTO: ABNORMAL /HPF
XM INTEP: NORMAL

## 2024-08-15 PROCEDURE — 85007 BL SMEAR W/DIFF WBC COUNT: CPT | Performed by: PHYSICIAN ASSISTANT

## 2024-08-15 PROCEDURE — 86901 BLOOD TYPING SEROLOGIC RH(D): CPT | Performed by: STUDENT IN AN ORGANIZED HEALTH CARE EDUCATION/TRAINING PROGRAM

## 2024-08-15 PROCEDURE — 96366 THER/PROPH/DIAG IV INF ADDON: CPT

## 2024-08-15 PROCEDURE — 74176 CT ABD & PELVIS W/O CONTRAST: CPT | Performed by: STUDENT IN AN ORGANIZED HEALTH CARE EDUCATION/TRAINING PROGRAM

## 2024-08-15 PROCEDURE — 87077 CULTURE AEROBIC IDENTIFY: CPT

## 2024-08-15 PROCEDURE — P9016 RBC LEUKOCYTES REDUCED: HCPCS

## 2024-08-15 PROCEDURE — 83605 ASSAY OF LACTIC ACID: CPT | Performed by: PHYSICIAN ASSISTANT

## 2024-08-15 PROCEDURE — 84075 ASSAY ALKALINE PHOSPHATASE: CPT | Performed by: PHYSICIAN ASSISTANT

## 2024-08-15 PROCEDURE — 85027 COMPLETE CBC AUTOMATED: CPT | Performed by: PHYSICIAN ASSISTANT

## 2024-08-15 PROCEDURE — 81001 URINALYSIS AUTO W/SCOPE: CPT

## 2024-08-15 PROCEDURE — 99285 EMERGENCY DEPT VISIT HI MDM: CPT | Mod: 25

## 2024-08-15 PROCEDURE — 1170000001 HC PRIVATE ONCOLOGY ROOM DAILY

## 2024-08-15 PROCEDURE — 36415 COLL VENOUS BLD VENIPUNCTURE: CPT | Performed by: PHYSICIAN ASSISTANT

## 2024-08-15 PROCEDURE — 87086 URINE CULTURE/COLONY COUNT: CPT

## 2024-08-15 PROCEDURE — 2500000004 HC RX 250 GENERAL PHARMACY W/ HCPCS (ALT 636 FOR OP/ED)

## 2024-08-15 PROCEDURE — 87040 BLOOD CULTURE FOR BACTERIA: CPT

## 2024-08-15 PROCEDURE — 83930 ASSAY OF BLOOD OSMOLALITY: CPT

## 2024-08-15 PROCEDURE — 74176 CT ABD & PELVIS W/O CONTRAST: CPT

## 2024-08-15 PROCEDURE — 86920 COMPATIBILITY TEST SPIN: CPT

## 2024-08-15 PROCEDURE — 85025 COMPLETE CBC W/AUTO DIFF WBC: CPT

## 2024-08-15 PROCEDURE — 96365 THER/PROPH/DIAG IV INF INIT: CPT | Mod: 59

## 2024-08-15 PROCEDURE — 87186 SC STD MICRODIL/AGAR DIL: CPT

## 2024-08-15 PROCEDURE — 84300 ASSAY OF URINE SODIUM: CPT

## 2024-08-15 PROCEDURE — 2500000004 HC RX 250 GENERAL PHARMACY W/ HCPCS (ALT 636 FOR OP/ED): Mod: JZ | Performed by: PHYSICIAN ASSISTANT

## 2024-08-15 PROCEDURE — 96361 HYDRATE IV INFUSION ADD-ON: CPT

## 2024-08-15 PROCEDURE — 83935 ASSAY OF URINE OSMOLALITY: CPT

## 2024-08-15 PROCEDURE — 80053 COMPREHEN METABOLIC PANEL: CPT

## 2024-08-15 PROCEDURE — 36430 TRANSFUSION BLD/BLD COMPNT: CPT

## 2024-08-15 PROCEDURE — 85610 PROTHROMBIN TIME: CPT

## 2024-08-15 PROCEDURE — 87040 BLOOD CULTURE FOR BACTERIA: CPT | Mod: WESLAB | Performed by: PHYSICIAN ASSISTANT

## 2024-08-15 PROCEDURE — 81003 URINALYSIS AUTO W/O SCOPE: CPT | Performed by: STUDENT IN AN ORGANIZED HEALTH CARE EDUCATION/TRAINING PROGRAM

## 2024-08-15 PROCEDURE — 87086 URINE CULTURE/COLONY COUNT: CPT | Mod: WESLAB | Performed by: STUDENT IN AN ORGANIZED HEALTH CARE EDUCATION/TRAINING PROGRAM

## 2024-08-15 PROCEDURE — 83605 ASSAY OF LACTIC ACID: CPT

## 2024-08-15 RX ORDER — CEFEPIME 1 G/50ML
1 INJECTION, SOLUTION INTRAVENOUS ONCE
Status: DISCONTINUED | OUTPATIENT
Start: 2024-08-15 | End: 2024-08-15

## 2024-08-15 RX ORDER — SODIUM CHLORIDE 9 MG/ML
125 INJECTION, SOLUTION INTRAVENOUS CONTINUOUS
Status: DISCONTINUED | OUTPATIENT
Start: 2024-08-15 | End: 2024-08-16

## 2024-08-15 RX ORDER — OXYCODONE HYDROCHLORIDE 5 MG/1
5 TABLET ORAL EVERY 4 HOURS PRN
Status: DISCONTINUED | OUTPATIENT
Start: 2024-08-15 | End: 2024-08-21 | Stop reason: HOSPADM

## 2024-08-15 RX ORDER — ONDANSETRON 4 MG/1
4 TABLET, FILM COATED ORAL EVERY 6 HOURS PRN
Status: DISCONTINUED | OUTPATIENT
Start: 2024-08-15 | End: 2024-08-21 | Stop reason: HOSPADM

## 2024-08-15 RX ORDER — HEPARIN SODIUM 5000 [USP'U]/ML
5000 INJECTION, SOLUTION INTRAVENOUS; SUBCUTANEOUS EVERY 8 HOURS
Status: DISCONTINUED | OUTPATIENT
Start: 2024-08-15 | End: 2024-08-15

## 2024-08-15 RX ORDER — VANCOMYCIN 2 G/400ML
2 INJECTION, SOLUTION INTRAVENOUS ONCE
Status: COMPLETED | OUTPATIENT
Start: 2024-08-15 | End: 2024-08-15

## 2024-08-15 RX ORDER — ONDANSETRON HYDROCHLORIDE 2 MG/ML
4 INJECTION, SOLUTION INTRAVENOUS EVERY 6 HOURS PRN
Status: DISCONTINUED | OUTPATIENT
Start: 2024-08-15 | End: 2024-08-21 | Stop reason: HOSPADM

## 2024-08-15 RX ORDER — LEVOTHYROXINE SODIUM 25 UG/1
25 TABLET ORAL DAILY
Status: DISCONTINUED | OUTPATIENT
Start: 2024-08-16 | End: 2024-08-21 | Stop reason: HOSPADM

## 2024-08-15 RX ORDER — DULOXETIN HYDROCHLORIDE 20 MG/1
20 CAPSULE, DELAYED RELEASE ORAL DAILY
Status: DISCONTINUED | OUTPATIENT
Start: 2024-08-16 | End: 2024-08-21 | Stop reason: HOSPADM

## 2024-08-15 RX ORDER — METOCLOPRAMIDE HYDROCHLORIDE 5 MG/ML
10 INJECTION INTRAMUSCULAR; INTRAVENOUS EVERY 6 HOURS PRN
Status: DISCONTINUED | OUTPATIENT
Start: 2024-08-15 | End: 2024-08-21 | Stop reason: HOSPADM

## 2024-08-15 RX ORDER — TRAZODONE HYDROCHLORIDE 50 MG/1
50 TABLET ORAL NIGHTLY PRN
Status: DISCONTINUED | OUTPATIENT
Start: 2024-08-15 | End: 2024-08-21 | Stop reason: HOSPADM

## 2024-08-15 RX ORDER — GABAPENTIN 300 MG/1
900 CAPSULE ORAL 3 TIMES DAILY
Status: DISCONTINUED | OUTPATIENT
Start: 2024-08-15 | End: 2024-08-19

## 2024-08-15 RX ORDER — HYDROXYZINE HYDROCHLORIDE 25 MG/1
25 TABLET, FILM COATED ORAL 3 TIMES DAILY PRN
Status: DISCONTINUED | OUTPATIENT
Start: 2024-08-15 | End: 2024-08-21 | Stop reason: HOSPADM

## 2024-08-15 RX ORDER — ACETAMINOPHEN 325 MG/1
975 TABLET ORAL EVERY 6 HOURS PRN
Status: DISCONTINUED | OUTPATIENT
Start: 2024-08-15 | End: 2024-08-21 | Stop reason: HOSPADM

## 2024-08-15 RX ORDER — METOCLOPRAMIDE 10 MG/1
10 TABLET ORAL EVERY 6 HOURS PRN
Status: DISCONTINUED | OUTPATIENT
Start: 2024-08-15 | End: 2024-08-21 | Stop reason: HOSPADM

## 2024-08-15 ASSESSMENT — PAIN - FUNCTIONAL ASSESSMENT: PAIN_FUNCTIONAL_ASSESSMENT: 0-10

## 2024-08-15 ASSESSMENT — PAIN DESCRIPTION - ORIENTATION: ORIENTATION: LEFT

## 2024-08-15 ASSESSMENT — PAIN SCALES - GENERAL
PAINLEVEL_OUTOF10: 8
PAINLEVEL_OUTOF10: 8

## 2024-08-15 ASSESSMENT — PAIN DESCRIPTION - DESCRIPTORS: DESCRIPTORS: THROBBING;STABBING

## 2024-08-15 ASSESSMENT — PAIN DESCRIPTION - LOCATION: LOCATION: ABDOMEN

## 2024-08-15 ASSESSMENT — PAIN DESCRIPTION - PAIN TYPE: TYPE: ACUTE PAIN

## 2024-08-15 NOTE — TELEPHONE ENCOUNTER
Patient's ANC=1, Plts=29K, Hgb=6.8. She was called by Dr. Jimenez last night and told not to have her week #3 Topotecan today. She should still have her On Pro Neulasta. Called patient and spoke to her  and patient. Patient has had left flank pain for 3 days where her nephrostomy tube is located. She has been taking tylenol every 6 hours for this. She has been shivering yesterday and last night. She has not taken her temperature. Requested that temperature be taken during call. Temp is 99.9. Last tylenol was taken last night at bedtime. Since she was shivering last night and on tylenol every 6 hours and ANC=1, there is a concern that she has neutropenic fever and possible urosepsis. Recommend that she go to the ED since we cannot admit to any of her local hospitals and she does not care to come to [facility] She will go to [facility] ED for evaluation. Called [facility] ED and provided report to triage nurse. Dr. Fisher and team notified.

## 2024-08-15 NOTE — TELEPHONE ENCOUNTER
Reviewing chart for topotecan today noted labs draw 8/14/24 WBC is 0.3, H/H 6.8/21.9, PLT 29, ANC 0.01, Dr. Fisher confirmed that that GYN service was notified yesterday and pt will not be receiving treatment today

## 2024-08-15 NOTE — ED TRIAGE NOTES
Pt has been having left sided fank pain around the nephrostomy tube. Pt has bilateral tubes placed. Pt last took 1000mg tylenol around 1100 for the pain. Pt has low plt and hgb per pcp. Pt has been having fevers.

## 2024-08-15 NOTE — ED PROVIDER NOTES
ED Supervising Attending Note & Attestation   I was the Supervising Physician. I have seen face to face and examined the patient; reviewed history and physical, performed a substantive portion of the encounter, and discussed the medical decision making with the Medical Student, Resident, Fellow, PA and/or NP.     I personally saw the patient and made/approve the management plan and take responsibility for the patient management:     64-year-old female with past medical history of cervical and bladder cancer on chemotherapy presenting to the emergency room with weakness.  Patient last got chemotherapy on Thursday of last week and had blood work done today.  Patient has had multiple episodes of diarrhea and as well as some left-sided flank pain.  Patient has bilateral nephrostomy tubes and notes that typically the left-sided output is decreased but her right side is looking mildly discolored.  She otherwise denies any significant nausea or vomiting and has been attempting to drink water at home but does feel significantly weak with poor appetite.    ED Triage Vitals [08/15/24 1301]   Temperature Heart Rate Respirations BP   36.8 °C (98.2 °F) 80 18 (!) 70/47      Pulse Ox Temp Source Heart Rate Source Patient Position   97 % Temporal Monitor Sitting      BP Location FiO2 (%)     Left arm --       Vital signs reviewed: Afebrile, nontachycardic, hypotensive, 97% on room air    Exam     Constitutional: No acute distress. Resting comfortably.   Head: Normocephalic, atraumatic.   Eyes: Pupils equal bilaterally, EOM grossly intact, conjunctiva normal.  Mouth/Throat: Oropharynx is clear, moist mucus membranes.   Neck: Supple. No lymphadenopathy.  Cardiovascular: Regular rate and regular rhythm. Extremities are well-perfused.   Pulmonary/Chest: No respiratory distress, breathing comfortably on room air.    Abdominal: Soft, non-tender, non-distended. No rebound or guarding.  Nephrostomy tubes in place, small amount of clear  urine noted in bilateral nephrostomies.  Musculoskeletal: No lower extremity edema.       Skin: Warm, dry, and intact.   Neurological: Patient is oriented to person, place, time, and situation. Face symmetric, hearing intact to voice, speech normal. Moves all extremities.     Differential includes but is not limited to:  Dehydration versus electrolyte abnormality versus nephrostomy dysfunction versus intra-abdominal pathology versus pyelonephritis versus UTI versus viral gastroenteritis    Labs: ordered.  Labs Reviewed   BLOOD CULTURE   BLOOD CULTURE   CBC WITH AUTO DIFFERENTIAL   COMPREHENSIVE METABOLIC PANEL   BLOOD GAS LACTIC ACID, VENOUS   URINALYSIS WITH REFLEX CULTURE AND MICROSCOPIC    Narrative:     The following orders were created for panel order Urinalysis with Reflex Culture and Microscopic.  Procedure                               Abnormality         Status                     ---------                               -----------         ------                     Urinalysis with Reflex C...[[id number]]                                                 Extra Urine Gray Tube[[id number]]                                                         Please view results for these tests on the individual orders.   URINALYSIS WITH REFLEX CULTURE AND MICROSCOPIC   EXTRA URINE GRAY TUBE       Radiology: ordered and independent interpretation performed.  CT Scan(s) CT abdomen pelvis is interpreted by me shows no large dilated loops of bowel to suggest bowel obstruction.    ECG/medicine tests: ordered and independent interpretation performed.  Diagnoses as of 08/17/24 1409   Acute kidney injury (CMS-HCC)   Anemia, unspecified type   Pancytopenia (Multi)   Dehydration     Lab work as interpreted by me shows severe leukopenia, anemia, and thrombocytopenia consistent with pancytopenia.  Patient is undergoing active treatment at this time.  CMP otherwise shows mild hyponatremia, uptrending creatinine in the setting of likely  poor functioning nephrostomy tubes/dehydration as well as no LFT abnormalities.  Urine sent from nephrostomy tube with no obvious evidence of urinary tract infection.  Patient given broad-spectrum antibiotics given neutropenia as well as transfusion given significant anemia.    CT abdomen pelvis per radiology notable for a kinked nephrostomy tube likely leading to patient's now AIDE.    Given patient's cancer history spoke to oncologist who recommends transfer.    Discussion of management or test interpretation with external provider(s):  I personally discussed the patient's management with Dr. Fisher, oncologist, who will accept patient to [facility] for transfer.    Patient stable at this time for transfer.    ED Medications managed:    Medications   sodium chloride 0.9 % bolus 2,505 mL (has no administration in time range)   piperacillin-tazobactam (Zosyn) 4.5 g in dextrose (iso)  mL (has no administration in time range)   vancomycin (Xellia) 2 g in diluent combination  mL (has no administration in time range)       Harris Pérez MD  1:19 PM    Attending Emergency Physician  [facility] EMERGENCY MEDICINE             Harris Pérez MD  08/17/24 [id number]

## 2024-08-15 NOTE — ED PROVIDER NOTES
HPI   Chief Complaint   Patient presents with    Flank Pain    Fever       HPI    Patient is a 64-year-old female with a past medical history of bladder cancer status post nephrostomy tubes and cervical cancer on chemotherapy presenting for evaluation of general illness.  Patient states she has felt generally unwell over the past 3 to 4 days.  She has been complaining of bouts of diarrhea and general weakness.  She has also been complaining of pain from the left side of her nephrostomy tube and discoloration of the urine coming from her right sided nephrostomy tube.  Patient is presenting here with her boyfriend, who is concerned for infection.  Her last chemotherapy treatment was last Thursday.  Patient apparently had outpatient labs done yesterday where she was found to be pancytopenic.  She denies any chest pain or shortness of breath.  No abdominal pain.  No nausea or vomiting.    Patient History   Past Medical History:   Diagnosis Date    Abdominal distension (gaseous) 11/11/2022    Abdominal bloating    Anxiety     Body mass index (BMI)30.0-30.9, adult 08/07/2019    BMI 30.0-30.9,adult    Cervix cancer (Multi)     Chemotherapy-induced peripheral neuropathy (Multi)     Chronic kidney disease     Encounter for screening for malignant neoplasm of colon     Encounter for screening colonoscopy    Hypertension     Hypothyroidism     Personal history of other specified conditions     History of snoring    Tachycardia, unspecified     Tachycardia     Past Surgical History:   Procedure Laterality Date    CT GUIDED IMAGING FOR NEEDLE PLACEMENT  01/12/2023    CT GUIDED IMAGING FOR NEEDLE PLACEMENT [facility]    IR  NEPHROURETERAL PLACEMENT Bilateral 6/18/2024    IR  NEPHROURETERAL PLACEMENT 6/18/2024 [facility]    IR NEPHROSTOMY TUBE EXCHANGE  10/20/2023    IR NEPHROSTOMY TUBE EXCHANGE 10/20/2023 MD [facility] B[facility]    IR NEPHROSTOMY TUBE EXCHANGE  12/19/2023    IR NEPHROSTOMY TUBE EXCHANGE  2023 [facility]    MEDIPORT      NEPHROSTOMY      ORIF TIBIA FRACTURE Right     TRANSURETHRAL RESECTION OF BLADDER TUMOR      US GUIDED PERCUTANEOUS PLACEMENT  2022    US GUIDED PERCUTANEOUS PLACEMENT [facility]GAC[facility]    US GUIDED PERCUTANEOUS PLACEMENT  2023    US GUIDED PERCUTANEOUS PLACEMENT [facility]     Family History   Problem Relation Name Age of Onset    No Known Problems Mother      Other (Acute myocardial infarction) Father       Social History     Tobacco Use    Smoking status: Former     Current packs/day: 0.00     Average packs/day: 0.2 packs/day for 42.9 years (10.7 ttl pk-yrs)     Types: Cigarettes     Start date:      Quit date: 2019     Years since quittin.7     Passive exposure: Past    Smokeless tobacco: Never   Vaping Use    Vaping status: Never Used   Substance Use Topics    Alcohol use: Not Currently     Alcohol/week: 8.0 standard drinks of alcohol     Types: 4 Shots of liquor, 4 Standard drinks or equivalent per week     Comment: quit 2019    Drug use: Never       Physical Exam   ED Triage Vitals [08/15/24 1301]   Temperature Heart Rate Respirations BP   36.8 °C (98.2 °F) 80 18 (!) 70/47      Pulse Ox Temp Source Heart Rate Source Patient Position   97 % Temporal Monitor Sitting      BP Location FiO2 (%)     Left arm --       Physical Exam  Vitals and nursing note reviewed.   Constitutional:       Appearance: Normal appearance.   HENT:      Head: Normocephalic and atraumatic.   Eyes:      Extraocular Movements: Extraocular movements intact.      Conjunctiva/sclera: Conjunctivae normal.      Pupils: Pupils are equal, round, and reactive to light.   Cardiovascular:      Rate and Rhythm: Normal rate and regular rhythm.   Pulmonary:      Effort: Pulmonary effort is normal.      Breath sounds: Normal breath sounds.   Abdominal:      General: Abdomen is flat. Bowel sounds are normal.      Palpations: Abdomen is soft.   Neurological:      Mental  Status: She is alert.           ED Course & MDM   Diagnoses as of 08/15/24 1630   Acute kidney injury (CMS-HCC)   Anemia, unspecified type   Pancytopenia (Multi)   Dehydration                 No data recorded     [place] Coma Scale Score: 15 (08/15/24 1350 : Adrianna Ramsey, ABDOUL)                           Medical Decision Making    Parts of this chart have been completed using voice recognition software. Please excuse any errors of transcription. Despite the medical decision making time stamp above-my medical decision making has taken place during the patient's entire visit. My thought process and reason for plan has been formulated from the time that I saw the patient until the time of disposition and is not specific to one specific moment during their visit and furthermore my MDM encompasses this entire chart and not only this text box.      HPI: Detailed above.    Exam: A medically appropriate exam performed, outlined above, given the known history and presentation.    History obtained from: Patient    Social Determinants of Health considered during this visit: From home    EKG interpreted by my attending physician, reviewed by myself.    Labs Reviewed   CBC WITH AUTO DIFFERENTIAL - Abnormal       Result Value    WBC 0.1 (*)     nRBC 0.0      RBC 1.83 (*)     Hemoglobin 6.0 (*)     Hematocrit 18.4 (*)      (*)     MCH 32.8      MCHC 32.6      RDW 17.4 (*)     Platelets 15 (*)     Immature Granulocytes %, Automated 0.0      Immature Granulocytes Absolute, Automated 0.00     COMPREHENSIVE METABOLIC PANEL - Abnormal    Glucose 131 (*)     Sodium 129 (*)     Potassium 4.1      Chloride 91 (*)     Bicarbonate 22 (*)     Urea Nitrogen 44 (*)     Creatinine 4.10 (*)     eGFR 12 (*)     Calcium 7.7 (*)     Albumin 3.2 (*)     Alkaline Phosphatase 73      Total Protein 6.7      AST 31      Bilirubin, Total 0.4      ALT 14      Anion Gap 16     MANUAL DIFFERENTIAL - Abnormal    Neutrophils %, Manual 2.0      Lymphocytes  %, Manual 62.0      Monocytes %, Manual 0.0      Eosinophils %, Manual 24.0      Basophils %, Manual 4.0      Atypical Lymphocytes %, Manual 8.0      Seg Neutrophils Absolute, Manual 0.00 (*)     Lymphocytes Absolute, Manual 0.06 (*)     Monocytes Absolute, Manual 0.00 (*)     Eosinophils Absolute, Manual 0.02      Basophils Absolute, Manual 0.00      Atypical Lymphs Absolute, Manual 0.01      Total Cells Counted 50      RBC Morphology No significant RBC morphology present     BLOOD GAS LACTIC ACID, VENOUS - Normal    POCT Lactate, Venous 1.3     BLOOD CULTURE   BLOOD CULTURE   TYPE AND SCREEN    ABO TYPE AB      Rh TYPE POS      ANTIBODY SCREEN NEG     VERAB/VERIFY ABORH    ABO TYPE AB      Rh TYPE POS     URINALYSIS WITH REFLEX CULTURE AND MICROSCOPIC    Narrative:     The following orders were created for panel order Urinalysis with Reflex Culture and Microscopic.  Procedure                               Abnormality         Status                     ---------                               -----------         ------                     Urinalysis with Reflex C...[[id number]]                      In process                 Extra Urine Gray Tube[[id number]]                            In process                   Please view results for these tests on the individual orders.   URINALYSIS WITH REFLEX CULTURE AND MICROSCOPIC   EXTRA URINE GRAY TUBE   MICROSCOPIC ONLY, URINE   PREPARE RBC    PRODUCT CODE [id number]      Unit Number [id number]      Unit ABO A      Unit RH POS      XM INTEP COMP      Dispense Status XM      Blood Expiration Date 8/26/2024 11:59:00 PM EDT      PRODUCT BLOOD TYPE 6200      UNIT VOLUME 350     PATH REVIEW-CBC DIFFERENTIAL    Pathologist Review-CBC Differential        Value: Pancytopenia with marked neutropenia and thrombocytopenia.  No platelet aggregates seen.       CT abdomen pelvis wo IV contrast   Final Result   No new hydronephrosis. Kinked left nephrostomy pigtail within the   left  lower pole collecting system, unchanged.        Increased size of peripherally calcified uterine mass and right   adnexal cystic structure. Stable left adnexal cystic structure.        Stable extensive abdominopelvic lymphadenopathy.        MACRO:   None        Signed by: Brannon Valerio 8/15/2024 3:16 PM   Dictation workstation:   [id number]        Medications   sodium chloride 0.9 % bolus 2,505 mL (0 mL intravenous Stopped 8/15/24 1604)   piperacillin-tazobactam (Zosyn) 4.5 g in dextrose (iso)  mL (0 g intravenous Stopped 8/15/24 1500)   vancomycin (Xellia) 2 g in diluent combination  mL (0 g intravenous Stopped 8/15/24 1604)     Differential diagnoses considered for this visit include: Electrolyte imbalance versus metabolic disturbance versus dehydration versus urinary tract infection versus sepsis    Considerations/further MDM:    Patient is a 64-year-old female presenting for evaluation of general weakness and diarrhea.  On arrival, patient was found to be hypotensive with a pressure of 82/58.  Afebrile.  No tachycardia or hypoxia.  Physical examination demonstrated well-nourished well-developed female no acute distress.  Given her history of cancer and concern for possible urosepsis, septic order set was ordered with cultures, lactic acid, broad-spectrum antibiotics and 30 cc/kg fluid bolus.  CBC demonstrated pancytopenia with a white count of 0.1, hemoglobin of 6.0, and platelets of 15.  CMP appreciated an acute kidney injury with a creatinine of 4.1 and a GFR of 12.  Patient's hemoglobin of 6, consent was obtained for transfusion. CT abdomen pelvis shows no new hydronephrosis.  There is a kinked left nephrostomy pigtail within the left lower pole collecting system that is unchanged.  There is an increased size of peripherally calcified uterine mass and right adnexal cystic structure with a stable left adnexal cystic structure.    Given patient's history, transfer was recommended to the [facility]. I spoke to Dr. Ashanti Fisher with gynecology oncology, and the patient was accepted to [facility] in good condition.    30 minutes of critical care time is independently billed for this encounter.  This is nonconcurrent with billable procedures.  This is used in the setting of life-threatening and life deteriorating illness.  Includes rapid evaluation of patient on arrival, interpretation of diagnostic testing, reassessment of the patient, monitoring of vital signs, and initiation of transfer to higher level of care.    Patient was seen in conjunction with attending physician Dr. Harris Pérez.   Patient's history, physical exam, diagnostic studies, and treatment plan were discussed thoroughly.  Procedure  Procedures     Liset Torres PA-C  08/15/24 7795

## 2024-08-16 ENCOUNTER — OFFICE VISIT (OUTPATIENT)
Dept: SURGICAL ONCOLOGY | Facility: HOSPITAL | Age: 64
End: 2024-08-16
Payer: COMMERCIAL

## 2024-08-16 ENCOUNTER — APPOINTMENT (OUTPATIENT)
Dept: RADIOLOGY | Facility: HOSPITAL | Age: 64
End: 2024-08-16
Payer: COMMERCIAL

## 2024-08-16 ENCOUNTER — APPOINTMENT (OUTPATIENT)
Dept: VASCULAR MEDICINE | Facility: HOSPITAL | Age: 64
DRG: 871 | End: 2024-08-16
Payer: COMMERCIAL

## 2024-08-16 LAB
ABO GROUP (TYPE) IN BLOOD: NORMAL
ABO GROUP (TYPE) IN BLOOD: NORMAL
ALBUMIN SERPL BCP-MCNC: 2.4 G/DL (ref 3.4–5)
ALBUMIN SERPL BCP-MCNC: 2.5 G/DL (ref 3.4–5)
ALP SERPL-CCNC: 41 U/L (ref 33–136)
ALP SERPL-CCNC: 46 U/L (ref 33–136)
ALT SERPL W P-5'-P-CCNC: 10 U/L (ref 7–45)
ALT SERPL W P-5'-P-CCNC: 10 U/L (ref 7–45)
ANION GAP SERPL CALC-SCNC: 16 MMOL/L (ref 10–20)
ANTIBODY SCREEN: NORMAL
APPEARANCE UR: ABNORMAL
APTT PPP: 31 SECONDS (ref 27–38)
AST SERPL W P-5'-P-CCNC: 16 U/L (ref 9–39)
AST SERPL W P-5'-P-CCNC: 19 U/L (ref 9–39)
BASOPHILS # BLD AUTO: 0 X10*3/UL (ref 0–0.1)
BASOPHILS # BLD AUTO: 0 X10*3/UL (ref 0–0.1)
BASOPHILS # BLD MANUAL: 0 X10*3/UL (ref 0–0.1)
BASOPHILS NFR BLD AUTO: 0 %
BASOPHILS NFR BLD AUTO: 0 %
BASOPHILS NFR BLD MANUAL: 0 %
BILIRUB DIRECT SERPL-MCNC: 0.3 MG/DL (ref 0–0.3)
BILIRUB SERPL-MCNC: 0.6 MG/DL (ref 0–1.2)
BILIRUB SERPL-MCNC: 0.8 MG/DL (ref 0–1.2)
BILIRUB UR STRIP.AUTO-MCNC: NEGATIVE MG/DL
BLOOD EXPIRATION DATE: NORMAL
BUN SERPL-MCNC: 45 MG/DL (ref 6–23)
BURR CELLS BLD QL SMEAR: ABNORMAL
C COLI+JEJ+UPSA DNA STL QL NAA+PROBE: NOT DETECTED
C DIF TOX TCDA+TCDB STL QL NAA+PROBE: NOT DETECTED
CALCIUM SERPL-MCNC: 6 MG/DL (ref 8.6–10.6)
CHLORIDE SERPL-SCNC: 101 MMOL/L (ref 98–107)
CO2 SERPL-SCNC: 19 MMOL/L (ref 21–32)
COLOR UR: ABNORMAL
CREAT SERPL-MCNC: 3.4 MG/DL (ref 0.5–1.05)
DISPENSE STATUS: NORMAL
EC STX1 GENE STL QL NAA+PROBE: NOT DETECTED
EC STX2 GENE STL QL NAA+PROBE: NOT DETECTED
EGFRCR SERPLBLD CKD-EPI 2021: 15 ML/MIN/1.73M*2
EOSINOPHIL # BLD AUTO: 0.03 X10*3/UL (ref 0–0.7)
EOSINOPHIL # BLD AUTO: 0.04 X10*3/UL (ref 0–0.7)
EOSINOPHIL # BLD MANUAL: 0.05 X10*3/UL (ref 0–0.7)
EOSINOPHIL NFR BLD AUTO: 23.1 %
EOSINOPHIL NFR BLD AUTO: 26.7 %
EOSINOPHIL NFR BLD MANUAL: 24.1 %
ERYTHROCYTE [DISTWIDTH] IN BLOOD BY AUTOMATED COUNT: 19.7 % (ref 11.5–14.5)
ERYTHROCYTE [DISTWIDTH] IN BLOOD BY AUTOMATED COUNT: 19.9 % (ref 11.5–14.5)
ERYTHROCYTE [DISTWIDTH] IN BLOOD BY AUTOMATED COUNT: 20.5 % (ref 11.5–14.5)
FLUAV RNA RESP QL NAA+PROBE: NOT DETECTED
FLUBV RNA RESP QL NAA+PROBE: NOT DETECTED
GLUCOSE SERPL-MCNC: 112 MG/DL (ref 74–99)
GLUCOSE UR STRIP.AUTO-MCNC: NORMAL MG/DL
HCT VFR BLD AUTO: 17.4 % (ref 36–46)
HCT VFR BLD AUTO: 18.4 % (ref 36–46)
HCT VFR BLD AUTO: 23.5 % (ref 36–46)
HGB BLD-MCNC: 6.2 G/DL (ref 12–16)
HGB BLD-MCNC: 6.3 G/DL (ref 12–16)
HGB BLD-MCNC: 7.7 G/DL (ref 12–16)
HOLD SPECIMEN: NORMAL
HOLD SPECIMEN: NORMAL
IMM GRANULOCYTES # BLD AUTO: 0 X10*3/UL (ref 0–0.7)
IMM GRANULOCYTES NFR BLD AUTO: 0 % (ref 0–0.9)
INR PPP: 1.2 (ref 0.9–1.1)
INR PPP: 1.2 (ref 0.9–1.1)
KETONES UR STRIP.AUTO-MCNC: NEGATIVE MG/DL
LACTATE SERPL-SCNC: 0.7 MMOL/L (ref 0.4–2)
LACTATE SERPL-SCNC: 0.7 MMOL/L (ref 0.4–2)
LDH SERPL L TO P-CCNC: 119 U/L (ref 84–246)
LEGIONELLA AG UR QL: NEGATIVE
LEUKOCYTE ESTERASE UR QL STRIP.AUTO: NEGATIVE
LYMPHOCYTES # BLD AUTO: 0.07 X10*3/UL (ref 1.2–4.8)
LYMPHOCYTES # BLD AUTO: 0.1 X10*3/UL (ref 1.2–4.8)
LYMPHOCYTES # BLD MANUAL: 0.14 X10*3/UL (ref 1.2–4.8)
LYMPHOCYTES NFR BLD AUTO: 53.8 %
LYMPHOCYTES NFR BLD AUTO: 66.6 %
LYMPHOCYTES NFR BLD MANUAL: 69 %
MAGNESIUM SERPL-MCNC: 1.63 MG/DL (ref 1.6–2.4)
MCH RBC QN AUTO: 30.7 PG (ref 26–34)
MCH RBC QN AUTO: 31 PG (ref 26–34)
MCH RBC QN AUTO: 31.2 PG (ref 26–34)
MCHC RBC AUTO-ENTMCNC: 32.8 G/DL (ref 32–36)
MCHC RBC AUTO-ENTMCNC: 34.2 G/DL (ref 32–36)
MCHC RBC AUTO-ENTMCNC: 35.6 G/DL (ref 32–36)
MCV RBC AUTO: 87 FL (ref 80–100)
MCV RBC AUTO: 91 FL (ref 80–100)
MCV RBC AUTO: 94 FL (ref 80–100)
MONOCYTES # BLD AUTO: 0.01 X10*3/UL (ref 0.1–1)
MONOCYTES # BLD AUTO: 0.01 X10*3/UL (ref 0.1–1)
MONOCYTES # BLD MANUAL: 0 X10*3/UL (ref 0.1–1)
MONOCYTES NFR BLD AUTO: 6.7 %
MONOCYTES NFR BLD AUTO: 7.7 %
MONOCYTES NFR BLD MANUAL: 0 %
NEUTROPHILS # BLD AUTO: 0 X10*3/UL (ref 1.2–7.7)
NEUTROPHILS # BLD AUTO: 0.02 X10*3/UL (ref 1.2–7.7)
NEUTROPHILS NFR BLD AUTO: 0 %
NEUTROPHILS NFR BLD AUTO: 15.4 %
NEUTS SEG # BLD MANUAL: 0 X10*3/UL (ref 1.2–7)
NEUTS SEG NFR BLD MANUAL: 0 %
NITRITE UR QL STRIP.AUTO: ABNORMAL
NOROVIRUS GI + GII RNA STL NAA+PROBE: NOT DETECTED
NRBC BLD-RTO: 0 /100 WBCS (ref 0–0)
OSMOLALITY SERPL: 290 MOSM/KG (ref 280–300)
OSMOLALITY UR: 247 MOSM/KG (ref 200–1200)
OVALOCYTES BLD QL SMEAR: ABNORMAL
PH UR STRIP.AUTO: 6 [PH]
PHOSPHATE SERPL-MCNC: 3.6 MG/DL (ref 2.5–4.9)
PLATELET # BLD AUTO: 25 X10*3/UL (ref 150–450)
PLATELET # BLD AUTO: 29 X10*3/UL (ref 150–450)
PLATELET # BLD AUTO: 31 X10*3/UL (ref 150–450)
POTASSIUM SERPL-SCNC: 3.5 MMOL/L (ref 3.5–5.3)
PRODUCT BLOOD TYPE: 8400
PRODUCT CODE: NORMAL
PROT SERPL-MCNC: 4.3 G/DL (ref 6.4–8.2)
PROT SERPL-MCNC: 4.7 G/DL (ref 6.4–8.2)
PROT UR STRIP.AUTO-MCNC: ABNORMAL MG/DL
PROTHROMBIN TIME: 13 SECONDS (ref 9.8–12.8)
PROTHROMBIN TIME: 13.3 SECONDS (ref 9.8–12.8)
RBC # BLD AUTO: 1.99 X10*6/UL (ref 4–5.2)
RBC # BLD AUTO: 2.03 X10*6/UL (ref 4–5.2)
RBC # BLD AUTO: 2.51 X10*6/UL (ref 4–5.2)
RBC # UR STRIP.AUTO: ABNORMAL /UL
RBC #/AREA URNS AUTO: ABNORMAL /HPF
RBC MORPH BLD: ABNORMAL
RBC MORPH BLD: NORMAL
RH FACTOR (ANTIGEN D): NORMAL
RH FACTOR (ANTIGEN D): NORMAL
RV RNA STL NAA+PROBE: NOT DETECTED
S PNEUM AG UR QL: NEGATIVE
SALMONELLA DNA STL QL NAA+PROBE: NOT DETECTED
SARS-COV-2 RNA RESP QL NAA+PROBE: NOT DETECTED
SCHISTOCYTES BLD QL SMEAR: ABNORMAL
SHIGELLA DNA SPEC QL NAA+PROBE: NOT DETECTED
SODIUM SERPL-SCNC: 132 MMOL/L (ref 136–145)
SP GR UR STRIP.AUTO: 1.01
TOTAL CELLS COUNTED BLD: 29
UNIT ABO: NORMAL
UNIT NUMBER: NORMAL
UNIT RH: NORMAL
UNIT VOLUME: 293
UNIT VOLUME: 350
UNIT VOLUME: 350
UROBILINOGEN UR STRIP.AUTO-MCNC: NORMAL MG/DL
V CHOLERAE DNA STL QL NAA+PROBE: NOT DETECTED
VANCOMYCIN TROUGH SERPL-MCNC: 17.4 UG/ML (ref 5–20)
VARIANT LYMPHS # BLD MANUAL: 0.01 X10*3/UL (ref 0–0.5)
VARIANT LYMPHS NFR BLD: 6.9 %
WBC # BLD AUTO: 0.1 X10*3/UL (ref 4.4–11.3)
WBC # BLD AUTO: 0.2 X10*3/UL (ref 4.4–11.3)
WBC # BLD AUTO: 0.2 X10*3/UL (ref 4.4–11.3)
WBC #/AREA URNS AUTO: ABNORMAL /HPF
XM INTEP: NORMAL
XM INTEP: NORMAL
Y ENTEROCOL DNA STL QL NAA+PROBE: NOT DETECTED

## 2024-08-16 PROCEDURE — 80202 ASSAY OF VANCOMYCIN: CPT

## 2024-08-16 PROCEDURE — 87899 AGENT NOS ASSAY W/OPTIC: CPT

## 2024-08-16 PROCEDURE — 85610 PROTHROMBIN TIME: CPT

## 2024-08-16 PROCEDURE — 3E043XZ INTRODUCTION OF VASOPRESSOR INTO CENTRAL VEIN, PERCUTANEOUS APPROACH: ICD-10-PCS | Performed by: STUDENT IN AN ORGANIZED HEALTH CARE EDUCATION/TRAINING PROGRAM

## 2024-08-16 PROCEDURE — 99291 CRITICAL CARE FIRST HOUR: CPT

## 2024-08-16 PROCEDURE — 85007 BL SMEAR W/DIFF WBC COUNT: CPT

## 2024-08-16 PROCEDURE — 93970 EXTREMITY STUDY: CPT | Performed by: INTERNAL MEDICINE

## 2024-08-16 PROCEDURE — 71045 X-RAY EXAM CHEST 1 VIEW: CPT

## 2024-08-16 PROCEDURE — 83605 ASSAY OF LACTIC ACID: CPT

## 2024-08-16 PROCEDURE — 71045 X-RAY EXAM CHEST 1 VIEW: CPT | Performed by: RADIOLOGY

## 2024-08-16 PROCEDURE — 93005 ELECTROCARDIOGRAM TRACING: CPT

## 2024-08-16 PROCEDURE — P9045 ALBUMIN (HUMAN), 5%, 250 ML: HCPCS | Mod: JZ

## 2024-08-16 PROCEDURE — C1729 CATH, DRAINAGE: HCPCS

## 2024-08-16 PROCEDURE — 99152 MOD SED SAME PHYS/QHP 5/>YRS: CPT

## 2024-08-16 PROCEDURE — 2550000001 HC RX 255 CONTRASTS: Performed by: RADIOLOGY

## 2024-08-16 PROCEDURE — 80076 HEPATIC FUNCTION PANEL: CPT | Mod: CCI

## 2024-08-16 PROCEDURE — 87493 C DIFF AMPLIFIED PROBE: CPT

## 2024-08-16 PROCEDURE — 2500000001 HC RX 250 WO HCPCS SELF ADMINISTERED DRUGS (ALT 637 FOR MEDICARE OP)

## 2024-08-16 PROCEDURE — 0T25X0Z CHANGE DRAINAGE DEVICE IN KIDNEY, EXTERNAL APPROACH: ICD-10-PCS | Performed by: RADIOLOGY

## 2024-08-16 PROCEDURE — 36430 TRANSFUSION BLD/BLD COMPNT: CPT

## 2024-08-16 PROCEDURE — 36415 COLL VENOUS BLD VENIPUNCTURE: CPT

## 2024-08-16 PROCEDURE — 85025 COMPLETE CBC W/AUTO DIFF WBC: CPT

## 2024-08-16 PROCEDURE — 2500000004 HC RX 250 GENERAL PHARMACY W/ HCPCS (ALT 636 FOR OP/ED)

## 2024-08-16 PROCEDURE — 87040 BLOOD CULTURE FOR BACTERIA: CPT

## 2024-08-16 PROCEDURE — 87449 NOS EACH ORGANISM AG IA: CPT

## 2024-08-16 PROCEDURE — 93970 EXTREMITY STUDY: CPT

## 2024-08-16 PROCEDURE — 93010 ELECTROCARDIOGRAM REPORT: CPT | Performed by: INTERNAL MEDICINE

## 2024-08-16 PROCEDURE — 86923 COMPATIBILITY TEST ELECTRIC: CPT

## 2024-08-16 PROCEDURE — 37799 UNLISTED PX VASCULAR SURGERY: CPT

## 2024-08-16 PROCEDURE — 87636 SARSCOV2 & INF A&B AMP PRB: CPT

## 2024-08-16 PROCEDURE — 2720000007 HC OR 272 NO HCPCS

## 2024-08-16 PROCEDURE — 2500000002 HC RX 250 W HCPCS SELF ADMINISTERED DRUGS (ALT 637 FOR MEDICARE OP, ALT 636 FOR OP/ED)

## 2024-08-16 PROCEDURE — 84100 ASSAY OF PHOSPHORUS: CPT

## 2024-08-16 PROCEDURE — 86901 BLOOD TYPING SEROLOGIC RH(D): CPT

## 2024-08-16 PROCEDURE — 7100000010 HC PHASE TWO TIME - EACH INCREMENTAL 1 MINUTE

## 2024-08-16 PROCEDURE — 87506 IADNA-DNA/RNA PROBE TQ 6-11: CPT

## 2024-08-16 PROCEDURE — 7100000009 HC PHASE TWO TIME - INITIAL BASE CHARGE

## 2024-08-16 PROCEDURE — 87081 CULTURE SCREEN ONLY: CPT

## 2024-08-16 PROCEDURE — P9073 PLATELETS PHERESIS PATH REDU: HCPCS

## 2024-08-16 PROCEDURE — 99292 CRITICAL CARE ADDL 30 MIN: CPT

## 2024-08-16 PROCEDURE — 2500000005 HC RX 250 GENERAL PHARMACY W/O HCPCS: Performed by: STUDENT IN AN ORGANIZED HEALTH CARE EDUCATION/TRAINING PROGRAM

## 2024-08-16 PROCEDURE — 2500000004 HC RX 250 GENERAL PHARMACY W/ HCPCS (ALT 636 FOR OP/ED): Mod: JZ

## 2024-08-16 PROCEDURE — C1769 GUIDE WIRE: HCPCS

## 2024-08-16 PROCEDURE — 50435 EXCHANGE NEPHROSTOMY CATH: CPT | Mod: LT | Performed by: RADIOLOGY

## 2024-08-16 PROCEDURE — 85027 COMPLETE CBC AUTOMATED: CPT

## 2024-08-16 PROCEDURE — 1200000002 HC GENERAL ROOM WITH TELEMETRY DAILY

## 2024-08-16 PROCEDURE — 83735 ASSAY OF MAGNESIUM: CPT

## 2024-08-16 PROCEDURE — 80053 COMPREHEN METABOLIC PANEL: CPT

## 2024-08-16 PROCEDURE — 2500000005 HC RX 250 GENERAL PHARMACY W/O HCPCS

## 2024-08-16 PROCEDURE — 2500000004 HC RX 250 GENERAL PHARMACY W/ HCPCS (ALT 636 FOR OP/ED): Performed by: RADIOLOGY

## 2024-08-16 PROCEDURE — 83615 LACTATE (LD) (LDH) ENZYME: CPT

## 2024-08-16 PROCEDURE — P9040 RBC LEUKOREDUCED IRRADIATED: HCPCS

## 2024-08-16 RX ORDER — VANCOMYCIN HYDROCHLORIDE 1 G/20ML
INJECTION, POWDER, LYOPHILIZED, FOR SOLUTION INTRAVENOUS DAILY PRN
Status: DISCONTINUED | OUTPATIENT
Start: 2024-08-16 | End: 2024-08-18

## 2024-08-16 RX ORDER — FENTANYL CITRATE 50 UG/ML
INJECTION, SOLUTION INTRAMUSCULAR; INTRAVENOUS
Status: COMPLETED | OUTPATIENT
Start: 2024-08-16 | End: 2024-08-16

## 2024-08-16 RX ORDER — NOREPINEPHRINE BITARTRATE/D5W 8 MG/250ML
0-.5 PLASTIC BAG, INJECTION (ML) INTRAVENOUS CONTINUOUS
Status: DISCONTINUED | OUTPATIENT
Start: 2024-08-16 | End: 2024-08-17

## 2024-08-16 RX ORDER — HEPARIN SODIUM 5000 [USP'U]/ML
5000 INJECTION, SOLUTION INTRAVENOUS; SUBCUTANEOUS EVERY 8 HOURS
Status: DISCONTINUED | OUTPATIENT
Start: 2024-08-16 | End: 2024-08-16

## 2024-08-16 RX ORDER — METOPROLOL TARTRATE 25 MG/1
12.5 TABLET, FILM COATED ORAL ONCE
Status: COMPLETED | OUTPATIENT
Start: 2024-08-16 | End: 2024-08-16

## 2024-08-16 RX ORDER — MULTIVIT-MIN/IRON FUM/FOLIC AC 7.5 MG-4
1 TABLET ORAL DAILY
COMMUNITY

## 2024-08-16 RX ORDER — POTASSIUM CHLORIDE 14.9 MG/ML
20 INJECTION INTRAVENOUS ONCE
Status: COMPLETED | OUTPATIENT
Start: 2024-08-16 | End: 2024-08-16

## 2024-08-16 RX ORDER — ALBUMIN HUMAN 50 G/1000ML
SOLUTION INTRAVENOUS
Status: COMPLETED
Start: 2024-08-16 | End: 2024-08-16

## 2024-08-16 RX ORDER — ALBUMIN HUMAN 50 G/1000ML
25 SOLUTION INTRAVENOUS ONCE
Status: COMPLETED | OUTPATIENT
Start: 2024-08-16 | End: 2024-08-16

## 2024-08-16 RX ORDER — MIDAZOLAM HYDROCHLORIDE 1 MG/ML
INJECTION INTRAMUSCULAR; INTRAVENOUS
Status: COMPLETED | OUTPATIENT
Start: 2024-08-16 | End: 2024-08-16

## 2024-08-16 SDOH — ECONOMIC STABILITY: FOOD INSECURITY: WITHIN THE PAST 12 MONTHS, THE FOOD YOU BOUGHT JUST DIDN'T LAST AND YOU DIDN'T HAVE MONEY TO GET MORE.: NEVER TRUE

## 2024-08-16 SDOH — SOCIAL STABILITY: SOCIAL INSECURITY
WITHIN THE LAST YEAR, HAVE TO BEEN RAPED OR FORCED TO HAVE ANY KIND OF SEXUAL ACTIVITY BY YOUR PARTNER OR EX-PARTNER?: NO

## 2024-08-16 SDOH — ECONOMIC STABILITY: HOUSING INSECURITY: AT ANY TIME IN THE PAST 12 MONTHS, WERE YOU HOMELESS OR LIVING IN A SHELTER (INCLUDING NOW)?: NO

## 2024-08-16 SDOH — SOCIAL STABILITY: SOCIAL INSECURITY
WITHIN THE LAST YEAR, HAVE YOU BEEN KICKED, HIT, SLAPPED, OR OTHERWISE PHYSICALLY HURT BY YOUR PARTNER OR EX-PARTNER?: NO

## 2024-08-16 SDOH — SOCIAL STABILITY: SOCIAL INSECURITY: WITHIN THE LAST YEAR, HAVE YOU BEEN AFRAID OF YOUR PARTNER OR EX-PARTNER?: NO

## 2024-08-16 SDOH — ECONOMIC STABILITY: INCOME INSECURITY: IN THE PAST 12 MONTHS, HAS THE ELECTRIC, GAS, OIL, OR WATER COMPANY THREATENED TO SHUT OFF SERVICE IN YOUR HOME?: NO

## 2024-08-16 SDOH — ECONOMIC STABILITY: FOOD INSECURITY: WITHIN THE PAST 12 MONTHS, YOU WORRIED THAT YOUR FOOD WOULD RUN OUT BEFORE YOU GOT MONEY TO BUY MORE.: NEVER TRUE

## 2024-08-16 SDOH — ECONOMIC STABILITY: INCOME INSECURITY: IN THE LAST 12 MONTHS, WAS THERE A TIME WHEN YOU WERE NOT ABLE TO PAY THE MORTGAGE OR RENT ON TIME?: NO

## 2024-08-16 SDOH — ECONOMIC STABILITY: TRANSPORTATION INSECURITY
IN THE PAST 12 MONTHS, HAS THE LACK OF TRANSPORTATION KEPT YOU FROM MEDICAL APPOINTMENTS OR FROM GETTING MEDICATIONS?: NO

## 2024-08-16 SDOH — ECONOMIC STABILITY: TRANSPORTATION INSECURITY
IN THE PAST 12 MONTHS, HAS LACK OF TRANSPORTATION KEPT YOU FROM MEETINGS, WORK, OR FROM GETTING THINGS NEEDED FOR DAILY LIVING?: NO

## 2024-08-16 SDOH — ECONOMIC STABILITY: HOUSING INSECURITY: IN THE PAST 12 MONTHS, HOW MANY TIMES HAVE YOU MOVED WHERE YOU WERE LIVING?: 0

## 2024-08-16 SDOH — SOCIAL STABILITY: SOCIAL INSECURITY: WITHIN THE LAST YEAR, HAVE YOU BEEN HUMILIATED OR EMOTIONALLY ABUSED IN OTHER WAYS BY YOUR PARTNER OR EX-PARTNER?: NO

## 2024-08-16 SDOH — ECONOMIC STABILITY: INCOME INSECURITY: HOW HARD IS IT FOR YOU TO PAY FOR THE VERY BASICS LIKE FOOD, HOUSING, MEDICAL CARE, AND HEATING?: NOT HARD AT ALL

## 2024-08-16 ASSESSMENT — PAIN SCALES - GENERAL
PAINLEVEL_OUTOF10: 8
PAINLEVEL_OUTOF10: 0 - NO PAIN
PAINLEVEL_OUTOF10: 0 - NO PAIN
PAINLEVEL_OUTOF10: 7
PAINLEVEL_OUTOF10: 3
PAINLEVEL_OUTOF10: 0 - NO PAIN
PAINLEVEL_OUTOF10: 3
PAINLEVEL_OUTOF10: 8
PAINLEVEL_OUTOF10: 0 - NO PAIN
PAINLEVEL_OUTOF10: 3
PAINLEVEL_OUTOF10: 0 - NO PAIN
PAINLEVEL_OUTOF10: 7
PAINLEVEL_OUTOF10: 7

## 2024-08-16 ASSESSMENT — PAIN - FUNCTIONAL ASSESSMENT
PAIN_FUNCTIONAL_ASSESSMENT: 0-10

## 2024-08-16 ASSESSMENT — COGNITIVE AND FUNCTIONAL STATUS - GENERAL
DRESSING REGULAR UPPER BODY CLOTHING: A LITTLE
PERSONAL GROOMING: A LITTLE
CLIMB 3 TO 5 STEPS WITH RAILING: TOTAL
DAILY ACTIVITIY SCORE: 16
MOVING TO AND FROM BED TO CHAIR: TOTAL
STANDING UP FROM CHAIR USING ARMS: TOTAL
MOBILITY SCORE: 10
WALKING IN HOSPITAL ROOM: TOTAL
HELP NEEDED FOR BATHING: A LOT
TOILETING: A LOT
MOVING FROM LYING ON BACK TO SITTING ON SIDE OF FLAT BED WITH BEDRAILS: A LITTLE
TURNING FROM BACK TO SIDE WHILE IN FLAT BAD: A LITTLE
DRESSING REGULAR LOWER BODY CLOTHING: A LOT

## 2024-08-16 ASSESSMENT — PAIN DESCRIPTION - DESCRIPTORS: DESCRIPTORS: ACHING

## 2024-08-16 ASSESSMENT — PAIN DESCRIPTION - LOCATION
LOCATION: BACK
LOCATION: BACK

## 2024-08-16 ASSESSMENT — PAIN DESCRIPTION - ORIENTATION: ORIENTATION: LEFT

## 2024-08-16 NOTE — PROGRESS NOTES
Occupational Therapy                 Therapy Communication Note    Patient Name: Shelbi Delaney  MRN: [id number]  Today's Date: 8/16/2024     Discipline: Occupational Therapy    Missed Visit Reason: Missed Visit Reason:  (Pt going to IR for neph tube, also possibly pending CT PE. Will reattempt when able.)    Missed Time: Attempt    Rebecca Martinez OT  8/16/2024  12:56 PM

## 2024-08-16 NOTE — PROGRESS NOTES
Vancomycin Dosing by Pharmacy  FOLLOW UP    Shelbi Delaney is a 64 y.o. female who Pharmacy is consulted to dose vancomycin for cellulitis/skin and soft tissue infection.     Based on the patient's indication and renal status, this patient is being dosed based on a goal vancomycin level of 15-20.     Current vancomycin dose: Dosing by levels due to AIDE    Most recent vancomycin trough: 17.4 mcg/mL    Renal function is currently unstable.    Estimated Creatinine Clearance: 18.3 mL/min (A) (by C-G formula based on SCr of 3.4 mg/dL (H)).    Vancomycin, Trough   Date Value Ref Range Status   08/16/2024 17.4 5.0 - 20.0 ug/mL Final     Comment:     Therapeutic Ranges:    Peak (all ages):        30.0-40.0 ug/mL                       Trough (all ages):        10.0-20.0 ug/mL                                             Vancomycin trough concentrations drawn immediately prior to the next dose at steady-state are preferred for concentration-guided monitoring of patients treated with vancomycin.    Reference: Am J Health-Syst Pharm. 2020; 77(11):835-864.            Results from last 7 days   Lab Units 08/16/24  1314 08/16/24  1138 08/16/24  0420 08/15/24  2212 08/15/24  1323   CREATININE mg/dL  --   --  3.40* 3.75* 4.10*   BUN mg/dL  --   --  45* 49* 44*   WBC AUTO x10*3/uL 0.2* 0.1*  --  0.2* 0.1*        Visit Vitals  /84   Pulse 80   Temp 37.8 °C (100 °F) (Temporal)   Resp 20       Gram Stain   Date/Time Value Ref Range Status   08/15/2024 10:50 PM No polymorphonuclear leukocytes seen (A)  Preliminary   08/15/2024 10:50 PM (A)  Preliminary    (4+) Abundant Mixed Gram positive and Gram negative bacteria     Urine Culture   Date/Time Value Ref Range Status   04/04/2024 04:00 PM >100,000 Pseudomonas aeruginosa (A)  Final   04/04/2024 04:00 PM >100,000 Pseudomonas aeruginosa (A)  Final     Blood Culture   Date/Time Value Ref Range Status   08/16/2024 12:19 AM Loaded on Instrument - Culture in progress  Preliminary      Tissue/Wound Culture/Smear   Date/Time Value Ref Range Status   01/26/2024 02:04 PM (A)  Final    (4+) Abundant Methicillin Susceptible Staphylococcus aureus (MSSA)   01/26/2024 02:04 PM (3+) Moderate Mixed Gram-Positive Bacteria  Final        No results found for the last 90 days.      Assessment/Plan    Vancomycin level is within goal range of 15-20. Will re-dose vancomycin with one time order of 1250 mg.    The next vancomycin level will be ordered for 8/17 PM at ~1600 (24 hrs post-dose), unless clinically indicated sooner.  Will continue to monitor renal function daily while on vancomycin and order serum creatinine at least every 48 hours if not already ordered.  Will follow for continued vancomycin needs, clinical response, and signs/symptoms of toxicity.       Aime Blackman, ArvindD

## 2024-08-16 NOTE — CARE PLAN
The patient's goals for the shift include  Going to IR for neph tube replacement    The clinical goals for the shift include  HDS

## 2024-08-16 NOTE — PROGRESS NOTES
Physical Therapy                 Therapy Communication Note    Patient Name: Shelbi Delaney  MRN: [id number]  Today's Date: 8/16/2024     Discipline: Physical Therapy    Missed Visit Reason: Missed Visit Reason: Patient placed on medical hold (IR planning for neph tube this date. PT will hold.)    Missed Time: Attempt    Comment:

## 2024-08-16 NOTE — PROGRESS NOTES
Shelbi Delaney is a 64 y.o. on day 1 of admission presenting with Neutropenic fever (CMS-HCC).      Subjective:   Reports feeling well overall. No new complaints. Continues to endorse diarrhea.     Objective:  Blood pressure 107/61, pulse 99, temperature 37.2 °C (99 °F), temperature source Temporal, resp. rate 25, weight 84.8 kg (186 lb 15.2 oz), SpO2 97%.      Intake/Output Summary (Last 24 hours) at 8/16/2024 0707  Last data filed at 8/16/2024 0600  Gross per 24 hour   Intake 2388.57 ml   Output 525 ml   Net 1863.57 ml        Physical Exam:  Constitutional: No visible distress, alert and cooperative  Eyes: Clear sclera  Head/Neck: Normocephalic  Respiratory/Thorax: Normal respiratory effort, currently on supplemental oxygen via nasal cannula  Cardiovascular: Warm and well perfused, regular rate and rhythm  Gastrointestinal: Soft, nondistended, nontender, without rebound or guarding  Genitourinary: Bilateral PCNs in place draining clear yellow urine. Skin surrounding L PCN site mildly erythematous, tender to palpation  Musculoskeletal: Moving all extremities spontaneously  Extremities: No LE edema  Neurological: No gross deficits  Psychological: Appropriate mood and behavior    Recent labs:   Lab Results   Component Value Date    WBC 0.2 (LL) 08/15/2024    HGB 6.9 (L) 08/15/2024    HCT 20.1 (L) 08/15/2024    PLT 10 (LL) 08/15/2024     Lab Results   Component Value Date     (L) 08/16/2024    K 3.5 08/16/2024     08/16/2024    CO2 19 (L) 08/16/2024    BUN 45 (H) 08/16/2024    CREATININE 3.40 (H) 08/16/2024    GLUCOSE 112 (H) 08/16/2024     Lab Results   Component Value Date    CALCIUM 6.0 (L) 08/16/2024    MG 1.63 08/16/2024    PHOS 3.6 08/16/2024     Lab Results   Component Value Date    AST 19 08/16/2024    ALT 10 08/16/2024    ALKPHOS 41 08/16/2024     Lab Results   Component Value Date    APTT 31 08/16/2024    INR 1.2 (H) 08/16/2024     Lab Results   Component Value Date    COLORU Light-Yellow  08/15/2024    GLUCOSEU Normal 08/15/2024    KETONESU NEGATIVE 08/15/2024    BLOODU 1.0 (3+) (A) 08/15/2024    UROBILINOGEN Normal 08/15/2024       Assessment/Plan:     Principal Problem:     Neutropenic fever (CMS-HCC)     64 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 Transferred to the MICU overnight due to persistent hypotension.      Neutropenic fever  - ANC on admission 21, c/w severe neutropenia, started Filgrastim 5mg/kg daily until ANC >10K  - Febrile on admission, continue Vanc/Zosyn (8/15 - )  - Currently on norepinephrine for BP support  - Suspect infectious source is L PCN given slight erythema and induration, decreased output per patient, and no other localizing infectious sxs. Recommend IR cs to discuss PCN exchange pending HDS  - Blood cultures and urine culture from [facility] pending, will follow up  - [facility], Ucx sent from bilateral PCNs, skin culture sent from L PCN; will continue to follow up  - C. diff and stool pathogens pending given intermittent diarrhea  - CXR with LLQ opacities, follow up urine legionella as well as respiratory swabs: COVID, influenza, culture  - Neutropenic precautions     Pancytopenia  - Hgb 6.9 after 1u pRBC, continue to transfuse for goal >7  - Plt 10 on admission, continue to transfuse in anticipation of procedure as above  - monitor closely for signs of bleeding     AIDE  - Cr improving to 3.40 (baseline 2), continue to montior  - FeNa pre-renal, continue fluid resuscitation     Hypoxia  - Continue supplemental O2, wean as tolerated  - May be infectious in etiology given CXR findings, but could consider CT PE given known active cancer    Other Co-Morbidities  - HTN: metoprolol held, patient reports taking prn for anxiety  - Hypothyroidism: cont synthroid  - neuropathic pain: duloxetine and cymbalta held for AIDE, can restart  once Cr improved    To be discussed with Dr. Finn.    Qiana Jimenez MD  Gynecologic Oncology Fellow  GYN Oncology Team Pager [phone]

## 2024-08-16 NOTE — H&P
Medical Intensive Care - History and Physical   Subjective    Shelbi Delaney is a 64 y.o. year old female patient admitted on 8/15/2024 with following ICU needs: hypotension requiring pressors.     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     She initially presented to OSH ED for generalized weakness and non bloody diarrhea.  She was also reporting pain near her left sided PCNT for the last couple days and had pinkish discoloration of her urine in the R PCNT. Diarrhea was ongoing for couple of days also and having at least 2 episodes per day. She had recent outpatient labs done on 8/14 notable for WBC 0.3, Hb 6.8, platelets 29, and ANC 10 for which she was also told to come into the hospital. While in the ED, she was noted to be hypotensive to 70/47 and was afebrile.  Labs notable for  WBC 0.1, Hb 6.0, plt 15, Na 129, Cr 4.10 and lactate 1.3.  CT A&P notable for unchanged kinked left nephrostomy tube, increased size of calcified uterine mass and right adnexal cystic structure.  Patient was started on Vanco, Zosyn and was fluid resuscitated with 2.5 L NS.  She was also given 1 unit of pRBC.  On arrival to [facility], patient febrile to 103.5, tachycardic to 111, hypotensive to 71/44 and SpO2 86% on RA and was placed on 3L NC. She was given an additional 2L of LR on the floor however remained hypotensive and Code White was called. Most recent labs on the floor notable for Na 133, Cr 3.75, Ca 6.9, lactate 2.1. CBC notable for WBC 0.2, Hb 6.9, plt 10, and ANC 20. UA positive for 75 LE and >20 RBC. Cxr showed hazy opacity in the L  lung base. She was given 25 g of albumin without improvement in blood pressure and was ultimately transferred to the MICU for further management of hypotension due to sepsis requiring pressors.     On arrival to the MICU, patient alert and oriented x 3.  Hypotensive to 80s/60s and satting 96% on 3 L nasal cannula.  Per Gyn Onc team, plan was to reach out to IR in the morning to discuss replacing left PCNT.  To optimize for potential procedure, 1 unit pRBC and 1 unit platelets ordered. Filgrastim was ordered but not given prior to arrival to the MICU.  Patient was also given albumin while on the floor.  Due to ongoing hypotension, Levophed was started peripherally. Denies any nausea/vomiting, abdominal pain, or chest pain.     Of note, most recently her bilateral PCNTs were exchanged in June of this year.  She reports dislodging the right PCNT, which was exchanged in July.  States they are usually exchanged every 3 months.    Review of Systems:  Review of Systems   Per above     Meds    Home medications:  Current Outpatient Medications   Medication Instructions    calcium carbonate (CALCIUM 600) 600 mg, oral, Daily    dexAMETHasone (DECADRON) 8 mg, oral, 2 times daily, Take on the day before, day of and day after treatment.    dexAMETHasone 1 mg, oral, 2 times daily PRN, Swish and spit. Do not swallow    DULoxetine (CYMBALTA) 20 mg, oral, Daily, Do not crush or chew.    gabapentin (NEURONTIN) 900 mg, oral, 3 times daily    hydrOXYzine HCL (Atarax) 25 mg tablet 1 tablet, oral, 3 times daily PRN    levothyroxine (SYNTHROID, LEVOXYL) 25 mcg, oral, Daily    magic mouthwash (lidocaine, diphenhydrAMINE, Maalox 1:1:1) 10 mL, Swish & Spit, Every 6 hours PRN    metoprolol tartrate (Lopressor) 50 mg tablet take 0.5 tablets by mouth 2 times a day    multivitamin with minerals tablet 1 tablet, oral, Daily    naloxone (Narcan) 4 mg/0.1 mL nasal spray 1 spray, nasal, As needed, May repeat every 2-3 minutes if needed, alternating  nostrils, until medical assistance becomes available.    nystatin (Mycostatin) cream 1 Application, Topical, 2 times daily    ondansetron (ZOFRAN) 8 mg, oral, Every 8 hours PRN    oxyCODONE (ROXICODONE) 5 mg, oral, 3 times daily, As needed for pain    prochlorperazine (COMPAZINE) 10 mg, oral, Every 6 hours PRN    tiZANidine (ZANAFLEX) 4 mg, oral, 3 times daily    traZODone (DESYREL) 50 mg, oral, Nightly PRN        Inpatient medications:  Scheduled medications  [Held by provider] DULoxetine, 20 mg, oral, Daily  filgrastim or biosimilar, 5 mcg/kg, subcutaneous, q24h  [Held by provider] gabapentin, 900 mg, oral, TID  levothyroxine, 25 mcg, oral, Daily  piperacillin-tazobactam, 2.25 g, intravenous, q6h  vancomycin, 2 g, intravenous, Once      Continuous medications  norepinephrine, 0-0.5 mcg/kg/min      PRN medications  PRN medications: acetaminophen, hydrOXYzine HCL, magic mouthwash (lidocaine, diphenhydrAMINE, Maalox 1:1:1), metoclopramide **OR** metoclopramide, ondansetron **OR** ondansetron, oxyCODONE, oxygen, traZODone     Objective    Blood pressure 76/50, pulse 84, temperature 37.3 °C (99.1 °F), resp. rate 17, weight 84.8 kg (186 lb 15.2 oz), SpO2 96%.     Physical Exam   General: NAD, pleasant  HEENT: NCAT, MMM  CV: RRR, no m/r/g  PULM: CTAB, non-labored respirations   ABD: soft, NT, ND, + bowel sounds   : erythema around the L PCNT, R PCNT clear without any erythema   EXT: 1+ LE edema, bilateral L>R erythema in the shins   SKIN: no rashes noted   NEURO: A&Ox4, no gross motor or sensory deficits, following commands   PSYCH: pleasant mood, appropriate affect      Intake/Output Summary (Last 24 hours) at 8/16/2024 0256  Last data filed at 8/16/2024 0221  Gross per 24 hour   Intake 2374.58 ml   Output 225 ml   Net 2149.58 ml     Labs:   Results from last 7 days   Lab Units 08/15/24  2212 08/15/24  1323 08/14/24  1528   WBC AUTO x10*3/uL 0.2* 0.1* 0.3*   HEMOGLOBIN g/dL 6.9* 6.0* 6.8*   HEMATOCRIT % 20.1* 18.4*  21.9*   PLATELETS AUTO x10*3/uL 10* 15* 29*            Results from last 7 days   Lab Units 08/15/24  2212 08/15/24  1323   SODIUM mmol/L 133* 129*   POTASSIUM mmol/L 4.5 4.1   CHLORIDE mmol/L 97* 91*   CO2 mmol/L 21 22*   BUN mg/dL 49* 44*   CREATININE mg/dL 3.75* 4.10*   CALCIUM mg/dL 6.9* 7.7*     Results from last 7 days   Lab Units 08/15/24  2212 08/15/24  1323   ALK PHOS U/L 56 73   BILIRUBIN TOTAL mg/dL 1.0 0.4   PROTEIN TOTAL g/dL 5.4* 6.7   ALT U/L 13 14   AST U/L 26 31      Results from last 7 days   Lab Units 08/15/24  2212   INR  1.2*      Micro/ID:     Lab Results   Component Value Date    URINECULTURE >100,000 Pseudomonas aeruginosa (A) 04/04/2024    URINECULTURE >100,000 Pseudomonas aeruginosa (A) 04/04/2024    BLOODCULT Loaded on Instrument - Culture in progress 08/16/2024     Summary of Key Imaging Results   XR chest 1 view    Result Date: 8/16/2024  1.  Streaky and hazy opacity of the left lung base favored to represent atelectasis with a possible component of developing consolidation. 2. No pneumothorax.   I personally reviewed the image(s)/study and resident interpretation. I agree with the findings as stated by resident Freddy Crockett. Data analyzed and images interpreted at [facility], [place], [place].   MACRO: None     Dictation workstation:   [id number]    CT abdomen pelvis wo IV contrast    Result Date: 8/15/2024  No new hydronephrosis. Kinked left nephrostomy pigtail within the left lower pole collecting system, unchanged.   Increased size of peripherally calcified uterine mass and right adnexal cystic structure. Stable left adnexal cystic structure.   Stable extensive abdominopelvic lymphadenopathy.   MACRO: None   Signed by: Brannon Valerio 8/15/2024 3:16 PM Dictation workstation:   [id number]      Assessment and Plan     Assessment:  Shelbi Delaney is a 64 y.o. year old female patient with a past medical history of stage IV moderately differentiated  SCC of the cervix (previously on docetaxel, recently initiated on Topotecan x2 cycles), bilateral hydronephrosis s/p bilateral PCNT 11/2022 (L PCNT replaced in 6/2024, R PCNT replaced 7/2024), hx of Pseudomonas UTI 4/2024, HTN, CKDIV (Cr 2.2-2.4), and hypothyroidism presenting as a transfer from the floor for febrile neutropenia and sepsis requiring pressors. Pt found to be pancytopenic with febrile neutropenia iso recent initiation of Topotecan x2 cycles.     Mechanical Ventilation: none  Sedation/Analgesia:  none  Restraints: no    Plan:  NEUROLOGY/PSYCH:  -No active issues     CARDIOVASCULAR:  #Hypotension   #Septic Shock   ::unknown origin for infectious source, possibly due to skin/soft tissue infection from L PCNT site vs UTI vs PNA   ::Started on Vanc and zosyn at OSH ED  ::s/p 4.5L IVF total   -Start levo, goal MAP > 65   -C/w Vanc and Zosyn (8/15-)    PULMONARY:  #AHRF   ::Requiring 3L NC   ::CXR notable for streaky opacity of the left lung base   ::possibly due to pulmonary edema from receiving 4.5L for fluid resuscitation over one day vs PNA    -on 3L NC, wean as tolerable     RENAL/GENITOURINARY:  #AIDE  #CKDIV  ::possible prerenal iso of hypotension and low intravascular volume vs intrinsic   ::Cr 4.10 on arrival -> down to 3.75   ::baseline Cr 2.2-2.5  ::UA notable for turbid with 75 LE and > 20 RBC   -s/p 4.5L IVF   -Trend Cr  -Avoid nephrotoxic agents   -Renally dose all medications     #Hyponatremia   ::likely hypovolemic   ::Na 129 -> 133   ::Urine Na 26  -s/p 4.5L IVF   -Check urine osm and serum osm   -CTM and trend Na     #Hx of b/l hydronephrosis   ::b/l PCNTs placed in 11/2022, exchanged every 3 months   ::Most recently R PCNT exchanged 7/2024 and L PCNT exchanged 6/2024   ::CTAP show kinked L PCNT   -Call IR in the AM to exchange L PCNT     GASTROENTEROLOGY:  #Diarrhea   -check stool pathogen panel and c diff     ENDOCRINOLOGY:  #Hypothyroidism   -C/w levothyroxine 25 mg daily      HEMATOLOGY:  #Febrile Neutropenia   #Pancytopenia   ::likely due to recent start of Topotecan   ::On presentation WBC 0.1, Hb 6.0, plt 15  ::ANC 10 on admission   ::peripheral smear showing pancytopenia with marked neutropenia and thrombocytopenia. No platelet aggregates seen  ::s/p 1 unit pRBC at OSH   -Will give additional 1 unit pRBCs, goal Hgb > 7   -Will give 2 unit plt for possible procedure with IR   -C/w Filgrastim 480 mcg daily, goal ANC > 10K    -C/w IV antibiotics as above   -Neutropenic precautions    -Fu infectious work up     #SCC of Cervix   ::Follows with Dr. Fisher  ::Recently was started on topotecan on 8/8, underwent two cycles so far    MUSCULOSKELETAL/ SKIN:  -No active issues     INFECTIOUS DISEASE:  #Sepsis, unknown origin   ::possibly due to L PCNT skin/soft tissue infection vs UTI vs PNA   ::lactate 2.1 -> 0.7   -On Vanc and Zosyn (8/15-)  -Fu blood cx, urine cx, urine legionella and strep, MRSA nares   -Pending IR consult for L PCNT exchange      ICU Check List     FEN  Fluids: s/p 4.5 L   Electrolytes: K > 4, Mg >2  Nutrition: NPO   Prophylaxis:  DVT ppx: SCD, holding iso pancytopenia    GI ppx: none   Bowel care: none   Hardware:  Catheter: none   Access: PIV, mediport   Drains: b/l PCNT  Lines: none   Social:  Code: Full Code    HPOA: Kenn Stevens (Partner) [phone]   Disposition: pending BP improvement     Debo Prieto MD   08/16/24 at 2:56 AM     Disclaimer: Documentation completed with the information available at the time of input. The times in the chart may not be reflective of actual patient care times, interventions, or procedures. Documentation occurs after the physical care of the patient.

## 2024-08-16 NOTE — CONSULTS
Vancomycin Dosing by Pharmacy- INITIAL    Shelbi Delaney is a 64 y.o. year old female who Pharmacy has been consulted for vancomycin dosing for cellulitis, skin and soft tissue. Based on the patient's indication and renal status this patient will be dosed based on a goal trough/random level of 15-20.     Renal function is currently declining.    Visit Vitals  BP 80/54   Pulse 78   Temp 37.3 °C (99.1 °F)   Resp 15        Lab Results   Component Value Date    CREATININE 3.75 (H) 08/15/2024    CREATININE 4.10 (H) 08/15/2024    CREATININE 2.42 (H) 2024    CREATININE 2.26 (H) 2024        Patient weight is as follows:   Vitals:    24 0200   Weight: 84.8 kg (186 lb 15.2 oz)       Cultures:  No results found for the encounter in last 14 days.        No intake/output data recorded.  I/O during current shift:  I/O this shift:  In: 2374.6 [P.O.:60; I.V.:264.6; IV Piggyback:]  Out: 225 [Urine:225]    Temp (24hrs), Av.4 °C (99.4 °F), Min:36.4 °C (97.5 °F), Max:39.7 °C (103.5 °F)         Assessment/Plan     Patient has already been given a loading dose of 2000 mg at [facility].  Follow-up level will be ordered on  at 1300 unless clinically indicated sooner.  Will continue to monitor renal function daily while on vancomycin and order serum creatinine at least every 48 hours if not already ordered.  Follow for continued vancomycin needs, clinical response, and signs/symptoms of toxicity.       Omar Coello, PharmD

## 2024-08-16 NOTE — SIGNIFICANT EVENT
Code White    Code white called for hypotension to 79/38. Also with new desaturation to 86% on RA, improvement in sats on 3L NC.     Patient awake, alert, and conversant. Continues to feel chills, denies SOB or CP.   Lungs CTAB. Mildly tachycardic.    CXR done, read pending however overall some L Lower lobe haziness bedside read.    Patient now s/p 2.5L prior to transfer and 2L on arrival. Given 25g albumin now with mild improvement, however not sustaining MAPs >65, BPs persistently 80s/40s.    Discussed with MICU fellow, accepted for transfer for BP support.     Rebecca Kapadia MD  PGY-3, Obstetrics and Gynecology

## 2024-08-16 NOTE — PRE-PROCEDURE NOTE
Interventional Radiology Preprocedure Note    Indication for procedure: The primary encounter diagnosis was Neutropenic fever (CMS-HCC). A diagnosis of Bilateral lower extremity edema was also pertinent to this visit.    Relevant review of systems: NA    Relevant Labs:   Lab Results   Component Value Date    CREATININE 3.40 (H) 08/16/2024    EGFR 15 (L) 08/16/2024    INR 1.2 (H) 08/16/2024    PROTIME 13.3 (H) 08/16/2024       Planned Sedation/Anesthesia: Minimal    Airway assessment: normal    Directed physical examination:    Physical Exam  Constitutional:       Appearance: Normal appearance.   HENT:      Head: Normocephalic.      Nose: Nose normal.      Mouth/Throat:      Mouth: Mucous membranes are dry.   Eyes:      Extraocular Movements: Extraocular movements intact.   Pulmonary:      Effort: Pulmonary effort is normal.   Neurological:      Mental Status: She is alert and oriented to person, place, and time.          Mallampati: II (hard and soft palate, upper portion of tonsils and uvula visible)    ASA Score: ASA 3 - Patient with moderate systemic disease with functional limitations    Benefits, risks and alternatives of procedure and planned sedation have been discussed with the patient and/or their representative. All questions answered and they agree to proceed.    Writer cx appt  On 11/16/22 with Anup Quiroz can not make that time, Pt will call back when they look at schedule  only available Tuesday and thursdays, Thank you

## 2024-08-16 NOTE — PROGRESS NOTES
Pharmacy Medication History Review    Shelbi Delaney is a 64 y.o. female admitted for Neutropenic fever (CMS-HCC). Pharmacy reviewed the patient's [id number] medications and allergies for accuracy.    The list below reflects the updated PTA list. Comments regarding how patient may be taking medications differently can be found in the Admit Orders Activity  Prior to Admission Medications   Prescriptions Last Dose Informant Patient Reported? Taking?   DULoxetine (Cymbalta) 20 mg DR capsule 8/15/2024 Self No Yes   Sig: Take 1 capsule (20 mg) by mouth once daily. Do not crush or chew.   calcium carbonate (Calcium 600) 600 mg calcium (1,500 mg) tablet 8/14/2024 Self Yes Yes   Sig: Take 600 mg by mouth once daily.   dexAMETHasone (Decadron) 4 mg tablet 8/15/2024 Self No Yes   Sig: Take 2 tablets (8 mg) by mouth 2 times a day. Take on the day before, day of and day after treatment.   dexAMETHasone 0.5 mg/5 mL oral liquid Unknown Self No No   Sig: Take 10 mL (1 mg) by mouth 2 times a day as needed (mucositis). Swish and spit. Do not swallow   gabapentin (Neurontin) 300 mg capsule 8/15/2024 at Pt states taking 1 cap TID Self No Yes   Sig: Take 3 capsules (900 mg) by mouth 3 times a day.   hydrOXYzine HCL (Atarax) 25 mg tablet More than a month Self Yes No   Sig: Take 1 tablet (25 mg) by mouth 3 times a day as needed for anxiety.   levothyroxine (Synthroid, Levoxyl) 25 mcg tablet 8/15/2024 Self No Yes   Sig: Take 1 tablet (25 mcg) by mouth once daily.   magic mouthwash (lidocaine, diphenhydrAMINE, Maalox 1:1:1) Past Month Self No Yes   Sig: Swish and spit 10 mL every 6 hours if needed for mucositis.   metoprolol tartrate (Lopressor) 50 mg tablet Past Month at pt states taking PRN Self No Yes   Sig: take 0.5 tablets by mouth 2 times a day   multivitamin with minerals tablet Past Week Self Yes Yes   Sig: Take 1 tablet by mouth once daily.   naloxone (Narcan) 4 mg/0.1 mL nasal spray Unknown Self Yes No   Sig: Administer 1  spray (4 mg) into affected nostril(s) if needed for opioid reversal. May repeat every 2-3 minutes if needed, alternating nostrils, until medical assistance becomes available.   nystatin (Mycostatin) cream Past Month Self Yes Yes   Sig: Apply 1 Application topically 2 times a day.   ondansetron (Zofran) 8 mg tablet Past Month Self No Yes   Sig: Take 1 tablet (8 mg) by mouth every 8 hours if needed for nausea or vomiting.   oxyCODONE (Roxicodone) 5 mg immediate release tablet 8/15/2024 Self No Yes   Sig: Take 1 tablet (5 mg) by mouth 3 times a day. As needed for pain   prochlorperazine (Compazine) 10 mg tablet 8/14/2024 Self No Yes   Sig: Take 1 tablet (10 mg) by mouth every 6 hours if needed for nausea or vomiting.   tiZANidine (Zanaflex) 4 mg tablet Past Month Self No Yes   Sig: Take 1 tablet (4 mg) by mouth 3 times a day.   traZODone (Desyrel) 50 mg tablet Past Month; pt states using PRN Self No Yes   Sig: Take 1 tablet (50 mg) by mouth as needed at bedtime for sleep.      Facility-Administered Medications: None        The list below reflects the updated allergy list. Please review each documented allergy for additional clarification and justification.  Allergies  Reviewed by Jolly Gomez RN on 8/15/2024        Severity Reactions Comments    L-lysine [lysine] High Rash Head to toe red rash    Adhesive Low Rash Skin irritation and rash             Patient accepts M2B at discharge. Pharmacy has been updated to [facility].    Sources used to complete the med history include   Patient Interview - moderate historian, somnolent during interview and required some repitiion and redirection  Admission MedRec Grid  [facility] - gabapentin 300mg LF: 5/12/24, #270, 30DS; oxycodone 5mg LF: 7/10/24, #90, 30DS  EPIC medication dispense report    Medications ADDED:  MVI  Medications CHANGED:  None  Medications REMOVED:   None    Below are additional concerns with the patient's PTA list.  Patient states using gabapentin 300mg - 1 cap  TID instead of 3 caps TID  Patient states using metoprolol and tizanidine DANIEL Fuentes, PharmD   Transitions of Care Pharmacist  [facility]  Please reach out via Secure Chat for questions, or if no response call [phone] or [facility]

## 2024-08-16 NOTE — POST-PROCEDURE NOTE
Interventional Radiology Brief Postprocedure Note    Attending: Checo Hancock MD    Assistant: None    Diagnosis: L Nephrostomy Tube obstruction    Description of procedure: Uncomplicated and technically successful exchange of L nephrostomy tube over a wire, replaced with a 10Fr x 25cm Mermaid nephrostomy Tube under fluoroscopic guidance. Please see PACS for full procedural report.    Anesthesia:  Local    Complications: None    Estimated Blood Loss: none    Medications  As of 08/16/24 1608      piperacillin-tazobactam (Zosyn) 2.25 g in dextrose (iso) IV 50 mL (g) Total dose:  2.25 g* Dosing weight:  83.5   *From user-documented volume     Date/Time Rate/Dose/Volume Action       08/15/24  2300 2.25 g (over 30 min) New Bag      2330 50 mL Stopped     08/16/24  0456 2.25 g (over 30 min) New Bag      0526  (over 30 min) Stopped      1045 2.25 g (over 30 min) New Bag      1115  (over 30 min) Stopped               lactated Ringer's bolus 1,000 mL (mL/hr) Total volume:  1,000 mL* Dosing weight:  83.5   *From user-documented volume     Date/Time Rate/Dose/Volume Action       08/15/24  2215 1,000 mL - 2,000 mL/hr (over 30 min) New Bag      2245 1,000 mL Stopped               acetaminophen (Tylenol) tablet 975 mg (mg) Total dose:  975 mg Dosing weight:  83.5      Date/Time Rate/Dose/Volume Action       08/15/24  2301 975 mg Given               gabapentin (Neurontin) capsule 900 mg (mg) Total dose:  Cannot be calculated*   *Administration dose not documented     Date/Time Rate/Dose/Volume Action       08/15/24  2311 *Not included in total Held by provider      2330 *Not included in total Automatically Held     08/16/24  0900 *900 mg Missed      1500 *900 mg Missed               DULoxetine (Cymbalta) DR capsule 20 mg (mg) Total dose:  Cannot be calculated* Dosing weight:  83.5   *Administration dose not documented     Date/Time Rate/Dose/Volume Action       08/15/24  2305 *Not included in total Held by provider     08/16/24   0900 *20 mg Missed               levothyroxine (Synthroid, Levoxyl) tablet 25 mcg (mcg) Total dose:  25 mcg      Date/Time Rate/Dose/Volume Action       08/16/24  0846 25 mcg Given               magic mouthwash (lidocaine, diphenhydrAMINE, Maalox 1:1:1) (mL) Total volume:  Not documented* Dosing weight:  83.5   *Total volume has not been documented. View each administration to see the amount administered.     Date/Time Rate/Dose/Volume Action       08/16/24  1130 10 mL Given               sodium chloride 0.9% infusion (mL/hr) Total volume:  264.58 mL* Dosing weight:  83.5   *From user-documented volume     Date/Time Rate/Dose/Volume Action       08/15/24  2358 125 mL/hr New Bag     08/16/24  0205  Stopped      0221 264.58 mL                sodium chloride 0.9 % bolus 1,000 mL (mL/hr) Total volume:  1,000 mL* Dosing weight:  83.5   *From user-documented volume     Date/Time Rate/Dose/Volume Action       08/16/24  0026 1,000 mL - 2,000 mL/hr (over 30 min) New Bag      0056  (over 30 min) Stopped      0221 1,000 mL       0400 0 mL                albumin human 5 % infusion 25 g (g) Total dose:  12.5 g Dosing weight:  83.5      Date/Time Rate/Dose/Volume Action       08/16/24  0056 12.5 g (over 120 min) New Bag      0256  (over 120 min) Stopped               albumin human infusion  - Omnicell Override Pull (g) Total dose:  12.5 g      Date/Time Rate/Dose/Volume Action       08/16/24  0056 12.5 g (over 120 min) New Bag      0256  (over 120 min) Stopped               heparin (porcine) injection 5,000 Units (Units) Total dose:  0 Units* Dosing weight:  83.5   *Administration not included in total     Date/Time Rate/Dose/Volume Action       08/16/24  0230 *5,000 Units Missed               norepinephrine (Levophed) 8 mg in dextrose 5% 250 mL (0.032 mg/mL) infusion (premix) (mcg/kg/min) Total dose:  1,516.16 mcg* Dosing weight:  84.8   *From user-documented volume     Date/Time Rate/Dose/Volume Action       08/16/24  0257 0.01  mcg/kg/min - 1.59 mL/hr New Bag      0300 0.02 mcg/kg/min - 3.18 mL/hr Rate Change      0315 0.03 mcg/kg/min - 4.77 mL/hr Rate Change      0400 0.03 mcg/kg/min - 4.77 mL/hr Rate Verify      0500 0.03 mcg/kg/min - 4.77 mL/hr Rate Verify      0600 0.03 mcg/kg/min - 4.77 mL/hr Rate Verify      0700 0.03 mcg/kg/min - 4.77 mL/hr Rate Verify      0705 0.03 mcg/kg/min - 4.77 mL/hr Rate Verify      0800 0.03 mcg/kg/min - 4.77 mL/hr Rate Verify      0900 0.03 mcg/kg/min - 4.77 mL/hr Rate Verify      1000 0.03 mcg/kg/min - 4.77 mL/hr Rate Verify      1100 0.03 mcg/kg/min - 4.77 mL/hr Rate Verify      1200 0.03 mcg/kg/min - 4.77 mL/hr Rate Verify      1300 0.03 mcg/kg/min - 4.77 mL/hr Rate Verify               potassium chloride 20 mEq in sterile water for injection 100 mL (mL/hr) Total volume:  Not documented* Dosing weight:  84.8   *Total volume has not been documented. View each administration to see the amount administered.     Date/Time Rate/Dose/Volume Action       08/16/24  0844 20 mEq - 50 mL/hr (over 120 min) New Bag      1044  (over 120 min) Stopped               fentaNYL PF (Sublimaze) injection (mcg) Total dose:  100 mcg      Date/Time Rate/Dose/Volume Action       08/16/24  1525 50 mcg Given      1532 50 mcg Given               midazolam (Versed) injection (mg) Total dose:  1 mg      Date/Time Rate/Dose/Volume Action       08/16/24  1525 1 mg Given               iohexol (OMNIPaque) 350 mg iodine/mL solution 50 mL (mL) Total volume:  50 mL Dosing weight:  84.8      Date/Time Rate/Dose/Volume Action       08/16/24  1540 50 mL Given                   No specimens collected      See detailed result report with images in PACS.    The patient tolerated the procedure well without incident or complication and is in stable condition.

## 2024-08-16 NOTE — PROGRESS NOTES
"    SOCIAL WORK NOTE   NOK: SO Kenn (see note)  DME: denied  Home Care: denied  HD: denied  PCP:   Additional information: SW spoke with patient via phone to complete assessment (please see flowsheets for further details). Patient normally lives at home with SO, Kenn. She is normally independent at baseline without needs. She denied concerns related to SDOH. Facesheet notes Living Will 2/2024, but it is not linked to any document. SW inquired and patient states that she thinks she completed one, but is not sure. She was agreeable to completing it in the future when able. She denied other family including spouse, adult children, parent, or sibling. She states that she would \"absolutely\" want Kenn to be her decision maker for medical care if she cannot. Social work to follow.  Gissel Young, ARIELLA, LISW-S ([id number])         "

## 2024-08-16 NOTE — H&P
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     Patient was seen for follow up chemo labs and found to be pancytopenic. Was instructed to come to ED for evaluation. She has also been feeling weak with some episodes of diarrhea. She is concerned her L PCN is infected noticed during dressing changes and it is causing her pain. Has been taking tylenol regularly for this.     On arrival to [facility] ED, patient afebrile but hypotensive to 70/47.  Labs notable for WBC 0.1, Hgb 6.0, Plt 15. Lactate was normal. Blood and urine cultures are pending. She was started on vanc/zosyn and given 2.5L fluid resuscitation with subsequent improvement in BPs. She was also transfused 1U RBCs    She was also found to have acute on chronic kidney injury with Cr 4.10.    CT demonstrated  known uterine and adnexal mass, no new hydronephrosis, L PCN kinked in renal pelvis.    On arrival, patient feeling shaky but overall ok. No complaints.      Past Medical History  Past Medical History:   Diagnosis Date    Anxiety     Body mass index (BMI)30.0-30.9, adult 08/07/2019    BMI 30.0-30.9,adult    Cervix cancer (Multi)     Chemotherapy-induced peripheral neuropathy (Multi)     Chronic kidney disease     Hypertension     Hypothyroidism     Tachycardia, unspecified     Tachycardia     Surgical History  Past Surgical History:   Procedure Laterality Date    CT GUIDED IMAGING FOR NEEDLE PLACEMENT  01/12/2023    CT GUIDED IMAGING FOR NEEDLE PLACEMENT [facility]    IR  NEPHROURETERAL PLACEMENT Bilateral 6/18/2024    IR  NEPHROURETERAL PLACEMENT 6/18/2024 [facility]    IR NEPHROSTOMY TUBE EXCHANGE  10/20/2023    IR NEPHROSTOMY TUBE EXCHANGE 10/20/2023 Manuel Marvin MD [facility]    IR NEPHROSTOMY TUBE EXCHANGE  12/19/2023    IR NEPHROSTOMY TUBE EXCHANGE 12/19/2023 [facility]    MEDIPORT      NEPHROSTOMY      ORIF TIBIA  FRACTURE Right     TRANSURETHRAL RESECTION OF BLADDER TUMOR      US GUIDED PERCUTANEOUS PLACEMENT  11/14/2022    US GUIDED PERCUTANEOUS PLACEMENT [facility]    US GUIDED PERCUTANEOUS PLACEMENT  08/28/2023    US GUIDED PERCUTANEOUS PLACEMENT [facility]       Oncology History Overview Note   Stage RONIT grade 2 squamous cell carcinoma of cervix  1/20/23: TURBT - poorly differentiated squamaous cell carcinoma; CPS10. [facility] review with history of cervical mass consistent with GYN primary  - Extension to rectal mucosa, pubic ramus with ?reactive mediastinal LN   - Bilateral nephrostomy placement   2/16/23 - 6/1/23: Carbo AUC5/Taxol 175 +Avastin 15mg/kg, Pembrolizumab 200mg/m2  - C2-C4 +Avastin - further treatments held due to progressive gr2 proteinuria   - Grade 3 anemia declining transfusion,   7/20/23 - 12/18/23: Pembrolizumab 200mg/m2, progressive disease  1/11/24 - 3/14/24: Tivdak x3, progressive disease   - C1: 0.9mg/kg in setting of CrCl < 30  3/2024-7/2024: Taxotere, progressive disease    Molecular Testing  PLD1: CPS 10  1/2024 Tempus xT - PIK3CA [id number] missense (gain): consider alpelisib  Loss of function: BCORL1, KMT2C, NSD1, KMT2D, RAD21  TMB 7.9mB - RACHELE  - Her2 Neg     Cervical cancer, FIGO stage RONIT (Multi)   3/21/2022 Cancer Staged    Staging form: Cervix Uteri, AJCC Version 9, Clinical stage from 3/21/2022: FIGO Stage RONIT (cT4, cN2, cM0) - Signed by Adrianna Jefferson MD on 10/12/2023, Prognostic indicators: CPS 10     7/20/2023 - 11/30/2023 Chemotherapy    Pembrolizumab, 21 Day Cycles     1/12/2024 - 2/22/2024 Chemotherapy    Tisotumab vedotin, 21 Day Cycles     3/21/2024 - 7/5/2024 Chemotherapy    DOCEtaxel, 21 Day Cycles     8/1/2024 -  Chemotherapy    Topotecan (Weekly), 28 Day Cycles      3/13/2025 - 3/13/2025 Chemotherapy    PACLitaxel / CARBOplatin, 21 Day Cycles - Gyn     3/13/2025 - 3/13/2025 Chemotherapy    PACLitaxel / CARBOplatin, 21 Day Cycles - Gyn         Social History  She  reports that she quit smoking about 4 years ago. Her smoking use included cigarettes. She started smoking about 47 years ago. She has a 10.7 pack-year smoking history. She has been exposed to tobacco smoke. She has never used smokeless tobacco. She reports that she does not currently use alcohol after a past usage of about 8.0 standard drinks of alcohol per week. She reports that she does not use drugs.     Allergies  L-lysine [lysine] and Adhesive    Review of Systems   All other systems reviewed and are negative.    Physical Exam   General: flush, tired appearing but alert, conversational  HEENT: normocephalic, EOMI, clear sclera  Cardio: Warm and well perfused, RRR  Resp: breathing comfortably on room air, CTAB  Abd: palpable firm mass, non-tender, nondistended  Back: bilat PCNs in place draining clear yellow urine. L pcn site mildly erythematous, tender to palpation.  Neuro: grossly intact, no focal deficits  Extremities: full ROM, no calf tenderness  Psych: A&O x3, appropriate mood and affect       Last Recorded Vitals  Blood pressure 103/52, pulse 101, temperature (!) 39.7 °C (103.5 °F), temperature source Temporal, resp. rate 24, SpO2 95%.    Relevant Results  Lab Results   Component Value Date    WBC 0.2 (LL) 08/15/2024    HGB 6.9 (L) 08/15/2024    HCT 20.1 (L) 08/15/2024    PLT 10 (LL) 08/15/2024     Lab Results   Component Value Date    GLUCOSE 82 08/15/2024     (L) 08/15/2024    K 4.5 08/15/2024    CL 97 (L) 08/15/2024    CO2 21 08/15/2024    ANIONGAP 20 08/15/2024    BUN 49 (H) 08/15/2024    CREATININE 3.75 (H) 08/15/2024    EGFR 13 (L) 08/15/2024    CALCIUM 6.9 (L) 08/15/2024    ALBUMIN 2.8 (L) 08/15/2024    PROT 5.4 (L) 08/15/2024    ALKPHOS 56 08/15/2024    ALT 13 08/15/2024    AST 26 08/15/2024    BILITOT 1.0 08/15/2024     Lab Results   Component Value Date    PROTIME 13.1 (H) 08/15/2024    INR 1.2 (H) 08/15/2024     Lab Results   Component Value Date    LACTATE 2.1 (H) 08/15/2024           Assessment/Plan   Assessment & Plan  Neutropenic fever (CMS-HCC)      Shelbi Delaney is a 64 y.o. female with progressive SCC of the cervix, currently undergoing chemo with topotecan (C2D8) who presents as transfer from [facility] for neutropenic fever.     Neutropenic fever  - ANC on admission 21, c/w severe neutropenia, Will start filgrastim 5mg/kg daily until ANC >10K  - Febrile on admission, continue Vanc/Zosyn (8/15 - )  - hypotensive on arrival, fluid responsive. Continue to replete   - suspect source is L PCN given slight erythema and induration, no other localizing infectious sxs. For IR cs tomorrow for PCN replacement  - Blood cultures and urine culture from [facility] pending, will follow up  - repeat Bcx including from [facility] on admission pending, Ucx sent from bilateral PCNs, skin culture sent from L PCN  - C.diff to be collected given intermittent diarrhea  - Neutropenic precautions    Pancytopenia  - Hgb 6.0 at [facility] -> 1U rbcs -> 6.9. Transfuse additional 1U rbcs now  - Plt 10 > transfuse 2U plt now in anticipation of procedure as above  - monitor closely for signs of bleeding    AIDE  - Cr on admission 3.75 (baseline 2), improving  - FeNa pre-renal, continue fluid resuscitation    Other Co-Morbidities  - HTN: metoprolol held, patient reports taking prn for anxiety  - Hypothyroidism: cont synthroid  - neuropathic pain: duloxetine and cymbalta held for AIDE, can restart once Cr improved    F: NS @125  N: reg diet, NPO at MN  AC: hep ppx held for procedure  Access: mediport, PIV  Drains: bilat PCNs    Discussed with fellow Dr. Jimenez, to be formally staffed in AM    Rebecca Kapadia MD  PGY-3, Obstetrics and Gynecology  Gynecologic Oncology, pager: [phone]

## 2024-08-16 NOTE — SIGNIFICANT EVENT
Patient stopped into the office to request a refill for the medication of Trazodone 50 mg tabs    Patient uses the mail order pharmacy through [facility].    Form given back to patient and copy of form left in Dr. Hughes's bin in the front office in [address].    Patient can be reached at [phone].   Rapid Response paged at 0048 hours as a result of the patient continuing hypotensive BP measurements.  (Refer to Flowsheets section of electronic medical record for specific details)  Additionally, per the bedside RN the pulse oximetry reading at approximately 0030 hours had decreased to 86% and subsequently supplemental O2 was placed on the patient at 3L per NC.   Dr. Rebecca Kapadia resident covering Gynecolgy/Oncology service was present in the room.  One thousand (1000) ml IV fluid bolus was infusing.  Patient appearing awake and fully alert, without any complaints.  Twenty-five (25) Grams of Albumin infusing as well.  Portable CXR obtained.  Slight increase in BP readings noted over the course of the next hour's time.  Dr. Kapadia contacted Dr. Angelita Koehler Pulmonary and Critical care fellow and discussed the patient with her.  As a result of their conversation, the medical decision was made to transfer her to a higher level of care in the Heart Failure ICU on [address].  Care to provided by the MICU team.  Appropriate arrangements were made and the patient was then transported there at 0200 hours.

## 2024-08-16 NOTE — PROGRESS NOTES
Medical Intensive Care - Daily Progress Note   Subjective    Shelbi Delaney is a 64 y.o. year old female patient admitted on 8/15/2024 with following ICU needs: pressor support.     Interval History:    Pt on bed pan on my initial evaluation. After patient removed from bed pain, I did examine the patient's back and nephrostomy tube site. Patient's skin is warm to the touch, left side slightly warmer than right. patient does report pain on palpation. Small area of possible fluctuance without erythema noted on the left side, immediately superior to the nephrostomy tube site. Pt endorses left sided calf pain, on and off over the past few months. She believes that she had ultrasound done of lower extremities in the past but not since starting topotecan treatment. On my examination, right calf is significantly more erythematous than left, though left has some petechial markings noted when compared to the right with decreased diffuse skin redness. In chart review, patient noted to have an order referral from cardiology for b/l LE venous duplex US. Patient denies any increase in her pain. Reports chills but otherwise no concerns at this time.       Meds    Scheduled medications  [Held by provider] DULoxetine, 20 mg, oral, Daily  filgrastim or biosimilar, 5 mcg/kg, subcutaneous, q24h  [Held by provider] gabapentin, 900 mg, oral, TID  levothyroxine, 25 mcg, oral, Daily  piperacillin-tazobactam, 2.25 g, intravenous, q6h  potassium chloride, 20 mEq, intravenous, Once      Continuous medications  norepinephrine, 0-0.5 mcg/kg/min, Last Rate: 0.03 mcg/kg/min (08/16/24 0600)      PRN medications  PRN medications: acetaminophen, hydrOXYzine HCL, magic mouthwash (lidocaine, diphenhydrAMINE, Maalox 1:1:1), metoclopramide **OR** metoclopramide, ondansetron **OR** ondansetron, oxyCODONE, oxygen, traZODone, vancomycin     Objective    Blood pressure 105/68, pulse 92, temperature 37.8 °C (100 °F), temperature source Temporal, resp. rate  22, weight 84.8 kg (186 lb 15.2 oz), SpO2 100%.     Physical Exam  Constitutional:       Interventions: Nasal cannula in place.   Cardiovascular:      Rate and Rhythm: Normal rate and regular rhythm.      Pulses:           Radial pulses are 2+ on the right side and 2+ on the left side.        Dorsalis pedis pulses are 2+ on the right side and 2+ on the left side.      Heart sounds: No murmur heard.  Pulmonary:      Effort: Pulmonary effort is normal. No respiratory distress.      Breath sounds: No wheezing or rhonchi.   Abdominal:      Tenderness: There is no abdominal tenderness. There is left CVA tenderness.   Musculoskeletal:      Right lower leg: Edema present.      Left lower leg: Tenderness present. Edema present.      Comments: Tenderness over the left nephrostomy tube site. No significant erythema noted when compared to the right side. There is a small area of fluctuance immediately anterior to the left nephrostomy tube which is tender. Edema of lower extremities bilaterally.    Skin:     General: Skin is warm and dry.      Findings: Erythema and petechiae present.             Comments: No significant skin erythema overlying the left nephrostomy site. A band-like area of erythema noted to the right shin and calf. Small areas of petechiae noted overlying the left shin and calf.   Neurological:      Mental Status: She is alert.            Intake/Output Summary (Last 24 hours) at 8/16/2024 0814  Last data filed at 8/16/2024 0730  Gross per 24 hour   Intake 2688.57 ml   Output 525 ml   Net 2163.57 ml     Labs:   Results from last 72 hours   Lab Units 08/16/24  0420 08/15/24  2212 08/15/24  1323   SODIUM mmol/L 132* 133* 129*   POTASSIUM mmol/L 3.5 4.5 4.1   CHLORIDE mmol/L 101 97* 91*   CO2 mmol/L 19* 21 22*   BUN mg/dL 45* 49* 44*   CREATININE mg/dL 3.40* 3.75* 4.10*   GLUCOSE mg/dL 112* 82 131*   CALCIUM mg/dL 6.0* 6.9* 7.7*   ANION GAP mmol/L 16 20 16   EGFR mL/min/1.73m*2 15* 13* 12*   PHOSPHORUS mg/dL 3.6   --   --       Results from last 72 hours   Lab Units 08/15/24  2212 08/15/24  1323 08/14/24  1528   WBC AUTO x10*3/uL 0.2* 0.1* 0.3*   HEMOGLOBIN g/dL 6.9* 6.0* 6.8*   HEMATOCRIT % 20.1* 18.4* 21.9*   PLATELETS AUTO x10*3/uL 10* 15* 29*   NEUTROS PCT AUTO % 10.5  --   --    LYMPHO PCT MAN %  --  62.0 64.3   LYMPHS PCT AUTO % 73.7  --   --    MONO PCT MAN %  --  0.0 1.8   MONOS PCT AUTO % 5.3  --   --    EOSINO PCT MAN %  --  24.0 14.3   EOS PCT AUTO % 10.5  --   --                  Micro/ID:     Lab Results   Component Value Date    URINECULTURE >100,000 Pseudomonas aeruginosa (A) 04/04/2024    URINECULTURE >100,000 Pseudomonas aeruginosa (A) 04/04/2024    BLOODCULT Loaded on Instrument - Culture in progress 08/16/2024       Summary of key imaging results from the last 24 hours  XR chest 1 view  Result Date: 8/16/2024  1.  Streaky and hazy opacity of the left lung base favored to represent atelectasis with a possible component of developing consolidation.   2. No pneumothorax.       CT abdomen pelvis wo IV contrast     Result Date: 8/15/2024  No new hydronephrosis. Kinked left nephrostomy pigtail within the left lower pole collecting system, unchanged.   Increased size of peripherally calcified uterine mass and right adnexal cystic structure. Stable left adnexal cystic structure.   Stable extensive abdominopelvic lymphadenopathy.       Assessment and Plan     Assessment: Shelbi Delaney is a 64 y.o. 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 Pt found to be pancytopenic with febrile neutropenia iso recent initiation  of Topotecan x2 cycles.     Mechanical Ventilation: none  Sedation/Analgesia:  none  Restraints: no    Summary for 08/16/24  :  Ensure patient has incentive spirometer due to c/f atelectasis   POCUS to evaluate for fluid responsiveness.  Reach out to IR regarding nepohrostomy tube exchange today   Follow up cultures and pending labs     Plan:  NEURO/PSYCH:  JERRICA    CARDIOVASCULAR:  #Septic shock   -likely 2/2 urosepsis from left PCNT  - started on Vanc and Zosyn at OSH ED   - received 4.5L IVF and albumin with persistently low BPs   - started levo with transfer to MICU  Plan  - downtitrate levo if possible, current rate of 0.03  -Continue vanc and zosyn     PULMONARY:  #acute hypoxic respiratory failure   - likely 2/2 pain from nephrostomy tube site with subsequent splinting and atelectasis  - On 3L NC  on arrival to MICU   Plan:   - Weaned to 2L NC this morning, continue to wean as tolerated   -encourage incentive spirometry use  - COVID/flu negative.     RENAL/GENITOURINARY:  #AIDE   #CKD IV  -Cr 4.10 on arrival, Cr today 1.24.   -s/p 4.5L IVF  Plan:   - continue to trend Cr   -avoid nephrotoxic agents  -renal dosing of meds     #hyponatremia, resolving   - initial Na of 129, today 144.   - urine osm and serum osm ordered per night team.     #hx of b/l hydronephrosis  - b/l PCNT placed initially in 11/2022, exchanged q3 months.   - bilateral tubes exchanged in 06/2024.   - Right tube additionally exchanged in 07/2024.   - CT AP showed kink in L PCNT   Plan:   - IR to take patient for L PCNT exchange.     GASTROENTEROLOGY:  #diarrhea   - C.Diff not detected on PCR.  -stool pathogen panel pending   Plan:   - if patient continues to have diarrhea, will need to give additional IV fluids.   - primary team will do POCUS to evaluate for volume status.     ENDOCRINOLOGY:  #hypothyrdoidism  -on levothyroxine 25mg daily     HEMATOLOGY:  #febrile neutropenia  -neutropenic precautions  #pancytopenia   - possibly due to start of  Topotecan  -ANC 10 on admission   Plan:   - complete 2 unit platelet transfusion and 1 unit RBCs.   - repeat labs post transfusion to insure adequate increment of Hgb.     #SCC of Cervix   - gyn onc following   - recently started on topotecan on 08/08, underwent two cycles so far.     SKIN/MSK:  #b/l LE edema   - erythema R>L lower leg.   - petechial pattern noted to left calf/shin, right calf/shin diffusely erythematous   Plan:   - B/L LE venous duplex US to assess for DVT.     INFECTIOUS DISEASE:  #sepsis, likely 2/2 L PCNT infection   - continue Vanc and Zosyn  -Fu blood cx, urine cx, urine legionalla and strep, MRSA nares.   - IR to exchange L PCNT   #neutropenic fever   -give filgastrim 480mcg, to be continued daily to goal of ANC>10k      ICU Check List       FEN  F: PRN, resuscitation with blood products   E: PRN  N: NPO   A: R Chest Port, 20G PIV L AC, 20G PIV L forearm  DVT Ppx: held, on SCD   GI ppx: none  Oxygen: 2L NC  Pressors: levo 0.03 mg/kg/min  Code Status: Full Code   NOK: Kenn Stevens (partner): [phone]    Hardware:         Implantable Port Right Chest Single lumen port (Active)   No placement date or time found.   Orientation: Right  Implantable Port Location: Chest  Port Type: Single lumen port  Placed by: Tiffany Dsouza   Number of days:        Nephrostomy Left 8 Fr. (Active)   Placement Date/Time: 06/18/24 0929   Location: Left  Tube Size (Fr.): 8 Fr.  Urine Returned: Yes   Number of days: 58       Nephrostomy Right 8 Fr. (Active)   Placement Date/Time: 07/11/24 1446   Location: (c) Right  Tube Size (Fr.): 8 Fr.   Number of days: 35       Renae Mackay DO   08/16/24 at 8:14 AM     Disclaimer: Documentation completed with the information available at the time of input. The times in the chart may not be reflective of actual patient care times, interventions, or procedures. Documentation occurs after the physical care of the patient.

## 2024-08-16 NOTE — SIGNIFICANT EVENT
Rapid Response RN Note    Rapid response RN at bedside for RADAR score 7 due to the following VS: T 38.4 °C; ; RR 18; BP 87/41; SPO2 93%.     Reviewed above VS with bedside RN.  Bedside staff christophe labs and cultures, initiated fluid bolus and continued antibiotics. To draw urine cultures on bilateral neph tubes. No interventions by rapid response team indicated at this time.      Staff to page rapid response for any concerns or acute change in condition/VS. Abhi Villar RN.

## 2024-08-17 ENCOUNTER — APPOINTMENT (OUTPATIENT)
Dept: CARDIOLOGY | Facility: HOSPITAL | Age: 64
End: 2024-08-17
Payer: COMMERCIAL

## 2024-08-17 LAB
ALBUMIN SERPL BCP-MCNC: 2.3 G/DL (ref 3.4–5)
ALBUMIN SERPL BCP-MCNC: 2.5 G/DL (ref 3.4–5)
ANION GAP SERPL CALC-SCNC: 14 MMOL/L (ref 10–20)
ANION GAP SERPL CALC-SCNC: 16 MMOL/L (ref 10–20)
BACTERIA UR CULT: ABNORMAL
BACTERIA UR CULT: ABNORMAL
BASOPHILS # BLD AUTO: 0 X10*3/UL (ref 0–0.1)
BASOPHILS NFR BLD AUTO: 0 %
BUN SERPL-MCNC: 40 MG/DL (ref 6–23)
BUN SERPL-MCNC: 40 MG/DL (ref 6–23)
BURR CELLS BLD QL SMEAR: NORMAL
CALCIUM SERPL-MCNC: 6.9 MG/DL (ref 8.6–10.6)
CALCIUM SERPL-MCNC: 8 MG/DL (ref 8.6–10.6)
CHLORIDE SERPL-SCNC: 105 MMOL/L (ref 98–107)
CHLORIDE SERPL-SCNC: 105 MMOL/L (ref 98–107)
CO2 SERPL-SCNC: 18 MMOL/L (ref 21–32)
CO2 SERPL-SCNC: 20 MMOL/L (ref 21–32)
CREAT SERPL-MCNC: 2.79 MG/DL (ref 0.5–1.05)
CREAT SERPL-MCNC: 3.07 MG/DL (ref 0.5–1.05)
EGFRCR SERPLBLD CKD-EPI 2021: 16 ML/MIN/1.73M*2
EGFRCR SERPLBLD CKD-EPI 2021: 18 ML/MIN/1.73M*2
EOSINOPHIL # BLD AUTO: 0.06 X10*3/UL (ref 0–0.7)
EOSINOPHIL NFR BLD AUTO: 30 %
ERYTHROCYTE [DISTWIDTH] IN BLOOD BY AUTOMATED COUNT: 21.3 % (ref 11.5–14.5)
GLUCOSE SERPL-MCNC: 124 MG/DL (ref 74–99)
GLUCOSE SERPL-MCNC: 131 MG/DL (ref 74–99)
HCT VFR BLD AUTO: 22 % (ref 36–46)
HGB BLD-MCNC: 7.3 G/DL (ref 12–16)
IMM GRANULOCYTES # BLD AUTO: 0.01 X10*3/UL (ref 0–0.7)
IMM GRANULOCYTES NFR BLD AUTO: 5 % (ref 0–0.9)
LYMPHOCYTES # BLD AUTO: 0.1 X10*3/UL (ref 1.2–4.8)
LYMPHOCYTES NFR BLD AUTO: 50 %
MAGNESIUM SERPL-MCNC: 1.67 MG/DL (ref 1.6–2.4)
MCH RBC QN AUTO: 30.7 PG (ref 26–34)
MCHC RBC AUTO-ENTMCNC: 33.2 G/DL (ref 32–36)
MCV RBC AUTO: 92 FL (ref 80–100)
MONOCYTES # BLD AUTO: 0.03 X10*3/UL (ref 0.1–1)
MONOCYTES NFR BLD AUTO: 15 %
NEUTROPHILS # BLD AUTO: 0 X10*3/UL (ref 1.2–7.7)
NEUTROPHILS NFR BLD AUTO: 0 %
NRBC BLD-RTO: 0 /100 WBCS (ref 0–0)
OVALOCYTES BLD QL SMEAR: NORMAL
PHOSPHATE SERPL-MCNC: 3.2 MG/DL (ref 2.5–4.9)
PHOSPHATE SERPL-MCNC: 3.3 MG/DL (ref 2.5–4.9)
PLATELET # BLD AUTO: 26 X10*3/UL (ref 150–450)
POTASSIUM SERPL-SCNC: 3.3 MMOL/L (ref 3.5–5.3)
POTASSIUM SERPL-SCNC: 4.1 MMOL/L (ref 3.5–5.3)
RBC # BLD AUTO: 2.38 X10*6/UL (ref 4–5.2)
RBC MORPH BLD: NORMAL
SODIUM SERPL-SCNC: 135 MMOL/L (ref 136–145)
SODIUM SERPL-SCNC: 136 MMOL/L (ref 136–145)
STAPHYLOCOCCUS SPEC CULT: NORMAL
TSH SERPL-ACNC: 2.7 MIU/L (ref 0.44–3.98)
VANCOMYCIN SERPL-MCNC: 19.3 UG/ML (ref 5–20)
WBC # BLD AUTO: 0.2 X10*3/UL (ref 4.4–11.3)

## 2024-08-17 PROCEDURE — 2500000004 HC RX 250 GENERAL PHARMACY W/ HCPCS (ALT 636 FOR OP/ED)

## 2024-08-17 PROCEDURE — 37799 UNLISTED PX VASCULAR SURGERY: CPT

## 2024-08-17 PROCEDURE — 93010 ELECTROCARDIOGRAM REPORT: CPT | Performed by: INTERNAL MEDICINE

## 2024-08-17 PROCEDURE — 2500000004 HC RX 250 GENERAL PHARMACY W/ HCPCS (ALT 636 FOR OP/ED): Mod: JZ

## 2024-08-17 PROCEDURE — 80069 RENAL FUNCTION PANEL: CPT

## 2024-08-17 PROCEDURE — 80202 ASSAY OF VANCOMYCIN: CPT

## 2024-08-17 PROCEDURE — 2500000001 HC RX 250 WO HCPCS SELF ADMINISTERED DRUGS (ALT 637 FOR MEDICARE OP)

## 2024-08-17 PROCEDURE — 93005 ELECTROCARDIOGRAM TRACING: CPT

## 2024-08-17 PROCEDURE — 84443 ASSAY THYROID STIM HORMONE: CPT

## 2024-08-17 PROCEDURE — 85025 COMPLETE CBC W/AUTO DIFF WBC: CPT

## 2024-08-17 PROCEDURE — 1200000002 HC GENERAL ROOM WITH TELEMETRY DAILY

## 2024-08-17 PROCEDURE — 83735 ASSAY OF MAGNESIUM: CPT

## 2024-08-17 RX ORDER — VANCOMYCIN HYDROCHLORIDE 1 G/200ML
1000 INJECTION, SOLUTION INTRAVENOUS ONCE
Status: COMPLETED | OUTPATIENT
Start: 2024-08-17 | End: 2024-08-17

## 2024-08-17 RX ORDER — CALCIUM GLUCONATE 20 MG/ML
2 INJECTION, SOLUTION INTRAVENOUS ONCE
Status: COMPLETED | OUTPATIENT
Start: 2024-08-17 | End: 2024-08-17

## 2024-08-17 RX ORDER — MAGNESIUM SULFATE HEPTAHYDRATE 40 MG/ML
2 INJECTION, SOLUTION INTRAVENOUS ONCE
Status: COMPLETED | OUTPATIENT
Start: 2024-08-17 | End: 2024-08-17

## 2024-08-17 RX ORDER — LOPERAMIDE HYDROCHLORIDE 2 MG/1
2 CAPSULE ORAL 4 TIMES DAILY PRN
Status: DISCONTINUED | OUTPATIENT
Start: 2024-08-17 | End: 2024-08-18

## 2024-08-17 RX ORDER — POTASSIUM CHLORIDE 1.5 G/1.58G
40 POWDER, FOR SOLUTION ORAL ONCE
Status: COMPLETED | OUTPATIENT
Start: 2024-08-17 | End: 2024-08-17

## 2024-08-17 ASSESSMENT — PAIN DESCRIPTION - ORIENTATION: ORIENTATION: LEFT

## 2024-08-17 ASSESSMENT — PAIN SCALES - GENERAL
PAINLEVEL_OUTOF10: 5 - MODERATE PAIN
PAINLEVEL_OUTOF10: 0 - NO PAIN
PAINLEVEL_OUTOF10: 5 - MODERATE PAIN
PAINLEVEL_OUTOF10: 5 - MODERATE PAIN
PAINLEVEL_OUTOF10: 0 - NO PAIN

## 2024-08-17 ASSESSMENT — PAIN - FUNCTIONAL ASSESSMENT
PAIN_FUNCTIONAL_ASSESSMENT: 0-10

## 2024-08-17 NOTE — PROGRESS NOTES
Medical Intensive Care - Daily Progress Note   Subjective    Shelbi Delaney is a 64 y.o. year old female patient admitted on 8/15/2024 with following ICU needs: pressor support    Interval History:  Patient sitting up in bed this  morning, reports some pain to her back at the nephrostomy tube exchange site. She denies any nausea currently. Reports she is still having significant episodes of diarrhea, stating that she has soiled the bed twice due to inability to get nurse assistance for bed pan use prior to having a bowel movement. Patient states that she does get the urge to defecate, it is simply the timing to get the bed pan/to the bathroom that is causing the difficulty.     Meds    Scheduled medications  [Held by provider] DULoxetine, 20 mg, oral, Daily  filgrastim or biosimilar, 5 mcg/kg, subcutaneous, q24h  [Held by provider] gabapentin, 900 mg, oral, TID  levothyroxine, 25 mcg, oral, Daily  magnesium sulfate, 2 g, intravenous, Once  piperacillin-tazobactam, 2.25 g, intravenous, q6h      Continuous medications  norepinephrine, 0-0.5 mcg/kg/min, Last Rate: 0.04 mcg/kg/min (08/17/24 0600)      PRN medications  PRN medications: acetaminophen, hydrOXYzine HCL, loperamide, magic mouthwash (lidocaine, diphenhydrAMINE, Maalox 1:1:1), metoclopramide **OR** metoclopramide, ondansetron **OR** ondansetron, oxyCODONE, oxygen, traZODone, vancomycin     Objective    Blood pressure 87/62, pulse 71, temperature 36.2 °C (97.2 °F), temperature source Temporal, resp. rate 15, weight 85.4 kg (188 lb 4.4 oz), SpO2 99%.     Physical Exam  Eyes:      General: No scleral icterus.  Cardiovascular:      Rate and Rhythm: Normal rate and regular rhythm.      Pulses:           Radial pulses are 2+ on the right side and 2+ on the left side.        Dorsalis pedis pulses are 2+ on the right side and 2+ on the left side.      Heart sounds: Normal heart sounds. No murmur heard.     Comments: Bilateral lower extremity edema again see. Right  lower extremity in the calf/thigh remains diffusely erythematous with no changes in the left lower extremity petechial appearance as well.   Abdominal:      General: Bowel sounds are normal.      Palpations: Abdomen is soft.      Tenderness: There is no abdominal tenderness. There is no guarding.   Musculoskeletal:      Right lower leg: Edema present.      Left lower leg: Edema present.   Skin:     Coloration: Skin is not jaundiced.   Neurological:      Mental Status: She is alert.          Intake/Output Summary (Last 24 hours) at 8/17/2024 0841  Last data filed at 8/17/2024 0600  Gross per 24 hour   Intake 1819.73 ml   Output 1350 ml   Net 469.73 ml     Labs:   Results from last 72 hours   Lab Units 08/17/24  0508 08/16/24  0420 08/15/24  2212   SODIUM mmol/L 136 132* 133*   POTASSIUM mmol/L 3.3* 3.5 4.5   CHLORIDE mmol/L 105 101 97*   CO2 mmol/L 18* 19* 21   BUN mg/dL 40* 45* 49*   CREATININE mg/dL 3.07* 3.40* 3.75*   GLUCOSE mg/dL 124* 112* 82   CALCIUM mg/dL 6.9* 6.0* 6.9*   ANION GAP mmol/L 16 16 20   EGFR mL/min/1.73m*2 16* 15* 13*   PHOSPHORUS mg/dL 3.3 3.6  --       Results from last 72 hours   Lab Units 08/17/24  0508 08/16/24  2126 08/16/24  1314 08/16/24  1138   WBC AUTO x10*3/uL 0.2* 0.2* 0.2* 0.1*   HEMOGLOBIN g/dL 7.3* 7.7* 6.2* 6.3*   HEMATOCRIT % 22.0* 23.5* 17.4* 18.4*   PLATELETS AUTO x10*3/uL 26* 29* 25* 31*   NEUTROS PCT AUTO % 0.0 0.0  --  15.4   LYMPHO PCT MAN %  --   --  69.0  --    LYMPHS PCT AUTO % 50.0 66.6  --  53.8   MONO PCT MAN %  --   --  0.0  --    MONOS PCT AUTO % 15.0 6.7  --  7.7   EOSINO PCT MAN %  --   --  24.1  --    EOS PCT AUTO % 30.0 26.7  --  23.1                 Micro/ID:     Lab Results   Component Value Date    URINECULTURE >100,000 Gram Negative Bacilli (A) 08/15/2024    BLOODCULT No growth at 1 day 08/17/2024       Summary of key imaging results from the last 24 hours  none    Assessment and Plan     Assessment: Shelbi Delaney is a 64 y.o. 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 Pt found to be pancytopenic with febrile neutropenia iso recent initiation of Topotecan x2 cycles.     Mechanical Ventilation: none  Sedation/Analgesia:  none  Restraints: no    Summary for 08/17/24  :  Loperamide started in setting of persistent diarrhea w/ negative C-diff and stool pathogen panel.   Continue antibiotics   Wean pressors as tolerated   Repeat EKG d/t recent episode of questionable Afib vs. flutter w/ no prior diagnosis or episodes per patient   Unclear rhythm on EKG done last night, repeating while patient bradycardic to better assess current rhythm and will obtain TSH w/ reflex T4.   Monitor electrolytes - replaced potassium and calcium today   repeat daily CBC for pancytopenia   CMV PCR due to diarrhea + pancytopenia in setting of new chemo regimen initiation.       Plan:  NEURO/PSYCH:  JERRICA     CARDIOVASCULAR:  #Septic shock   -likely 2/2 urosepsis from left PCNT  - started on Vanc and Zosyn at OSH ED   - received 4.5L IVF and albumin with persistently low BPs   - started levo with transfer to MICU  Plan  - downtitrate levo if possible, current rate of 0.02  -Continue vanc and zosyn       #acute episode of tachycardia   - overnight on 08/16 into the morning of 08/17, patient noted to have episode of tachycardia with EKG obtained which showed questionable atrial flutter vs atrial fibrillation.   - patient has no history of A fib, denies any episodes in the past to me on my questioning   - patient was given 12.5 mg metoprolol dose per night team, with resolution of her tachycardia /  conversion back to sinus rhythm.   Plan:   -given new episode of possible a fib without known history, patient will get a repeat EKG and also get thyroid laboratory studies.      PULMONARY:  #acute hypoxic respiratory failure, resolving  - likely 2/2 pain from nephrostomy tube site with subsequent splinting and LLL atelectasis  - On 3L NC  on arrival to MICU   - now off oxygen, on room air  Plan:   -encourage incentive spirometry use       RENAL/GENITOURINARY:  #AIDE   #CKD IV  -Cr 4.10 on arrival, Cr today 3.07.   -4.5 L IVF yesterday 08/16  Plan:   - continue to trend Cr   -avoid nephrotoxic agents  -renal dosing of meds      #hyponatremia, resolved   - initial Na of 129, today 136      #hx of b/l hydronephrosis  - b/l PCNT placed initially in 11/2022, exchanged q3 months.   - bilateral tubes exchanged in 06/2024.   - Right tube additionally exchanged in 07/2024.   - CT AP showed kink in L PCNT  -IR took patient for L PCNT exchange yesterday    Plan:   - monitor patient urine output from nephrostomy tubes     #electrolyte abnormalities   - Given potassium, calcium, and magnesium replacement.      GASTROENTEROLOGY:  #diarrhea   - C.Diff not detected on PCR.  -stool pathogen panel negative   -POCUS 08/16 revealed plethoric IVC w/ minimal respiratory variation.   Plan:   - loperamide ordered d/t suspected noninfectious etiology  - will obtain CMV PCR in the setting of pancytopenia + diarrhea      ENDOCRINOLOGY:  #hypothyrdoidism  -on levothyroxine 25mg daily   -obtaining new thyroid labs today in the setting of possible Afib/Aflutter last night.      HEMATOLOGY:  #febrile neutropenia  -neutropenic precautions  #pancytopenia   - possibly due to start of Topotecan  -ANC 10 on admission   - pt transfused with RBCs and platelets yesterday, Hgb incremented appropriately after the 2nd unit.   Plan:  - will obtain CMV PCR in the setting of pancytopenia + diarrhea   - repeat daily CBC to assess for improvement of  pancytopenia    #SCC of Cervix   - recently started on topotecan on 08/08, underwent two cycles so far.      SKIN/MSK:  #b/l LE edema   - erythema R>L lower leg.   - petechial pattern noted to left calf/shin, right calf/shin diffusely erythematous   -B/L LE venous duplex US from 08/16 negative for DVT.     INFECTIOUS DISEASE:  #sepsis, likely 2/2 L PCNT infection   - continue Vanc and Zosyn  -Fu blood cx, urine cx, urine legionalla and strep, MRSA nares.   - IR exchanged L PCNT 08/16  - urine culture, obtained pre-procedure positive for >100,000 gram negative bacilli.     #neutropenic fever   -give filgastrim 480mcg, to be continued daily to goal of ANC>10k    ICU Check List       FEN  F: PRN   E: PRN  N: low microbial diet   A: R Chest Port, 20G PIV L AC, 20G PIV L forearm  DVT Ppx: held, on SCD   GI ppx: none  Oxygen: Room Air   Pressors: levo 0.02 mg/kg/min  Code Status: Full Code   NOK: Kenn Dunnultz (partner): [phone]      Renae Mackay, DO   08/17/24 at 8:41 AM     Disclaimer: Documentation completed with the information available at the time of input. The times in the chart may not be reflective of actual patient care times, interventions, or procedures. Documentation occurs after the physical care of the patient.

## 2024-08-17 NOTE — PROGRESS NOTES
Vancomycin Dosing by Pharmacy- FOLLOW UP    Shelbi Delaney is a 64 y.o. year old female who Pharmacy has been consulted for vancomycin dosing for cellulitis, skin and soft tissue. Based on the patient's indication and renal status this patient is being dosed based on a goal trough/random level of 10-15.     Renal function is currently improving. AIDE - dose by level.     Most recent vancomycin trough: 19.3 mcg/mL     Visit Vitals  BP 88/54   Pulse 80   Temp 36.5 °C (97.7 °F) (Temporal)   Resp 21        Lab Results   Component Value Date    CREATININE 2.79 (H) 2024    CREATININE 3.07 (H) 2024    CREATININE 3.40 (H) 2024    CREATININE 3.75 (H) 08/15/2024        Patient weight is as follows:   Vitals:    24 0500   Weight: 85.4 kg (188 lb 4.4 oz)       Cultures:  Susceptibility data for the encounter in last 14 days.  Collected Specimen Info Organism Clindamycin Erythromycin Oxacillin Tetracycline Trimethoprim/Sulfamethoxazole Vancomycin   08/15/24 Tissue/Biopsy from [facility] Methicillin Susceptible Staphylococcus aureus (MSSA)  S  R  S  S  R  S     Pseudomonas aeruginosa           Mixed Skin Microorganisms          08/15/24 Urine from Nephrostomy Tube Gram Negative Bacilli              I/O last 3 completed shifts:  In: 2129.3 (24.9 mL/kg) [I.V.:92.6 (1.1 mL/kg); Blood:1536.7; IV Piggyback:500]  Out: 2150 (25.2 mL/kg) [Urine:2150 (0.7 mL/kg/hr)]  Weight: 85.4 kg   I/O during current shift:  No intake/output data recorded.    Temp (24hrs), Av.7 °C (98 °F), Min:35.9 °C (96.6 °F), Max:37.3 °C (99.1 °F)      Assessment/Plan    Re-dose 1 g x once.   The next level will be obtained on  at 1900. May be obtained sooner if clinically indicated.   Will continue to monitor renal function daily while on vancomycin and order serum creatinine at least every 48 hours if not already ordered.  Follow for continued vancomycin needs, clinical response, and signs/symptoms of toxicity.     Yamilet Brooks RPh

## 2024-08-17 NOTE — PROGRESS NOTES
Shelbi Delaney is a 64 y.o. on day 2 of admission presenting with Neutropenic fever (CMS-HCC).      Subjective:   Had a single episode of Afib overnight that resolved with metoprolol. Feeling good today. No nausea/vomiting, abdominal pain. Having diarrhea still.     Objective:  Blood pressure 118/69, pulse 75, temperature 36.5 °C (97.7 °F), temperature source Temporal, resp. rate 22, weight 85.4 kg (188 lb 4.4 oz), SpO2 92%.      Intake/Output Summary (Last 24 hours) at 8/17/2024 1644  Last data filed at 8/17/2024 1500  Gross per 24 hour   Intake 1414.07 ml   Output 1550 ml   Net -135.93 ml        Physical Exam:  Constitutional: No visible distress, alert and cooperative  Eyes: Clear sclera  Head/Neck: Normocephalic  Respiratory/Thorax: Normal respiratory effort, currently on supplemental oxygen via nasal cannula  Cardiovascular: Warm and well perfused, regular rate and rhythm  Gastrointestinal: Soft, nondistended, nontender, without rebound or guarding  Genitourinary: Bilateral PCNs in place draining clear yellow urine. Skin surrounding L PCN site mildly erythematous  Musculoskeletal: Moving all extremities spontaneously  Extremities: No LE edema  Neurological: No gross deficits  Psychological: Appropriate mood and behavior    Recent labs:   Lab Results   Component Value Date    WBC 0.2 (LL) 08/17/2024    HGB 7.3 (L) 08/17/2024    HCT 22.0 (L) 08/17/2024    PLT 26 (LL) 08/17/2024     Lab Results   Component Value Date     (L) 08/17/2024    K 4.1 08/17/2024     08/17/2024    CO2 20 (L) 08/17/2024    BUN 40 (H) 08/17/2024    CREATININE 2.79 (H) 08/17/2024    GLUCOSE 131 (H) 08/17/2024     Lab Results   Component Value Date    CALCIUM 8.0 (L) 08/17/2024    MG 1.67 08/17/2024    PHOS 3.2 08/17/2024     Lab Results   Component Value Date    AST 16 08/16/2024    ALT 10 08/16/2024    ALKPHOS 46 08/16/2024     Lab Results   Component Value Date    APTT 31 08/16/2024    INR 1.2 (H) 08/16/2024     Lab Results    Component Value Date    COLORU Light-Yellow 08/15/2024    GLUCOSEU Normal 08/15/2024    KETONESU NEGATIVE 08/15/2024    BLOODU 1.0 (3+) (A) 08/15/2024    UROBILINOGEN Normal 08/15/2024       Assessment/Plan:     Principal Problem:     Neutropenic fever (CMS-HCC)     64 y.o. with progressive SCC of the cervix, currently undergoing chemo with topotecan [facility]. Currently admitted to MICU due to persistent hypotension in setting of septic shock.      Urosepsis  - S/p L PCN exchange 8/16  - Ucx from L PCN with >100,000 CFU GNB   - Skin culture L PCN 3+ staph aureus  - continue Vanc/Zosyn (8/15 - )  - Currently on norepinephrine, wean as able    Severe neutropenia   - Continue filgastrim   - Neutropenic precautions     Anemia, chemotherapy induced  - asymptomatic, Hgb 7.3   - continue to transfuse for goal >7    Thrombocytopenia, chemotherapy induced  - Plt 26 this AM, continue to monitor closely for signs of bleeding     AIDE  - Suspect hypovolemia due to sepsis and obstruction  - Cr 2.79 (baseline 2), continue to montior     Hypoxia  - Continue supplemental O2, wean as tolerated  - CXR with LLQ opacities  - Urine legionella and strep pneumo antigens negative; also negative for COVID, influenza  - Atelectasis vs. infectious in etiology given CXR findings  - Wean oxygen as able    Diarrhea  - C. diff and stool pathogens negative  - Continue Imodium    Other Co-Morbidities  - HTN: metoprolol held, patient reports taking prn for anxiety  - Hypothyroidism: cont synthroid  - neuropathic pain: duloxetine and cymbalta held for AIDE, can restart once Cr improved    Discussed with Dr. Melendez.    Qiana Jimenez MD  Gynecologic Oncology Fellow  GYN Oncology Team Pager [phone]

## 2024-08-18 LAB
ALBUMIN SERPL BCP-MCNC: 2.4 G/DL (ref 3.4–5)
ANION GAP SERPL CALC-SCNC: 16 MMOL/L (ref 10–20)
BACTERIA BLD CULT: NORMAL
BACTERIA BLD CULT: NORMAL
BACTERIA SPEC CULT: ABNORMAL
BACTERIA UR CULT: ABNORMAL
BASOPHILS # BLD AUTO: 0 X10*3/UL (ref 0–0.1)
BASOPHILS NFR BLD AUTO: 0 %
BUN SERPL-MCNC: 41 MG/DL (ref 6–23)
BURR CELLS BLD QL SMEAR: NORMAL
CALCIUM SERPL-MCNC: 8.1 MG/DL (ref 8.6–10.6)
CHLORIDE SERPL-SCNC: 106 MMOL/L (ref 98–107)
CMV DNA SERPL NAA+PROBE-LOG IU: NORMAL {LOG_IU}/ML
CO2 SERPL-SCNC: 19 MMOL/L (ref 21–32)
CREAT SERPL-MCNC: 2.74 MG/DL (ref 0.5–1.05)
EGFRCR SERPLBLD CKD-EPI 2021: 19 ML/MIN/1.73M*2
EOSINOPHIL # BLD AUTO: 0.12 X10*3/UL (ref 0–0.7)
EOSINOPHIL NFR BLD AUTO: 22.6 %
ERYTHROCYTE [DISTWIDTH] IN BLOOD BY AUTOMATED COUNT: 21.2 % (ref 11.5–14.5)
GLUCOSE SERPL-MCNC: 85 MG/DL (ref 74–99)
GRAM STN SPEC: ABNORMAL
GRAM STN SPEC: ABNORMAL
HCT VFR BLD AUTO: 23.6 % (ref 36–46)
HGB BLD-MCNC: 7.8 G/DL (ref 12–16)
IMM GRANULOCYTES # BLD AUTO: 0 X10*3/UL (ref 0–0.7)
IMM GRANULOCYTES NFR BLD AUTO: 0 % (ref 0–0.9)
LABORATORY COMMENT REPORT: NOT DETECTED
LYMPHOCYTES # BLD AUTO: 0.26 X10*3/UL (ref 1.2–4.8)
LYMPHOCYTES NFR BLD AUTO: 49.1 %
MAGNESIUM SERPL-MCNC: 1.86 MG/DL (ref 1.6–2.4)
MCH RBC QN AUTO: 30.8 PG (ref 26–34)
MCHC RBC AUTO-ENTMCNC: 33.1 G/DL (ref 32–36)
MCV RBC AUTO: 93 FL (ref 80–100)
MONOCYTES # BLD AUTO: 0.09 X10*3/UL (ref 0.1–1)
MONOCYTES NFR BLD AUTO: 17 %
NEUTROPHILS # BLD AUTO: 0.06 X10*3/UL (ref 1.2–7.7)
NEUTROPHILS NFR BLD AUTO: 11.3 %
NRBC BLD-RTO: 0 /100 WBCS (ref 0–0)
PHOSPHATE SERPL-MCNC: 3.3 MG/DL (ref 2.5–4.9)
PLATELET # BLD AUTO: 29 X10*3/UL (ref 150–450)
POTASSIUM SERPL-SCNC: 3.5 MMOL/L (ref 3.5–5.3)
RBC # BLD AUTO: 2.53 X10*6/UL (ref 4–5.2)
RBC MORPH BLD: NORMAL
SODIUM SERPL-SCNC: 137 MMOL/L (ref 136–145)
WBC # BLD AUTO: 0.5 X10*3/UL (ref 4.4–11.3)

## 2024-08-18 PROCEDURE — 2500000001 HC RX 250 WO HCPCS SELF ADMINISTERED DRUGS (ALT 637 FOR MEDICARE OP)

## 2024-08-18 PROCEDURE — 83735 ASSAY OF MAGNESIUM: CPT

## 2024-08-18 PROCEDURE — 2500000004 HC RX 250 GENERAL PHARMACY W/ HCPCS (ALT 636 FOR OP/ED): Mod: JZ

## 2024-08-18 PROCEDURE — 2500000004 HC RX 250 GENERAL PHARMACY W/ HCPCS (ALT 636 FOR OP/ED)

## 2024-08-18 PROCEDURE — 85025 COMPLETE CBC W/AUTO DIFF WBC: CPT

## 2024-08-18 PROCEDURE — 1200000003 HC ONCOLOGY  ROOM WITH TELEMETRY DAILY

## 2024-08-18 PROCEDURE — 37799 UNLISTED PX VASCULAR SURGERY: CPT

## 2024-08-18 PROCEDURE — 99232 SBSQ HOSP IP/OBS MODERATE 35: CPT

## 2024-08-18 PROCEDURE — 80069 RENAL FUNCTION PANEL: CPT

## 2024-08-18 RX ORDER — POTASSIUM CHLORIDE 1.5 G/1.58G
40 POWDER, FOR SOLUTION ORAL ONCE
Status: COMPLETED | OUTPATIENT
Start: 2024-08-18 | End: 2024-08-18

## 2024-08-18 RX ORDER — LOPERAMIDE HYDROCHLORIDE 2 MG/1
4 CAPSULE ORAL 4 TIMES DAILY PRN
Status: DISCONTINUED | OUTPATIENT
Start: 2024-08-18 | End: 2024-08-21 | Stop reason: HOSPADM

## 2024-08-18 RX ORDER — MAGNESIUM SULFATE HEPTAHYDRATE 40 MG/ML
2 INJECTION, SOLUTION INTRAVENOUS ONCE
Status: COMPLETED | OUTPATIENT
Start: 2024-08-18 | End: 2024-08-18

## 2024-08-18 SDOH — SOCIAL STABILITY: SOCIAL INSECURITY: DOES ANYONE TRY TO KEEP YOU FROM HAVING/CONTACTING OTHER FRIENDS OR DOING THINGS OUTSIDE YOUR HOME?: NO

## 2024-08-18 SDOH — SOCIAL STABILITY: SOCIAL INSECURITY: HAVE YOU HAD ANY THOUGHTS OF HARMING ANYONE ELSE?: NO

## 2024-08-18 SDOH — SOCIAL STABILITY: SOCIAL INSECURITY: DO YOU FEEL ANYONE HAS EXPLOITED OR TAKEN ADVANTAGE OF YOU FINANCIALLY OR OF YOUR PERSONAL PROPERTY?: NO

## 2024-08-18 SDOH — SOCIAL STABILITY: SOCIAL INSECURITY: WERE YOU ABLE TO COMPLETE ALL THE BEHAVIORAL HEALTH SCREENINGS?: YES

## 2024-08-18 SDOH — SOCIAL STABILITY: SOCIAL INSECURITY: ARE THERE ANY APPARENT SIGNS OF INJURIES/BEHAVIORS THAT COULD BE RELATED TO ABUSE/NEGLECT?: NO

## 2024-08-18 SDOH — SOCIAL STABILITY: SOCIAL INSECURITY: HAVE YOU HAD THOUGHTS OF HARMING ANYONE ELSE?: NO

## 2024-08-18 SDOH — SOCIAL STABILITY: SOCIAL INSECURITY: HAS ANYONE EVER THREATENED TO HURT YOUR FAMILY OR YOUR PETS?: NO

## 2024-08-18 SDOH — SOCIAL STABILITY: SOCIAL INSECURITY: DO YOU FEEL UNSAFE GOING BACK TO THE PLACE WHERE YOU ARE LIVING?: NO

## 2024-08-18 SDOH — SOCIAL STABILITY: SOCIAL INSECURITY: ARE YOU OR HAVE YOU BEEN THREATENED OR ABUSED PHYSICALLY, EMOTIONALLY, OR SEXUALLY BY ANYONE?: NO

## 2024-08-18 SDOH — SOCIAL STABILITY: SOCIAL INSECURITY: ABUSE: ADULT

## 2024-08-18 ASSESSMENT — PAIN SCALES - GENERAL
PAINLEVEL_OUTOF10: 7
PAINLEVEL_OUTOF10: 6
PAINLEVEL_OUTOF10: 0 - NO PAIN
PAINLEVEL_OUTOF10: 6
PAINLEVEL_OUTOF10: 0 - NO PAIN
PAINLEVEL_OUTOF10: 0 - NO PAIN

## 2024-08-18 ASSESSMENT — PAIN DESCRIPTION - DESCRIPTORS: DESCRIPTORS: ACHING

## 2024-08-18 ASSESSMENT — LIFESTYLE VARIABLES
SKIP TO QUESTIONS 9-10: 1
AUDIT-C TOTAL SCORE: 0
AUDIT-C TOTAL SCORE: 0
HOW OFTEN DO YOU HAVE A DRINK CONTAINING ALCOHOL: NEVER
HOW OFTEN DO YOU HAVE 6 OR MORE DRINKS ON ONE OCCASION: NEVER
HOW MANY STANDARD DRINKS CONTAINING ALCOHOL DO YOU HAVE ON A TYPICAL DAY: PATIENT DOES NOT DRINK

## 2024-08-18 ASSESSMENT — COGNITIVE AND FUNCTIONAL STATUS - GENERAL
CLIMB 3 TO 5 STEPS WITH RAILING: A LITTLE
DAILY ACTIVITIY SCORE: 24
PATIENT BASELINE BEDBOUND: NO
MOBILITY SCORE: 23

## 2024-08-18 ASSESSMENT — ACTIVITIES OF DAILY LIVING (ADL)
PATIENT'S MEMORY ADEQUATE TO SAFELY COMPLETE DAILY ACTIVITIES?: YES
WALKS IN HOME: INDEPENDENT
HEARING - LEFT EAR: FUNCTIONAL
DRESSING YOURSELF: INDEPENDENT
GROOMING: INDEPENDENT
TOILETING: INDEPENDENT
ADEQUATE_TO_COMPLETE_ADL: YES
HEARING - RIGHT EAR: FUNCTIONAL
BATHING: INDEPENDENT
JUDGMENT_ADEQUATE_SAFELY_COMPLETE_DAILY_ACTIVITIES: YES
FEEDING YOURSELF: INDEPENDENT

## 2024-08-18 ASSESSMENT — PATIENT HEALTH QUESTIONNAIRE - PHQ9
2. FEELING DOWN, DEPRESSED OR HOPELESS: NOT AT ALL
1. LITTLE INTEREST OR PLEASURE IN DOING THINGS: NOT AT ALL
SUM OF ALL RESPONSES TO PHQ9 QUESTIONS 1 & 2: 0

## 2024-08-18 ASSESSMENT — PAIN - FUNCTIONAL ASSESSMENT
PAIN_FUNCTIONAL_ASSESSMENT: 0-10

## 2024-08-18 ASSESSMENT — PAIN SCALES - PAIN ASSESSMENT IN ADVANCED DEMENTIA (PAINAD): TOTALSCORE: MEDICATION (SEE MAR)

## 2024-08-18 ASSESSMENT — PAIN DESCRIPTION - ORIENTATION: ORIENTATION: LEFT;LOWER

## 2024-08-18 ASSESSMENT — PAIN DESCRIPTION - LOCATION: LOCATION: BACK

## 2024-08-18 NOTE — HOSPITAL COURSE
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     She initially presented to OSH ED for generalized weakness and non bloody diarrhea.  She was also reporting pain near her left sided PCNT for the last couple days and had pinkish discoloration of her urine in the R PCNT. Diarrhea was ongoing for couple of days also and having at least 2 episodes per day. She had recent outpatient labs done on 8/14 notable for WBC 0.3, Hb 6.8, platelets 29, and ANC 10 for which she was also told to come into the hospital. While in the ED, she was noted to be hypotensive to 70/47 and was afebrile.  Labs notable for  WBC 0.1, Hb 6.0, plt 15, Na 129, Cr 4.10 and lactate 1.3.  CT A&P notable for unchanged kinked left nephrostomy tube, increased size of calcified uterine mass and right adnexal cystic structure.  Patient was started on Vanco, Zosyn and was fluid resuscitated with 2.5 L NS.  She was also given 1 unit of pRBC.  On arrival to [facility], patient febrile to 103.5, tachycardic to 111, hypotensive to 71/44 and SpO2 86% on RA and was placed on 3L NC. She was given an additional 2L of LR on the floor however remained hypotensive and Code White was called. Most recent labs on the floor notable for Na 133, Cr 3.75, Ca 6.9, lactate 2.1. CBC notable for WBC 0.2, Hb 6.9, plt 10, and ANC 20. UA positive for 75 LE and >20 RBC. Cxr showed hazy opacity in the L lung base. She was given 25 g of albumin without improvement in blood pressure and was ultimately transferred to the MICU for further management of hypotension due to sepsis requiring pressors. In the MICU she was started on Levophed  and IR replaced her L PCNT. She addiitonally received 2 units pRBCs and 1 unit platelets for thrombocytopenia and anemia. Following PCNT replacement her pressor requirements increased and she went into Afib/Aflutter with RVR HR in the 130s-140s. She received 1 PO dose of metoprolol 12.5 mg and HR improved. Throughout 8/17 she was weaned off pressors. Stool pathogen panel and Cdiff were negative and she was able to be started on loperamide for diarrhea. Urine culture was positive for pseudomonas aeruginosa, wound culture around the L PCNT grew MSSA. Vanc was discontinued and Zosyn was continued. On 8/18 she was stable for transfer back to the floor.   While on the floor,  (8/19) patient had an episode of hypotension and tachycardia with concern for afib, however, no abnormal rhythm noted on  telemetry or EKG, improved with supine rest, fluid bolus, and albumin. Based on sensitivities patient was continued on Augmentin  to complete a 10 day course after Zosyn.

## 2024-08-18 NOTE — SIGNIFICANT EVENT
Floor Readiness Note       I, personally, evaluated Shelbi Delaney prior to transfer to the floor, including reviewing all current laboratory and imaging studies. The patient remains appropriate for transfer to the floor. Bedside nurse and respiratory therapy are also in agreement of patient's readiness for the floor.     Brief summary:  Shelbi Delaney is a 64 y.o. female who was admitted to the MICU on 8/16 for septic shock. They have been treated with vanc/zosyn and left PCNT replacement.     Updated focused Physical Exam:  NAD, resting comfortably in bed  No increased WOB, no abdominal pain    Current Vital Signs:  Heart Rate: 74 (08/18/24 1000 : Nelia Aguirre, RN)  BP: 101/63 (08/18/24 1000 : Nelia Aguirre, RN)  Temp: 36.9 °C (98.4 °F) (08/18/24 0700 : Marcus Rider)  Resp: 20 (08/18/24 1000 : Nelia Aguirre, RN)  SpO2: 96 % (08/18/24 1000 : Nelia Aguirre, ABDOUL)    Relevant updates since rounds:  Increased loperamide to 4 QID prn  F/up lytes iso continued diarrhea  PCNT site culture with MSSA- stoppped vanc    Accepting team,  Gyn onc , received verbal sign out and the Provider Care team/Attending has been updated. Bedside nurse will now call accepting nurse for report and patient will be transferred to [facility].    Myriam King MD

## 2024-08-18 NOTE — PROGRESS NOTES
Vancomycin Dosing by Pharmacy- Cessation of Therapy    Consult to pharmacy for vancomycin dosing has been discontinued by the prescriber, pharmacy will sign off at this time.    Please call pharmacy if there are further questions or re-enter a consult if vancomycin is resumed.     JESUS BARBA

## 2024-08-18 NOTE — PROGRESS NOTES
Shelbi Delaney is a 64 y.o. on day 3 of admission presenting with Neutropenic fever (CMS-HCC).      Subjective:   Feeling well this AM, much improved. Off pressors and O2. Tolerating diet but has low appetite. Eager to get out of bed    Objective:  Blood pressure 101/63, pulse 74, temperature 36.9 °C (98.4 °F), resp. rate 20, weight 85.4 kg (188 lb 4.4 oz), SpO2 96%.      Intake/Output Summary (Last 24 hours) at 8/18/2024 1110  Last data filed at 8/18/2024 0800  Gross per 24 hour   Intake 680 ml   Output 1650 ml   Net -970 ml     L PCN with low UOP     Physical Exam:  Constitutional: No visible distress, alert and cooperative  Eyes: Clear sclera  Head/Neck: Normocephalic  Respiratory/Thorax: Normal respiratory effort, CTAB  Cardiovascular: Warm and well perfused, regular rate and rhythm  Gastrointestinal: Soft, nondistended, nontender, without rebound or guarding  Genitourinary: Bilateral PCNs in place draining clear yellow urine. Bilat PCN sites clean  Extremities: No LE edema, no calf tenderness  Neurological: No gross deficits  Psychological: Appropriate mood and behavior    Recent labs:   Lab Results   Component Value Date    WBC 0.5 (LL) 08/18/2024    HGB 7.8 (L) 08/18/2024    HCT 23.6 (L) 08/18/2024    PLT 29 (LL) 08/18/2024     Lab Results   Component Value Date     08/18/2024    K 3.5 08/18/2024     08/18/2024    CO2 19 (L) 08/18/2024    BUN 41 (H) 08/18/2024    CREATININE 2.74 (H) 08/18/2024    GLUCOSE 85 08/18/2024     Lab Results   Component Value Date    CALCIUM 8.1 (L) 08/18/2024    MG 1.86 08/18/2024    PHOS 3.3 08/18/2024     Lab Results   Component Value Date    AST 16 08/16/2024    ALT 10 08/16/2024    ALKPHOS 46 08/16/2024     Lab Results   Component Value Date    APTT 31 08/16/2024    INR 1.2 (H) 08/16/2024     Lab Results   Component Value Date    COLORU Light-Yellow 08/15/2024    GLUCOSEU Normal 08/15/2024    KETONESU NEGATIVE 08/15/2024    BLOODU 1.0 (3+) (A) 08/15/2024     UROBILINOGEN Normal 08/15/2024       Assessment/Plan:     Principal Problem:     Neutropenic fever (CMS-HCC)     64 y.o. with progressive SCC of the cervix, currently undergoing chemo with topotecan who presented as transfer from [facility] for neutropenic fever. Currently admitted to MICU due to persistent hypotension in setting of septic shock.      Urosepsis  - S/p L PCN exchange 8/16  - Ucx from L PCN with >100,000 CFU GNB   - Skin culture L PCN with MSSA and pseudomonas, susceptible to vanc/zosyn  - continue Vanc/Zosyn (8/15 - )  - Bcx from [place]: NGTD x2ds  - Bcx on admission: NGTD x2ds  - Currently admitted to ICU for pressor support, now normotensive off pressors     Severe neutropenia   - Continue filgastrim   - Neutropenic precautions     Anemia, chemotherapy induced  - asymptomatic, Hgb 7.8  - continue to transfuse for goal >7    Thrombocytopenia, chemotherapy induced  - Plt 29 this AM, continue to monitor closely for signs of bleeding     AIDE  - Suspect hypovolemia due to sepsis and obstruction  - Cr 2.74 (baseline 2), continue to trend     Hypoxia (resolved)  - now on RA  - CXR with LLQ opacities  - Urine legionella and strep pneumo antigens negative; also negative for COVID, influenza  - Atelectasis vs. infectious in etiology given CXR findings    Diarrhea  - C. diff and stool pathogens negative  - Continue Imodium    Other Co-Morbidities  - HTN: metoprolol held, patient reports taking prn for anxiety  - Hypothyroidism: cont synthroid  - neuropathic pain: duloxetine and cymbalta held for AIDE, can restart once Cr improved    Dispo: will accept to the floor once cleared by ICU team    Discussed with Dr. Aakash Kapadia MD, PGY-3  GYN Oncology Team Pager [phone]

## 2024-08-19 ENCOUNTER — OFFICE VISIT (OUTPATIENT)
Dept: SURGICAL ONCOLOGY | Facility: HOSPITAL | Age: 64
End: 2024-08-19
Payer: COMMERCIAL

## 2024-08-19 LAB
ALBUMIN SERPL BCP-MCNC: 2.9 G/DL (ref 3.4–5)
ANION GAP SERPL CALC-SCNC: 14 MMOL/L (ref 10–20)
ATRIAL RATE: 264 BPM
ATRIAL RATE: 69 BPM
BACTERIA BLD CULT: NORMAL
BACTERIA BLD CULT: NORMAL
BASOPHILS # BLD MANUAL: 0.02 X10*3/UL (ref 0–0.1)
BASOPHILS NFR BLD MANUAL: 0.8 %
BLOOD EXPIRATION DATE: NORMAL
BLOOD EXPIRATION DATE: NORMAL
BUN SERPL-MCNC: 37 MG/DL (ref 6–23)
BURR CELLS BLD QL SMEAR: ABNORMAL
CALCIUM SERPL-MCNC: 8.6 MG/DL (ref 8.6–10.6)
CHLORIDE SERPL-SCNC: 107 MMOL/L (ref 98–107)
CO2 SERPL-SCNC: 19 MMOL/L (ref 21–32)
CREAT SERPL-MCNC: 2.25 MG/DL (ref 0.5–1.05)
DISPENSE STATUS: NORMAL
DISPENSE STATUS: NORMAL
EGFRCR SERPLBLD CKD-EPI 2021: 24 ML/MIN/1.73M*2
EOSINOPHIL # BLD MANUAL: 0.2 X10*3/UL (ref 0–0.7)
EOSINOPHIL NFR BLD MANUAL: 10.7 %
ERYTHROCYTE [DISTWIDTH] IN BLOOD BY AUTOMATED COUNT: 20.5 % (ref 11.5–14.5)
GLUCOSE SERPL-MCNC: 84 MG/DL (ref 74–99)
HCT VFR BLD AUTO: 24.3 % (ref 36–46)
HGB BLD-MCNC: 8.3 G/DL (ref 12–16)
IMM GRANULOCYTES # BLD AUTO: 0.11 X10*3/UL (ref 0–0.7)
IMM GRANULOCYTES NFR BLD AUTO: 5.8 % (ref 0–0.9)
LYMPHOCYTES # BLD MANUAL: 0.48 X10*3/UL (ref 1.2–4.8)
LYMPHOCYTES NFR BLD MANUAL: 25.4 %
MAGNESIUM SERPL-MCNC: 1.92 MG/DL (ref 1.6–2.4)
MCH RBC QN AUTO: 31 PG (ref 26–34)
MCHC RBC AUTO-ENTMCNC: 34.2 G/DL (ref 32–36)
MCV RBC AUTO: 91 FL (ref 80–100)
METAMYELOCYTES # BLD MANUAL: 0.03 X10*3/UL
METAMYELOCYTES NFR BLD MANUAL: 1.6 %
MONOCYTES # BLD MANUAL: 0.26 X10*3/UL (ref 0.1–1)
MONOCYTES NFR BLD MANUAL: 13.9 %
NEUTROPHILS # BLD MANUAL: 0.89 X10*3/UL (ref 1.2–7.7)
NEUTS BAND # BLD MANUAL: 0.06 X10*3/UL (ref 0–0.7)
NEUTS BAND NFR BLD MANUAL: 3.3 %
NEUTS SEG # BLD MANUAL: 0.83 X10*3/UL (ref 1.2–7)
NEUTS SEG NFR BLD MANUAL: 43.5 %
NRBC BLD-RTO: 0 /100 WBCS (ref 0–0)
OVALOCYTES BLD QL SMEAR: ABNORMAL
P AXIS: 175 DEGREES
P OFFSET: 189 MS
P ONSET: 151 MS
PHOSPHATE SERPL-MCNC: 2.1 MG/DL (ref 2.5–4.9)
PLATELET # BLD AUTO: 49 X10*3/UL (ref 150–450)
POTASSIUM SERPL-SCNC: 3.4 MMOL/L (ref 3.5–5.3)
PR INTERVAL: 134 MS
PRODUCT BLOOD TYPE: 6200
PRODUCT BLOOD TYPE: 6200
PRODUCT CODE: NORMAL
PRODUCT CODE: NORMAL
Q ONSET: 218 MS
Q ONSET: 219 MS
QRS COUNT: 11 BEATS
QRS COUNT: 20 BEATS
QRS DURATION: 84 MS
QRS DURATION: 88 MS
QT INTERVAL: 330 MS
QT INTERVAL: 420 MS
QTC CALCULATION(BAZETT): 450 MS
QTC CALCULATION(BAZETT): 472 MS
QTC FREDERICIA: 419 MS
QTC FREDERICIA: 440 MS
R AXIS: 10 DEGREES
R AXIS: 179 DEGREES
RBC # BLD AUTO: 2.68 X10*6/UL (ref 4–5.2)
RBC MORPH BLD: ABNORMAL
SODIUM SERPL-SCNC: 137 MMOL/L (ref 136–145)
T AXIS: 169 DEGREES
T AXIS: 37 DEGREES
T OFFSET: 384 MS
T OFFSET: 428 MS
TOTAL CELLS COUNTED BLD: 122
UNIT ABO: NORMAL
UNIT ABO: NORMAL
UNIT NUMBER: NORMAL
UNIT NUMBER: NORMAL
UNIT RH: NORMAL
UNIT RH: NORMAL
UNIT VOLUME: 350
UNIT VOLUME: 350
VARIANT LYMPHS # BLD MANUAL: 0.02 X10*3/UL (ref 0–0.5)
VARIANT LYMPHS NFR BLD: 0.8 %
VENTRICULAR RATE: 123 BPM
VENTRICULAR RATE: 69 BPM
WBC # BLD AUTO: 1.9 X10*3/UL (ref 4.4–11.3)
XM INTEP: NORMAL
XM INTEP: NORMAL

## 2024-08-19 PROCEDURE — 85007 BL SMEAR W/DIFF WBC COUNT: CPT

## 2024-08-19 PROCEDURE — 93005 ELECTROCARDIOGRAM TRACING: CPT

## 2024-08-19 PROCEDURE — 83735 ASSAY OF MAGNESIUM: CPT

## 2024-08-19 PROCEDURE — 2500000004 HC RX 250 GENERAL PHARMACY W/ HCPCS (ALT 636 FOR OP/ED): Mod: JZ

## 2024-08-19 PROCEDURE — 97530 THERAPEUTIC ACTIVITIES: CPT | Mod: GO

## 2024-08-19 PROCEDURE — 99255 IP/OBS CONSLTJ NEW/EST HI 80: CPT | Performed by: INTERNAL MEDICINE

## 2024-08-19 PROCEDURE — 2500000001 HC RX 250 WO HCPCS SELF ADMINISTERED DRUGS (ALT 637 FOR MEDICARE OP): Performed by: NURSE PRACTITIONER

## 2024-08-19 PROCEDURE — 99497 ADVNCD CARE PLAN 30 MIN: CPT | Performed by: INTERNAL MEDICINE

## 2024-08-19 PROCEDURE — 97161 PT EVAL LOW COMPLEX 20 MIN: CPT | Mod: GP | Performed by: STUDENT IN AN ORGANIZED HEALTH CARE EDUCATION/TRAINING PROGRAM

## 2024-08-19 PROCEDURE — P9045 ALBUMIN (HUMAN), 5%, 250 ML: HCPCS | Mod: JZ

## 2024-08-19 PROCEDURE — 36415 COLL VENOUS BLD VENIPUNCTURE: CPT

## 2024-08-19 PROCEDURE — 97165 OT EVAL LOW COMPLEX 30 MIN: CPT | Mod: GO

## 2024-08-19 PROCEDURE — 85027 COMPLETE CBC AUTOMATED: CPT

## 2024-08-19 PROCEDURE — 2500000001 HC RX 250 WO HCPCS SELF ADMINISTERED DRUGS (ALT 637 FOR MEDICARE OP)

## 2024-08-19 PROCEDURE — 1200000003 HC ONCOLOGY  ROOM WITH TELEMETRY DAILY

## 2024-08-19 PROCEDURE — 86920 COMPATIBILITY TEST SPIN: CPT

## 2024-08-19 PROCEDURE — 82040 ASSAY OF SERUM ALBUMIN: CPT

## 2024-08-19 PROCEDURE — 93010 ELECTROCARDIOGRAM REPORT: CPT | Performed by: INTERNAL MEDICINE

## 2024-08-19 PROCEDURE — 99232 SBSQ HOSP IP/OBS MODERATE 35: CPT

## 2024-08-19 RX ORDER — ALBUMIN HUMAN 50 G/1000ML
25 SOLUTION INTRAVENOUS ONCE
Status: COMPLETED | OUTPATIENT
Start: 2024-08-19 | End: 2024-08-19

## 2024-08-19 RX ORDER — HEPARIN SODIUM,PORCINE/PF 10 UNIT/ML
50 SYRINGE (ML) INTRAVENOUS AS NEEDED
Status: DISCONTINUED | OUTPATIENT
Start: 2024-08-19 | End: 2024-08-21 | Stop reason: HOSPADM

## 2024-08-19 RX ORDER — GABAPENTIN 100 MG/1
200 CAPSULE ORAL 3 TIMES DAILY
Status: DISCONTINUED | OUTPATIENT
Start: 2024-08-19 | End: 2024-08-20

## 2024-08-19 RX ORDER — OXYCODONE HYDROCHLORIDE 5 MG/1
5 TABLET ORAL ONCE
Status: COMPLETED | OUTPATIENT
Start: 2024-08-19 | End: 2024-08-19

## 2024-08-19 RX ORDER — SODIUM CHLORIDE, SODIUM LACTATE, POTASSIUM CHLORIDE, CALCIUM CHLORIDE 600; 310; 30; 20 MG/100ML; MG/100ML; MG/100ML; MG/100ML
80 INJECTION, SOLUTION INTRAVENOUS CONTINUOUS
Status: DISCONTINUED | OUTPATIENT
Start: 2024-08-19 | End: 2024-08-21 | Stop reason: HOSPADM

## 2024-08-19 RX ORDER — HEPARIN SODIUM,PORCINE/PF 10 UNIT/ML
50 SYRINGE (ML) INTRAVENOUS EVERY 12 HOURS
Status: DISCONTINUED | OUTPATIENT
Start: 2024-08-19 | End: 2024-08-21 | Stop reason: HOSPADM

## 2024-08-19 RX ORDER — HEPARIN 100 UNIT/ML
500 SYRINGE INTRAVENOUS ONCE AS NEEDED
Status: DISCONTINUED | OUTPATIENT
Start: 2024-08-19 | End: 2024-08-21 | Stop reason: HOSPADM

## 2024-08-19 ASSESSMENT — COGNITIVE AND FUNCTIONAL STATUS - GENERAL
STANDING UP FROM CHAIR USING ARMS: A LITTLE
WALKING IN HOSPITAL ROOM: A LITTLE
MOVING TO AND FROM BED TO CHAIR: A LITTLE
MOBILITY SCORE: 18
CLIMB 3 TO 5 STEPS WITH RAILING: A LITTLE
DAILY ACTIVITIY SCORE: 23
MOVING FROM LYING ON BACK TO SITTING ON SIDE OF FLAT BED WITH BEDRAILS: A LITTLE
TURNING FROM BACK TO SIDE WHILE IN FLAT BAD: A LITTLE
HELP NEEDED FOR BATHING: A LITTLE

## 2024-08-19 ASSESSMENT — ACTIVITIES OF DAILY LIVING (ADL)
ADL_ASSISTANCE: INDEPENDENT
BATHING_ASSISTANCE: STAND BY

## 2024-08-19 ASSESSMENT — PAIN SCALES - GENERAL
PAINLEVEL_OUTOF10: 2
PAINLEVEL_OUTOF10: 6
PAINLEVEL_OUTOF10: 3
PAINLEVEL_OUTOF10: 8

## 2024-08-19 ASSESSMENT — PAIN - FUNCTIONAL ASSESSMENT: PAIN_FUNCTIONAL_ASSESSMENT: 0-10

## 2024-08-19 NOTE — PROGRESS NOTES
Shelbi Delaney is a 64 y.o. female on day 4 of admission presenting with Neutropenic fever (CMS-HCC).    Subjective   Patient feeling well, denies pain, tolerating diet , voiding. Very eager to go home.    Objective     Last Recorded Vitals  Blood pressure 94/54, pulse 68, temperature 36.2 °C (97.2 °F), temperature source Temporal, resp. rate 16, weight 85.4 kg (188 lb 4.4 oz), SpO2 97%.  Intake/Output last 3 Shifts:    Intake/Output Summary (Last 24 hours) at 8/19/2024 1110  Last data filed at 8/19/2024 0851  Gross per 24 hour   Intake 1730.67 ml   Output 770 ml   Net 960.67 ml       Physical Exam  General: no acute distress  HEENT: normocephalic, atraumatic  Heart: warm and well perfused, RRR  Lungs: no increased WOB, CTAB  Abd: soft, nontender  Extremities: moving all extremities  Neuro: awake and conversant  Psych: appropriate mood and affect   :  Bilateral PCNs in place draining clear yellow urine. Bilat PCN sites clean   Assessment & Plan  Neutropenic fever (CMS-HCC)    64 y.o. with progressive SCC of the cervix, currently undergoing chemo with topotecan who presented as transfer from [facility] for neutropenic fever. Transferred back from  MICU (due to persistent hypotension in setting of septic shock) on 8/18.     Urosepsis  - S/p L PCN exchange 8/16  - Ucx from L PCN with >100,000 CFU GNB   - Skin culture L PCN with MSSA and pseudomonas, susceptible to vanc/zosyn  - Continue Zosyn (8/15 - )  - Bcx from [place]: NGTD x2ds  - Bcx on admission: NGTD x2ds    Deconditioning  - Likely due to recent MICU admission, cancer, chemotherapy  - Hypotension overnight responsive to IV fluids  - Continue to monitor, continue telemetry, PT consult    Severe neutropenia   - Continue filgastrim   - Neutropenic precautions  - ANC: 889     Anemia, chemotherapy induced  - asymptomatic, Hgb 8.3  - continue to transfuse for goal >7     Thrombocytopenia, chemotherapy induced  - Plt 49 this AM, continue to monitor closely for signs  of bleeding     AIDE  - Suspect hypovolemia due to sepsis and obstruction  - Cr 2.25(baseline 2), continue to trend    Diarrhea  - C. diff and stool pathogens negative  - Continue Imodium     Other Co-Morbidities  - HTN: metoprolol held, patient reports taking prn for anxiety  - Hypothyroidism: cont synthroid  - neuropathic pain: duloxetine and cymbalta held for AIDE, can restart once Cr improved   Seen and discussed with Dr Phillip Gomez MD PGY2

## 2024-08-19 NOTE — CONSULTS
SUPPORTIVE AND PALLIATIVE ONCOLOGY CONSULT    Inpatient consult to [facility] Adult Supportive Oncology  Consult performed by: Mae Hernández MD  Consult ordered by: Eden Anders MD        SERVICE DATE: 8/19/2024      PALLIATIVE MEDICINE OUTPATIENT PROVIDER:  None  CURRENT ATTENDING PROVIDER: Eden Anders MD     Medical Oncologist: MD Ashanti Melgar MD   Radiation Oncologist: No care team member to display  Primary Physician: Adrianna Jefferson  [phone]    REASON FOR CONSULT/CHIEF CONSULT COMPLAINT: pain management    Subjective   HISTORY OF PRESENT ILLNESS: Shelbi Delaney is a 64 y.o. female with stage IV moderately differentiated SCC cervix s/p docetaxel and recently started on topotecan X2 cycles, bilateral hydronephrosis s/p PCNT, Pseudomonas UTI, CKDIV (baseline creatinine 2.2-2.4) presented to outside ED for generalized weakness and nonbloody diarrhea and found to be pancytopenic with hypotension and hyponatremia and transferred to [facility] for further workup and management of febrile neutropenia.  Code white called on the floor for hypotension and transferred to ICU with concern for septic shock requiring pressor.  Found to have urosepsis, s/p left PCN exchange on 8/16, pancytopenia secondary to chemotherapy, AIDE on CKD.  Supportive and Palliative Oncology is consulted for pain management and Introduction to Supportive and Palliative Oncology Services.     Complains of bilateral back pain at the site of percutaneous nephrostomy tube 5/10 nonradiating pressure-like no aggravating factors relieved with oxycodone as needed.  Left percutaneous nephrostomy site pain 8/10 since recent replacement and oxycodone has been helping    Complaints of anterior pelvic pain since she was started on chemotherapy intermittent 8/10 after chemotherapy pressure radiating in a bandlike fashion to the back aggravated by chemotherapy and relieved with oxycodone to 3/10.    Importance of chronic numbness and tingling  bilateral feet and hands since she was started on chemotherapy.    She takes oxycodone 5 mg IR every 8 hours as needed.  Takes between 1 to 3 tablets/day  She was initially taking gabapentin 900 mg p.o. 3 times daily at home however recently has been taking 300 mg 3 times a day.  She does not take Cymbalta at home    Decreased appetite recently since hospitalization    Decreased sleep due to technical interruptions    Pain Assessment:  Onset:  Months  Location:  As above multiple sites  Duration:  Chronic and intermittent as above  Characteristics:   Rating:  As above   Descriptors:  As above   Aggravating: As above   Relieving:  As above   Intolerances:Shelbi Delaney is allergic to l-lysine [lysine] and adhesive.   Personal Pain Goal: 5    Interference with Function: A little numbness and tingling but her ambulation.  She is unable to stand more than 10 minutes   Coping Strategies: none   Emotional Response: None   Barriers to Pain Management: None    Opioid Use  Past 24 h opioid use:   oxycodone IR 5 mg every 4 hours as needed.  Used 3 doses/24 hours= oxycodone 15 mg / 24 hours= 18 mg OME  Total 24h OME use: 18 mg OME    Note: OME calculations based on equianalgesic table below. Please note this table is based on best available evidence but conversions are still approximate. These are NOT opioid DOSES for individual patient use; this is equivalency information.  Drug Parenteral Enteral   Morphine 10 25   Oxycodone N/A 20   Hydromorphone 2 5   Fentanyl 0.15 N/A   Tramadol N/A 120   Citation: Gem REYNOLDS. Demystifying opioid conversion calculations: A guide for effective dosing, Second edition. [place]: American [employer] of Health-System Pharmacists, 2018.    OARRS/PDMP reviewed  no aberrant behavior noted.  Provider Richard Mendez who last prescribed oxycodone IR 5 mg tablet on 7/10/2024-90 tablets X 30 days supply likely oxycodone IR 5 mg every 8 hours as needed    Symptom Assessment:  Pain: As above  Headache:  none  Dizziness:none  Lack of energy: a little  Difficulty sleeping: a little  Worrying: none  Anxiety: none  Depression: none  Pain in mouth/swallowing: none  Dry mouth: none  Taste changes: none  Shortness of breath: none  Lack of appetite: a little   Nausea: none  Vomiting: none  Constipation: none  Diarrhea: somewhat  Sore muscles: none  Numbness or tingling in hands/feet/other: very much  Weight loss: none  Other: none        Information obtained from: chart review, interview of patient, and discussion with primary team  ______________________________________________________________________     Oncology History Overview Note   Stage RONIT grade 2 squamous cell carcinoma of cervix  1/20/23: TURBT - poorly differentiated squamaous cell carcinoma; CPS10. [facility] review with history of cervical mass consistent with GYN primary  - Extension to rectal mucosa, pubic ramus with ?reactive mediastinal LN   - Bilateral nephrostomy placement   2/16/23 - 6/1/23: Carbo AUC5/Taxol 175 +Avastin 15mg/kg, Pembrolizumab 200mg/m2  - C2-C4 +Avastin - further treatments held due to progressive gr2 proteinuria   - Grade 3 anemia declining transfusion,   7/20/23 - 12/18/23: Pembrolizumab 200mg/m2, progressive disease  1/11/24 - 3/14/24: Tivdak x3, progressive disease   - C1: 0.9mg/kg in setting of CrCl < 30  3/2024-7/2024: Taxotere, progressive disease    Molecular Testing  PLD1: CPS 10  1/2024 Tempus xT - PIK3CA p.[id number] missense (gain): consider alpelisib  Loss of function: BCORL1, KMT2C, NSD1, KMT2D, RAD21  TMB 7.9mB - RACHELE  - Her2 Neg     Cervical cancer, FIGO stage RONIT (Multi)   3/21/2022 Cancer Staged    Staging form: Cervix Uteri, AJCC Version 9, Clinical stage from 3/21/2022: FIGO Stage RONIT (cT4, cN2, cM0) - Signed by Adrianna Jefferson MD on 10/12/2023, Prognostic indicators: CPS 10     7/20/2023 - 11/30/2023 Chemotherapy    Pembrolizumab, 21 Day Cycles     1/12/2024 - 2/22/2024 Chemotherapy    Tisotumab vedotin, 21 Day Cycles      3/21/2024 - 7/5/2024 Chemotherapy    DOCEtaxel, 21 Day Cycles     8/1/2024 -  Chemotherapy    Topotecan (Weekly), 28 Day Cycles      3/13/2025 - 3/13/2025 Chemotherapy    PACLitaxel / CARBOplatin, 21 Day Cycles - Gyn     3/13/2025 - 3/13/2025 Chemotherapy    PACLitaxel / CARBOplatin, 21 Day Cycles - Gyn         Past Medical History:   Diagnosis Date    Anxiety     Body mass index (BMI)30.0-30.9, adult 08/07/2019    BMI 30.0-30.9,adult    Cervix cancer (Multi)     Chemotherapy-induced peripheral neuropathy (Multi)     Chronic kidney disease     Hypertension     Hypothyroidism     Tachycardia, unspecified     Tachycardia     Past Surgical History:   Procedure Laterality Date    CT GUIDED IMAGING FOR NEEDLE PLACEMENT  01/12/2023    CT GUIDED IMAGING FOR NEEDLE PLACEMENT [facility]    IR  NEPHROURETERAL PLACEMENT Bilateral 6/18/2024    IR  NEPHROURETERAL PLACEMENT 6/18/2024 [facility]    IR NEPHROSTOMY TUBE EXCHANGE  10/20/2023    IR NEPHROSTOMY TUBE EXCHANGE 10/20/2023 Manuel Marvin MD [facility]    IR NEPHROSTOMY TUBE EXCHANGE  12/19/2023    IR NEPHROSTOMY TUBE EXCHANGE 12/19/2023 [facility]    MEDIPORT      NEPHROSTOMY      ORIF TIBIA FRACTURE Right     TRANSURETHRAL RESECTION OF BLADDER TUMOR      US GUIDED PERCUTANEOUS PLACEMENT  11/14/2022    US GUIDED PERCUTANEOUS PLACEMENT [facility]    US GUIDED PERCUTANEOUS PLACEMENT  08/28/2023    US GUIDED PERCUTANEOUS PLACEMENT [facility]     Family History   Problem Relation Name Age of Onset    No Known Problems Mother      Other (Acute myocardial infarction) Father          SOCIAL HISTORY:  .  Lives with .  Has 1 brother who lives in [place] along with his family   independent with ADLs however needs assistance with IADLs.   helps  Enjoys cooking    Social History:  reports that she quit smoking about 4 years ago. Her smoking use included cigarettes. She started smoking about 47 years ago. She has a 10.7  pack-year smoking history. She has been exposed to tobacco smoke. She has never used smokeless tobacco. She reports that she does not currently use alcohol after a past usage of about 8.0 standard drinks of alcohol per week. She reports that she does not use drugs.    REVIEW OF SYSTEMS:  Review of systems negative unless noted in HPI.       Objective     Results from last 7 days   Lab Units 08/19/24  0724 08/18/24  0436 08/17/24  0508 08/16/24  2126   WBC AUTO x10*3/uL 1.9* 0.5* 0.2* 0.2*   HEMOGLOBIN g/dL 8.3* 7.8* 7.3* 7.7*   HEMATOCRIT % 24.3* 23.6* 22.0* 23.5*   PLATELETS AUTO x10*3/uL 49* 29* 26* 29*   NEUTROS PCT AUTO %  --  11.3 0.0 0.0   LYMPHS PCT AUTO %  --  49.1 50.0 66.6   MONOS PCT AUTO %  --  17.0 15.0 6.7   EOS PCT AUTO %  --  22.6 30.0 26.7     Results from last 7 days   Lab Units 08/19/24  0724 08/18/24  0436 08/17/24  1237 08/17/24  0508 08/16/24  1312 08/16/24  0420 08/15/24  2212   SODIUM mmol/L 137 137 135*   < >  --  132* 133*   POTASSIUM mmol/L 3.4* 3.5 4.1   < >  --  3.5 4.5   CHLORIDE mmol/L 107 106 105   < >  --  101 97*   CO2 mmol/L 19* 19* 20*   < >  --  19* 21   BUN mg/dL 37* 41* 40*   < >  --  45* 49*   CREATININE mg/dL 2.25* 2.74* 2.79*   < >  --  3.40* 3.75*   CALCIUM mg/dL 8.6 8.1* 8.0*   < >  --  6.0* 6.9*   PROTEIN TOTAL g/dL  --   --   --   --  4.7* 4.3* 5.4*   BILIRUBIN TOTAL mg/dL  --   --   --   --  0.8 0.6 1.0   ALK PHOS U/L  --   --   --   --  46 41 56   ALT U/L  --   --   --   --  10 10 13   AST U/L  --   --   --   --  16 19 26   GLUCOSE mg/dL 84 85 131*   < >  --  112* 82    < > = values in this interval not displayed.       Estimated Creatinine Clearance: 22.8 mL/min (A) (by C-G formula based on SCr of 2.74 mg/dL (H)).     IR nephrostomy tube exchange   Final Result   1.  Uncomplicated and technically successful  right-sided   percutaneous nephrostomy tube exchange - newly placed  10-Greek   self-forming pigtail drainage catheter placement  connected to   external gravity  drainage.        I was present for and/or performed the critical portions of the   procedure and immediately available throughout the entire procedure.        I personally reviewed the image(s) / study and resident   interpretation. I agree with the findings as stated.        Performed and dictated at [facility].        MACRO:   None        Signed by: Checo Hancock 8/16/2024 4:16 PM   Dictation workstation:   [id number]      Lower extremity venous duplex bilateral   Final Result      XR chest 1 view   Final Result   1.  Streaky and hazy opacity of the left lung base favored to   represent atelectasis with a possible component of developing   consolidation.   2. No pneumothorax.        I personally reviewed the image(s)/study and resident interpretation.   I agree with the findings as stated by resident Freddy Crockett.   Data analyzed and images interpreted at [facility], [place], [place].        MACRO:   None        Signed by: Raulito Okeefe 8/16/2024 10:46 AM   Dictation workstation:   [id number]          Encounter Date: 08/15/24   Electrocardiogram, 12-lead PRN ACS symptoms   Result Value    Ventricular Rate 77    Atrial Rate 77    AK Interval 174    QRS Duration 94    QT Interval 410    QTC Calculation(Bazett) 463    P Axis 29    R Axis -6    T Axis 11    QRS Count 12    Q Onset 217    P Onset 130    P Offset 195    T Offset 422    QTC Fredericia 445    Narrative    Normal sinus rhythm  Normal ECG  When compared with ECG of 19-AUG-2024 03:10, (unconfirmed)  No significant change was found     Wt Readings from Last 5 Encounters:   08/17/24 85.4 kg (188 lb 4.4 oz)   08/15/24 83.5 kg (184 lb)   08/08/24 84 kg (185 lb 1.2 oz)   08/01/24 85.2 kg (187 lb 15.1 oz)   07/11/24 83.9 kg (185 lb)       Current Outpatient Medications   Medication Instructions    calcium carbonate (CALCIUM 600) 600 mg, oral, Daily    dexAMETHasone (DECADRON) 8 mg,  oral, 2 times daily, Take on the day before, day of and day after treatment.    dexAMETHasone 1 mg, oral, 2 times daily PRN, Swish and spit. Do not swallow    DULoxetine (CYMBALTA) 20 mg, oral, Daily, Do not crush or chew.    gabapentin (NEURONTIN) 900 mg, oral, 3 times daily    hydrOXYzine HCL (Atarax) 25 mg tablet 1 tablet, oral, 3 times daily PRN    levothyroxine (SYNTHROID, LEVOXYL) 25 mcg, oral, Daily    magic mouthwash (lidocaine, diphenhydrAMINE, Maalox 1:1:1) 10 mL, Swish & Spit, Every 6 hours PRN    metoprolol tartrate (Lopressor) 50 mg tablet take 0.5 tablets by mouth 2 times a day    multivitamin with minerals tablet 1 tablet, oral, Daily    naloxone (Narcan) 4 mg/0.1 mL nasal spray 1 spray, nasal, As needed, May repeat every 2-3 minutes if needed, alternating nostrils, until medical assistance becomes available.    nystatin (Mycostatin) cream 1 Application, Topical, 2 times daily    ondansetron (ZOFRAN) 8 mg, oral, Every 8 hours PRN    oxyCODONE (ROXICODONE) 5 mg, oral, 3 times daily, As needed for pain    prochlorperazine (COMPAZINE) 10 mg, oral, Every 6 hours PRN    tiZANidine (ZANAFLEX) 4 mg, oral, 3 times daily    traZODone (DESYREL) 50 mg, oral, Nightly PRN     Scheduled medications   [Held by provider] DULoxetine, 20 mg, oral, Daily  filgrastim or biosimilar, 5 mcg/kg, subcutaneous, q24h  [Held by provider] gabapentin, 900 mg, oral, TID  heparin flush, 50 Units, intra-catheter, q12h  levothyroxine, 25 mcg, oral, Daily  piperacillin-tazobactam, 2.25 g, intravenous, q6h      Continuous medications  lactated Ringer's, 80 mL/hr, Last Rate: 80 mL/hr (08/19/24 0422)      PRN medications  acetaminophen, 975 mg, q6h PRN  alteplase, 2 mg, PRN  heparin flush, 50 Units, PRN  heparin flush, 500 Units, Once PRN  hydrOXYzine HCL, 25 mg, TID PRN  loperamide, 4 mg, 4x daily PRN  magic mouthwash (lidocaine, diphenhydrAMINE, Maalox 1:1:1), 10 mL, q6h PRN  metoclopramide, 10 mg, q6h PRN   Or  metoclopramide, 10 mg,  q6h PRN  ondansetron, 4 mg, q6h PRN   Or  ondansetron, 4 mg, q6h PRN  oxyCODONE, 5 mg, q4h PRN  oxygen, , Continuous PRN - O2/gases  traZODone, 50 mg, Nightly PRN         Allergies:   Allergies   Allergen Reactions    L-Lysine [Lysine] Rash     Head to toe red rash    Adhesive Rash     Skin irritation and rash                 PHYSICAL EXAMINATION:  Vital Signs:   Vital signs reviewed  Vitals:    08/19/24 0850   BP: 94/54   Pulse: 68   Resp: 16   Temp: 36.2 °C (97.2 °F)   SpO2: 97%     Pain Score: 3     Physical Exam  Vitals reviewed.   Constitutional:       General: She is not in acute distress.  HENT:      Head: Normocephalic and atraumatic.      Mouth/Throat:      Mouth: Mucous membranes are moist.   Eyes:      Extraocular Movements: Extraocular movements intact.      Conjunctiva/sclera: Conjunctivae normal.      Pupils: Pupils are equal, round, and reactive to light.   Cardiovascular:      Rate and Rhythm: Normal rate and regular rhythm.      Heart sounds: Normal heart sounds.   Pulmonary:      Effort: Pulmonary effort is normal. No respiratory distress.   Abdominal:      General: Bowel sounds are normal. There is no distension.      Palpations: Abdomen is soft.   Genitourinary:     Comments: Bilateral percutaneous nephrostomy tubes in place  Musculoskeletal:         General: Normal range of motion.   Skin:     General: Skin is warm and dry.   Neurological:      General: No focal deficit present.      Mental Status: She is alert and oriented to person, place, and time.      Comments: Sensations intact bilateral feet and hands   Psychiatric:         Mood and Affect: Mood normal.         Behavior: Behavior normal.       ASSESSMENT/PLAN: 64 y.o. female with stage IV moderately differentiated SCC cervix s/p docetaxel and recently started on topotecan X2 cycles, bilateral hydronephrosis s/p PCNT, Pseudomonas UTI, CKDIV (baseline creatinine 2.2-2.4) presented to outside ED for generalized weakness and nonbloody  diarrhea and found to be pancytopenic with hypotension and hyponatremia and transferred to [facility] for further workup and management of febrile neutropenia.  Code white called on the floor for hypotension and transferred to ICU with concern for septic shock requiring pressor.  Found to have urosepsis, s/p left PCN exchange on 8/16, pancytopenia secondary to chemotherapy, AIDE on CKD.  Supportive and Palliative Oncology is consulted for pain management and Introduction to Supportive and Palliative Oncology Services.     Chronic bilateral nephrostomy site tube pain along with acute pain due to nephrostomy tube change.  Somatic  Intermittent post chemotherapy related abdominal pain.  Visceral  Chronic bilateral fingers and hand and feet numbness and tingling secondary to chemotherapy.  Neuropathic pain  OARRS/PDMP reviewed no aberrant behavior noted.consistent with  prescriptions/records and patient history   Pain is: acute on chronic  Type: visceral, somatic, and neuropathic  Pain control: Fair control  Home regimen:   Gabapentin 300 mg p.o. 3 times daily, oxycodone IR 5 mg every 8 hours as needed.  Takes 1 to 3 tablets/day  Intolerances/previously tried: None  Personalized pain goal: 5  Total OME usage for the past 24 hours: 80 mg    Gabapentin 900 mg p.o. 3 times daily, currently on hold since 8/15 due to AIDE.  Patient states that she has been taking 300 mg p.o. 3 times daily at home.  With her current creatinine clearance of 27.8 ml/min, I recommend starting gabapentin at 200 mg p.o. 3 times daily.  Continue Tylenol 975 mg p.o. every 6 hours as needed.  Used 3 doses/24 hours  Continue oxycodone IR 5 mg every 4 hours as needed.  Used 3 doses/24 hours  Cymbalta 20 mg p.o. daily currently on hold since 8/15 due to AIDE.  Current creatinine clearance 27.8 ml/min.  Patient states that she has not been taking at home however it can be started at low-dose if needed and if kidney function improves  Continue to monitor pain  scores and administer PRN medications as appropriate  Continue/initiate nonpharmacologic pain management strategies including ice/heat therapy, distraction techniques, deep breathing/relaxation techniques, calming music, and repositioning  Continue to monitor for signs of opioid efficacy (pain scores, improved functionality) and toxicity (pinpoint pupils, excess sedation/drowsiness/confusion, respiratory depression, etc.)    At risk for nausea without vomiting related to opioids and postprocedure  Well-controlled  Home regimen:  none  Continue Reglan 10 mg IV/p.o. every 6 hours as needed second line.  Used 0 dose/24 hours  Continue Zofran 4 mg IV/p.o. every 6 hours as needed first-line.  Used 0 dose/24 hours    At risk for constipation related to opioids, currently not constipated.  Currently has diarrhea  Usual bowel pattern: every day  Home regimen:   LBM today  Continue loperamide 4 mg p.o. 4 times a day as needed.  Use 1 dose/24 hours  Warm water  Prune juice  Encourage mobility as tolerated, PT/OT following   Monitor BM frequency, adjust regimen as needed  Goal to have BM without straining q48-72h    Altered Mood:  Acute anxiety related to health concerns   controlled with home regimen   Home regimen:  none  Continue Atarax 25 mg p.o. 3 times daily as needed    Impaired sleep related to frequent interruptions while inpatient.   Home regimen:  trazodone    Continue trazodone 50 mg p.o. nightly as needed  Recommend good sleep hygiene, relaxation techniques (deep breathing techniques, meditation, mindfulness), minimizing interruptions    Appetite loss related to , hospitalization, disease process  Nutrition consult  Home regimen:  none  Reinforced small frequent meals/snacks  Encouraged increased fluid intake  Encouraged use of supplements  Nausea not currently an issue    Medical Decision Making/Goals of Care/Advance Care Planning:  Introduction to Supportive and Palliative Oncology:  Introduced the role and  philosophy of Supportive and Palliative oncology in the evaluation and management of symptoms during cancer treatment  Palliative care was introduced as a service for patients with serious illness to help with symptoms, assist with goals of care conversations, navigate complex decision making, improve quality of life for patients, and provide support both patients and families.  Patient seemed to appreciate the extra layer of support.     The patient and/or family consented to a voluntary Advance Care Planning conversation. Individuals present for the conversation: Patient    Summary of the conversation: Patient's current clinical condition, including diagnosis, prognosis, and management plan, and goals of care were discussed.     Family: .  Lives with .  Has 1 brother who lives in [place] along with his family  Performance status: independent with ADLs however needs assistance with IADLs.   helps  Joys/meaning/strength: Cooking  Understanding of health: Understands that she has cervical cancer and has been receiving chemotherapy however could not receive chemotherapy last week since her blood counts were low and she was asked to come to the emergency room and was admitted due to low blood count and low blood pressure which required ICU stay requiring vasopressors.  Information:Full disclosure  Goals: cancer directed therapy and go home  Worries and fears now and future: Delaying her cancer treatment  Minimum acceptable outcome/QOL: Intact cognition, communication, wheelchair-bound is acceptable however bedbound is not acceptable  Code status discussion:  We specifically discussed code status.  I explained that I have this conversation with all my patients so that their wishes can be respected in case of emergency and also to ease the burden on caregivers to make those decisions in the emergency for care during times when she is unable to tell us, so that we can provide the care that she would  want cain in the circumstance that she were to become very sick and her heart were to stop.  We discussed her wishes regarding intubation/CPR in case of cardiopulmonary arrest. We discussed risks of CPR.  She stated that she wanted to be full code for now but doesn't want to be in a vegetative state with brain not functioning, not being aware of surroundings and not being able to communicate, and being bedbound. This wouldn't be quality of life and would only cause suffering. Pt agreed to undergo short term ventilation and  expressed that she would not want long term mechanical ventilation as this would greatly affect her quality of life.    Advance Directives  Existence of Advance Directives:Yes, documentation or copy in medical record  Decision maker: HCPOA is   Code Status: Full code with limitations as above    Outcome of the conversation and documents completed: Full code changed to full code with limitations    Supportive Interventions: Will engage interdisciplinary team as needed    Disposition:  Please  start the process of having prior authorization with meds to beds deliver medications [facility]. Prescriptions will need to be sent 48-72 hours prior to discharge so that a prior authorization can be completed.     Discharge date: unknown pending acute issues  Patient will continue to follow-up with her outpatient pain management provider Richard Mendez in [place]    SIGNATURE AND BILLING:    High Complexity   Today, I am treating the patient for acute on chronic pain which is in moderate exacerbation    Extensive DATA   Reviewed records from the following unique sources:  providers from oncology services   Diagnostic tests and information reviewed for today's visit:  Most recent labs and imaging results, Medications  Independently interpreted test CBC, CMP chest x-ray which shows pancytopenia AIDE on CKD, atelectasis.  Discussed plan of Care/management of pain  with: Provider and Patient via shared electronic medical record/secure chat/email or face-to-face.     Thank you for asking Supportive and Palliative Oncology to assist with care of this patient. Recommendations will be communicated back to the consulting service by way of shared electronic medical record/secure chat/email or face-to-face.  We will continue to follow.  Please contact us for additional questions or concerns.    Medical complexity was high level due to due to complexity of problems, extensive data review, and high risk of management/treatment.     I spent 30 minutes providing separately identifiable ACP services/goals of care discussion with the patient and/or surrogate decision maker in a voluntary, in-person conversation discussing the patient's wishes and goals (discussing pt's beliefs, values and goals/wishes for aggressive medical care, desire for hospice vs palliative care), counseling including explainaition as detailed in the above note.    SIGNATURE: Mae Hernández MD   PAGER/CONTACT:  Contact information:  [facility]  Monday-Friday 8 AM-5 PM  Epic Secure chat or pager [phone].  After hours and weekends:  pager [phone]

## 2024-08-19 NOTE — PROGRESS NOTES
Physical Therapy    Physical Therapy Evaluation    Patient Name: Shelbi Delaney  MRN: [id number]  Room: [address] [place] [address]  Today's Date: 8/19/2024   Time Calculation  Start Time: 1345  Stop Time: 1400  Time Calculation (min): 15 min    Assessment/Plan   PT Assessment  PT Assessment Results: Decreased endurance  Rehab Prognosis: Good  Barriers to Discharge: medical acuity  End of Session Communication: Bedside nurse  End of Session Patient Position: Bed, 2 rail up  IP OR SWING BED PT PLAN  Inpatient or Swing Bed: Inpatient  PT Plan  Treatment/Interventions: Bed mobility, Transfer training, Gait training, Balance training, Endurance training, Strengthening, Range of motion, Therapeutic activity, Therapeutic exercise  PT Plan: Ongoing PT  PT Frequency: 3 times per week  PT Discharge Recommendations: Low intensity level of continued care  PT Recommended Transfer Status: Stand by assist  PT - OK to Discharge: Yes  Reason for Referral: neutropenic fever, orthostatic hypotension  Past Medical History Relevant to Rehab: 64 y.o. 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  States she feels tired from constantly impaired sleep in hospital, feels close to baseline after orthostatic incident this morning.  Does not feel like long distance/out of room ambulation trials this date.  Reason for Referral: neutropenic fever, orthostatic hypotension  Past Medical History Relevant to Rehab: 64 y.o. 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  Prior to Session Communication: Bedside nurse  Patient Position Received: Bed, 3 rail up, Alarm off, not on at start of session   Home Living:  Home Living  Type of Home: House  Lives With: Significant other (Kenn)  Home Adaptive Equipment: None  Prior Level of Function:  Prior  Function Per Pt/Caregiver Report  Level of [place]: Independent with ADLs and functional transfers  Precautions:  Precautions  Medical Precautions: Fall precautions  Vital Signs:  Vital Signs  Heart Rate: 77 (post 72)  BP:  (post 99/58)  Medical Gas Therapy: None (Room air)    Lines/Tubes/Drains:  Implantable Port Right Chest Single lumen port (Active)   Number of days:        Nephrostomy Right 8 Fr. (Active)   Number of days: 38       Nephrostomy Left 10 Fr. (Active)   Number of days:         Continuous Medications/Drips:  lactated Ringer's, 80 mL/hr, Last Rate: 80 mL/hr (08/19/24 0422)        Objective   Pain:  Pain Assessment  0-10 (Numeric) Pain Score: 2 (post 2)  Pain Type: Acute pain  Pain Location: Back    Cognition:  Cognition  Overall Cognitive Status: Within Functional Limits  Orientation Level: Oriented X4    General Assessments:  Extremity/Trunk Assessments:  Tone: No abnormalities noted  Sensation  Light Touch:  (neuropathy tingling distally BUE/BLE, able to feel light touch)  Coordination  Movements are Fluid and Coordinated: Yes  Upper Extremity  ROM: WFL  Strength:WFL  Lower Extremity  ROM: WNL  Strength: WFL   Sitting Static Balance Normal  Sitting Dynamic Balance Normal  Standing Static Balance Normal  Standing Dynamic Balance Normal    Functional Assessments:  Bed Mobility  Bed Mobility: Yes  Bed Mobility 1  Bed Mobility 1: Sitting to supine  Level of Assistance 1: Independent    Transfers  Transfer: Yes  Transfer 1  Transfer From 1: Sit to  Transfer to 1: Stand  Transfer Device 1:  (no device)  Transfer Level of Assistance 1: Close supervision  Transfers 2  Transfer From 2: Stand to  Transfer to 2: Sit  Transfer Device 2:  (no device)  Transfer Level of Assistance 2: Close supervision  Transfers 3  Transfer From 3: Bed to  Transfer to 3: Bed  Transfer Device 3:  (no device)  Transfer Level of Assistance 3: Close supervision    Ambulation/Gait Training  Ambulation/Gait Training Performed:  Yes  Ambulation/Gait Training 1  Surface 1: Level tile  Device 1: No device  Assistance 1: Close supervision  Quality of Gait 1:  (decreased gait speed, decreased step length, decreased foot clearance)  Comments/Distance (ft) 1: 20, 30              Outcome Measures:  [facility] Basic Mobility  Turning from your back to your side while in a flat bed without using bedrails: A little  Moving from lying on your back to sitting on the side of a flat bed without using bedrails: A little  Moving to and from bed to chair (including a wheelchair): A little  Standing up from a chair using your arms (e.g. wheelchair or bedside chair): A little  To walk in hospital room: A little  Climbing 3-5 steps with railing: A little  Basic Mobility - Total Score: 18                 [place]  Sitting Balance: Steady, safe  Arises: Able, uses arms to help  Attempts to Arise: Able to arise, one attempt  Immediate Standing Balance (First 5 Seconds): Steady without walker or other support  Standing Balance: Narrow stance without support  Nudged: Steady without walker or other support  Eyes Closed: Steady  Turned 360 Degrees: Steadiness: Steady  Turned 360 Degrees: Continuity of Steps: Continuous  Sitting Down: Uses arms or not a smooth motion  Balance Score: 14  Initiation of Gait: No hesitancy  Step Height: R Swing Foot: Right foot complete clears floor  Step Length: R Swing Foot: Passes left stance foot  Step Height: L Swing Foot: Left foot complete clears floor  Step Length: L Swing Foot: Passes right stance foot  Step Symmetry: Right and left step appear equal  Step Continuity: Steps appear continuous  Path: Straight without walking aid  Trunk: No sway but flexion of knees or back or spreads arms out while walking  Walking Time: Heels almost touching while walking  Gait Score: 11  Total Score: 25  Timed Up and Go Test  How many seconds did it take to complete the 5 tasks?: 25 seconds       Encounter Problems       Encounter Problems (Active)        PT Problem       Patient will ambulate >150' with LRAD and Supervision  (Progressing)       Start:  08/19/24    Expected End:  09/02/24            Patient will complete Timed Up and Go with a score equal to or better than 12 to reduce falls risk.    (Progressing)       Start:  08/19/24    Expected End:  09/02/24               Pain - Adult            Assessment: Patient presents 2/2 neutropenic fever and positive orthostatic hypotension.  Currently supervision for mobility with moderate falls risk based on TUG score.  Patient would benefit from further therapy to increase functional independence and safety.  Will continue to follow during acute stay.      Education Documentation  Precautions, taught by Madhu Jordan PT at 8/19/2024  2:17 PM.  Learner: Patient  Readiness: Acceptance  Method: Explanation  Response: Needs Reinforcement    Body Mechanics, taught by Madhu Jordan PT at 8/19/2024  2:17 PM.  Learner: Patient  Readiness: Acceptance  Method: Explanation  Response: Needs Reinforcement    Mobility Training, taught by Madhu Jordan PT at 8/19/2024  2:17 PM.  Learner: Patient  Readiness: Acceptance  Method: Explanation  Response: Needs Reinforcement    Education Comments  No comments found.          08/19/24 at 2:17 PM   Madhu Jordan PT   Rehab Office: [phone]

## 2024-08-19 NOTE — SIGNIFICANT EVENT
"To bedside for report of patient heart rate up to 180 and hypotension after getting up to the bathroom.    On arrival at bedside, , EKG sinus tach. BP 84/47 with MAP 59 (previously 90s/60). Afebrile.     Patient reports she \"over did it\" walking to bathroom as she has been supine for days. She initially felt dizzy but this has resolved. Just feeling very tired now, no other complaints.     General: tired appearing, conversational  HEENT: normocephalic, EOMI, clear sclera,flush  Cardio: tachycardic, regular rhythm  Resp: breathing comfortably on room air, CTAB  Abd: soft, nontender, nondistended  Neuro: grossly intact, no focal deficits  Extremities: no calf tenderness    Patient given 1L LR bolus and albumin with subsequent improvement in BPs (90s/50s) and MAPs sustained >65. Subsequent EKG with NSR and heart rate of 77 after period of rest    A/P:  Shelbi Delaney is a 64 y.o. 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    - hypotension and tachycardia likely orthostatic and 2/2 deconditioning, vitals now normalized with supine rest and fluids/albumin  - initially with concern for afib given HR in 180s, however no evidence of this on EKG. Of note, patient did have questionable episode of afib while in ICU, not formally diagnosed. Continue tele.  - continue veronika Kapadia MD, PGY-3  Gynecologic Oncology, pager: [phone]          "

## 2024-08-19 NOTE — CONSULTS
Wound Care Consult     Visit Date: 8/19/2024      Patient Name: Shelbi Delaney         MRN: [id number]           YOB: 1960     Reason for Consult: assess buttocks            Pertinent Labs:   Albumin   Date Value Ref Range Status   08/19/2024 2.9 (L) 3.4 - 5.0 g/dL Final       Wound Assessment:  Wound 08/16/24 Other (comment) Back Medial (Active)   Wound Image   08/16/24 0200   Site Assessment Clean;Dry;Intact 08/18/24 2300   Greer-Wound Assessment Clean;Dry;Intact 08/18/24 2300   State of Healing Closed wound edges 08/18/24 1200   Margins Attached edges 08/18/24 1200   Drainage Description None 08/18/24 2300   Drainage Amount None 08/18/24 2300   Dressing Open to air 08/18/24 2300       Wound 08/16/24 Pressure Injury Buttocks (Active)   Wound Image   08/16/24 0200   Site Assessment Blanchable erythema 08/19/24 1700   Greer-Wound Assessment Intact 08/19/24 1700   Margins Attached edges 08/16/24 1605   Drainage Description None 08/18/24 2300   Drainage Amount None 08/18/24 2300   Dressing Silicone border dressing 08/19/24 1700   Dressing Changed New 08/19/24 1700   Dressing Status Clean 08/19/24 1700       Wound 08/16/24 Other (comment) Pretibial Right (Active)   Wound Image   08/16/24 0200   Site Assessment Red 08/18/24 2300   Greer-Wound Assessment Clean;Dry;Intact 08/18/24 2300   Drainage Description None 08/18/24 2300   Drainage Amount None 08/18/24 2300   Dressing Open to air 08/18/24 2300       Wound Team Summary Assessment: Skin to buttocks/ sacrum intact with blanchable redness. Sacral Mepilex applied preventatively. Change every 3 days. Assess the skin under the dressing with each skin assessment. Maintain EHOB waffle mattress. Turn every 2 hours.      Wound Team Plan: Please review recommendations      Leticia DEVINE   8/19/2024  5:42 PM

## 2024-08-19 NOTE — PROGRESS NOTES
08/19/24 1100   Discharge Planning   Living Arrangements Spouse/significant other   Support Systems Spouse/significant other   Assistance Needed none   Type of Residence Private residence   Who is requesting discharge planning? Provider   Home or Post Acute Services In home services   Type of Home Care Services Home nursing visits;Home PT;Home OT   Expected Discharge Disposition HH Services   Does the patient need discharge transport arranged? No     TCC Note    Plan per Medical/Surgical Team: 64 y.o. 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   Status: inpatient   Payor Source: [facility]  Discharge disposition: home   Expected date of discharge: 8/22  Barriers: none at this time  Primary Oncologist: Dr. Adrianna Jefferson  Preferred home care agency: [facility]    TCC met with patient at bedside to discuss anticipated discharge needs. Demographics and insurance verifed with patient. Patient lives at home with significant other who is teach able caregiver. Patient is agreeable to using [facility] if needed upon discharge. Patient denies any DME supplies or home health at this time. Will continue to follow patient for any discharge needs.   Cherie Marte RN TCC

## 2024-08-19 NOTE — PROGRESS NOTES
Occupational Therapy    Evaluation and Treatment    Patient Name: Shelbi Delaney  MRN: [id number]  Today's Date: 8/19/2024  Room: [address] [place], [place] [address]  Time Calculation  Start Time: 1237  Stop Time: 1304  Time Calculation (min): 27 min    Assessment  IP OT Assessment  OT Assessment: Pt presents near reported baseline with deficits in endurance and functional mobility. Pt has decreased endurance and verbalized understanding of all energy conservation education. Pt also educated on use of reacher, sock aid to assist with LB dressing for energy conservation. Pt has good social support from her boyfriend and is IND at baseline. Pt is likely to benefit from skilled OT services while admitted but is safe to dc home without further OT services and with assistance from her partner. Pt would benefit from shower chair, raised toilet seat, reacher and sock aid to aid in energy conservation and assist with ADL management with decreased endurance.  Prognosis: Good  Barriers to Discharge: None  Evaluation/Treatment Tolerance: Patient tolerated treatment well  Medical Staff Made Aware: Yes  End of Session Communication: Bedside nurse  End of Session Patient Position:  (On bench by window with call bell within reach)  Plan:  Inpatient Plan  Treatment Interventions: Functional transfer training, Endurance training  OT Frequency: 2 times per week  OT Discharge Recommendations: No OT needed after discharge  Equipment Recommended upon Discharge: Other (comment) (shower chair, raised toilet seat, reacher and sock aid)  OT Recommended Transfer Status: Assist of 1  OT - OK to Discharge: Yes  OT Assessment  OT Assessment Results: Decreased endurance, Decreased functional mobility  Prognosis: Good  Barriers to Discharge: None  Evaluation/Treatment Tolerance: Patient tolerated treatment well  Medical Staff Made Aware: Yes  Strengths: Ability to acquire knowledge, Attitude of self, Capable of completing ADLs semi/independent, Coping skills, Insight into  "problems, Premorbid level of function, Support of Caregivers  Barriers to Participation: Comorbidities    Subjective   Current Problem:  1. Neutropenic fever (CMS-HCC)        2. Bilateral lower extremity edema  Lower extremity venous duplex bilateral    Lower extremity venous duplex bilateral        General:  Reason for Referral: neutropenic fever, orthostatic hypotension  Past Medical History Relevant to Rehab: 64 y.o. 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 Pleasant and agreeable to OT eval. Initially thought pt may be appropriate for screen but upon discussions with pt, she could benefit from further OT services while admitted.   Precautions:  Medical Precautions: Fall precautions    Pain:  Pain Assessment  Pain Assessment: 0-10  0-10 (Numeric) Pain Score: 6  Pain Type: Acute pain  Pain Location: Back  Pain Orientation: Left, Lower (site of nephro)  Pain Interventions: Repositioned, Ambulation/increased activity, Relaxation technique, Rest  Response to Interventions: unchanged- RN notified and aware  Lines/Tubes/Drains:  Implantable Port Right Chest Single lumen port (Active)   Number of days:        Nephrostomy Right 8 Fr. (Active)   Number of days: 39       Nephrostomy Left 10 Fr. (Active)   Number of days:          Objective   Cognition:  Overall Cognitive Status: Within Functional Limits  Orientation Level: Oriented X4           Home Living:  Type of Home: House  Lives With: Significant other (Boyfriend)  Home Adaptive Equipment: Walker rolling or standard, Crutches (\"thinks\" she has a FWW)  Home Layout: Multi-level, Stairs to alternate level with rails  Alternate Level Stairs-Rails:  (1)  Alternate Level Stairs-Number of Steps: 3 steps down from main level to lower level where pt resides with full bath/bed, laundry  Home Access: Stairs " to enter with rails  Entrance Stairs-Rails:  (1)  Entrance Stairs-Number of Steps: 3  Bathroom Shower/Tub: Tub/shower unit  Bathroom Toilet: Standard  Bathroom Equipment: Grab bars in shower  Home Living Comments: If patient enters through garage entrance (which she usually does) she enters on the level she resides on. Pt then able to go up 3-5 steps to main level where living room, kitchen is, and then another 3-5 steps to bedrooms/bathroom (which she rarely accesses)   Prior Function:  Level of [place]: Independent with ADLs and functional transfers, Needs assistance with homemaking  Receives Help From: Family (Bf)  ADL Assistance: Independent  Homemaking Assistance: Needs assistance (Pt's boyfriend completes all homemaking tasks per report)  Ambulatory Assistance: Independent  Hand Dominance: Right  IADL History:  Homemaking Responsibilities: No (Pt's boyfriend is primary homeamaker and pt has no homemaking responsibilities)  Current License: Yes  Mode of Transportation: Car  Occupation: Retired  Type of Occupation:   Leisure and Hobbies: walking, cooking, TV  ADL:  Eating Assistance: Independent (Anticipated)  Grooming Assistance: Modified independent (Device) (Anticipated seated)  Bathing Assistance: Stand by (Anticipated)  Bathing Deficit: Supervision/safety  UE Dressing Assistance: Independent (Anticipated)  LE Dressing Assistance: Independent (Anticipated)  Toileting Assistance with Device: Independent (As demonstrated)  Activity Tolerance:  Endurance: Tolerates 10 - 20 min exercise with multiple rests  Balance:  Dynamic Sitting Balance  Dynamic Sitting-Comments: IND  Dynamic Standing Balance  Dynamic Standing-Comments: SBA-IND  Static Sitting Balance  Static Sitting-Comment/Number of Minutes: IND  Static Standing Balance  Static Standing-Comment/Number of Minutes: IND  Bed Mobility/Transfers: Bed Mobility/Transfers: Bed Mobility  Bed Mobility: No  Functional Mobility  Functional Mobility  Performed: Yes  Functional Mobility 1  Comments 1: Pt ambulated MIN household distance in room from bathroom to bench near window with no AD and with SBA only for safety   and Transfers  Transfer: Yes  Transfer 1  Transfer From 1: Toilet to  Transfer to 1: Stand  Technique 1: Sit to stand  Transfer Level of Assistance 1: Independent  Transfers 2  Transfer From 2: Stand to  Transfer to 2:  (bench near window)  Technique 2: Stand to sit  Transfer Level of Assistance 2: Close supervision  IADL's:   Homemaking Responsibilities: No (Pt's boyfriend is primary homeamaker and pt has no homemaking responsibilities)  Current License: Yes  Mode of Transportation: Car  Occupation: Retired  Type of Occupation:   Leisure and Hobbies: walking, cooking, TV  Vision: Vision - Basic Assessment  Current Vision: Wears glasses all the time   and Vision - Complex Assessment  Ocular Range of Motion: Within Functional Limits  Sensation:  Light Touch:  (neuropathic tingling distally BUE, able to differentiate light touch to each digit without visual stimulation)  Strength:  Strength Comments: BUE WFL as demonstrated during functional transfers  Perception:  Inattention/Neglect: Appears intact  Coordination:  Movements are Fluid and Coordinated: Yes   Hand Function:  Hand Function  Gross Grasp: Functional  Coordination: Functional  Extremities:   RUE   RUE : Within Functional Limits, LUE   LUE: Within Functional Limits,  , and        Outcome Measures: [facility] Daily Activity  Putting on and taking off regular lower body clothing: None  Bathing (including washing, rinsing, drying): A little  Putting on and taking off regular upper body clothing: None  Toileting, which includes using toilet, bedpan or urinal: None  Taking care of personal grooming such as brushing teeth: None  Eating Meals: None  Daily Activity - Total Score: 23         ,     OT Adult Other Outcome Measures  4AT: -    Education Documentation  Body Mechanics, taught by  Collin Bradley OT at 8/19/2024  3:11 PM.  Learner: Patient  Readiness: Acceptance  Method: Explanation, Demonstration  Response: Verbalizes Understanding, Demonstrated Understanding  Comment: Pt educated on energy conservation, AE/DME for ADLs and other daily task management    Precautions, taught by Collin Bradley OT at 8/19/2024  3:11 PM.  Learner: Patient  Readiness: Acceptance  Method: Explanation, Demonstration  Response: Verbalizes Understanding, Demonstrated Understanding  Comment: Pt educated on energy conservation, AE/DME for ADLs and other daily task management    ADL Training, taught by Collin Bradley OT at 8/19/2024  3:11 PM.  Learner: Patient  Readiness: Acceptance  Method: Explanation, Demonstration  Response: Verbalizes Understanding, Demonstrated Understanding  Comment: Pt educated on energy conservation, AE/DME for ADLs and other daily task management    Education Comments  No comments found.        Goals:   Encounter Problems       Encounter Problems (Active)       ADLs       Pt will complete LB dressing with modified independence while seated and/or standing and AE as needed.        Start:  08/19/24    Expected End:  09/02/24            Pt will complete UB /LB bathing tasks with modified independence while seated or standing and AE as needed.        Start:  08/19/24    Expected End:  09/02/24               BALANCE       Pt will maintain dynamic standing balance during ADL task with independent level of assistance in order to demonstrate decreased risk of falling and improved postural control.       Start:  08/19/24    Expected End:  09/02/24               TRANSFERS       Patient will perform bed mobility with independent level of assistance in order to improve safety and independence with mobility       Start:  08/19/24    Expected End:  09/02/24                   Treatment Completed on Evaluation    Activities of Daily Living:      LE Dressing  LE Dressing: Yes  LE Dressing  Adaptive Equipment: Reacher, Sock aide  LE Dressing Comments: Pt educated on use of reacher to don bilateral socks for energy conservation- verbalized understanding. Pt also educated on use of reacher to thread LB clothing while seated- verbalized understanding    Therapy/Activity:     Therapeutic Activity  Therapeutic Activity Performed: Yes  Therapeutic Activity 1: Pt educated on energy conservation techniques/strategies during ADL management and other daily tasks  Therapeutic Activity 2: Pt educated on DME/AE use for energy conservation during LB dressing and other daily tasks    08/19/24 at 3:14 PM   Collin Bradley, OT   Rehab Office: [phone]

## 2024-08-20 ENCOUNTER — TELEPHONE (OUTPATIENT)
Dept: GYNECOLOGIC ONCOLOGY | Facility: HOSPITAL | Age: 64
End: 2024-08-20
Payer: COMMERCIAL

## 2024-08-20 LAB
ACANTHOCYTES BLD QL SMEAR: ABNORMAL
ALBUMIN SERPL BCP-MCNC: 2.5 G/DL (ref 3.4–5)
ANION GAP SERPL CALC-SCNC: 15 MMOL/L (ref 10–20)
BACTERIA BLD CULT: NORMAL
BASOPHILS # BLD MANUAL: 0 X10*3/UL (ref 0–0.1)
BASOPHILS NFR BLD MANUAL: 0 %
BUN SERPL-MCNC: 34 MG/DL (ref 6–23)
BURR CELLS BLD QL SMEAR: ABNORMAL
CALCIUM SERPL-MCNC: 8.2 MG/DL (ref 8.6–10.6)
CHLORIDE SERPL-SCNC: 107 MMOL/L (ref 98–107)
CO2 SERPL-SCNC: 20 MMOL/L (ref 21–32)
CREAT SERPL-MCNC: 2.32 MG/DL (ref 0.5–1.05)
EGFRCR SERPLBLD CKD-EPI 2021: 23 ML/MIN/1.73M*2
EOSINOPHIL # BLD MANUAL: 0.26 X10*3/UL (ref 0–0.7)
EOSINOPHIL NFR BLD MANUAL: 3.3 %
ERYTHROCYTE [DISTWIDTH] IN BLOOD BY AUTOMATED COUNT: 20.9 % (ref 11.5–14.5)
GLUCOSE SERPL-MCNC: 89 MG/DL (ref 74–99)
HCT VFR BLD AUTO: 23.6 % (ref 36–46)
HGB BLD-MCNC: 7.7 G/DL (ref 12–16)
IMM GRANULOCYTES # BLD AUTO: 0.94 X10*3/UL (ref 0–0.7)
IMM GRANULOCYTES NFR BLD AUTO: 11.7 % (ref 0–0.9)
LYMPHOCYTES # BLD MANUAL: 0.46 X10*3/UL (ref 1.2–4.8)
LYMPHOCYTES NFR BLD MANUAL: 5.8 %
MAGNESIUM SERPL-MCNC: 1.68 MG/DL (ref 1.6–2.4)
MCH RBC QN AUTO: 30.1 PG (ref 26–34)
MCHC RBC AUTO-ENTMCNC: 32.6 G/DL (ref 32–36)
MCV RBC AUTO: 92 FL (ref 80–100)
MONOCYTES # BLD MANUAL: 0.26 X10*3/UL (ref 0.1–1)
MONOCYTES NFR BLD MANUAL: 3.3 %
NEUTROPHILS # BLD MANUAL: 7.01 X10*3/UL (ref 1.2–7.7)
NEUTS BAND # BLD MANUAL: 1.14 X10*3/UL (ref 0–0.7)
NEUTS BAND NFR BLD MANUAL: 14.2 %
NEUTS SEG # BLD MANUAL: 5.87 X10*3/UL (ref 1.2–7)
NEUTS SEG NFR BLD MANUAL: 73.4 %
NRBC BLD-RTO: 0 /100 WBCS (ref 0–0)
OVALOCYTES BLD QL SMEAR: ABNORMAL
PATH REVIEW-CBC DIFFERENTIAL: NORMAL
PHOSPHATE SERPL-MCNC: 1.9 MG/DL (ref 2.5–4.9)
PLATELET # BLD AUTO: 63 X10*3/UL (ref 150–450)
POLYCHROMASIA BLD QL SMEAR: ABNORMAL
POTASSIUM SERPL-SCNC: 3.1 MMOL/L (ref 3.5–5.3)
RBC # BLD AUTO: 2.56 X10*6/UL (ref 4–5.2)
RBC MORPH BLD: ABNORMAL
SODIUM SERPL-SCNC: 139 MMOL/L (ref 136–145)
TOTAL CELLS COUNTED BLD: 120
WBC # BLD AUTO: 8 X10*3/UL (ref 4.4–11.3)

## 2024-08-20 PROCEDURE — 85027 COMPLETE CBC AUTOMATED: CPT

## 2024-08-20 PROCEDURE — 2500000004 HC RX 250 GENERAL PHARMACY W/ HCPCS (ALT 636 FOR OP/ED): Mod: JZ

## 2024-08-20 PROCEDURE — 99232 SBSQ HOSP IP/OBS MODERATE 35: CPT

## 2024-08-20 PROCEDURE — 80069 RENAL FUNCTION PANEL: CPT

## 2024-08-20 PROCEDURE — 2500000001 HC RX 250 WO HCPCS SELF ADMINISTERED DRUGS (ALT 637 FOR MEDICARE OP)

## 2024-08-20 PROCEDURE — 1200000003 HC ONCOLOGY  ROOM WITH TELEMETRY DAILY

## 2024-08-20 PROCEDURE — 2500000004 HC RX 250 GENERAL PHARMACY W/ HCPCS (ALT 636 FOR OP/ED)

## 2024-08-20 PROCEDURE — 85007 BL SMEAR W/DIFF WBC COUNT: CPT

## 2024-08-20 PROCEDURE — 2500000001 HC RX 250 WO HCPCS SELF ADMINISTERED DRUGS (ALT 637 FOR MEDICARE OP): Performed by: NURSE PRACTITIONER

## 2024-08-20 PROCEDURE — 83735 ASSAY OF MAGNESIUM: CPT

## 2024-08-20 RX ORDER — AMOXICILLIN AND CLAVULANATE POTASSIUM 875; 125 MG/1; MG/1
1 TABLET, FILM COATED ORAL EVERY 12 HOURS
Status: DISCONTINUED | OUTPATIENT
Start: 2024-08-20 | End: 2024-08-20

## 2024-08-20 RX ORDER — GABAPENTIN 300 MG/1
300 CAPSULE ORAL 2 TIMES DAILY
Status: DISCONTINUED | OUTPATIENT
Start: 2024-08-20 | End: 2024-08-21 | Stop reason: HOSPADM

## 2024-08-20 RX ORDER — POTASSIUM CHLORIDE 14.9 MG/ML
20 INJECTION INTRAVENOUS
Status: COMPLETED | OUTPATIENT
Start: 2024-08-20 | End: 2024-08-20

## 2024-08-20 RX ORDER — POTASSIUM CHLORIDE 20 MEQ/1
20 TABLET, EXTENDED RELEASE ORAL ONCE
Status: CANCELLED | OUTPATIENT
Start: 2024-08-20 | End: 2024-08-20

## 2024-08-20 RX ORDER — LANOLIN ALCOHOL/MO/W.PET/CERES
400 CREAM (GRAM) TOPICAL ONCE
Status: COMPLETED | OUTPATIENT
Start: 2024-08-20 | End: 2024-08-20

## 2024-08-20 RX ORDER — MAGNESIUM SULFATE HEPTAHYDRATE 40 MG/ML
2 INJECTION, SOLUTION INTRAVENOUS ONCE
Status: CANCELLED | OUTPATIENT
Start: 2024-08-20 | End: 2024-08-20

## 2024-08-20 RX ORDER — AMOXICILLIN AND CLAVULANATE POTASSIUM 500; 125 MG/1; MG/1
1 TABLET, FILM COATED ORAL EVERY 12 HOURS SCHEDULED
Status: DISCONTINUED | OUTPATIENT
Start: 2024-08-20 | End: 2024-08-21 | Stop reason: HOSPADM

## 2024-08-20 ASSESSMENT — PAIN SCALES - GENERAL
PAINLEVEL_OUTOF10: 4

## 2024-08-20 NOTE — PROGRESS NOTES
Shelbi Delaney is a 64 y.o. female on day 5 of admission presenting with Neutropenic fever (CMS-HCC).      Subjective   Patient resting in bed. Reports good pain control. She has been able to eat, had a bowel movement, less runny than before.    Objective           Last Recorded Vitals  Blood pressure 100/62, pulse 93, temperature 36.4 °C (97.5 °F), temperature source Temporal, resp. rate 16, weight 85.4 kg (188 lb 4.4 oz), SpO2 95%.  Intake/Output last 3 Shifts:    Intake/Output Summary (Last 24 hours) at 8/20/2024 0638  Last data filed at 8/20/2024 0052  Gross per 24 hour   Intake --   Output 475 ml   Net -475 ml       Physical Exam  General: no acute distress  HEENT: normocephalic, atraumatic  Heart: warm and well perfused  Lungs: no increased WOB, CTAB  Abd: soft, nontender  Extremities: moving all extremities  Neuro: awake and conversant  Psych: appropriate mood and affect   :  Bilateral PCNs in place draining clear yellow urine. Bilat PCN sites clean     Assessment & Plan    64 y.o. with progressive SCC of the cervix, currently undergoing chemo with topotecan who presented as transfer from [facility] for neutropenic fever. Transferred back from  MICU (due to persistent hypotension in setting of septic shock) on 8/18.     Urosepsis  - S/p L PCN exchange 8/16  - Ucx from L PCN with >100,000 CFU GNB   - Skin culture L PCN with MSSA and pseudomonas, susceptible to vanc/zosyn  - Continue Zosyn (8/15 - 8,20), transition to po Augmentin  - Bcx on admission: NGTD x2ds     Deconditioning  - Likely due to recent MICU admission, cancer, chemotherapy  - Hypotension overnight responsive to IV fluids  - Continue to monitor, continue telemetry  - PT recs: PT 3x/wk, OT recommendations appreciated.     Severe neutropenia   - ANC: 7010  - Discontinue Filgastrim     Anemia, chemotherapy induced  - asymptomatic, Hgb 8.3  - continue to transfuse for goal >7     Thrombocytopenia, chemotherapy induced  - Plt 49 this AM, continue  to monitor closely for signs of bleeding     AIDE  - Suspect hypovolemia due to sepsis and obstruction  - Cr 2.25(baseline 2), continue to trend     Diarrhea  - C. diff and stool pathogens negative  - Getting better  - Continue Imodium     Other Co-Morbidities  - HTN: metoprolol held, patient reports taking prn for anxiety  - Hypothyroidism: cont synthroid  - neuropathic pain: duloxetine and cymbalta held for AIDE, can restart once Cr improved    Seen and d/w Dr. Cherri Gomez MD PGY2

## 2024-08-20 NOTE — TELEPHONE ENCOUNTER
Patient's  asking what is causing patient's diarrhea. Discussed that Topotecan can cause diarrhea but not usually to the degree that she had. Discussed that intestinal infections can cause it but that she has been tested and these are negative. Told that her diarrhea is improving.

## 2024-08-21 ENCOUNTER — DOCUMENTATION (OUTPATIENT)
Dept: HOME HEALTH SERVICES | Facility: HOME HEALTH | Age: 64
End: 2024-08-21
Payer: COMMERCIAL

## 2024-08-21 ENCOUNTER — PHARMACY VISIT (OUTPATIENT)
Dept: PHARMACY | Facility: CLINIC | Age: 64
End: 2024-08-21
Payer: COMMERCIAL

## 2024-08-21 ENCOUNTER — HOME HEALTH ADMISSION (OUTPATIENT)
Dept: HOME HEALTH SERVICES | Facility: HOME HEALTH | Age: 64
End: 2024-08-21
Payer: COMMERCIAL

## 2024-08-21 VITALS
RESPIRATION RATE: 18 BRPM | HEART RATE: 54 BPM | BODY MASS INDEX: 30.39 KG/M2 | WEIGHT: 188.27 LBS | SYSTOLIC BLOOD PRESSURE: 115 MMHG | DIASTOLIC BLOOD PRESSURE: 73 MMHG | TEMPERATURE: 96.8 F | OXYGEN SATURATION: 96 %

## 2024-08-21 LAB
ALBUMIN SERPL BCP-MCNC: 2.3 G/DL (ref 3.4–5)
ANION GAP SERPL CALC-SCNC: 11 MMOL/L (ref 10–20)
ATRIAL RATE: 77 BPM
BASOPHILS # BLD MANUAL: 0 X10*3/UL (ref 0–0.1)
BASOPHILS NFR BLD MANUAL: 0 %
BUN SERPL-MCNC: 29 MG/DL (ref 6–23)
CALCIUM SERPL-MCNC: 7.9 MG/DL (ref 8.6–10.6)
CHLORIDE SERPL-SCNC: 110 MMOL/L (ref 98–107)
CO2 SERPL-SCNC: 21 MMOL/L (ref 21–32)
CREAT SERPL-MCNC: 2.08 MG/DL (ref 0.5–1.05)
EGFRCR SERPLBLD CKD-EPI 2021: 26 ML/MIN/1.73M*2
EOSINOPHIL # BLD MANUAL: 0.19 X10*3/UL (ref 0–0.7)
EOSINOPHIL NFR BLD MANUAL: 0.9 %
ERYTHROCYTE [DISTWIDTH] IN BLOOD BY AUTOMATED COUNT: 21.2 % (ref 11.5–14.5)
GLUCOSE SERPL-MCNC: 88 MG/DL (ref 74–99)
HCT VFR BLD AUTO: 25.8 % (ref 36–46)
HGB BLD-MCNC: 8.2 G/DL (ref 12–16)
IMM GRANULOCYTES # BLD AUTO: 4.49 X10*3/UL (ref 0–0.7)
IMM GRANULOCYTES NFR BLD AUTO: 20.9 % (ref 0–0.9)
LYMPHOCYTES # BLD MANUAL: 1.16 X10*3/UL (ref 1.2–4.8)
LYMPHOCYTES NFR BLD MANUAL: 5.4 %
MAGNESIUM SERPL-MCNC: 1.66 MG/DL (ref 1.6–2.4)
MCH RBC QN AUTO: 30.7 PG (ref 26–34)
MCHC RBC AUTO-ENTMCNC: 31.8 G/DL (ref 32–36)
MCV RBC AUTO: 97 FL (ref 80–100)
MONOCYTES # BLD MANUAL: 2.71 X10*3/UL (ref 0.1–1)
MONOCYTES NFR BLD MANUAL: 12.6 %
NEUTROPHILS # BLD MANUAL: 17.24 X10*3/UL (ref 1.2–7.7)
NEUTS BAND # BLD MANUAL: 0.77 X10*3/UL (ref 0–0.7)
NEUTS BAND NFR BLD MANUAL: 3.6 %
NEUTS SEG # BLD MANUAL: 16.47 X10*3/UL (ref 1.2–7)
NEUTS SEG NFR BLD MANUAL: 76.6 %
NRBC BLD-RTO: 0 /100 WBCS (ref 0–0)
P AXIS: 29 DEGREES
P OFFSET: 195 MS
P ONSET: 130 MS
PHOSPHATE SERPL-MCNC: 2.2 MG/DL (ref 2.5–4.9)
PLATELET # BLD AUTO: 100 X10*3/UL (ref 150–450)
POTASSIUM SERPL-SCNC: 3.3 MMOL/L (ref 3.5–5.3)
PR INTERVAL: 174 MS
PROMYELOCYTES # BLD MANUAL: 0.19 X10*3/UL
PROMYELOCYTES NFR BLD MANUAL: 0.9 %
Q ONSET: 217 MS
QRS COUNT: 12 BEATS
QRS DURATION: 94 MS
QT INTERVAL: 410 MS
QTC CALCULATION(BAZETT): 463 MS
QTC FREDERICIA: 445 MS
R AXIS: -6 DEGREES
RBC # BLD AUTO: 2.67 X10*6/UL (ref 4–5.2)
RBC MORPH BLD: ABNORMAL
SODIUM SERPL-SCNC: 139 MMOL/L (ref 136–145)
T AXIS: 11 DEGREES
T OFFSET: 422 MS
TOTAL CELLS COUNTED BLD: 111
VENTRICULAR RATE: 77 BPM
WBC # BLD AUTO: 21.5 X10*3/UL (ref 4.4–11.3)

## 2024-08-21 PROCEDURE — 2500000004 HC RX 250 GENERAL PHARMACY W/ HCPCS (ALT 636 FOR OP/ED): Performed by: NURSE PRACTITIONER

## 2024-08-21 PROCEDURE — 80069 RENAL FUNCTION PANEL: CPT

## 2024-08-21 PROCEDURE — RXMED WILLOW AMBULATORY MEDICATION CHARGE

## 2024-08-21 PROCEDURE — 85027 COMPLETE CBC AUTOMATED: CPT

## 2024-08-21 PROCEDURE — 99238 HOSP IP/OBS DSCHRG MGMT 30/<: CPT

## 2024-08-21 PROCEDURE — 2500000001 HC RX 250 WO HCPCS SELF ADMINISTERED DRUGS (ALT 637 FOR MEDICARE OP)

## 2024-08-21 PROCEDURE — 2500000002 HC RX 250 W HCPCS SELF ADMINISTERED DRUGS (ALT 637 FOR MEDICARE OP, ALT 636 FOR OP/ED): Performed by: NURSE PRACTITIONER

## 2024-08-21 PROCEDURE — 83735 ASSAY OF MAGNESIUM: CPT

## 2024-08-21 PROCEDURE — 85007 BL SMEAR W/DIFF WBC COUNT: CPT

## 2024-08-21 RX ORDER — AMOXICILLIN AND CLAVULANATE POTASSIUM 500; 125 MG/1; MG/1
1 TABLET, FILM COATED ORAL EVERY 12 HOURS SCHEDULED
Qty: 18 TABLET | Refills: 0 | Status: SHIPPED | OUTPATIENT
Start: 2024-08-21 | End: 2024-08-30

## 2024-08-21 RX ORDER — ACETAMINOPHEN 325 MG/1
975 TABLET ORAL EVERY 6 HOURS PRN
COMMUNITY
Start: 2024-08-21

## 2024-08-21 RX ORDER — POTASSIUM CHLORIDE 20 MEQ/1
20 TABLET, EXTENDED RELEASE ORAL ONCE
Status: COMPLETED | OUTPATIENT
Start: 2024-08-21 | End: 2024-08-21

## 2024-08-21 RX ORDER — LANOLIN ALCOHOL/MO/W.PET/CERES
400 CREAM (GRAM) TOPICAL ONCE
Status: COMPLETED | OUTPATIENT
Start: 2024-08-21 | End: 2024-08-21

## 2024-08-21 RX ORDER — LOPERAMIDE HYDROCHLORIDE 2 MG/1
4 CAPSULE ORAL 4 TIMES DAILY PRN
COMMUNITY
Start: 2024-08-21

## 2024-08-21 RX ORDER — OXYCODONE HYDROCHLORIDE 5 MG/1
5 TABLET ORAL EVERY 4 HOURS PRN
Qty: 30 TABLET | Refills: 0 | Status: SHIPPED | OUTPATIENT
Start: 2024-08-21 | End: 2024-09-20

## 2024-08-21 RX ORDER — GABAPENTIN 300 MG/1
300 CAPSULE ORAL 2 TIMES DAILY
Qty: 60 CAPSULE | Refills: 3 | Status: SHIPPED | OUTPATIENT
Start: 2024-08-21

## 2024-08-21 ASSESSMENT — COGNITIVE AND FUNCTIONAL STATUS - GENERAL
TURNING FROM BACK TO SIDE WHILE IN FLAT BAD: A LITTLE
STANDING UP FROM CHAIR USING ARMS: A LITTLE
WALKING IN HOSPITAL ROOM: A LITTLE
MOVING TO AND FROM BED TO CHAIR: A LITTLE
DAILY ACTIVITIY SCORE: 24
CLIMB 3 TO 5 STEPS WITH RAILING: A LITTLE
MOBILITY SCORE: 18
MOVING FROM LYING ON BACK TO SITTING ON SIDE OF FLAT BED WITH BEDRAILS: A LITTLE

## 2024-08-21 ASSESSMENT — PAIN SCALES - GENERAL: PAINLEVEL_OUTOF10: 0 - NO PAIN

## 2024-08-21 NOTE — NURSING NOTE
Patient discharged home, taken home by family member, taken to lobby by transport. Rn reviewed all discharge instruction and follow up appointments. RN received prescriptions from pharmacy and provided them for patient, all questions answered and follow up appointments confirmed. RN removed Ivs, catheters intact.     Sarah Tate RN

## 2024-08-21 NOTE — PROGRESS NOTES
08/19/24 1100   Discharge Planning   Living Arrangements Spouse/significant other   Support Systems Spouse/significant other   Assistance Needed none   Type of Residence Private residence   Who is requesting discharge planning? Provider   Home or Post Acute Services In home services   Type of Home Care Services Home nursing visits;Home PT;Home OT   Expected Discharge Disposition HH Services   Does the patient need discharge transport arranged? No     TCC Note    Plan per Medical/Surgical Team: 64 y.o. 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   Status: inpatient   Payor Source: Medical [facility]  Discharge disposition: home   Expected date of discharge: 8/21  Barriers: none at this time  Primary Oncologist: Dr. Adrianna Jefferson  Preferred home care agency: [facility]    TCC met with patient at bedside to discuss anticipated discharge needs. Demographics and insurance verifed with patient. Patient lives at home with significant other who is teach able caregiver. Patient is agreeable to using [facility] if needed upon discharge. Patient denies any DME supplies or home health at this time. Will continue to follow patient for any discharge needs.   Cherie Marte RN TCC    8/21/24- TCC reached out to [facility]. Per [facility] SOC 08/22-08/23. TCC will continue to follow patient for discharge needs.  Cherie Marte RN TCC

## 2024-08-21 NOTE — DISCHARGE SUMMARY
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     She initially presented to OSH ED for generalized weakness and non bloody diarrhea.  She was also reporting pain near her left sided PCNT for the last couple days and had pinkish discoloration of her urine in the R PCNT. Diarrhea was ongoing for couple of days also and having at least 2 episodes per day. She had recent outpatient labs done on 8/14 notable for WBC 0.3, Hb 6.8, platelets 29, and ANC 10 for which she was also told to come into the hospital. While in the ED, she was noted to be hypotensive to 70/47 and was afebrile.  Labs notable for  WBC 0.1, Hb 6.0, plt 15, Na 129, Cr 4.10 and lactate 1.3.  CT A&P notable for unchanged kinked left nephrostomy tube, increased size of calcified uterine mass and right adnexal cystic structure.  Patient was started on Vanco, Zosyn and was fluid resuscitated with 2.5 L NS.  She was also given 1 unit of pRBC.  On arrival to [facility], patient febrile to 103.5, tachycardic to 111, hypotensive to 71/44 and SpO2 86% on RA and was placed on 3L NC. She was given an additional 2L of LR on the floor however remained hypotensive and Code White was called. Most recent labs on the floor notable for Na 133, Cr 3.75, Ca 6.9, lactate 2.1. CBC notable for WBC 0.2, Hb 6.9, plt 10, and ANC 20. UA positive for 75 LE and >20 RBC. Cxr showed hazy opacity in the L lung base. She was given 25 g of albumin without improvement in blood pressure and was  ultimately transferred to the MICU for further management of hypotension due to sepsis requiring pressors. In the MICU she was started on Levophed and IR replaced her L PCNT. She addiitonally received 2 units pRBCs and 1 unit platelets for thrombocytopenia and anemia. Following PCNT replacement her pressor requirements increased and she went into Afib/Aflutter with RVR HR in the 130s-140s. She received 1 PO dose of metoprolol 12.5 mg and HR improved. Throughout 8/17 she was weaned off pressors. Stool pathogen panel and Cdiff were negative and she was able to be started on loperamide for diarrhea. Urine culture was positive for pseudomonas aeruginosa, wound culture around the L PCNT grew MSSA. Vanc was discontinued and Zosyn was continued. On 8/18 she was stable for transfer back to the floor.      While on the floor,  (8/19) patient had an episode of hypotension and tachycardia with concern for afib, however, no   abnormal rhythm noted on  telemetry or EKG, improved with supine rest, fluid bolus, and albumin. Based on sensitivities patient was continued on Augmentin  to complete a 10 day course after Zosyn.     Patient was discharged on 8/21/2024 in stable condition with a plan to follow up with GynOnc on 8/27 and PT 3 times/wk.    From admission HPI:  Oncology History Overview Note    Stage RONIT grade 2 squamous cell carcinoma of cervix  1/20/23: TURBT - poorly differentiated squamaous cell carcinoma; CPS10.  review with history of cervical mass consistent with GYN primary  - Extension to rectal mucosa, pubic ramus with ?reactive mediastinal LN   - Bilateral nephrostomy placement   2/16/23 - 6/1/23: Carbo AUC5/Taxol 175 +Avastin 15mg/kg, Pembrolizumab 200mg/m2  - C2-C4 +Avastin - further treatments held due to progressive gr2 proteinuria   - Grade 3 anemia declining transfusion,   7/20/23 - 12/18/23: Pembrolizumab 200mg/m2, progressive disease  1/11/24 - 3/14/24: Tivdak x3, progressive disease   - C1: 0.9mg/kg in  setting of CrCl < 30  3/2024-7/2024: Taxotere, progressive disease     Molecular Testing  PLD1: CPS 10  1/2024 Tempus xT - PIK3CA p.E726K missense (gain): consider alpelisib  Loss of function: BCORL1, KMT2C, NSD1, KMT2D, RAD21  TMB 7.9mB - RACHELE  - Her2 Neg      Cervical cancer, FIGO stage RONIT (Multi)    3/21/2022 Cancer Staged      Staging form: Cervix Uteri, AJCC Version 9, Clinical stage from 3/21/2022: FIGO Stage RONIT (cT4, cN2, cM0) - Signed by Adrianna Jefferson MD on 10/12/2023, Prognostic indicators: CPS 10       7/20/2023 - 11/30/2023 Chemotherapy      Pembrolizumab, 21 Day Cycles       1/12/2024 - 2/22/2024 Chemotherapy      Tisotumab vedotin, 21 Day Cycles       3/21/2024 - 7/5/2024 Chemotherapy      DOCEtaxel, 21 Day Cycles       8/1/2024 -  Chemotherapy      Topotecan (Weekly), 28 Day Cycles        3/13/2025 - 3/13/2025 Chemotherapy      PACLitaxel / CARBOplatin, 21 Day Cycles - Gyn       3/13/2025 - 3/13/2025 Chemotherapy      PACLitaxel / CARBOplatin, 21 Day Cycles - Gyn               Pertinent Physical Exam At Time of Discharge  General: no acute distress  HEENT: normocephalic, atraumatic  Heart: warm and well perfused  Lungs: no increased WOB, CTAB  Abd: soft, nontender  Extremities: moving all extremities  Neuro: awake and conversant  Psych: appropriate mood and affect   :  Bilateral PCNs in place draining clear yellow urine. Bilat PCN sites clean        Last Vitals:  Temp Pulse Resp BP MAP Pulse Ox   35.5 °C (95.9 °F) 62 16 114/71 90 98 %     Discharge Meds     Your medication list        START taking these medications        Instructions Last Dose Given Next Dose Due   amoxicillin-pot clavulanate 500-125 mg tablet  Commonly known as: Augmentin      Take 1 tablet by mouth every 12 hours for 9 days.              CHANGE how you take these medications        Instructions Last Dose Given Next Dose Due   gabapentin 300 mg capsule  Commonly known as: Neurontin  What changed: Another medication with the same  name was added. Make sure you understand how and when to take each.      Take 3 capsules (900 mg) by mouth 3 times a day.       gabapentin 300 mg capsule  Commonly known as: Neurontin  What changed: You were already taking a medication with the same name, and this prescription was added. Make sure you understand how and when to take each.      Take 1 capsule (300 mg) by mouth 2 times a day.       oxyCODONE 5 mg immediate release tablet  Commonly known as: Roxicodone  What changed:   when to take this  reasons to take this  additional instructions      Take 1 tablet (5 mg) by mouth every 4 hours if needed for moderate pain (4 - 6).              CONTINUE taking these medications        Instructions Last Dose Given Next Dose Due   Calcium 600 600 mg calcium (1,500 mg) tablet  Generic drug: calcium carbonate           dexAMETHasone 4 mg tablet  Commonly known as: Decadron      Take 2 tablets (8 mg) by mouth 2 times a day. Take on the day before, day of and day after treatment.       dexAMETHasone 0.5 mg/5 mL oral liquid      Take 10 mL (1 mg) by mouth 2 times a day as needed (mucositis). Swish and spit. Do not swallow       DULoxetine 20 mg DR capsule  Commonly known as: Cymbalta      Take 1 capsule (20 mg) by mouth once daily. Do not crush or chew.       hydrOXYzine HCL 25 mg tablet  Commonly known as: Atarax           levothyroxine 25 mcg tablet  Commonly known as: Synthroid, Levoxyl      Take 1 tablet (25 mcg) by mouth once daily.       magic mouthwash (lidocaine, diphenhydrAMINE, Maalox 1:1:1)      Swish and spit 10 mL every 6 hours if needed for mucositis.       metoprolol tartrate 50 mg tablet  Commonly known as: Lopressor      take 0.5 tablets by mouth 2 times a day       multivitamin with minerals tablet           naloxone 4 mg/0.1 mL nasal spray  Commonly known as: Narcan           nystatin cream  Commonly known as: Mycostatin           ondansetron 8 mg tablet  Commonly known as: Zofran      Take 1 tablet (8  mg) by mouth every 8 hours if needed for nausea or vomiting.       prochlorperazine 10 mg tablet  Commonly known as: Compazine      Take 1 tablet (10 mg) by mouth every 6 hours if needed for nausea or vomiting.       tiZANidine 4 mg tablet  Commonly known as: Zanaflex      Take 1 tablet (4 mg) by mouth 3 times a day.       traZODone 50 mg tablet  Commonly known as: Desyrel      Take 1 tablet (50 mg) by mouth as needed at bedtime for sleep.                 Where to Get Your Medications        These medications were sent to [facility]  [address], [address], [facility] [address]      Hours: 8AM to 6PM Mon-Fri, 8AM to 4PM Sat, 9AM to 1PM Sun Phone: [phone]   amoxicillin-pot clavulanate 500-125 mg tablet  gabapentin 300 mg capsule  oxyCODONE 5 mg immediate release tablet          Complications Requiring Follow-Up  none    Test Results Pending At Discharge  Pending Labs       Order Current Status    CBC and Auto Differential Collected (08/21/24 0610)    Magnesium Collected (08/21/24 0610)    Renal Function Panel Collected (08/21/24 0610)            Outpatient Follow-Up  Future Appointments   Date Time Provider Department Center   8/27/2024  1:00 PM JUN So-EDGAR SCCGEAGYO [facility]   8/29/2024  8:00 AM INF 10 MENTOR [id number] [facility]   9/5/2024  2:00 PM INF 10 MENTOR [id number] [facility]   9/12/2024  2:00 PM INF 10 MENTOR [id number] [facility]   9/26/2024 12:00 PM INF 10 MENTOR [id number] [facility]   11/12/2024  1:15 PM Richard Zuniga MD [id number] [facility]       Seen and discussed with Dr. Phillip Gomez MD PGY2

## 2024-08-21 NOTE — HH CARE COORDINATION
Home Care received a Referral for Nursing and Physical Therapy. We have processed the referral for a Start of Care on 08/22-08/23.     If you have any questions or concerns, please feel free to contact us at [phone]. Follow the prompts, enter your five digit zip code, and you will be directed to your care team on EAST 1.

## 2024-08-21 NOTE — DISCHARGE INSTRUCTIONS
Call the Gynecologic Oncology office at [phone] if you have:  Temperature greater than 100.4  Persistent nausea and vomiting  Severe uncontrolled pain  Difficulty breathing, headache or visual disturbances  Hives  Persistent dizziness or light-headedness  Extreme fatigue  Any other questions or concerns you may have after discharge    In an emergency, call 911 or go to an Emergency Department at a nearby hospital     You have a nephrostomy drain in place. These drains are placed to help drain urine from your kidney. These drains stay in place until your doctors determine that it is safe to be removed. These tubes need to be exchanged every 6 to 8 weeks in order to make sure that your kidneys drain correctly. You can call Interventional Radiology [phone] to schedule an exchange. If you have questions or issues regarding your nephrostomy tube, you can call Interventional Radiology at [phone].     This drain cannot be submerged in water, so please do not use a tub or swim while you have a nephrostomy tube. You can wash yourself in a shower and keep the tubes away from the direct spray of the shower. Please use a tegaderm to properly cover the drain in the shower. Please monitor the output daily from the nephrostomy tube so that you can understand if your output is low. If you notice low output or leaking around the drain at your skin, you can flush the drain with a syringe of saline in your nephrostomy kit.

## 2024-08-24 ENCOUNTER — HOME CARE VISIT (OUTPATIENT)
Dept: HOME HEALTH SERVICES | Facility: HOME HEALTH | Age: 64
End: 2024-08-24

## 2024-08-27 ENCOUNTER — TELEPHONE (OUTPATIENT)
Dept: GYNECOLOGIC ONCOLOGY | Facility: HOSPITAL | Age: 64
End: 2024-08-27
Payer: COMMERCIAL

## 2024-08-27 ENCOUNTER — APPOINTMENT (OUTPATIENT)
Dept: GYNECOLOGIC ONCOLOGY | Facility: CLINIC | Age: 64
End: 2024-08-27
Payer: COMMERCIAL

## 2024-08-27 NOTE — TELEPHONE ENCOUNTER
Patient and  called and asked if patient should have blood work and chemo this Thursday. She was discharged from the hospital recently and was given a virtual appointment with Dr. Jefferson for 9/9/24 at 9:00. Called and LM with this information. Patient had severe pancytopenia with first cycle of weekly Topotecan with complications and will need to discuss treatment plan with Dr. Jefferson before proceeding with further treatment.

## 2024-08-29 ENCOUNTER — APPOINTMENT (OUTPATIENT)
Dept: HEMATOLOGY/ONCOLOGY | Facility: CLINIC | Age: 64
End: 2024-08-29
Payer: COMMERCIAL

## 2024-09-05 ENCOUNTER — APPOINTMENT (OUTPATIENT)
Dept: HEMATOLOGY/ONCOLOGY | Facility: CLINIC | Age: 64
End: 2024-09-05
Payer: COMMERCIAL

## 2024-09-05 DIAGNOSIS — G89.3 CANCER RELATED PAIN: ICD-10-CM

## 2024-09-05 RX ORDER — OXYCODONE HYDROCHLORIDE 5 MG/1
5 TABLET ORAL 3 TIMES DAILY PRN
Qty: 90 TABLET | Refills: 0 | Status: SHIPPED | OUTPATIENT
Start: 2024-09-05 | End: 2024-10-05

## 2024-09-05 RX ORDER — GABAPENTIN 300 MG/1
300 CAPSULE ORAL 2 TIMES DAILY
Qty: 60 CAPSULE | Refills: 3 | Status: SHIPPED | OUTPATIENT
Start: 2024-09-05

## 2024-09-05 NOTE — TELEPHONE ENCOUNTER
Patient requesting refills of oxycodone and Gabapentin. OARRS reviewed, no issues, last fill 7/10/24 (5 day rx oxycodone given in hospital at discharge on 8/21/24). CSA/UDS up to date. Has follow up scheduled 11/12/24.

## 2024-09-06 NOTE — PROGRESS NOTES
Consent:  Verbal consent was requested and obtained from patient on this date for a telehealth visit.    Patient ID: Shelbi Delaney is a 64 y.o. female.  Referring Physician: No referring provider defined for this encounter.  Primary Care Provider: Adrianna Jefferson MD    Subjective    65yo with recurrent cervical cancer, most recently on weekly Taxotere. Unfortunately patient admitted C1D8 of weekly Taxotere. Admitted with neutropenic urosepsis 8/15-21/24 (Pseudomonas aeruginosa, pan-sensitive) with MSSA, required MICU for pressor support, new onset Afib +RVR. L nephrostomy replaced during hospitalization, R nephrostomy exchange pending @ [facility]. Reports improvement in vaginal bleeding since hospitalization - reports resolution of red spotting with minimal drainage. Reports increased flatulence but no changes in continence of stool. Otherwise doing well, tolerating regular diet without nausea/vomiting. No changes in bladder habits, no blood via nephrostomies. No fevers/chills, chest pain or shortness of breath. Lower extremity swelling overall unchanged since hospitalization - continues with compression stockings since last assessment.     A comprehensive review of systems was performed and otherwise negative.      Objective    BSA: There is no height or weight on file to calculate BSA.  There were no vitals taken for this visit.     Physical Exam  Vitals reviewed.     General:   alert and oriented, in no acute distress   Cardiovascular: no apparent distress    Lungs: Normal respirations, No increased work of breathing   Neurologic:  CN III-XII grossly intact, no deficits     Pathology:  8/15/24 - CT A/P (noncon)  FINDINGS:  Lower chest: No pleural effusion. Platelike consolidative opacities in the lingula, right middle lobe and left lower lobe likely  subsegmental atelectasis.     Liver: No mass.  Biliary: No intrahepatic or extrahepatic bile duct dilation. Distended gallbladder without gallbladder wall thickening  or  pericholecystic edema/fluid. No cholelithiasis.  Spleen: No mass. No splenomegaly.  Pancreas: No mass or duct dilation.  Adrenals: No mass.  Kidneys: Right nephroureteral stent and left nephrostomy tube, the pigtail of the left nephrostomy tube is kinked within left lower pole collecting system, unchanged. Gaseous foci within bilateral collecting systems likely related to drainage catheters. Stable left  renal atrophy. Stable bilateral urothelial thickening. No hydronephrosis.  GI tract: Air-filled duodenal diverticuli. No bowel wall thickening or dilation. Thin peripheral rim of hyperdensities along inferior  cecum, possibly new calcification.  Lymph nodes: Multiple enlarged calcified and noncalcified abdominopelvic lymph node, grossly stable. For example, 2.1 x 1.8 cm  left para-aortic node (series 4, image 50); 1.8 x 1.5 cm aortocaval node (image 68); 3.3 x 3.3 cm left inguinal node (image 133). No new lymphadenopathy.  Mesentery/peritoneum: No free fluid, free air or fluid collection.  Vasculature: Mild abdominal aortic atherosclerotic calcifications without aneurysmal dilation.  Pelvis: No free fluid, free air or fluid collection. Increased size 7.3 x 6.5 cm peripherally calcified uterine mass, previously 6.8 x 5.1 cm (series 4, image 115). Increased size of 6.0 x 5.2 cm right adnexal cystic structure, previously 5.6 x 4.7 cm. Stable size 2.4 cm left adnexal cystic structure. Redemonstration of a tubular structure containing an air-fluid level in the right pelvis (series 4, image 103), likely a loop of bowel tethering to the uterine mass.  Bones/Soft tissues: No acute fracture or abdominal wall soft tissue abnormalities.    IMPRESSION:  No new hydronephrosis. Kinked left nephrostomy pigtail within the  left lower pole collecting system, unchanged.      Increased size of peripherally calcified uterine mass and right  adnexal cystic structure. Stable left adnexal cystic structure.      Stable extensive  abdominopelvic lymphadenopathy.      MACRO:  None    Performance Status:  Symptomatic; fully ambulatory    Assessment/Plan    Oncology History Overview Note   Stage RONIT grade 2 squamous cell carcinoma of cervix  1/20/23: TURBT - poorly differentiated squamaous cell carcinoma; CPS10.  review with history of cervical mass consistent with GYN primary  - Extension to rectal mucosa, pubic ramus with ?reactive mediastinal LN   - Bilateral nephrostomy placement   2/16/23 - 6/1/23: Carbo AUC5/Taxol 175 +Avastin 15mg/kg, Pembrolizumab 200mg/m2  - C2-C4 +Avastin - further treatments held due to progressive gr2 proteinuria   - Grade 3 anemia declining transfusion,   7/20/23 - 12/18/23: Pembrolizumab 200mg/m2, progressive disease  1/11/24 - 3/14/24: Tivdak x3, progressive disease   - C1: 0.9mg/kg in setting of CrCl < 30  3/2024-7/2024: Taxotere, progressive disease    Molecular Testing  PLD1: CPS 10  1/2024 Tempus xT - PIK3CA p.E726K missense (gain): consider alpelisib  Loss of function: BCORL1, KMT2C, NSD1, KMT2D, RAD21  TMB 7.9mB - RACHELE  - Her2 Neg     Cervical cancer, FIGO stage RONIT (Multi)   3/21/2022 Cancer Staged    Staging form: Cervix Uteri, AJCC Version 9, Clinical stage from 3/21/2022: FIGO Stage RONIT (cT4, cN2, cM0) - Signed by Adrianna Jefferson MD on 10/12/2023, Prognostic indicators: CPS 10     7/20/2023 - 11/30/2023 Chemotherapy    Pembrolizumab, 21 Day Cycles     1/12/2024 - 2/22/2024 Chemotherapy    Tisotumab vedotin, 21 Day Cycles     3/21/2024 - 7/5/2024 Chemotherapy    DOCEtaxel, 21 Day Cycles     8/1/2024 -  Chemotherapy    Topotecan (Weekly), 28 Day Cycles      3/13/2025 - 3/13/2025 Chemotherapy    PACLitaxel / CARBOplatin, 21 Day Cycles - Gyn     3/13/2025 - 3/13/2025 Chemotherapy    PACLitaxel / CARBOplatin, 21 Day Cycles - Gyn       Cancer Staging   Cervical cancer, FIGO stage RONIT (Multi)  Staging form: Cervix Uteri, AJCC Version 9  - Clinical stage from 3/21/2022: FIGO Stage RONIT (cT4, cN2, cM0) - Signed by  Adrianna Jefferson MD on 10/12/2023       Diagnoses and all orders for this visit:  Cervical cancer, FIGO stage RONIT (Multi)  Encounter for antineoplastic chemotherapy  - Plan for dose reduction to 2mg/m2 D1/D15 in setting of neutropenic sepsis with gCSF support; fever precautions reviewed with patient and her  in setting of prior admission   - Follow up pretreatment labs  - Plan for imaging post-C3  Chemotherapy induced neutropenia (CMS-HCC)  - History of urosepsis in s/o bilateral PCNs  - Plan for gCSF with dose modification as above  Urinary tract infection associated with nephrostomy catheter, subsequent encounter  - L PCN exchanged d  - R PCN exchange pending at [facility]     Treatment Plans       Name Type Plan Dates Plan Provider         Active    Topotecan (Weekly), 28 Day Cycles  Oncology Treatment  7/31/2024 - Present Ashanti Fisher MD                 Approximately 8min spent in discussion with patient      Adrianna Jefferson MD

## 2024-09-09 ENCOUNTER — TELEMEDICINE (OUTPATIENT)
Dept: GYNECOLOGIC ONCOLOGY | Facility: HOSPITAL | Age: 64
End: 2024-09-09
Payer: COMMERCIAL

## 2024-09-09 ENCOUNTER — TELEPHONE (OUTPATIENT)
Dept: GYNECOLOGIC ONCOLOGY | Facility: HOSPITAL | Age: 64
End: 2024-09-09

## 2024-09-09 DIAGNOSIS — D70.1 CHEMOTHERAPY INDUCED NEUTROPENIA (CMS-HCC): ICD-10-CM

## 2024-09-09 DIAGNOSIS — N39.0 URINARY TRACT INFECTION ASSOCIATED WITH NEPHROSTOMY CATHETER, SUBSEQUENT ENCOUNTER: ICD-10-CM

## 2024-09-09 DIAGNOSIS — C53.9 CERVICAL CANCER, FIGO STAGE IVA (MULTI): Primary | ICD-10-CM

## 2024-09-09 DIAGNOSIS — T83.512D URINARY TRACT INFECTION ASSOCIATED WITH NEPHROSTOMY CATHETER, SUBSEQUENT ENCOUNTER: ICD-10-CM

## 2024-09-09 DIAGNOSIS — T45.1X5A CHEMOTHERAPY INDUCED NEUTROPENIA (CMS-HCC): ICD-10-CM

## 2024-09-09 DIAGNOSIS — Z51.11 ENCOUNTER FOR ANTINEOPLASTIC CHEMOTHERAPY: ICD-10-CM

## 2024-09-09 PROCEDURE — 1036F TOBACCO NON-USER: CPT | Performed by: STUDENT IN AN ORGANIZED HEALTH CARE EDUCATION/TRAINING PROGRAM

## 2024-09-09 PROCEDURE — 99441 PR PHYS/QHP TELEPHONE EVALUATION 5-10 MIN: CPT | Performed by: STUDENT IN AN ORGANIZED HEALTH CARE EDUCATION/TRAINING PROGRAM

## 2024-09-09 RX ORDER — PROCHLORPERAZINE EDISYLATE 5 MG/ML
10 INJECTION INTRAMUSCULAR; INTRAVENOUS EVERY 6 HOURS PRN
Status: CANCELLED | OUTPATIENT
Start: 2024-09-12

## 2024-09-09 RX ORDER — EPINEPHRINE 0.3 MG/.3ML
0.3 INJECTION SUBCUTANEOUS EVERY 5 MIN PRN
OUTPATIENT
Start: 2024-09-26

## 2024-09-09 RX ORDER — FAMOTIDINE 10 MG/ML
20 INJECTION INTRAVENOUS ONCE AS NEEDED
OUTPATIENT
Start: 2024-09-26

## 2024-09-09 RX ORDER — ALBUTEROL SULFATE 0.83 MG/ML
3 SOLUTION RESPIRATORY (INHALATION) AS NEEDED
OUTPATIENT
Start: 2024-09-26

## 2024-09-09 RX ORDER — FAMOTIDINE 10 MG/ML
20 INJECTION INTRAVENOUS ONCE AS NEEDED
Status: CANCELLED | OUTPATIENT
Start: 2024-09-12

## 2024-09-09 RX ORDER — PROCHLORPERAZINE MALEATE 10 MG
10 TABLET ORAL EVERY 6 HOURS PRN
Status: CANCELLED | OUTPATIENT
Start: 2024-09-12

## 2024-09-09 RX ORDER — ALBUTEROL SULFATE 0.83 MG/ML
3 SOLUTION RESPIRATORY (INHALATION) AS NEEDED
Status: CANCELLED | OUTPATIENT
Start: 2024-09-12

## 2024-09-09 RX ORDER — PROCHLORPERAZINE MALEATE 10 MG
10 TABLET ORAL EVERY 6 HOURS PRN
OUTPATIENT
Start: 2024-09-26

## 2024-09-09 RX ORDER — PROCHLORPERAZINE EDISYLATE 5 MG/ML
10 INJECTION INTRAMUSCULAR; INTRAVENOUS EVERY 6 HOURS PRN
OUTPATIENT
Start: 2024-09-26

## 2024-09-09 RX ORDER — EPINEPHRINE 0.3 MG/.3ML
0.3 INJECTION SUBCUTANEOUS EVERY 5 MIN PRN
Status: CANCELLED | OUTPATIENT
Start: 2024-09-12

## 2024-09-09 RX ORDER — ONDANSETRON HYDROCHLORIDE 2 MG/ML
8 INJECTION, SOLUTION INTRAVENOUS ONCE
Status: CANCELLED | OUTPATIENT
Start: 2024-09-12

## 2024-09-09 RX ORDER — ONDANSETRON HYDROCHLORIDE 2 MG/ML
8 INJECTION, SOLUTION INTRAVENOUS ONCE
OUTPATIENT
Start: 2024-09-26

## 2024-09-09 RX ORDER — DIPHENHYDRAMINE HYDROCHLORIDE 50 MG/ML
50 INJECTION INTRAMUSCULAR; INTRAVENOUS AS NEEDED
OUTPATIENT
Start: 2024-09-26

## 2024-09-09 RX ORDER — DIPHENHYDRAMINE HYDROCHLORIDE 50 MG/ML
50 INJECTION INTRAMUSCULAR; INTRAVENOUS AS NEEDED
Status: CANCELLED | OUTPATIENT
Start: 2024-09-12

## 2024-09-09 NOTE — TELEPHONE ENCOUNTER
Plan is to resume Topotecan at reduced dose and add On Pro Neulasta on day 15. Call to patient and asked her to have pretreatment labs tomorrow and to see if she can come for treatment on Thursday.

## 2024-09-10 ENCOUNTER — LAB (OUTPATIENT)
Dept: LAB | Facility: LAB | Age: 64
End: 2024-09-10
Payer: COMMERCIAL

## 2024-09-10 ENCOUNTER — TELEPHONE (OUTPATIENT)
Dept: GYNECOLOGIC ONCOLOGY | Facility: HOSPITAL | Age: 64
End: 2024-09-10

## 2024-09-10 DIAGNOSIS — C53.9 CERVICAL CANCER, FIGO STAGE IVA (MULTI): ICD-10-CM

## 2024-09-10 LAB
ALBUMIN SERPL BCP-MCNC: 3.3 G/DL (ref 3.4–5)
ALP SERPL-CCNC: 55 U/L (ref 33–136)
ALT SERPL W P-5'-P-CCNC: 6 U/L (ref 7–45)
ANION GAP SERPL CALC-SCNC: 17 MMOL/L (ref 10–20)
AST SERPL W P-5'-P-CCNC: 22 U/L (ref 9–39)
BASOPHILS # BLD AUTO: 0.2 X10*3/UL (ref 0–0.1)
BASOPHILS NFR BLD AUTO: 2.5 %
BILIRUB SERPL-MCNC: 0.5 MG/DL (ref 0–1.2)
BUN SERPL-MCNC: 23 MG/DL (ref 6–23)
CALCIUM SERPL-MCNC: 8.7 MG/DL (ref 8.6–10.6)
CHLORIDE SERPL-SCNC: 104 MMOL/L (ref 98–107)
CO2 SERPL-SCNC: 22 MMOL/L (ref 21–32)
CREAT SERPL-MCNC: 1.93 MG/DL (ref 0.5–1.05)
EGFRCR SERPLBLD CKD-EPI 2021: 29 ML/MIN/1.73M*2
EOSINOPHIL # BLD AUTO: 0.27 X10*3/UL (ref 0–0.7)
EOSINOPHIL NFR BLD AUTO: 3.4 %
ERYTHROCYTE [DISTWIDTH] IN BLOOD BY AUTOMATED COUNT: 20.6 % (ref 11.5–14.5)
GLUCOSE SERPL-MCNC: 95 MG/DL (ref 74–99)
HCT VFR BLD AUTO: 27.8 % (ref 36–46)
HGB BLD-MCNC: 8.5 G/DL (ref 12–16)
IMM GRANULOCYTES # BLD AUTO: 0.06 X10*3/UL (ref 0–0.7)
IMM GRANULOCYTES NFR BLD AUTO: 0.8 % (ref 0–0.9)
LYMPHOCYTES # BLD AUTO: 1.05 X10*3/UL (ref 1.2–4.8)
LYMPHOCYTES NFR BLD AUTO: 13.2 %
MCH RBC QN AUTO: 30.6 PG (ref 26–34)
MCHC RBC AUTO-ENTMCNC: 30.6 G/DL (ref 32–36)
MCV RBC AUTO: 100 FL (ref 80–100)
MONOCYTES # BLD AUTO: 0.71 X10*3/UL (ref 0.1–1)
MONOCYTES NFR BLD AUTO: 8.9 %
NEUTROPHILS # BLD AUTO: 5.69 X10*3/UL (ref 1.2–7.7)
NEUTROPHILS NFR BLD AUTO: 71.2 %
NRBC BLD-RTO: 0 /100 WBCS (ref 0–0)
PLATELET # BLD AUTO: 387 X10*3/UL (ref 150–450)
POTASSIUM SERPL-SCNC: 5.2 MMOL/L (ref 3.5–5.3)
PROT SERPL-MCNC: 6.6 G/DL (ref 6.4–8.2)
RBC # BLD AUTO: 2.78 X10*6/UL (ref 4–5.2)
RBC MORPH BLD: NORMAL
SODIUM SERPL-SCNC: 138 MMOL/L (ref 136–145)
WBC # BLD AUTO: 8 X10*3/UL (ref 4.4–11.3)

## 2024-09-10 PROCEDURE — 80053 COMPREHEN METABOLIC PANEL: CPT

## 2024-09-10 PROCEDURE — 85025 COMPLETE CBC W/AUTO DIFF WBC: CPT

## 2024-09-10 NOTE — TELEPHONE ENCOUNTER
No pretreatment labs found. Call to patient and LM to call regarding if she wants to resume treatment this Thursday and if she does, then she needs to have labs by tomorrow. Requested return call.

## 2024-09-12 ENCOUNTER — APPOINTMENT (OUTPATIENT)
Dept: HEMATOLOGY/ONCOLOGY | Facility: CLINIC | Age: 64
End: 2024-09-12
Payer: COMMERCIAL

## 2024-09-12 ENCOUNTER — INFUSION (OUTPATIENT)
Dept: HEMATOLOGY/ONCOLOGY | Facility: CLINIC | Age: 64
End: 2024-09-12
Payer: COMMERCIAL

## 2024-09-12 VITALS
OXYGEN SATURATION: 99 % | SYSTOLIC BLOOD PRESSURE: 123 MMHG | TEMPERATURE: 96.3 F | BODY MASS INDEX: 30.09 KG/M2 | HEART RATE: 61 BPM | WEIGHT: 186.4 LBS | DIASTOLIC BLOOD PRESSURE: 78 MMHG | RESPIRATION RATE: 17 BRPM

## 2024-09-12 DIAGNOSIS — C53.9 CERVICAL CANCER, FIGO STAGE IVA: ICD-10-CM

## 2024-09-12 DIAGNOSIS — C53.9 CERVICAL CANCER, FIGO STAGE IVA (MULTI): ICD-10-CM

## 2024-09-12 PROCEDURE — 2500000004 HC RX 250 GENERAL PHARMACY W/ HCPCS (ALT 636 FOR OP/ED): Performed by: STUDENT IN AN ORGANIZED HEALTH CARE EDUCATION/TRAINING PROGRAM

## 2024-09-12 PROCEDURE — 96375 TX/PRO/DX INJ NEW DRUG ADDON: CPT | Mod: INF

## 2024-09-12 PROCEDURE — 96413 CHEMO IV INFUSION 1 HR: CPT

## 2024-09-12 RX ORDER — ALBUTEROL SULFATE 0.83 MG/ML
3 SOLUTION RESPIRATORY (INHALATION) AS NEEDED
Status: DISCONTINUED | OUTPATIENT
Start: 2024-09-12 | End: 2024-09-12 | Stop reason: HOSPADM

## 2024-09-12 RX ORDER — FAMOTIDINE 10 MG/ML
20 INJECTION INTRAVENOUS ONCE AS NEEDED
Status: DISCONTINUED | OUTPATIENT
Start: 2024-09-12 | End: 2024-09-12 | Stop reason: HOSPADM

## 2024-09-12 RX ORDER — PROCHLORPERAZINE MALEATE 10 MG
10 TABLET ORAL EVERY 6 HOURS PRN
Status: DISCONTINUED | OUTPATIENT
Start: 2024-09-12 | End: 2024-09-12 | Stop reason: HOSPADM

## 2024-09-12 RX ORDER — HEPARIN SODIUM,PORCINE/PF 10 UNIT/ML
50 SYRINGE (ML) INTRAVENOUS AS NEEDED
OUTPATIENT
Start: 2024-09-12

## 2024-09-12 RX ORDER — HEPARIN SODIUM,PORCINE/PF 10 UNIT/ML
50 SYRINGE (ML) INTRAVENOUS AS NEEDED
Status: DISCONTINUED | OUTPATIENT
Start: 2024-09-12 | End: 2024-09-12 | Stop reason: HOSPADM

## 2024-09-12 RX ORDER — EPINEPHRINE 0.3 MG/.3ML
0.3 INJECTION SUBCUTANEOUS EVERY 5 MIN PRN
Status: DISCONTINUED | OUTPATIENT
Start: 2024-09-12 | End: 2024-09-12 | Stop reason: HOSPADM

## 2024-09-12 RX ORDER — PROCHLORPERAZINE EDISYLATE 5 MG/ML
10 INJECTION INTRAMUSCULAR; INTRAVENOUS EVERY 6 HOURS PRN
Status: DISCONTINUED | OUTPATIENT
Start: 2024-09-12 | End: 2024-09-12 | Stop reason: HOSPADM

## 2024-09-12 RX ORDER — DIPHENHYDRAMINE HYDROCHLORIDE 50 MG/ML
50 INJECTION INTRAMUSCULAR; INTRAVENOUS AS NEEDED
Status: DISCONTINUED | OUTPATIENT
Start: 2024-09-12 | End: 2024-09-12 | Stop reason: HOSPADM

## 2024-09-12 RX ORDER — HEPARIN 100 UNIT/ML
500 SYRINGE INTRAVENOUS AS NEEDED
OUTPATIENT
Start: 2024-09-12

## 2024-09-12 RX ORDER — HEPARIN 100 UNIT/ML
500 SYRINGE INTRAVENOUS AS NEEDED
Status: DISCONTINUED | OUTPATIENT
Start: 2024-09-12 | End: 2024-09-12 | Stop reason: HOSPADM

## 2024-09-12 RX ORDER — ONDANSETRON HYDROCHLORIDE 2 MG/ML
8 INJECTION, SOLUTION INTRAVENOUS ONCE
Status: COMPLETED | OUTPATIENT
Start: 2024-09-12 | End: 2024-09-12

## 2024-09-12 ASSESSMENT — PAIN SCALES - GENERAL: PAINLEVEL: 5

## 2024-09-16 DIAGNOSIS — C53.9 CERVICAL CANCER, FIGO STAGE IVA: ICD-10-CM

## 2024-09-20 ENCOUNTER — TELEPHONE (OUTPATIENT)
Dept: GYNECOLOGIC ONCOLOGY | Facility: HOSPITAL | Age: 64
End: 2024-09-20
Payer: COMMERCIAL

## 2024-09-20 NOTE — TELEPHONE ENCOUNTER
Patient called and reports she is having some side effects. She had small amounts of diarrhea and cramps for a few days after chemo. The cramps were painful. She was able to keep up with hydrating herself. She also reports that she had chills and a low grade fever of 99. She took tylenol. Her fever went up to 101 and she took tylenol. Discussed that tylenol can mask a serious infection and that she should call the on call service to discuss with the doctor. Since this happened 2 days after chemo, it is unlikely that her ANC was low but did discuss that a fever after chemo could indicate neutropenia and with fever could represent sepsis. She did not have any signs of infection during this time. She could have had tumor fever. She does not have liver nodules. She had fatigue and weak legs a few days after chemo which is common. Discussed On Pro for next week and to take Claritin 10 mg the day after On Pro is placed. Discussed bone pain after On Pro Neulasta and the bones that could be affected including sternum bone, pelvic, and long bones. She verbalizes understanding.

## 2024-09-25 NOTE — PATIENT INSTRUCTIONS
Please start Claritin (generic Loratadine is fine) 10 mg daily on Friday after chemo and take for a week to prevent bone pain from On Pro Neulasta. Remove On Pro Neulasta when indicated (27 hours after chemo). Please remember to have pretreatment blood work on Monday, 10/7/24 prior to  your next treatment on 10/10/24.

## 2024-09-26 ENCOUNTER — INFUSION (OUTPATIENT)
Dept: HEMATOLOGY/ONCOLOGY | Facility: CLINIC | Age: 64
End: 2024-09-26
Payer: COMMERCIAL

## 2024-09-26 ENCOUNTER — APPOINTMENT (OUTPATIENT)
Dept: HEMATOLOGY/ONCOLOGY | Facility: CLINIC | Age: 64
End: 2024-09-26
Payer: COMMERCIAL

## 2024-09-26 ENCOUNTER — OFFICE VISIT (OUTPATIENT)
Dept: GYNECOLOGIC ONCOLOGY | Facility: CLINIC | Age: 64
End: 2024-09-26
Payer: COMMERCIAL

## 2024-09-26 VITALS
DIASTOLIC BLOOD PRESSURE: 75 MMHG | TEMPERATURE: 98.2 F | BODY MASS INDEX: 28.61 KG/M2 | RESPIRATION RATE: 18 BRPM | HEART RATE: 83 BPM | SYSTOLIC BLOOD PRESSURE: 114 MMHG | OXYGEN SATURATION: 99 % | WEIGHT: 177.25 LBS

## 2024-09-26 VITALS
WEIGHT: 177.25 LBS | BODY MASS INDEX: 28.61 KG/M2 | OXYGEN SATURATION: 99 % | TEMPERATURE: 98.2 F | HEART RATE: 83 BPM | SYSTOLIC BLOOD PRESSURE: 114 MMHG | DIASTOLIC BLOOD PRESSURE: 75 MMHG | RESPIRATION RATE: 18 BRPM

## 2024-09-26 DIAGNOSIS — D64.9 ANEMIA, UNSPECIFIED TYPE: ICD-10-CM

## 2024-09-26 DIAGNOSIS — C53.9 CERVICAL CANCER, FIGO STAGE IVA: Primary | ICD-10-CM

## 2024-09-26 DIAGNOSIS — C53.9 CERVICAL CANCER, FIGO STAGE IVA: ICD-10-CM

## 2024-09-26 LAB
ABO GROUP (TYPE) IN BLOOD: NORMAL
ALBUMIN SERPL BCP-MCNC: 3.1 G/DL (ref 3.4–5)
ALP SERPL-CCNC: 54 U/L (ref 33–136)
ALT SERPL W P-5'-P-CCNC: 5 U/L (ref 7–45)
ANION GAP SERPL CALC-SCNC: 14 MMOL/L (ref 10–20)
ANTIBODY SCREEN: NORMAL
AST SERPL W P-5'-P-CCNC: 15 U/L (ref 9–39)
BASOPHILS # BLD AUTO: 0.08 X10*3/UL (ref 0–0.1)
BASOPHILS NFR BLD AUTO: 0.7 %
BILIRUB SERPL-MCNC: 0.5 MG/DL (ref 0–1.2)
BUN SERPL-MCNC: 26 MG/DL (ref 6–23)
CALCIUM SERPL-MCNC: 8.2 MG/DL (ref 8.6–10.3)
CHLORIDE SERPL-SCNC: 104 MMOL/L (ref 98–107)
CO2 SERPL-SCNC: 22 MMOL/L (ref 21–32)
CREAT SERPL-MCNC: 2.14 MG/DL (ref 0.5–1.05)
EGFRCR SERPLBLD CKD-EPI 2021: 25 ML/MIN/1.73M*2
EOSINOPHIL # BLD AUTO: 0.33 X10*3/UL (ref 0–0.7)
EOSINOPHIL NFR BLD AUTO: 3 %
ERYTHROCYTE [DISTWIDTH] IN BLOOD BY AUTOMATED COUNT: 19 % (ref 11.5–14.5)
GLUCOSE SERPL-MCNC: 98 MG/DL (ref 74–99)
HCT VFR BLD AUTO: 23.4 % (ref 36–46)
HGB BLD-MCNC: 7.3 G/DL (ref 12–16)
IMM GRANULOCYTES # BLD AUTO: 0.06 X10*3/UL (ref 0–0.7)
IMM GRANULOCYTES NFR BLD AUTO: 0.5 % (ref 0–0.9)
LYMPHOCYTES # BLD AUTO: 0.8 X10*3/UL (ref 1.2–4.8)
LYMPHOCYTES NFR BLD AUTO: 7.2 %
MCH RBC QN AUTO: 31.3 PG (ref 26–34)
MCHC RBC AUTO-ENTMCNC: 31.2 G/DL (ref 32–36)
MCV RBC AUTO: 100 FL (ref 80–100)
MONOCYTES # BLD AUTO: 0.94 X10*3/UL (ref 0.1–1)
MONOCYTES NFR BLD AUTO: 8.5 %
NEUTROPHILS # BLD AUTO: 8.87 X10*3/UL (ref 1.2–7.7)
NEUTROPHILS NFR BLD AUTO: 80.1 %
NRBC BLD-RTO: 0 /100 WBCS (ref 0–0)
PLATELET # BLD AUTO: 277 X10*3/UL (ref 150–450)
POTASSIUM SERPL-SCNC: 4.4 MMOL/L (ref 3.5–5.3)
PROT SERPL-MCNC: 6.8 G/DL (ref 6.4–8.2)
RBC # BLD AUTO: 2.33 X10*6/UL (ref 4–5.2)
RH FACTOR (ANTIGEN D): NORMAL
SODIUM SERPL-SCNC: 136 MMOL/L (ref 136–145)
WBC # BLD AUTO: 11.1 X10*3/UL (ref 4.4–11.3)

## 2024-09-26 PROCEDURE — 3074F SYST BP LT 130 MM HG: CPT | Performed by: STUDENT IN AN ORGANIZED HEALTH CARE EDUCATION/TRAINING PROGRAM

## 2024-09-26 PROCEDURE — 1036F TOBACCO NON-USER: CPT | Performed by: STUDENT IN AN ORGANIZED HEALTH CARE EDUCATION/TRAINING PROGRAM

## 2024-09-26 PROCEDURE — 85025 COMPLETE CBC W/AUTO DIFF WBC: CPT

## 2024-09-26 PROCEDURE — 96413 CHEMO IV INFUSION 1 HR: CPT

## 2024-09-26 PROCEDURE — 84520 ASSAY OF UREA NITROGEN: CPT

## 2024-09-26 PROCEDURE — 86923 COMPATIBILITY TEST ELECTRIC: CPT

## 2024-09-26 PROCEDURE — 2500000004 HC RX 250 GENERAL PHARMACY W/ HCPCS (ALT 636 FOR OP/ED): Performed by: STUDENT IN AN ORGANIZED HEALTH CARE EDUCATION/TRAINING PROGRAM

## 2024-09-26 PROCEDURE — 99215 OFFICE O/P EST HI 40 MIN: CPT | Performed by: STUDENT IN AN ORGANIZED HEALTH CARE EDUCATION/TRAINING PROGRAM

## 2024-09-26 PROCEDURE — 96375 TX/PRO/DX INJ NEW DRUG ADDON: CPT | Mod: INF

## 2024-09-26 PROCEDURE — 86901 BLOOD TYPING SEROLOGIC RH(D): CPT

## 2024-09-26 PROCEDURE — 99215 OFFICE O/P EST HI 40 MIN: CPT | Mod: 25 | Performed by: STUDENT IN AN ORGANIZED HEALTH CARE EDUCATION/TRAINING PROGRAM

## 2024-09-26 PROCEDURE — 96377 APPLICATON ON-BODY INJECTOR: CPT

## 2024-09-26 PROCEDURE — 3078F DIAST BP <80 MM HG: CPT | Performed by: STUDENT IN AN ORGANIZED HEALTH CARE EDUCATION/TRAINING PROGRAM

## 2024-09-26 RX ORDER — PROCHLORPERAZINE EDISYLATE 5 MG/ML
10 INJECTION INTRAMUSCULAR; INTRAVENOUS EVERY 6 HOURS PRN
Status: DISCONTINUED | OUTPATIENT
Start: 2024-09-26 | End: 2024-09-26 | Stop reason: HOSPADM

## 2024-09-26 RX ORDER — EPINEPHRINE 0.3 MG/.3ML
0.3 INJECTION SUBCUTANEOUS EVERY 5 MIN PRN
Status: CANCELLED | OUTPATIENT
Start: 2024-09-26

## 2024-09-26 RX ORDER — ALBUTEROL SULFATE 0.83 MG/ML
3 SOLUTION RESPIRATORY (INHALATION) AS NEEDED
Status: CANCELLED | OUTPATIENT
Start: 2024-09-26

## 2024-09-26 RX ORDER — HEPARIN SODIUM,PORCINE/PF 10 UNIT/ML
50 SYRINGE (ML) INTRAVENOUS AS NEEDED
Status: CANCELLED | OUTPATIENT
Start: 2024-09-26

## 2024-09-26 RX ORDER — HEPARIN SODIUM,PORCINE/PF 10 UNIT/ML
50 SYRINGE (ML) INTRAVENOUS AS NEEDED
Status: DISCONTINUED | OUTPATIENT
Start: 2024-09-26 | End: 2024-09-26 | Stop reason: HOSPADM

## 2024-09-26 RX ORDER — DIPHENHYDRAMINE HYDROCHLORIDE 50 MG/ML
50 INJECTION INTRAMUSCULAR; INTRAVENOUS AS NEEDED
Status: CANCELLED | OUTPATIENT
Start: 2024-09-26

## 2024-09-26 RX ORDER — HEPARIN 100 UNIT/ML
500 SYRINGE INTRAVENOUS AS NEEDED
Status: CANCELLED | OUTPATIENT
Start: 2024-09-26

## 2024-09-26 RX ORDER — FAMOTIDINE 10 MG/ML
20 INJECTION INTRAVENOUS ONCE AS NEEDED
Status: CANCELLED | OUTPATIENT
Start: 2024-09-26

## 2024-09-26 RX ORDER — ALBUTEROL SULFATE 0.83 MG/ML
3 SOLUTION RESPIRATORY (INHALATION) AS NEEDED
Status: DISCONTINUED | OUTPATIENT
Start: 2024-09-26 | End: 2024-09-26 | Stop reason: HOSPADM

## 2024-09-26 RX ORDER — PROCHLORPERAZINE MALEATE 10 MG
10 TABLET ORAL EVERY 6 HOURS PRN
Status: DISCONTINUED | OUTPATIENT
Start: 2024-09-26 | End: 2024-09-26 | Stop reason: HOSPADM

## 2024-09-26 RX ORDER — FAMOTIDINE 10 MG/ML
20 INJECTION INTRAVENOUS ONCE AS NEEDED
Status: DISCONTINUED | OUTPATIENT
Start: 2024-09-26 | End: 2024-09-26 | Stop reason: HOSPADM

## 2024-09-26 RX ORDER — ONDANSETRON HYDROCHLORIDE 2 MG/ML
8 INJECTION, SOLUTION INTRAVENOUS ONCE
Status: COMPLETED | OUTPATIENT
Start: 2024-09-26 | End: 2024-09-26

## 2024-09-26 RX ORDER — HEPARIN 100 UNIT/ML
500 SYRINGE INTRAVENOUS AS NEEDED
Status: DISCONTINUED | OUTPATIENT
Start: 2024-09-26 | End: 2024-09-26 | Stop reason: HOSPADM

## 2024-09-26 RX ORDER — EPINEPHRINE 0.3 MG/.3ML
0.3 INJECTION SUBCUTANEOUS EVERY 5 MIN PRN
Status: DISCONTINUED | OUTPATIENT
Start: 2024-09-26 | End: 2024-09-26 | Stop reason: HOSPADM

## 2024-09-26 RX ORDER — DIPHENHYDRAMINE HYDROCHLORIDE 50 MG/ML
50 INJECTION INTRAMUSCULAR; INTRAVENOUS AS NEEDED
Status: DISCONTINUED | OUTPATIENT
Start: 2024-09-26 | End: 2024-09-26 | Stop reason: HOSPADM

## 2024-09-26 ASSESSMENT — PAIN SCALES - GENERAL
PAINLEVEL_OUTOF10: 5
PAINLEVEL: 5

## 2024-09-26 NOTE — PROGRESS NOTES
"Patient ID: Shelbi Delaney is a 64 y.o. female.  Referring Physician: No referring provider defined for this encounter.  Primary Care Provider: Adrianna Jefferson MD    Subjective    63yo with recurrent cervical cancer, most recently on weekly Taxotere; presenting for C2 with gCSF support. Pretreatment labs significant for grade 3 anemia. She reports overall feeling well without significant changes. Intermittent low grade temperatures for 3 days after last infusion improved with Tylenol; again low grade temperature last night (99). Denies fatigue, cough/URI symptoms, back pain or other concerns. Tolerating regular diet without nausea/vomiting. No changes in bladder habits, no blood via nephrostomies and appear \"clearest they've been in a while.\" Deneis chest pain or shortness of breath, dyspnea on exertion. Lower extremity swelling overall unchanged since hospitalization - continues with compression stockings since last assessment.      A comprehensive review of systems was performed and otherwise negative.      Objective    BSA: 1.93 meters squared  /75 (BP Location: Left arm, Patient Position: Sitting)   Pulse 83   Temp 36.8 °C (98.2 °F) (Temporal)   Resp 18   Wt 80.4 kg (177 lb 4 oz)   SpO2 99%   BMI 28.61 kg/m²     Wt Readings from Last 3 Encounters:   09/26/24 80.4 kg (177 lb 4 oz)   09/26/24 80.4 kg (177 lb 4 oz)   09/12/24 84.5 kg (186 lb 6.4 oz)      Physical Exam  Vitals and nursing note reviewed. Exam conducted with a chaperone present.   Constitutional:       General: She is not in acute distress.     Appearance: Normal appearance.   HENT:      Head: Normocephalic and atraumatic.      Mouth/Throat:      Mouth: Mucous membranes are moist.      Pharynx: No oropharyngeal exudate or posterior oropharyngeal erythema.   Eyes:      Extraocular Movements: Extraocular movements intact.      Conjunctiva/sclera: Conjunctivae normal.      Pupils: Pupils are equal, round, and reactive to light.   Neck:      " Thyroid: No thyroid mass or thyromegaly.   Cardiovascular:      Rate and Rhythm: Normal rate and regular rhythm.      Pulses: Normal pulses.      Heart sounds: Normal heart sounds.   Pulmonary:      Effort: Pulmonary effort is normal.      Breath sounds: Normal breath sounds. No wheezing, rhonchi or rales.   Abdominal:      General: Bowel sounds are normal. There is no distension.      Palpations: Abdomen is soft. There is no mass.      Tenderness: There is no abdominal tenderness.      Hernia: No hernia is present.   Genitourinary:     Comments: Bilateral nephrostomies in place - clear urine in tubing  Musculoskeletal:         General: No tenderness. Normal range of motion.      Cervical back: Normal range of motion and neck supple. No tenderness.      Right lower leg: Edema present.      Left lower leg: Edema present.      Comments: Chronic BLE edema; compression dressings in place   Skin:     General: Skin is warm.      Findings: Rash present. No lesion.      Comments: Interval resolution of rash on face, neck, anterior chest, bilateral upper extremities, abdomen and flanks including bilateral nephrostomy sites - no purulent drainage/debris.   Neurological:      General: No focal deficit present.      Mental Status: She is alert and oriented to person, place, and time.   Psychiatric:         Mood and Affect: Mood normal.         Behavior: Behavior normal.       Results for orders placed or performed in visit on 09/26/24 (from the past 96 hour(s))   CBC and Auto Differential   Result Value Ref Range    WBC 11.1 4.4 - 11.3 x10*3/uL    nRBC 0.0 0.0 - 0.0 /100 WBCs    RBC 2.33 (L) 4.00 - 5.20 x10*6/uL    Hemoglobin 7.3 (L) 12.0 - 16.0 g/dL    Hematocrit 23.4 (L) 36.0 - 46.0 %     80 - 100 fL    MCH 31.3 26.0 - 34.0 pg    MCHC 31.2 (L) 32.0 - 36.0 g/dL    RDW 19.0 (H) 11.5 - 14.5 %    Platelets 277 150 - 450 x10*3/uL    Neutrophils % 80.1 40.0 - 80.0 %    Immature Granulocytes %, Automated 0.5 0.0 - 0.9 %     Lymphocytes % 7.2 13.0 - 44.0 %    Monocytes % 8.5 2.0 - 10.0 %    Eosinophils % 3.0 0.0 - 6.0 %    Basophils % 0.7 0.0 - 2.0 %    Neutrophils Absolute 8.87 (H) 1.20 - 7.70 x10*3/uL    Immature Granulocytes Absolute, Automated 0.06 0.00 - 0.70 x10*3/uL    Lymphocytes Absolute 0.80 (L) 1.20 - 4.80 x10*3/uL    Monocytes Absolute 0.94 0.10 - 1.00 x10*3/uL    Eosinophils Absolute 0.33 0.00 - 0.70 x10*3/uL    Basophils Absolute 0.08 0.00 - 0.10 x10*3/uL   Comprehensive Metabolic Panel   Result Value Ref Range    Glucose 98 74 - 99 mg/dL    Sodium 136 136 - 145 mmol/L    Potassium 4.4 3.5 - 5.3 mmol/L    Chloride 104 98 - 107 mmol/L    Bicarbonate 22 21 - 32 mmol/L    Anion Gap 14 10 - 20 mmol/L    Urea Nitrogen 26 (H) 6 - 23 mg/dL    Creatinine 2.14 (H) 0.50 - 1.05 mg/dL    eGFR 25 (L) >60 mL/min/1.73m*2    Calcium 8.2 (L) 8.6 - 10.3 mg/dL    Albumin 3.1 (L) 3.4 - 5.0 g/dL    Alkaline Phosphatase 54 33 - 136 U/L    Total Protein 6.8 6.4 - 8.2 g/dL    AST 15 9 - 39 U/L    Bilirubin, Total 0.5 0.0 - 1.2 mg/dL    ALT 5 (L) 7 - 45 U/L     Performance Status:  Symptomatic; fully ambulatory    Assessment/Plan   62yo with stage RONIT moderately differentiated SCC of cervix with stable disease on Docetaxel; obstructive uropathy with bilateral PCNs in place. Grade 2 mucositis improved and grade 2 CIPN; fluid retention. ECOG 1.   Oncology History Overview Note   Stage RONIT grade 2 squamous cell carcinoma of cervix  1/20/23: TURBT - poorly differentiated squamaous cell carcinoma; CPS10.  review with history of cervical mass consistent with GYN primary  - Extension to rectal mucosa, pubic ramus with ?reactive mediastinal LN   - Bilateral nephrostomy placement   2/16/23 - 6/1/23: Carbo AUC5/Taxol 175 +Avastin 15mg/kg, Pembrolizumab 200mg/m2  - C2-C4 +Avastin - further treatments held due to progressive gr2 proteinuria   - Grade 3 anemia declining transfusion,   7/20/23 - 12/18/23: Pembrolizumab 200mg/m2, progressive disease  1/11/24 -  3/14/24: Tivdak x3, progressive disease   - C1: 0.9mg/kg in setting of CrCl < 30  3/2024-7/2024: Taxotere, progressive disease    Molecular Testing  PLD1: CPS 10  1/2024 Tempus xT - PIK3CA p.[id number] missense (gain): consider alpelisib  Loss of function: BCORL1, KMT2C, NSD1, KMT2D, RAD21  TMB 7.9mB - RACHELE  - Her2 Neg     Cervical cancer, FIGO stage RONIT (Multi)   3/21/2022 Cancer Staged    Staging form: Cervix Uteri, AJCC Version 9, Clinical stage from 3/21/2022: FIGO Stage RONIT (cT4, cN2, cM0) - Signed by Adrianna Jefferson MD on 10/12/2023, Prognostic indicators: CPS 10     7/20/2023 - 11/30/2023 Chemotherapy    Pembrolizumab, 21 Day Cycles     1/12/2024 - 2/22/2024 Chemotherapy    Tisotumab vedotin, 21 Day Cycles     3/21/2024 - 7/5/2024 Chemotherapy    DOCEtaxel, 21 Day Cycles     8/1/2024 -  Chemotherapy    Topotecan (Weekly), 28 Day Cycles      3/13/2025 - 3/13/2025 Chemotherapy    PACLitaxel / CARBOplatin, 21 Day Cycles - Gyn     3/13/2025 - 3/13/2025 Chemotherapy    PACLitaxel / CARBOplatin, 21 Day Cycles - Gyn        Diagnoses and all orders for this visit:  Encounter for antineoplastic chemotherapy and immunotherapy  Cervical cancer, FIGO stage RONIT (CMS/HCC)  - Dose reduction to 2mg/m2 D1/D15 in setting of neutropenic sepsis with gCSF support; fever precautions reviewed with patient and her  in setting of prior admission   - Grade 3 anemia for outpatient transfusion; OK to treat today. Asymptomatic re: anemia   - Follow up CT q3 cycles   Urinary tract infection associated with nephrostomy catheter, subsequent encounter  Obstructive uropathy  - S/p treatment for Pseudomonas UTI; continue to monitor for signs/symptoms of progressive infection due to increased risk of complex UTI  - Follow up PCN exchange    Adrianna Jefferson MD

## 2024-09-26 NOTE — SIGNIFICANT EVENT
09/26/24 1112   Prechemo Checklist   Has the patient been in the hospital, ED, or urgent care since last date of service No   Chemo/Immuno Consent Completed and Signed Yes   Protocol/Indications Verified Yes   Confirmed to previous date/time of medication Yes   Compared to previous dose Yes   All medications are dated accurately Yes   Pregnancy Test Negative Not applicable   Parameters Met Yes   Provider Notified Yes   Provider Name Li   Is Patient Proceeding With Treatment? Yes  (communication order in)   BSA/Weight-Height Verified Yes   Dose Calculations Verified (current, total, cumulative) Yes

## 2024-09-26 NOTE — PROGRESS NOTES
"Patient came in today stated that she had diarrhea, SOB, moderate fatigue, and low grade fever () on Friday-Sunday (9/20-9/22). Patient stated, \"I feel better today\". Dr. Jefferson reviewed lab results from today and contacted and saw patient in infusion center. Orders in okay to treat today and blood transfusion tomorrow at [place]. Type and screen drawn. Patient and  agreeable with updated plan of care.   "

## 2024-09-26 NOTE — PROGRESS NOTES
Patient recieved Topotecan Infusion with no s/s of reaction or complication. Applied neulasta on-pro and educated patient on potential side effects and how/when to remove device (1615) tomorrow. Patient agreeable and understood. Gave and reviewed schedule with patient and . Patient will come in for blood transfusion tomorrow @0930 per OBINNA Fernandes. Patient left infusion with  in stable condition.

## 2024-09-27 ENCOUNTER — INFUSION (OUTPATIENT)
Dept: HEMATOLOGY/ONCOLOGY | Facility: CLINIC | Age: 64
End: 2024-09-27
Payer: COMMERCIAL

## 2024-09-27 VITALS
BODY MASS INDEX: 28.75 KG/M2 | WEIGHT: 178.13 LBS | OXYGEN SATURATION: 96 % | SYSTOLIC BLOOD PRESSURE: 102 MMHG | HEART RATE: 61 BPM | DIASTOLIC BLOOD PRESSURE: 65 MMHG | RESPIRATION RATE: 16 BRPM | TEMPERATURE: 98.4 F

## 2024-09-27 DIAGNOSIS — C53.9 CERVICAL CANCER, FIGO STAGE IVA: ICD-10-CM

## 2024-09-27 LAB
BLOOD EXPIRATION DATE: NORMAL
DISPENSE STATUS: NORMAL
PRODUCT BLOOD TYPE: 5100
PRODUCT CODE: NORMAL
UNIT ABO: NORMAL
UNIT NUMBER: NORMAL
UNIT RH: NORMAL
UNIT VOLUME: 350
XM INTEP: NORMAL

## 2024-09-27 PROCEDURE — 36430 TRANSFUSION BLD/BLD COMPNT: CPT

## 2024-09-27 PROCEDURE — P9040 RBC LEUKOREDUCED IRRADIATED: HCPCS

## 2024-09-27 PROCEDURE — 2500000004 HC RX 250 GENERAL PHARMACY W/ HCPCS (ALT 636 FOR OP/ED): Performed by: STUDENT IN AN ORGANIZED HEALTH CARE EDUCATION/TRAINING PROGRAM

## 2024-09-27 RX ORDER — EPINEPHRINE 0.3 MG/.3ML
0.3 INJECTION SUBCUTANEOUS EVERY 5 MIN PRN
OUTPATIENT
Start: 2024-09-27

## 2024-09-27 RX ORDER — HEPARIN 100 UNIT/ML
500 SYRINGE INTRAVENOUS AS NEEDED
OUTPATIENT
Start: 2024-09-27

## 2024-09-27 RX ORDER — FAMOTIDINE 10 MG/ML
20 INJECTION INTRAVENOUS ONCE AS NEEDED
OUTPATIENT
Start: 2024-09-27

## 2024-09-27 RX ORDER — ALBUTEROL SULFATE 0.83 MG/ML
3 SOLUTION RESPIRATORY (INHALATION) AS NEEDED
Status: DISCONTINUED | OUTPATIENT
Start: 2024-09-27 | End: 2024-09-27 | Stop reason: HOSPADM

## 2024-09-27 RX ORDER — HEPARIN SODIUM,PORCINE/PF 10 UNIT/ML
50 SYRINGE (ML) INTRAVENOUS AS NEEDED
OUTPATIENT
Start: 2024-09-27

## 2024-09-27 RX ORDER — ALBUTEROL SULFATE 0.83 MG/ML
3 SOLUTION RESPIRATORY (INHALATION) AS NEEDED
OUTPATIENT
Start: 2024-09-27

## 2024-09-27 RX ORDER — FAMOTIDINE 10 MG/ML
20 INJECTION INTRAVENOUS ONCE AS NEEDED
Status: DISCONTINUED | OUTPATIENT
Start: 2024-09-27 | End: 2024-09-27 | Stop reason: HOSPADM

## 2024-09-27 RX ORDER — EPINEPHRINE 0.3 MG/.3ML
0.3 INJECTION SUBCUTANEOUS EVERY 5 MIN PRN
Status: DISCONTINUED | OUTPATIENT
Start: 2024-09-27 | End: 2024-09-27 | Stop reason: HOSPADM

## 2024-09-27 RX ORDER — DIPHENHYDRAMINE HYDROCHLORIDE 50 MG/ML
50 INJECTION INTRAMUSCULAR; INTRAVENOUS AS NEEDED
Status: DISCONTINUED | OUTPATIENT
Start: 2024-09-27 | End: 2024-09-27 | Stop reason: HOSPADM

## 2024-09-27 RX ORDER — DIPHENHYDRAMINE HYDROCHLORIDE 50 MG/ML
50 INJECTION INTRAMUSCULAR; INTRAVENOUS AS NEEDED
OUTPATIENT
Start: 2024-09-27

## 2024-09-27 RX ORDER — HEPARIN 100 UNIT/ML
500 SYRINGE INTRAVENOUS AS NEEDED
Status: DISCONTINUED | OUTPATIENT
Start: 2024-09-27 | End: 2024-09-27 | Stop reason: HOSPADM

## 2024-09-27 ASSESSMENT — PAIN SCALES - GENERAL: PAINLEVEL: 5

## 2024-09-27 NOTE — PROGRESS NOTES
pt tolerated today's PRBCs transfusion without difficulty. VS stable post and pt without any complaints. pt aware to call with any questions and/or concerns.

## 2024-10-05 ENCOUNTER — TELEPHONE (OUTPATIENT)
Dept: GYNECOLOGIC ONCOLOGY | Facility: HOSPITAL | Age: 64
End: 2024-10-05
Payer: COMMERCIAL

## 2024-10-05 DIAGNOSIS — N39.0 URINARY TRACT INFECTION ASSOCIATED WITH NEPHROSTOMY CATHETER, INITIAL ENCOUNTER (CMS-HCC): Primary | ICD-10-CM

## 2024-10-05 DIAGNOSIS — T83.512A URINARY TRACT INFECTION ASSOCIATED WITH NEPHROSTOMY CATHETER, INITIAL ENCOUNTER (CMS-HCC): Primary | ICD-10-CM

## 2024-10-05 RX ORDER — AMOXICILLIN AND CLAVULANATE POTASSIUM 500; 125 MG/1; MG/1
1 TABLET, FILM COATED ORAL DAILY
Qty: 10 TABLET | Refills: 0 | Status: SHIPPED | OUTPATIENT
Start: 2024-10-05 | End: 2024-10-15

## 2024-10-05 NOTE — TELEPHONE ENCOUNTER
Spoke with patient and her partner Jack on the phone after they paged the on call service. Patient reports cloudy urine output from PCN. Denies fevers/chills, nausea/vomiting, lightheadedness, diarrhea. Has not taken any antipyretics today. Overall feels well. Discussed that outpatient labs are not currently open for urinary sample, but given current PCNs with concern for UTI, would recommend starting empiric antibiotics. Patient in agreement, will send Rx to pharmacy. Patient reports she may be due for PCN exchange as well, plans to reach out to radiology coordinators on Monday re: scheduling. Precautions given, patient expressed understanding.

## 2024-10-07 ENCOUNTER — TELEMEDICINE (OUTPATIENT)
Dept: GYNECOLOGIC ONCOLOGY | Facility: HOSPITAL | Age: 64
End: 2024-10-07
Payer: COMMERCIAL

## 2024-10-07 DIAGNOSIS — C53.9 CERVICAL CANCER, FIGO STAGE IVA: Primary | ICD-10-CM

## 2024-10-07 DIAGNOSIS — Z76.89 ENCOUNTER FOR PROPHYLAXIS FOR NEUTROPENIA DUE TO CHEMOTHERAPY: ICD-10-CM

## 2024-10-07 DIAGNOSIS — Z51.11 ENCOUNTER FOR ANTINEOPLASTIC CHEMOTHERAPY AND IMMUNOTHERAPY: ICD-10-CM

## 2024-10-07 DIAGNOSIS — Z51.12 ENCOUNTER FOR ANTINEOPLASTIC CHEMOTHERAPY AND IMMUNOTHERAPY: ICD-10-CM

## 2024-10-07 DIAGNOSIS — G89.3 CANCER RELATED PAIN: ICD-10-CM

## 2024-10-07 DIAGNOSIS — N13.1 ACQUIRED HYDRONEPHROSIS DUE TO OBSTRUCTION OF URETER: ICD-10-CM

## 2024-10-07 PROCEDURE — 99441 PR PHYS/QHP TELEPHONE EVALUATION 5-10 MIN: CPT | Performed by: STUDENT IN AN ORGANIZED HEALTH CARE EDUCATION/TRAINING PROGRAM

## 2024-10-07 PROCEDURE — 1036F TOBACCO NON-USER: CPT | Performed by: STUDENT IN AN ORGANIZED HEALTH CARE EDUCATION/TRAINING PROGRAM

## 2024-10-07 RX ORDER — OXYCODONE HYDROCHLORIDE 5 MG/1
5 TABLET ORAL 3 TIMES DAILY PRN
Qty: 90 TABLET | Refills: 0 | Status: SHIPPED | OUTPATIENT
Start: 2024-10-07 | End: 2024-11-06

## 2024-10-07 NOTE — PROGRESS NOTES
Consent:  Verbal consent was requested and obtained from patient on this date for a telehealth visit.    Patient ID: Shelbi Delaney is a 64 y.o. female.  Referring Physician: No referring provider defined for this encounter.  Primary Care Provider: Adrianna Jefferson MD    Subjective    Patient presenting for pretreatment evaluation for continuation of single-agent Taxotere. Called over the weekend reporting cloudy output from nephrostomy with mild back pain for which she was started empirically on Amoxicillin. Cloudy urine and back pain now resolved. Otherwise reports mild nausea - no vomiting. Denies chills, chest pain or shortness of breath. Denies dizziness, lightheadedness. No changes in stools. Persistent light vaginal bleeding stable. No other changes.     A comprehensive review of systems was performed and otherwise negative.    PCN exchange due - exchanged @ [facility]   R PCN changed during hospitalization  L PCN exchanged approx 2 months ago       Objective    BSA: There is no height or weight on file to calculate BSA.  There were no vitals taken for this visit.     Physical Exam  General:   alert and oriented, in no acute distress   Cardiovascular: No apparent distress    Lungs: Normal respirations, No increased work of breathing   Neurologic:  Grossly intact, no deficits     No results found for this or any previous visit (from the past 96 hour(s)).    Performance Status:  Symptomatic; fully ambulatory    Assessment/Plan    62yo with stage RONIT moderately differentiated SCC of cervix with most recent imaging demonstrating stable disease on single-agent Taxotere; obstructive uropathy with bilateral PCNs in place. Grade 2 mucositis improved and grade 2 CIPN; fluid retention improved. ECOG 1.   Oncology History Overview Note   Stage RONIT grade 2 squamous cell carcinoma of cervix  1/20/23: TURBT - poorly differentiated squamaous cell carcinoma; CPS10.  review with history of cervical mass consistent with GYN  primary  - Extension to rectal mucosa, pubic ramus with ?reactive mediastinal LN   - Bilateral nephrostomy placement   2/16/23 - 6/1/23: Carbo AUC5/Taxol 175 +Avastin 15mg/kg, Pembrolizumab 200mg/m2  - C2-C4 +Avastin - further treatments held due to progressive gr2 proteinuria   - Grade 3 anemia declining transfusion,   7/20/23 - 12/18/23: Pembrolizumab 200mg/m2, progressive disease  1/11/24 - 3/14/24: Tivdak x3, progressive disease   - C1: 0.9mg/kg in setting of CrCl < 30  3/2024-7/2024: Taxotere, progressive disease    Molecular Testing  PLD1: CPS 10  1/2024 Tempus xT - PIK3CA p.E726K missense (gain): consider alpelisib  Loss of function: BCORL1, KMT2C, NSD1, KMT2D, RAD21  TMB 7.9mB - RACHELE  - Her2 Neg     Cervical cancer, FIGO stage RONIT   3/21/2022 Cancer Staged    Staging form: Cervix Uteri, AJCC Version 9, Clinical stage from 3/21/2022: FIGO Stage RONIT (cT4, cN2, cM0) - Signed by Adrianna Jefferson MD on 10/12/2023, Prognostic indicators: CPS 10     7/20/2023 - 11/30/2023 Chemotherapy    Pembrolizumab, 21 Day Cycles     1/12/2024 - 2/22/2024 Chemotherapy    Tisotumab vedotin, 21 Day Cycles     3/21/2024 - 7/5/2024 Chemotherapy    DOCEtaxel, 21 Day Cycles     8/1/2024 -  Chemotherapy    Topotecan (Weekly), 28 Day Cycles      3/13/2025 - 3/13/2025 Chemotherapy    PACLitaxel / CARBOplatin, 21 Day Cycles - Gyn     3/13/2025 - 3/13/2025 Chemotherapy    PACLitaxel / CARBOplatin, 21 Day Cycles - Gyn       Cancer Staging   Cervical cancer, FIGO stage RONIT  Staging form: Cervix Uteri, AJCC Version 9  - Clinical stage from 3/21/2022: FIGO Stage RONIT (cT4, cN2, cM0) - Signed by Adrianna Jefferson MD on 10/12/2023       Diagnoses and all orders for this visit:  Cervical cancer, FIGO stage RONIT  Encounter for antineoplastic chemotherapy and immunotherapy  History of febrile neutropenia  - Currently on Topotecan 2mg/m2 D1/D15 in setting of history of neutropenic sepsis on current regimen; gCSF support ordered with strict precautions  reviewed.   - History of grade 3 anemia, follow up pretreatment labs with transfusion as clinically indicated  - Plan for CT after this cycle  Acquired hydronephrosis due to obstruction of ureter  - As above; follow up urine culture. Most recently with Pseudomonas UTI currently on PO Amoxicillin  - Due for PCN exchange; to be arranged at [facility]     Treatment Plans       Name Type Plan Dates Plan Provider         Active    Topotecan (Weekly), 28 Day Cycles  Oncology Treatment  8/1/2024 - Present Ashanti Fisher MD                     Approx 8 min spent in discussion with patient

## 2024-10-07 NOTE — TELEPHONE ENCOUNTER
Patient requesting Oxycodone refill. OARRS reviewed, no issues, CSA/UDS up to date. Last fill 9/5/24. Has 3 month FUV scheduled 11/12/24.

## 2024-10-08 ENCOUNTER — LAB (OUTPATIENT)
Dept: LAB | Facility: LAB | Age: 64
End: 2024-10-08
Payer: COMMERCIAL

## 2024-10-08 DIAGNOSIS — C53.9 CERVICAL CANCER, FIGO STAGE IVA: ICD-10-CM

## 2024-10-08 LAB
ALBUMIN SERPL BCP-MCNC: 3.4 G/DL (ref 3.4–5)
ALP SERPL-CCNC: 71 U/L (ref 33–136)
ALT SERPL W P-5'-P-CCNC: 4 U/L (ref 7–45)
ANION GAP SERPL CALC-SCNC: 14 MMOL/L (ref 10–20)
AST SERPL W P-5'-P-CCNC: 14 U/L (ref 9–39)
BASOPHILS # BLD AUTO: 0.07 X10*3/UL (ref 0–0.1)
BASOPHILS NFR BLD AUTO: 0.6 %
BILIRUB SERPL-MCNC: 0.3 MG/DL (ref 0–1.2)
BUN SERPL-MCNC: 24 MG/DL (ref 6–23)
CALCIUM SERPL-MCNC: 8.9 MG/DL (ref 8.6–10.6)
CHLORIDE SERPL-SCNC: 105 MMOL/L (ref 98–107)
CO2 SERPL-SCNC: 26 MMOL/L (ref 21–32)
CREAT SERPL-MCNC: 2.42 MG/DL (ref 0.5–1.05)
EGFRCR SERPLBLD CKD-EPI 2021: 22 ML/MIN/1.73M*2
EOSINOPHIL # BLD AUTO: 0.32 X10*3/UL (ref 0–0.7)
EOSINOPHIL NFR BLD AUTO: 2.7 %
ERYTHROCYTE [DISTWIDTH] IN BLOOD BY AUTOMATED COUNT: 18.6 % (ref 11.5–14.5)
GLUCOSE SERPL-MCNC: 81 MG/DL (ref 74–99)
HCT VFR BLD AUTO: 27.4 % (ref 36–46)
HGB BLD-MCNC: 8.2 G/DL (ref 12–16)
IMM GRANULOCYTES # BLD AUTO: 0.26 X10*3/UL (ref 0–0.7)
IMM GRANULOCYTES NFR BLD AUTO: 2.2 % (ref 0–0.9)
LYMPHOCYTES # BLD AUTO: 1.08 X10*3/UL (ref 1.2–4.8)
LYMPHOCYTES NFR BLD AUTO: 9.1 %
MCH RBC QN AUTO: 30.3 PG (ref 26–34)
MCHC RBC AUTO-ENTMCNC: 29.9 G/DL (ref 32–36)
MCV RBC AUTO: 101 FL (ref 80–100)
MONOCYTES # BLD AUTO: 0.55 X10*3/UL (ref 0.1–1)
MONOCYTES NFR BLD AUTO: 4.6 %
NEUTROPHILS # BLD AUTO: 9.61 X10*3/UL (ref 1.2–7.7)
NEUTROPHILS NFR BLD AUTO: 80.8 %
NRBC BLD-RTO: 0 /100 WBCS (ref 0–0)
PLATELET # BLD AUTO: 199 X10*3/UL (ref 150–450)
POTASSIUM SERPL-SCNC: 5 MMOL/L (ref 3.5–5.3)
PROT SERPL-MCNC: 6.9 G/DL (ref 6.4–8.2)
RBC # BLD AUTO: 2.71 X10*6/UL (ref 4–5.2)
SODIUM SERPL-SCNC: 140 MMOL/L (ref 136–145)
WBC # BLD AUTO: 11.9 X10*3/UL (ref 4.4–11.3)

## 2024-10-08 PROCEDURE — 80053 COMPREHEN METABOLIC PANEL: CPT

## 2024-10-08 PROCEDURE — 85025 COMPLETE CBC W/AUTO DIFF WBC: CPT

## 2024-10-09 DIAGNOSIS — C53.9 CERVICAL CANCER, FIGO STAGE IVA: ICD-10-CM

## 2024-10-09 NOTE — TELEPHONE ENCOUNTER
It appears that patient had a refill request for his 6 mg Coumadin tablets sent to Dr. Hollins, which had yet to be processed.  As noted below, previous 2 mg prescription had been sent on 9/13/2024.  In reviewing patient's chart, he does follow with the Coumadin clinic who have traditionally managed his Coumadin regimen with last level stable and no indicated medication changes.  Regimen appears to be 6 mg 5 days a week with 2 mg twice a week.  I am not seeing any additional adjustments/changes to his regimen to which the patient seems to be indicating.  Would recommend he discuss any additional concerns with Coumadin clinic at his upcoming INR check   error

## 2024-10-10 ENCOUNTER — PREP FOR PROCEDURE (OUTPATIENT)
Dept: RADIOLOGY | Facility: HOSPITAL | Age: 64
End: 2024-10-10

## 2024-10-10 ENCOUNTER — INFUSION (OUTPATIENT)
Dept: HEMATOLOGY/ONCOLOGY | Facility: CLINIC | Age: 64
End: 2024-10-10
Payer: COMMERCIAL

## 2024-10-10 VITALS
HEIGHT: 66 IN | OXYGEN SATURATION: 96 % | WEIGHT: 171.96 LBS | BODY MASS INDEX: 27.64 KG/M2 | HEART RATE: 77 BPM | TEMPERATURE: 96.8 F | DIASTOLIC BLOOD PRESSURE: 74 MMHG | SYSTOLIC BLOOD PRESSURE: 108 MMHG | RESPIRATION RATE: 16 BRPM

## 2024-10-10 DIAGNOSIS — C53.9 CERVICAL CANCER, FIGO STAGE IVB: Primary | ICD-10-CM

## 2024-10-10 DIAGNOSIS — N13.9 URINARY TRACT OBSTRUCTION: Primary | ICD-10-CM

## 2024-10-10 DIAGNOSIS — C53.9 CERVICAL CANCER, FIGO STAGE IVA: ICD-10-CM

## 2024-10-10 DIAGNOSIS — C53.9 CERVICAL CANCER, FIGO STAGE IVA: Primary | ICD-10-CM

## 2024-10-10 PROCEDURE — 2500000004 HC RX 250 GENERAL PHARMACY W/ HCPCS (ALT 636 FOR OP/ED): Performed by: STUDENT IN AN ORGANIZED HEALTH CARE EDUCATION/TRAINING PROGRAM

## 2024-10-10 PROCEDURE — 96375 TX/PRO/DX INJ NEW DRUG ADDON: CPT | Mod: INF

## 2024-10-10 PROCEDURE — 96413 CHEMO IV INFUSION 1 HR: CPT

## 2024-10-10 RX ORDER — HEPARIN 100 UNIT/ML
500 SYRINGE INTRAVENOUS AS NEEDED
Status: DISCONTINUED | OUTPATIENT
Start: 2024-10-10 | End: 2024-10-10 | Stop reason: HOSPADM

## 2024-10-10 RX ORDER — DIPHENHYDRAMINE HYDROCHLORIDE 50 MG/ML
50 INJECTION INTRAMUSCULAR; INTRAVENOUS AS NEEDED
OUTPATIENT
Start: 2024-10-24

## 2024-10-10 RX ORDER — PROCHLORPERAZINE MALEATE 5 MG
10 TABLET ORAL EVERY 6 HOURS PRN
OUTPATIENT
Start: 2024-10-24

## 2024-10-10 RX ORDER — ONDANSETRON HYDROCHLORIDE 2 MG/ML
8 INJECTION, SOLUTION INTRAVENOUS ONCE
Status: COMPLETED | OUTPATIENT
Start: 2024-10-10 | End: 2024-10-10

## 2024-10-10 RX ORDER — PROCHLORPERAZINE EDISYLATE 5 MG/ML
10 INJECTION INTRAMUSCULAR; INTRAVENOUS EVERY 6 HOURS PRN
OUTPATIENT
Start: 2024-10-24

## 2024-10-10 RX ORDER — EPINEPHRINE 0.3 MG/.3ML
0.3 INJECTION SUBCUTANEOUS EVERY 5 MIN PRN
Status: CANCELLED | OUTPATIENT
Start: 2024-10-10

## 2024-10-10 RX ORDER — HEPARIN SODIUM,PORCINE/PF 10 UNIT/ML
50 SYRINGE (ML) INTRAVENOUS AS NEEDED
OUTPATIENT
Start: 2024-10-10

## 2024-10-10 RX ORDER — EPINEPHRINE 0.3 MG/.3ML
0.3 INJECTION SUBCUTANEOUS EVERY 5 MIN PRN
OUTPATIENT
Start: 2024-10-24

## 2024-10-10 RX ORDER — PROCHLORPERAZINE MALEATE 5 MG
10 TABLET ORAL EVERY 6 HOURS PRN
Status: CANCELLED | OUTPATIENT
Start: 2024-10-10

## 2024-10-10 RX ORDER — DIPHENHYDRAMINE HYDROCHLORIDE 50 MG/ML
50 INJECTION INTRAMUSCULAR; INTRAVENOUS AS NEEDED
Status: DISCONTINUED | OUTPATIENT
Start: 2024-10-10 | End: 2024-10-10 | Stop reason: HOSPADM

## 2024-10-10 RX ORDER — ONDANSETRON HYDROCHLORIDE 2 MG/ML
8 INJECTION, SOLUTION INTRAVENOUS ONCE
OUTPATIENT
Start: 2024-10-24

## 2024-10-10 RX ORDER — ONDANSETRON HYDROCHLORIDE 2 MG/ML
8 INJECTION, SOLUTION INTRAVENOUS ONCE
Status: CANCELLED | OUTPATIENT
Start: 2024-10-10

## 2024-10-10 RX ORDER — HEPARIN SODIUM,PORCINE/PF 10 UNIT/ML
50 SYRINGE (ML) INTRAVENOUS AS NEEDED
Status: DISCONTINUED | OUTPATIENT
Start: 2024-10-10 | End: 2024-10-10 | Stop reason: HOSPADM

## 2024-10-10 RX ORDER — ALBUTEROL SULFATE 0.83 MG/ML
3 SOLUTION RESPIRATORY (INHALATION) AS NEEDED
Status: DISCONTINUED | OUTPATIENT
Start: 2024-10-10 | End: 2024-10-10 | Stop reason: HOSPADM

## 2024-10-10 RX ORDER — FAMOTIDINE 10 MG/ML
20 INJECTION INTRAVENOUS ONCE AS NEEDED
Status: DISCONTINUED | OUTPATIENT
Start: 2024-10-10 | End: 2024-10-10 | Stop reason: HOSPADM

## 2024-10-10 RX ORDER — PROCHLORPERAZINE MALEATE 10 MG
10 TABLET ORAL EVERY 6 HOURS PRN
Status: DISCONTINUED | OUTPATIENT
Start: 2024-10-10 | End: 2024-10-10 | Stop reason: HOSPADM

## 2024-10-10 RX ORDER — HEPARIN 100 UNIT/ML
500 SYRINGE INTRAVENOUS AS NEEDED
OUTPATIENT
Start: 2024-10-10

## 2024-10-10 RX ORDER — PROCHLORPERAZINE EDISYLATE 5 MG/ML
10 INJECTION INTRAMUSCULAR; INTRAVENOUS EVERY 6 HOURS PRN
Status: CANCELLED | OUTPATIENT
Start: 2024-10-10

## 2024-10-10 RX ORDER — FAMOTIDINE 10 MG/ML
20 INJECTION INTRAVENOUS ONCE AS NEEDED
Status: CANCELLED | OUTPATIENT
Start: 2024-10-10

## 2024-10-10 RX ORDER — DIPHENHYDRAMINE HYDROCHLORIDE 50 MG/ML
50 INJECTION INTRAMUSCULAR; INTRAVENOUS AS NEEDED
Status: CANCELLED | OUTPATIENT
Start: 2024-10-10

## 2024-10-10 RX ORDER — FAMOTIDINE 10 MG/ML
20 INJECTION INTRAVENOUS ONCE AS NEEDED
OUTPATIENT
Start: 2024-10-24

## 2024-10-10 RX ORDER — ALBUTEROL SULFATE 0.83 MG/ML
3 SOLUTION RESPIRATORY (INHALATION) AS NEEDED
OUTPATIENT
Start: 2024-10-24

## 2024-10-10 RX ORDER — PROCHLORPERAZINE EDISYLATE 5 MG/ML
10 INJECTION INTRAMUSCULAR; INTRAVENOUS EVERY 6 HOURS PRN
Status: DISCONTINUED | OUTPATIENT
Start: 2024-10-10 | End: 2024-10-10 | Stop reason: HOSPADM

## 2024-10-10 RX ORDER — ALBUTEROL SULFATE 0.83 MG/ML
3 SOLUTION RESPIRATORY (INHALATION) AS NEEDED
Status: CANCELLED | OUTPATIENT
Start: 2024-10-10

## 2024-10-10 RX ORDER — EPINEPHRINE 0.3 MG/.3ML
0.3 INJECTION SUBCUTANEOUS EVERY 5 MIN PRN
Status: DISCONTINUED | OUTPATIENT
Start: 2024-10-10 | End: 2024-10-10 | Stop reason: HOSPADM

## 2024-10-10 ASSESSMENT — PAIN SCALES - GENERAL: PAINLEVEL: 5

## 2024-10-23 ENCOUNTER — APPOINTMENT (OUTPATIENT)
Dept: CARDIOLOGY | Facility: HOSPITAL | Age: 64
End: 2024-10-23
Payer: COMMERCIAL

## 2024-10-24 ENCOUNTER — APPOINTMENT (OUTPATIENT)
Dept: LAB | Facility: LAB | Age: 64
End: 2024-10-24
Payer: COMMERCIAL

## 2024-10-24 ENCOUNTER — LAB (OUTPATIENT)
Dept: LAB | Facility: CLINIC | Age: 64
End: 2024-10-24
Payer: COMMERCIAL

## 2024-10-24 ENCOUNTER — INFUSION (OUTPATIENT)
Dept: HEMATOLOGY/ONCOLOGY | Facility: CLINIC | Age: 64
End: 2024-10-24
Payer: COMMERCIAL

## 2024-10-24 VITALS
OXYGEN SATURATION: 96 % | RESPIRATION RATE: 18 BRPM | HEART RATE: 77 BPM | TEMPERATURE: 97 F | SYSTOLIC BLOOD PRESSURE: 99 MMHG | DIASTOLIC BLOOD PRESSURE: 65 MMHG | BODY MASS INDEX: 27.86 KG/M2 | WEIGHT: 172.95 LBS

## 2024-10-24 DIAGNOSIS — C53.9 CERVICAL CANCER, FIGO STAGE IVA: ICD-10-CM

## 2024-10-24 LAB
ABO GROUP (TYPE) IN BLOOD: NORMAL
ANTIBODY SCREEN: NORMAL
BASOPHILS # BLD AUTO: 0.08 X10*3/UL (ref 0–0.1)
BASOPHILS NFR BLD AUTO: 1.5 %
EOSINOPHIL # BLD AUTO: 0.26 X10*3/UL (ref 0–0.7)
EOSINOPHIL NFR BLD AUTO: 4.7 %
ERYTHROCYTE [DISTWIDTH] IN BLOOD BY AUTOMATED COUNT: 18 % (ref 11.5–14.5)
HCT VFR BLD AUTO: 24.6 % (ref 36–46)
HGB BLD-MCNC: 7.5 G/DL (ref 12–16)
IMM GRANULOCYTES # BLD AUTO: 0.03 X10*3/UL (ref 0–0.7)
IMM GRANULOCYTES NFR BLD AUTO: 0.5 % (ref 0–0.9)
LYMPHOCYTES # BLD AUTO: 0.94 X10*3/UL (ref 1.2–4.8)
LYMPHOCYTES NFR BLD AUTO: 17.1 %
MCH RBC QN AUTO: 30.2 PG (ref 26–34)
MCHC RBC AUTO-ENTMCNC: 30.5 G/DL (ref 32–36)
MCV RBC AUTO: 99 FL (ref 80–100)
MONOCYTES # BLD AUTO: 0.53 X10*3/UL (ref 0.1–1)
MONOCYTES NFR BLD AUTO: 9.6 %
NEUTROPHILS # BLD AUTO: 3.66 X10*3/UL (ref 1.2–7.7)
NEUTROPHILS NFR BLD AUTO: 66.6 %
NRBC BLD-RTO: 0 /100 WBCS (ref 0–0)
PLATELET # BLD AUTO: 352 X10*3/UL (ref 150–450)
RBC # BLD AUTO: 2.48 X10*6/UL (ref 4–5.2)
RH FACTOR (ANTIGEN D): NORMAL
WBC # BLD AUTO: 5.5 X10*3/UL (ref 4.4–11.3)

## 2024-10-24 PROCEDURE — 96375 TX/PRO/DX INJ NEW DRUG ADDON: CPT | Mod: INF

## 2024-10-24 PROCEDURE — 2500000004 HC RX 250 GENERAL PHARMACY W/ HCPCS (ALT 636 FOR OP/ED): Performed by: STUDENT IN AN ORGANIZED HEALTH CARE EDUCATION/TRAINING PROGRAM

## 2024-10-24 PROCEDURE — 96377 APPLICATON ON-BODY INJECTOR: CPT

## 2024-10-24 PROCEDURE — 86923 COMPATIBILITY TEST ELECTRIC: CPT

## 2024-10-24 PROCEDURE — 85025 COMPLETE CBC W/AUTO DIFF WBC: CPT

## 2024-10-24 PROCEDURE — 86901 BLOOD TYPING SEROLOGIC RH(D): CPT

## 2024-10-24 PROCEDURE — 96413 CHEMO IV INFUSION 1 HR: CPT

## 2024-10-24 RX ORDER — FAMOTIDINE 10 MG/ML
20 INJECTION INTRAVENOUS ONCE AS NEEDED
Status: DISCONTINUED | OUTPATIENT
Start: 2024-10-24 | End: 2024-10-24 | Stop reason: HOSPADM

## 2024-10-24 RX ORDER — EPINEPHRINE 0.3 MG/.3ML
0.3 INJECTION SUBCUTANEOUS EVERY 5 MIN PRN
Status: CANCELLED | OUTPATIENT
Start: 2024-10-24

## 2024-10-24 RX ORDER — HEPARIN 100 UNIT/ML
500 SYRINGE INTRAVENOUS AS NEEDED
Status: DISCONTINUED | OUTPATIENT
Start: 2024-10-24 | End: 2024-10-24 | Stop reason: HOSPADM

## 2024-10-24 RX ORDER — FAMOTIDINE 10 MG/ML
20 INJECTION INTRAVENOUS ONCE AS NEEDED
Status: CANCELLED | OUTPATIENT
Start: 2024-10-24

## 2024-10-24 RX ORDER — DIPHENHYDRAMINE HYDROCHLORIDE 50 MG/ML
50 INJECTION INTRAMUSCULAR; INTRAVENOUS AS NEEDED
Status: CANCELLED | OUTPATIENT
Start: 2024-10-24

## 2024-10-24 RX ORDER — HEPARIN 100 UNIT/ML
500 SYRINGE INTRAVENOUS AS NEEDED
Status: CANCELLED | OUTPATIENT
Start: 2024-10-24

## 2024-10-24 RX ORDER — ALBUTEROL SULFATE 0.83 MG/ML
3 SOLUTION RESPIRATORY (INHALATION) AS NEEDED
Status: DISCONTINUED | OUTPATIENT
Start: 2024-10-24 | End: 2024-10-24 | Stop reason: HOSPADM

## 2024-10-24 RX ORDER — EPINEPHRINE 0.3 MG/.3ML
0.3 INJECTION SUBCUTANEOUS EVERY 5 MIN PRN
Status: DISCONTINUED | OUTPATIENT
Start: 2024-10-24 | End: 2024-10-24 | Stop reason: HOSPADM

## 2024-10-24 RX ORDER — HEPARIN SODIUM,PORCINE/PF 10 UNIT/ML
50 SYRINGE (ML) INTRAVENOUS AS NEEDED
Status: CANCELLED | OUTPATIENT
Start: 2024-10-24

## 2024-10-24 RX ORDER — PROCHLORPERAZINE MALEATE 10 MG
10 TABLET ORAL EVERY 6 HOURS PRN
Status: DISCONTINUED | OUTPATIENT
Start: 2024-10-24 | End: 2024-10-24 | Stop reason: HOSPADM

## 2024-10-24 RX ORDER — ONDANSETRON HYDROCHLORIDE 2 MG/ML
8 INJECTION, SOLUTION INTRAVENOUS ONCE
Status: COMPLETED | OUTPATIENT
Start: 2024-10-24 | End: 2024-10-24

## 2024-10-24 RX ORDER — DIPHENHYDRAMINE HYDROCHLORIDE 50 MG/ML
50 INJECTION INTRAMUSCULAR; INTRAVENOUS AS NEEDED
Status: DISCONTINUED | OUTPATIENT
Start: 2024-10-24 | End: 2024-10-24 | Stop reason: HOSPADM

## 2024-10-24 RX ORDER — ALBUTEROL SULFATE 0.83 MG/ML
3 SOLUTION RESPIRATORY (INHALATION) AS NEEDED
Status: CANCELLED | OUTPATIENT
Start: 2024-10-24

## 2024-10-24 RX ORDER — HEPARIN SODIUM,PORCINE/PF 10 UNIT/ML
50 SYRINGE (ML) INTRAVENOUS AS NEEDED
Status: DISCONTINUED | OUTPATIENT
Start: 2024-10-24 | End: 2024-10-24 | Stop reason: HOSPADM

## 2024-10-24 RX ORDER — PROCHLORPERAZINE EDISYLATE 5 MG/ML
10 INJECTION INTRAMUSCULAR; INTRAVENOUS EVERY 6 HOURS PRN
Status: DISCONTINUED | OUTPATIENT
Start: 2024-10-24 | End: 2024-10-24 | Stop reason: HOSPADM

## 2024-10-24 ASSESSMENT — PAIN SCALES - GENERAL: PAINLEVEL_OUTOF10: 3

## 2024-10-25 ENCOUNTER — INFUSION (OUTPATIENT)
Dept: HEMATOLOGY/ONCOLOGY | Facility: CLINIC | Age: 64
End: 2024-10-25
Payer: COMMERCIAL

## 2024-10-25 VITALS
RESPIRATION RATE: 16 BRPM | BODY MASS INDEX: 27.77 KG/M2 | SYSTOLIC BLOOD PRESSURE: 112 MMHG | DIASTOLIC BLOOD PRESSURE: 68 MMHG | TEMPERATURE: 97 F | WEIGHT: 172.4 LBS | HEART RATE: 78 BPM | OXYGEN SATURATION: 99 %

## 2024-10-25 DIAGNOSIS — C53.9 CERVICAL CANCER, FIGO STAGE IVA: ICD-10-CM

## 2024-10-25 LAB
BLOOD EXPIRATION DATE: NORMAL
DISPENSE STATUS: NORMAL
PRODUCT BLOOD TYPE: 600
PRODUCT CODE: NORMAL
UNIT ABO: NORMAL
UNIT NUMBER: NORMAL
UNIT RH: NORMAL
UNIT VOLUME: 350
XM INTEP: NORMAL

## 2024-10-25 PROCEDURE — 36430 TRANSFUSION BLD/BLD COMPNT: CPT

## 2024-10-25 PROCEDURE — 2500000004 HC RX 250 GENERAL PHARMACY W/ HCPCS (ALT 636 FOR OP/ED): Performed by: STUDENT IN AN ORGANIZED HEALTH CARE EDUCATION/TRAINING PROGRAM

## 2024-10-25 PROCEDURE — P9040 RBC LEUKOREDUCED IRRADIATED: HCPCS

## 2024-10-25 RX ORDER — HEPARIN SODIUM,PORCINE/PF 10 UNIT/ML
50 SYRINGE (ML) INTRAVENOUS AS NEEDED
OUTPATIENT
Start: 2024-10-25

## 2024-10-25 RX ORDER — DIPHENHYDRAMINE HYDROCHLORIDE 50 MG/ML
50 INJECTION INTRAMUSCULAR; INTRAVENOUS AS NEEDED
OUTPATIENT
Start: 2024-10-25

## 2024-10-25 RX ORDER — FAMOTIDINE 10 MG/ML
20 INJECTION INTRAVENOUS ONCE AS NEEDED
OUTPATIENT
Start: 2024-10-25

## 2024-10-25 RX ORDER — EPINEPHRINE 0.3 MG/.3ML
0.3 INJECTION SUBCUTANEOUS EVERY 5 MIN PRN
OUTPATIENT
Start: 2024-10-25

## 2024-10-25 RX ORDER — HEPARIN 100 UNIT/ML
500 SYRINGE INTRAVENOUS AS NEEDED
OUTPATIENT
Start: 2024-10-25

## 2024-10-25 RX ORDER — HEPARIN SODIUM,PORCINE/PF 10 UNIT/ML
50 SYRINGE (ML) INTRAVENOUS AS NEEDED
Status: DISCONTINUED | OUTPATIENT
Start: 2024-10-25 | End: 2024-10-25 | Stop reason: HOSPADM

## 2024-10-25 RX ORDER — HEPARIN 100 UNIT/ML
500 SYRINGE INTRAVENOUS AS NEEDED
Status: DISCONTINUED | OUTPATIENT
Start: 2024-10-25 | End: 2024-10-25 | Stop reason: HOSPADM

## 2024-10-25 RX ORDER — ALBUTEROL SULFATE 0.83 MG/ML
3 SOLUTION RESPIRATORY (INHALATION) AS NEEDED
OUTPATIENT
Start: 2024-10-25

## 2024-10-25 ASSESSMENT — PAIN SCALES - GENERAL: PAINLEVEL_OUTOF10: 0-NO PAIN

## 2024-11-04 ENCOUNTER — INFUSION (OUTPATIENT)
Dept: HEMATOLOGY/ONCOLOGY | Facility: CLINIC | Age: 64
End: 2024-11-04
Payer: COMMERCIAL

## 2024-11-04 ENCOUNTER — HOSPITAL ENCOUNTER (OUTPATIENT)
Dept: RADIOLOGY | Facility: CLINIC | Age: 64
Discharge: HOME | End: 2024-11-04
Payer: COMMERCIAL

## 2024-11-04 DIAGNOSIS — C53.9 CERVICAL CANCER, FIGO STAGE IVB: ICD-10-CM

## 2024-11-04 DIAGNOSIS — G89.3 CANCER RELATED PAIN: ICD-10-CM

## 2024-11-04 DIAGNOSIS — C53.9 CERVICAL CANCER, FIGO STAGE IVA: ICD-10-CM

## 2024-11-04 PROCEDURE — 74176 CT ABD & PELVIS W/O CONTRAST: CPT

## 2024-11-04 PROCEDURE — 74176 CT ABD & PELVIS W/O CONTRAST: CPT | Performed by: RADIOLOGY

## 2024-11-04 PROCEDURE — 71250 CT THORAX DX C-: CPT | Performed by: RADIOLOGY

## 2024-11-04 RX ORDER — OXYCODONE HYDROCHLORIDE 5 MG/1
5 TABLET ORAL 3 TIMES DAILY PRN
Qty: 90 TABLET | Refills: 0 | Status: SHIPPED | OUTPATIENT
Start: 2024-11-06 | End: 2024-12-06

## 2024-11-04 NOTE — TELEPHONE ENCOUNTER
Patient requesting oxycodone refill. OARRS reviewed, no issues. Last fill 10/07/24. CSA/UDS up to date. Has 3 month FUV scheduled 11/12/24.

## 2024-11-07 ENCOUNTER — OFFICE VISIT (OUTPATIENT)
Dept: GYNECOLOGIC ONCOLOGY | Facility: CLINIC | Age: 64
End: 2024-11-07
Payer: COMMERCIAL

## 2024-11-07 ENCOUNTER — APPOINTMENT (OUTPATIENT)
Dept: HEMATOLOGY/ONCOLOGY | Facility: CLINIC | Age: 64
End: 2024-11-07
Payer: COMMERCIAL

## 2024-11-07 ENCOUNTER — TELEPHONE (OUTPATIENT)
Dept: PREOP | Facility: HOSPITAL | Age: 64
End: 2024-11-07

## 2024-11-07 VITALS
BODY MASS INDEX: 26.65 KG/M2 | TEMPERATURE: 98.8 F | SYSTOLIC BLOOD PRESSURE: 119 MMHG | DIASTOLIC BLOOD PRESSURE: 72 MMHG | WEIGHT: 165.46 LBS | HEART RATE: 69 BPM | RESPIRATION RATE: 18 BRPM | OXYGEN SATURATION: 100 %

## 2024-11-07 DIAGNOSIS — C53.9 CERVICAL CANCER, FIGO STAGE IVA: Primary | ICD-10-CM

## 2024-11-07 DIAGNOSIS — Z51.12 ENCOUNTER FOR ANTINEOPLASTIC CHEMOTHERAPY AND IMMUNOTHERAPY: ICD-10-CM

## 2024-11-07 DIAGNOSIS — Z51.11 ENCOUNTER FOR ANTINEOPLASTIC CHEMOTHERAPY AND IMMUNOTHERAPY: ICD-10-CM

## 2024-11-07 PROCEDURE — 99215 OFFICE O/P EST HI 40 MIN: CPT | Performed by: STUDENT IN AN ORGANIZED HEALTH CARE EDUCATION/TRAINING PROGRAM

## 2024-11-07 PROCEDURE — 3078F DIAST BP <80 MM HG: CPT | Performed by: STUDENT IN AN ORGANIZED HEALTH CARE EDUCATION/TRAINING PROGRAM

## 2024-11-07 PROCEDURE — 3074F SYST BP LT 130 MM HG: CPT | Performed by: STUDENT IN AN ORGANIZED HEALTH CARE EDUCATION/TRAINING PROGRAM

## 2024-11-07 PROCEDURE — 1036F TOBACCO NON-USER: CPT | Performed by: STUDENT IN AN ORGANIZED HEALTH CARE EDUCATION/TRAINING PROGRAM

## 2024-11-07 RX ORDER — ALBUTEROL SULFATE 0.83 MG/ML
3 SOLUTION RESPIRATORY (INHALATION) AS NEEDED
OUTPATIENT
Start: 2024-11-14

## 2024-11-07 RX ORDER — PROCHLORPERAZINE EDISYLATE 5 MG/ML
10 INJECTION INTRAMUSCULAR; INTRAVENOUS EVERY 6 HOURS PRN
OUTPATIENT
Start: 2024-11-14

## 2024-11-07 RX ORDER — PROCHLORPERAZINE MALEATE 10 MG
10 TABLET ORAL EVERY 6 HOURS PRN
OUTPATIENT
Start: 2024-11-21

## 2024-11-07 RX ORDER — ALBUTEROL SULFATE 0.83 MG/ML
3 SOLUTION RESPIRATORY (INHALATION) AS NEEDED
OUTPATIENT
Start: 2024-11-21

## 2024-11-07 RX ORDER — ONDANSETRON HYDROCHLORIDE 2 MG/ML
8 INJECTION, SOLUTION INTRAVENOUS ONCE
OUTPATIENT
Start: 2024-11-21

## 2024-11-07 RX ORDER — FAMOTIDINE 10 MG/ML
20 INJECTION INTRAVENOUS ONCE AS NEEDED
OUTPATIENT
Start: 2024-11-28

## 2024-11-07 RX ORDER — EPINEPHRINE 0.3 MG/.3ML
0.3 INJECTION SUBCUTANEOUS EVERY 5 MIN PRN
OUTPATIENT
Start: 2024-11-28

## 2024-11-07 RX ORDER — PROCHLORPERAZINE MALEATE 10 MG
10 TABLET ORAL EVERY 6 HOURS PRN
OUTPATIENT
Start: 2024-11-28

## 2024-11-07 RX ORDER — PROCHLORPERAZINE EDISYLATE 5 MG/ML
10 INJECTION INTRAMUSCULAR; INTRAVENOUS EVERY 6 HOURS PRN
OUTPATIENT
Start: 2024-11-21

## 2024-11-07 RX ORDER — ONDANSETRON HYDROCHLORIDE 2 MG/ML
8 INJECTION, SOLUTION INTRAVENOUS ONCE
OUTPATIENT
Start: 2024-11-28

## 2024-11-07 RX ORDER — FAMOTIDINE 10 MG/ML
20 INJECTION INTRAVENOUS ONCE AS NEEDED
OUTPATIENT
Start: 2024-11-14

## 2024-11-07 RX ORDER — DIPHENHYDRAMINE HYDROCHLORIDE 50 MG/ML
50 INJECTION INTRAMUSCULAR; INTRAVENOUS AS NEEDED
OUTPATIENT
Start: 2024-11-21

## 2024-11-07 RX ORDER — DIPHENHYDRAMINE HYDROCHLORIDE 50 MG/ML
50 INJECTION INTRAMUSCULAR; INTRAVENOUS AS NEEDED
OUTPATIENT
Start: 2024-11-14

## 2024-11-07 RX ORDER — EPINEPHRINE 0.3 MG/.3ML
0.3 INJECTION SUBCUTANEOUS EVERY 5 MIN PRN
OUTPATIENT
Start: 2024-11-14

## 2024-11-07 RX ORDER — PROCHLORPERAZINE MALEATE 10 MG
10 TABLET ORAL EVERY 6 HOURS PRN
OUTPATIENT
Start: 2024-11-14

## 2024-11-07 RX ORDER — FAMOTIDINE 10 MG/ML
20 INJECTION INTRAVENOUS ONCE AS NEEDED
OUTPATIENT
Start: 2024-11-21

## 2024-11-07 RX ORDER — ALBUTEROL SULFATE 0.83 MG/ML
3 SOLUTION RESPIRATORY (INHALATION) AS NEEDED
OUTPATIENT
Start: 2024-11-28

## 2024-11-07 RX ORDER — ONDANSETRON HYDROCHLORIDE 2 MG/ML
8 INJECTION, SOLUTION INTRAVENOUS ONCE
OUTPATIENT
Start: 2024-11-14

## 2024-11-07 RX ORDER — PROCHLORPERAZINE EDISYLATE 5 MG/ML
10 INJECTION INTRAMUSCULAR; INTRAVENOUS EVERY 6 HOURS PRN
OUTPATIENT
Start: 2024-11-28

## 2024-11-07 RX ORDER — DIPHENHYDRAMINE HYDROCHLORIDE 50 MG/ML
50 INJECTION INTRAMUSCULAR; INTRAVENOUS AS NEEDED
OUTPATIENT
Start: 2024-11-28

## 2024-11-07 RX ORDER — EPINEPHRINE 0.3 MG/.3ML
0.3 INJECTION SUBCUTANEOUS EVERY 5 MIN PRN
OUTPATIENT
Start: 2024-11-21

## 2024-11-07 ASSESSMENT — PAIN SCALES - GENERAL: PAINLEVEL_OUTOF10: 7

## 2024-11-07 NOTE — PROGRESS NOTES
Patient ID: Shelbi Delaney is a 64 y.o. female.  Referring Physician: No referring provider defined for this encounter.  Primary Care Provider: Adrianna Jefferson MD    Subjective    63yo with recurrent cervical cancer, most recently on weekly Taxotere +gCSF for treatment planning and results review. Reports increased fatigue since last assessment with decreased appetite. Denies nausea/vomiting, just less interest in food. Continued persistent low back pain without redness/drainage or abnormal drainage from bilateral nephrostomy sites. Bilateral nephrostomies for exchange tomorrow. Minimal vaginal discharge, no bleeding. Denies chest pain or shortness of breath, dyspnea on exertion. Lower extremity swelling overall stable - continues with compression stockings.      A comprehensive review of systems was performed and otherwise negative.      Objective    BSA: 1.87 meters squared  /72 (BP Location: Left arm, Patient Position: Sitting, BP Cuff Size: Adult)   Pulse 69   Temp 37.1 °C (98.8 °F) (Temporal)   Resp 18   Wt 75 kg (165 lb 7.3 oz)   SpO2 100%   BMI 26.65 kg/m²     Wt Readings from Last 3 Encounters:   11/07/24 75 kg (165 lb 7.3 oz)   10/25/24 78.2 kg (172 lb 6.4 oz)   10/24/24 78.4 kg (172 lb 15.2 oz)      Physical Exam  Vitals and nursing note reviewed. Exam conducted with a chaperone present.   Constitutional:       General: She is not in acute distress.     Appearance: Normal appearance.   HENT:      Head: Normocephalic and atraumatic.      Mouth/Throat:      Mouth: Mucous membranes are moist.      Pharynx: No oropharyngeal exudate or posterior oropharyngeal erythema.   Eyes:      Extraocular Movements: Extraocular movements intact.      Conjunctiva/sclera: Conjunctivae normal.      Pupils: Pupils are equal, round, and reactive to light.   Neck:      Thyroid: No thyroid mass or thyromegaly.   Cardiovascular:      Rate and Rhythm: Normal rate and regular rhythm.      Pulses: Normal pulses.      Heart  sounds: Normal heart sounds.   Pulmonary:      Effort: Pulmonary effort is normal.      Breath sounds: Normal breath sounds. No wheezing, rhonchi or rales.   Abdominal:      General: Bowel sounds are normal. There is no distension.      Palpations: Abdomen is soft. There is no mass.      Tenderness: There is no abdominal tenderness.      Hernia: No hernia is present.   Genitourinary:     Comments: Bilateral nephrostomies in place - clear urine in tubing  Musculoskeletal:         General: No tenderness. Normal range of motion.      Cervical back: Normal range of motion and neck supple. No tenderness.      Right lower leg: Edema present.      Left lower leg: Edema present.      Comments: Chronic BLE edema; improved   Skin:     General: Skin is warm.      Findings: No lesion or rash.      Comments: Interval resolution of rash on face, neck, anterior chest, bilateral upper extremities, abdomen and flanks including bilateral nephrostomy sites - no purulent drainage/debris.   Neurological:      General: No focal deficit present.      Mental Status: She is alert and oriented to person, place, and time.   Psychiatric:         Mood and Affect: Mood normal.         Behavior: Behavior normal.       Recent Imaging:   CT chest abdomen pelvis wo IV contrast - 11/5/24  Impression:   Cervical cancer restaging scan. When compared to the prior CTs dated 08/15/2024 and 07/16/2024:   1. Interval worsening of the intra-abdominal tumor burden as evidenced by the new hepatic metastatic lesions, mildly worsening lymphadenopathy, and the increasing uterine mass as described above.   2. Interval worsening of the lytic and sclerotic lesion of the right inferior pubic ramus with a new pathologic fracture.   3. Peritoneal nodularity along the right pericolic gutter concerning for peritoneal carcinomatosis, attention on follow-up imaging is recommended.   4. Improvement of the scattered ground-glass opacities involving the right lung compatible  with a resolving infectious/inflammatory process.   5. Bilateral percutaneous nephrostomy tubes are in place. No hydronephrosis.   6. Additional stable chronic incidental findings described above.     I personally reviewed the image(s) / study and I agree with the findings as stated by Shauna Villagomez MD. This study was interpreted at [facility], [place], [place].   MACRO: None   Signed by: Jairo Cardoso 11/5/2024 10:39 PM Dictation workstation:   [id number]     Performance Status:  Symptomatic; fully ambulatory    Assessment/Plan   63yo with stage RONIT moderately differentiated SCC of cervix with progressive disease on Topotecan; obstructive uropathy with bilateral PCNs in place. Grade 2 CIPN; fluid retention. ECOG 1.   Oncology History Overview Note   Stage RONIT grade 2 squamous cell carcinoma of cervix  1/20/23: TURBT - poorly differentiated squamaous cell carcinoma; CPS10.  review with history of cervical mass consistent with GYN primary  - Extension to rectal mucosa, pubic ramus with ?reactive mediastinal LN   - Bilateral nephrostomy placement   2/16/23 - 6/1/23: Carbo AUC5/Taxol 175 +Avastin 15mg/kg, Pembrolizumab 200mg/m2  - C2-C4 +Avastin - further treatments held due to progressive gr2 proteinuria   - Grade 3 anemia declining transfusion,   7/20/23 - 12/18/23: Pembrolizumab 200mg/m2, progressive disease  1/11/24 - 3/14/24: Tivdak x3, progressive disease   - C1: 0.9mg/kg in setting of CrCl < 30  3/2024-7/2024: Taxotere, progressive disease  8/1/24 - 11/5/24: Topotecan q week x3, progressive disease    Molecular Testing  PLD1: CPS 10  1/2024 Tempus xT - PIK3CA [id number] missense (gain): consider alpelisib  Loss of function: BCORL1, KMT2C, NSD1, KMT2D, RAD21  TMB 7.9mB - RACHELE  - Her2 Neg     Cervical cancer, FIGO stage RONIT   3/21/2022 Cancer Staged    Staging form: Cervix Uteri, AJCC Version 9, Clinical stage from 3/21/2022: FIGO Stage RONIT (cT4, cN2, cM0) - Signed by Adrianna Jefferson MD on  10/12/2023, Prognostic indicators: CPS 10     7/20/2023 - 11/30/2023 Chemotherapy    Pembrolizumab, 21 Day Cycles     1/12/2024 - 2/22/2024 Chemotherapy    Tisotumab vedotin, 21 Day Cycles     3/21/2024 - 7/5/2024 Chemotherapy    DOCEtaxel, 21 Day Cycles     8/1/2024 -  Chemotherapy    Topotecan (Weekly), 28 Day Cycles      3/13/2025 - 3/13/2025 Chemotherapy    PACLitaxel / CARBOplatin, 21 Day Cycles - Gyn     3/13/2025 - 3/13/2025 Chemotherapy    PACLitaxel / CARBOplatin, 21 Day Cycles - Gyn        Diagnoses and all orders for this visit:  Encounter for antineoplastic chemotherapy and immunotherapy  Cervical cancer, FIGO stage RONIT (CMS/HCC)  - Discussed imaging demonstrating progression of disease; in setting of increasing fatigue and decreased appetite we again discussed consideration of informational visit with Supportive Oncology re: symptom-directed treatment over cancer-directed therapy due to progressively lower likelihood of response to chemotherapy.   - She was counseled re: standard of care options including Pemetrexed, Gemcitabine vs. targeted therapy (PIK3CA alteration) including apelisib vs newly approved capivasertib. Patient and spouse amenable to proceeding with D1/D15 gemcitabine (+d16 gCSF). Fever precautions reviewed with patient and her  in setting of prior admission   - Treatment consents signed  - Follow up CT after 3 cycles/sooner as clinically indicated  Urinary tract infection associated with nephrostomy catheter, subsequent encounter  Obstructive uropathy  - S/p treatment for Pseudomonas UTI; continue to monitor for signs/symptoms of progressive infection due to increased risk of complex UTI  - Follow up PCN exchange 11/8  Goals of care  - Patient and spouse have been counseled at multiple instances re: treatment goals and palliative intent of ongoing cancer directed therapy. Patient has been offered referral to Supportive Oncology to further discuss positive impact of  symptom-directed treatemnt, particuarly in setting of increasingly treatment resistant disease  - She was counseled re: lower likelihood of clinical benefit of ongoing chemotherapy treatment and understands limited future cancer-directed treatment options. She and her  remain forward thinking but do not wish to address code status or end of life planning at this time.   - Referral to Supportive Oncology re: appetite, cancer-related pain, GOC    Adrianna Jefferson MD

## 2024-11-08 ENCOUNTER — HOSPITAL ENCOUNTER (OUTPATIENT)
Dept: CARDIOLOGY | Facility: HOSPITAL | Age: 64
Discharge: HOME | End: 2024-11-08
Payer: COMMERCIAL

## 2024-11-08 ENCOUNTER — TELEPHONE (OUTPATIENT)
Dept: PALLIATIVE MEDICINE | Facility: HOSPITAL | Age: 64
End: 2024-11-08
Payer: COMMERCIAL

## 2024-11-08 VITALS
RESPIRATION RATE: 20 BRPM | OXYGEN SATURATION: 100 % | HEART RATE: 68 BPM | SYSTOLIC BLOOD PRESSURE: 136 MMHG | DIASTOLIC BLOOD PRESSURE: 80 MMHG

## 2024-11-08 DIAGNOSIS — N13.9 URINARY TRACT OBSTRUCTION: ICD-10-CM

## 2024-11-08 DIAGNOSIS — C53.9 CERVICAL CANCER, FIGO STAGE IVA: ICD-10-CM

## 2024-11-08 DIAGNOSIS — G89.3 CANCER RELATED PAIN: Primary | ICD-10-CM

## 2024-11-08 LAB
ERYTHROCYTE [DISTWIDTH] IN BLOOD BY AUTOMATED COUNT: 18.4 % (ref 11.5–14.5)
HCT VFR BLD AUTO: 27.7 % (ref 36–46)
HGB BLD-MCNC: 8.7 G/DL (ref 12–16)
INR PPP: 1 (ref 0.9–1.2)
MCH RBC QN AUTO: 30.5 PG (ref 26–34)
MCHC RBC AUTO-ENTMCNC: 31.4 G/DL (ref 32–36)
MCV RBC AUTO: 97 FL (ref 80–100)
NRBC BLD-RTO: 0 /100 WBCS (ref 0–0)
PLATELET # BLD AUTO: 388 X10*3/UL (ref 150–450)
PROTHROMBIN TIME: 10.4 SECONDS (ref 9.3–12.7)
RBC # BLD AUTO: 2.85 X10*6/UL (ref 4–5.2)
WBC # BLD AUTO: 11.1 X10*3/UL (ref 4.4–11.3)

## 2024-11-08 PROCEDURE — 99152 MOD SED SAME PHYS/QHP 5/>YRS: CPT | Performed by: RADIOLOGY

## 2024-11-08 PROCEDURE — C1729 CATH, DRAINAGE: HCPCS

## 2024-11-08 PROCEDURE — 7100000009 HC PHASE TWO TIME - INITIAL BASE CHARGE

## 2024-11-08 PROCEDURE — C1769 GUIDE WIRE: HCPCS

## 2024-11-08 PROCEDURE — 2780000003 HC OR 278 NO HCPCS

## 2024-11-08 PROCEDURE — 50387 CHANGE NEPHROURETERAL CATH: CPT

## 2024-11-08 PROCEDURE — 85027 COMPLETE CBC AUTOMATED: CPT | Performed by: RADIOLOGY

## 2024-11-08 PROCEDURE — 2550000001 HC RX 255 CONTRASTS: Performed by: RADIOLOGY

## 2024-11-08 PROCEDURE — C2625 STENT, NON-COR, TEM W/DEL SY: HCPCS

## 2024-11-08 PROCEDURE — 85610 PROTHROMBIN TIME: CPT | Performed by: RADIOLOGY

## 2024-11-08 PROCEDURE — 2500000004 HC RX 250 GENERAL PHARMACY W/ HCPCS (ALT 636 FOR OP/ED): Performed by: RADIOLOGY

## 2024-11-08 PROCEDURE — 7100000010 HC PHASE TWO TIME - EACH INCREMENTAL 1 MINUTE

## 2024-11-08 PROCEDURE — 99152 MOD SED SAME PHYS/QHP 5/>YRS: CPT

## 2024-11-08 PROCEDURE — 2720000007 HC OR 272 NO HCPCS

## 2024-11-08 RX ORDER — MIDAZOLAM HYDROCHLORIDE 1 MG/ML
INJECTION, SOLUTION INTRAMUSCULAR; INTRAVENOUS AS NEEDED
Status: DISCONTINUED | OUTPATIENT
Start: 2024-11-08 | End: 2024-11-08 | Stop reason: HOSPADM

## 2024-11-08 RX ORDER — LIDOCAINE HYDROCHLORIDE 10 MG/ML
INJECTION, SOLUTION EPIDURAL; INFILTRATION; INTRACAUDAL; PERINEURAL AS NEEDED
Status: DISCONTINUED | OUTPATIENT
Start: 2024-11-08 | End: 2024-11-08 | Stop reason: HOSPADM

## 2024-11-08 RX ORDER — IODIXANOL 320 MG/ML
INJECTION, SOLUTION INTRAVASCULAR AS NEEDED
Status: DISCONTINUED | OUTPATIENT
Start: 2024-11-08 | End: 2024-11-08 | Stop reason: HOSPADM

## 2024-11-08 RX ORDER — FENTANYL CITRATE 50 UG/ML
INJECTION, SOLUTION INTRAMUSCULAR; INTRAVENOUS AS NEEDED
Status: DISCONTINUED | OUTPATIENT
Start: 2024-11-08 | End: 2024-11-08 | Stop reason: HOSPADM

## 2024-11-08 RX ORDER — OXYCODONE HYDROCHLORIDE 5 MG/1
5 TABLET ORAL EVERY 8 HOURS
Qty: 12 TABLET | Refills: 0 | Status: SHIPPED | OUTPATIENT
Start: 2024-11-08 | End: 2024-11-12

## 2024-11-08 NOTE — TELEPHONE ENCOUNTER
Patient contacted after referral was made to supportive oncology by Dr Adrianna Jefferson.  Patient has a history of cervical cancer.  Patient/ family agreed to a NPV with Liset Ibarra NP at  [place] on 12/3 at 1:30 pm.

## 2024-11-11 ENCOUNTER — ONCOLOGY MEDICATION OUTREACH (OUTPATIENT)
Dept: GYNECOLOGIC ONCOLOGY | Facility: HOSPITAL | Age: 64
End: 2024-11-11
Payer: COMMERCIAL

## 2024-11-11 DIAGNOSIS — C53.9 CERVICAL CANCER, FIGO STAGE IVA: ICD-10-CM

## 2024-11-11 NOTE — PROGRESS NOTES
ONCOLOGY CLINICAL PHARMACY NOTE     Subjective  Shelbi Delaney is a 64 y.o. female with cervical cancer, called for education.        Treatment history  Treatment Details   Treatment goal [No plan goal]   Plan Name Venous Access Orders   Status Active   Start Date 10/12/2023   End Date Until discontinued   Provider Adrianna Jefferson MD   Chemotherapy [No matching medication found in this treatment plan]     Treatment Details   Treatment goal [No plan goal]   Plan Name Pembrolizumab, 21 Day Cycles   Status Inactive   Start Date 7/20/2023   End Date 11/30/2023   Provider Adrianna Jefferson MD   Chemotherapy methylPREDNISolone sod succinate (PF) (SOLU-Medrol) 40 mg/mL injection 40 mg, 40 mg, intravenous, As needed, 7 of 17 cycles    pembrolizumab (Keytruda) 200 mg in sodium chloride 0.9% 100 mL IV, 200 mg, intravenous, Once, 7 of 17 cycles  Administration: 200 mg (10/12/2023), 200 mg (11/2/2023), 200 mg (11/30/2023)       Treatment Details   Treatment goal Palliative   Plan Name Tisotumab vedotin, 21 Day Cycles   Status Inactive   Start Date 1/12/2024   End Date 2/22/2024   Provider Adrianna Jefferson MD   Chemotherapy tisotumab vedotin (Tivdak) 74 mg in sodium chloride 0.9% 65 mL IV, 0.9 mg/kg = 74 mg (45 % of original dose 2 mg/kg), intravenous, Once, 3 of 17 cycles  Dose modification: 0.9 mg/kg (original dose 2 mg/kg, Cycle 1, Reason: Abnormal Renal Function, Comment: reduced CrCl), 1.3 mg/kg (original dose 2 mg/kg, Cycle 2, Reason: Other (See Comments), Comment: Reduced GFR)  Administration: 74 mg (1/12/2024), 108 mg (2/1/2024), 108 mg (2/22/2024)    methylPREDNISolone sod succinate (PF) (SOLU-Medrol) 40 mg/mL injection 40 mg, 40 mg, intravenous, As needed, 3 of 17 cycles       Treatment Details   Treatment goal Palliative   Plan Name DOCEtaxel, 21 Day Cycles   Status Inactive   Start Date 3/21/2024   End Date 7/5/2024   Provider Adrianna Jefferson MD   Chemotherapy methylPREDNISolone sod succinate (PF) (SOLU-Medrol) 40 mg/mL  injection 40 mg, 40 mg, intravenous, As needed, 6 of 17 cycles    DOCEtaxeL (Taxotere) 200 mg in dextrose 5 % in water (D5W) 557 mL IV, 100 mg/m2 = 200 mg, intravenous, Once, 6 of 17 cycles  Dose modification: 80 mg/m2 (original dose 100 mg/m2, Cycle 2)  Administration: 200 mg (3/21/2024), 160 mg (4/11/2024), 160 mg (5/2/2024), 160 mg (5/23/2024), 160 mg (6/13/2024), 160 mg (7/5/2024)       Treatment Details   Treatment goal Palliative   Plan Name PACLitaxel / CARBOplatin, 21 Day Cycles - Gyn   Status Inactive   Start Date    End Date    Provider Adrianna Jefferson MD   Chemotherapy fosaprepitant (Emend) 150 mg in sodium chloride 0.9% 250 mL IV, 150 mg, intravenous, Once, 0 of 6 cycles    CARBOplatin (Paraplatin) in sodium chloride 0.9% 100 mL IV, , intravenous, Once, 0 of 6 cycles    PACLitaxeL (Taxol) 175 mg/m2 in dextrose 5 % in water (D5W) 500 mL IV, 175 mg/m2, intravenous, Once, 0 of 6 cycles    methylPREDNISolone sod succinate (PF) (SOLU-Medrol) 40 mg/mL injection 40 mg, 40 mg, intravenous, As needed, 0 of 6 cycles    palonosetron (Aloxi) injection 250 mcg, 0.25 mg, intravenous, Once, 0 of 6 cycles       Treatment Details   Treatment goal Palliative   Plan Name PACLitaxel / CARBOplatin, 21 Day Cycles - Gyn   Status Inactive   Start Date    End Date    Provider Adrianna Jefferson MD   Chemotherapy fosaprepitant (Emend) 150 mg in sodium chloride 0.9% 250 mL IV, 150 mg, intravenous, Once, 0 of 6 cycles    CARBOplatin (Paraplatin) in sodium chloride 0.9% 100 mL IV, , intravenous, Once, 0 of 6 cycles    PACLitaxeL (Taxol) 348 mg in dextrose 5 % in water (D5W) 308 mL IV, 175 mg/m2, intravenous, Once, 0 of 6 cycles    methylPREDNISolone sod succinate (PF) (SOLU-Medrol) 40 mg/mL injection 40 mg, 40 mg, intravenous, As needed, 0 of 6 cycles    palonosetron (Aloxi) injection 250 mcg, 0.25 mg, intravenous, Once, 0 of 6 cycles       Treatment Details   Treatment goal [No plan goal]   Plan Name Topotecan (Daily), 28 Day Cycles -  Cervical    Status Inactive   Start Date    End Date    Provider Ashanti Fisher MD   Chemotherapy methylPREDNISolone sod succinate (SOLU-Medrol) 40 mg/mL injection 40 mg, 40 mg, intravenous, As needed, 0 of 12 cycles    topotecan (Hycamtin) 2.95 mg in dextrose 5% 102.95 mL IV, 1.5 mg/m2 = 2.95 mg, intravenous, Once, 0 of 12 cycles    pegfilgrastim-cbqv (Udenyca) injection 6 mg, 6 mg, subcutaneous, Once, 0 of 12 cycles       Treatment Details   Treatment goal [No plan goal]   Plan Name Topotecan (Weekly), 28 Day Cycles    Status Inactive   Start Date 8/1/2024   End Date 10/24/2024   Provider Ashanti Fisher MD   Chemotherapy pegfilgrastim (Neulasta Onpro) injection 6 mg, 6 mg, subcutaneous, Once, 3 of 12 cycles  Administration: 6 mg (9/26/2024), 6 mg (10/24/2024)    methylPREDNISolone sod succinate (SOLU-Medrol) 40 mg/mL injection 40 mg, 40 mg, intravenous, As needed, 3 of 12 cycles    topotecan (Hycamtin) 5.9 mg in dextrose 5% 115.9 mL IV, 3 mg/m2 = 5.9 mg (75 % of original dose 4 mg/m2), intravenous, Once, 3 of 12 cycles  Dose modification: 3 mg/m2 (original dose 4 mg/m2, Cycle 1), 2 mg/m2 (original dose 4 mg/m2, Cycle 2, Reason: Other (See Comments), Comment: low counts)  Administration: 5.9 mg (8/1/2024), 4 mg (9/12/2024), 4 mg (9/26/2024), 5.9 mg (8/8/2024), 4 mg (10/10/2024), 4 mg (10/24/2024)       Treatment Details   Treatment goal [No plan goal]   Plan Name Blood Products, PRN   Status Active   Start Date 9/26/2024   End Date Until discontinued   Provider Adrianna Jefferson MD   Chemotherapy methylPREDNISolone sod succinate (SOLU-Medrol) 40 mg/mL injection 40 mg, 40 mg, intravenous, As needed, 1 of 1 cycle       Treatment Details   Treatment goal Palliative   Plan Name Gemcitabine, 28 Day Cycles   Status Active   Start Date 11/14/2024 (Planned)   End Date 10/2/2025 (Planned)   Provider Adrianna Jefferson MD   Chemotherapy pegfilgrastim (Neulasta Onpro) injection 6 mg, 6 mg, subcutaneous, Once, 0 of 12  cycles    gemcitabine (Gemzar) 1,497.2 mg in sodium chloride 0.9% 289.4 mL IV, 800 mg/m2 = 1,497.2 mg (80 % of original dose 1,000 mg/m2), intravenous, Once, 0 of 12 cycles  Dose modification: 800 mg/m2 (original dose 1,000 mg/m2, Cycle 1, Reason: Protocol Driven)    methylPREDNISolone sod succinate (SOLU-Medrol) 40 mg/mL injection 40 mg, 40 mg, intravenous, As needed, 0 of 12 cycles          Objective  There were no vitals taken for this visit.  Lab Results   Component Value Date    WBC 11.1 11/08/2024    HGB 8.7 (L) 11/08/2024    HCT 27.7 (L) 11/08/2024    MCV 97 11/08/2024     11/08/2024      Lab Results   Component Value Date    GLUCOSE 81 10/08/2024    CALCIUM 8.9 10/08/2024     10/08/2024    K 5.0 10/08/2024    CO2 26 10/08/2024     10/08/2024    BUN 24 (H) 10/08/2024    CREATININE 2.42 (H) 10/08/2024     Lab Results   Component Value Date    ALT 4 (L) 10/08/2024    AST 14 10/08/2024    ALKPHOS 71 10/08/2024    BILITOT 0.3 10/08/2024       Allergies and Medications   Allergies   Allergen Reactions    L-Lysine [Lysine] Rash     Head to toe red rash    Adhesive Rash     Skin irritation and rash        Current Outpatient Medications:     acetaminophen (Tylenol) 325 mg tablet, Take 3 tablets (975 mg) by mouth every 6 hours if needed for mild pain (1 - 3)., Disp: , Rfl:     calcium carbonate (Calcium 600) 600 mg calcium (1,500 mg) tablet, Take 600 mg by mouth once daily., Disp: , Rfl:     dexAMETHasone (Decadron) 4 mg tablet, Take 2 tablets (8 mg) by mouth 2 times a day. Take on the day before, day of and day after treatment. (Patient not taking: Reported on 11/7/2024), Disp: 36 tablet, Rfl: 3    dexAMETHasone 0.5 mg/5 mL oral liquid, Take 10 mL (1 mg) by mouth 2 times a day as needed (mucositis). Swish and spit. Do not swallow (Patient not taking: Reported on 11/7/2024), Disp: 100 mL, Rfl: 0    DULoxetine (Cymbalta) 20 mg DR capsule, Take 1 capsule (20 mg) by mouth once daily. Do not crush or  chew. (Patient not taking: Reported on 11/7/2024), Disp: 30 capsule, Rfl: 11    gabapentin (Neurontin) 300 mg capsule, Take 1 capsule (300 mg) by mouth 2 times a day., Disp: 60 capsule, Rfl: 3    hydrOXYzine HCL (Atarax) 25 mg tablet, Take 1 tablet (25 mg) by mouth 3 times a day as needed for anxiety., Disp: , Rfl:     levothyroxine (Synthroid, Levoxyl) 25 mcg tablet, Take 1 tablet (25 mcg) by mouth once daily., Disp: 90 tablet, Rfl: 3    loperamide (Imodium) 2 mg capsule, Take 2 capsules (4 mg) by mouth 4 times a day as needed for diarrhea., Disp: , Rfl:     magic mouthwash (lidocaine, diphenhydrAMINE, Maalox 1:1:1), Swish and spit 10 mL every 6 hours if needed for mucositis. (Patient not taking: Reported on 11/7/2024), Disp: 250 mL, Rfl: 2    metoprolol tartrate (Lopressor) 50 mg tablet, take 0.5 tablets by mouth 2 times a day, Disp: 30 tablet, Rfl: 5    multivitamin with minerals tablet, Take 1 tablet by mouth once daily., Disp: , Rfl:     naloxone (Narcan) 4 mg/0.1 mL nasal spray, Administer 1 spray (4 mg) into affected nostril(s) if needed for opioid reversal. May repeat every 2-3 minutes if needed, alternating nostrils, until medical assistance becomes available. (Patient not taking: Reported on 11/7/2024), Disp: , Rfl:     nystatin (Mycostatin) cream, Apply 1 Application topically 2 times a day. (Patient not taking: Reported on 11/7/2024), Disp: , Rfl:     ondansetron (Zofran) 8 mg tablet, Take 1 tablet (8 mg) by mouth every 8 hours if needed for nausea or vomiting., Disp: 30 tablet, Rfl: 5    oxyCODONE (Roxicodone) 5 mg immediate release tablet, Take 1 tablet (5 mg) by mouth 3 times a day as needed for moderate pain (4 - 6). Do not fill before November 6, 2024., Disp: 90 tablet, Rfl: 0    oxyCODONE (Roxicodone) 5 mg immediate release tablet, Take 1 tablet (5 mg) by mouth every 8 hours for 4 days., Disp: 12 tablet, Rfl: 0    prochlorperazine (Compazine) 10 mg tablet, Take 1 tablet (10 mg) by mouth every 6  hours if needed for nausea or vomiting., Disp: 30 tablet, Rfl: 5    tiZANidine (Zanaflex) 4 mg tablet, Take 1 tablet (4 mg) by mouth 3 times a day., Disp: 270 tablet, Rfl: 1    traZODone (Desyrel) 50 mg tablet, Take 1 tablet (50 mg) by mouth as needed at bedtime for sleep., Disp: 90 tablet, Rfl: 1    Assessment and Plan  Shelbi Delaney is a 64 y.o. female with cervical cancer, to be treated with gemcitabine.    Chemotherapy  Education: Reviewed drug, dose, frequency, administration, treatment cycle, duration of therapy, and missed doses. Counseled on potential side effects including but not limited to chemotherapy side effects: neutropenia, infection risk, anemia, fatigue, weakness, low energy, thrombocytopenia, bleeding/bruising, n/v, diarrhea, and skin rash. Other key side-effects discussed: drug fever, liver changes, lung changes, and HUS. Discussed techniques to mitigate severity of side effects such as blood count checks, temperature checks, electrolyte monitoring, antiemetic use, loperamide use with max dose of 8 tabs per 24 hours, staying hydrated if having diarrhea, and monitoring for leg edema, SOB, trouble breathing. Handouts not provided. Patient had questions about side-effects. All questions answered and contact information was given to patient.   Drug-drug interactions: no major interactions  Time spent on patient care: 20 minutes            Coleman Moon, ArvindD

## 2024-11-11 NOTE — ADDENDUM NOTE
Addended by: VANESSA TONY on: 11/11/2024 10:30 AM     Modules accepted: Orders     Assessment: 70yo male      Plan:  L BKA  f/u am labs  NPO for OR today  Continue IVF  Consent in chart             H. LAZ Patricia  (p) [id number] Assessment: 70yo male hx of CAD, PVD, s/p left femoral endarterectomy and fem pop bypass (6/2017) and subsequent left femoral to peroneal bypass with PTFE (8/7/18) for claudication with known occlusion of bypass now with increased LLE pain.      Plan:  L BKA today  f/u am labs  NPO for OR today  Continue IVF  Consent in chart             OTILIO Patricia PA-C  (a) [phone] Assessment: 70yo male hx of CAD, PVD, s/p left femoral endarterectomy and fem pop bypass (6/2017) and subsequent left femoral to peroneal bypass with PTFE (8/7/18) for claudication with known occlusion of bypass now with increased LLE pain. Patient scheduled for left BKA today.    Plan:  L BKA today  f/u am labs  NPO for OR today  Continue IVF  Consent in chart           OTILIO Patricia PA-C  (t) [phone]

## 2024-11-12 ENCOUNTER — APPOINTMENT (OUTPATIENT)
Dept: PAIN MEDICINE | Facility: CLINIC | Age: 64
End: 2024-11-12
Payer: COMMERCIAL

## 2024-11-12 VITALS — SYSTOLIC BLOOD PRESSURE: 104 MMHG | DIASTOLIC BLOOD PRESSURE: 73 MMHG | HEART RATE: 108 BPM | RESPIRATION RATE: 16 BRPM

## 2024-11-12 DIAGNOSIS — Z79.899 ENCOUNTER FOR LONG-TERM (CURRENT) USE OF HIGH-RISK MEDICATION: ICD-10-CM

## 2024-11-12 DIAGNOSIS — G89.3 CANCER RELATED PAIN: Primary | ICD-10-CM

## 2024-11-12 PROCEDURE — 99214 OFFICE O/P EST MOD 30 MIN: CPT | Performed by: PHYSICAL MEDICINE & REHABILITATION

## 2024-11-12 PROCEDURE — 3078F DIAST BP <80 MM HG: CPT | Performed by: PHYSICAL MEDICINE & REHABILITATION

## 2024-11-12 PROCEDURE — 3074F SYST BP LT 130 MM HG: CPT | Performed by: PHYSICAL MEDICINE & REHABILITATION

## 2024-11-12 RX ORDER — POLYETHYLENE GLYCOL 3350 17 G/17G
17 POWDER, FOR SOLUTION ORAL DAILY PRN
Qty: 90 PACKET | Refills: 3 | Status: SHIPPED | OUTPATIENT
Start: 2024-11-12 | End: 2025-11-07

## 2024-11-12 RX ORDER — DOCUSATE SODIUM 100 MG/1
100 CAPSULE, LIQUID FILLED ORAL 2 TIMES DAILY
Qty: 180 CAPSULE | Refills: 3 | Status: SHIPPED | OUTPATIENT
Start: 2024-11-12

## 2024-11-12 RX ORDER — OXYCODONE HYDROCHLORIDE 10 MG/1
10 TABLET ORAL 3 TIMES DAILY PRN
Qty: 90 TABLET | Refills: 0 | Status: SHIPPED | OUTPATIENT
Start: 2024-11-12 | End: 2024-12-12

## 2024-11-12 ASSESSMENT — PAIN SCALES - GENERAL: PAINLEVEL_OUTOF10: 8

## 2024-11-12 NOTE — PROGRESS NOTES
Subjective   Patient ID: Shelbi Delaney is a 64 y.o. female who presents for Follow-up.  HPI    As reminder, this is a 64-year-old female with PMH of bladder cancer s/p nephrostomy tubes, cervical cancer on chemotherapy, and herniated lumbar disc presenting today for follow-up.  She is currently undergoing active treatment for her cancer as the most recent one was not working anymore so she started a new 1 within the last couple of weeks. She presents today for follow-up appointment.  She was last seen in August and at that time was continued on oxycodone, gabapentin, tizanidine.  Since then, she has had new pelvic pain that she has been told is related to metastasis.  Pain is 8/10 in the pelvis and also has 8/10 back pain.  Denies any radicular pain, she does have numbness and tingling in her bilateral hands and feet that is chronic for her.  She has been taking oxycodone 20 mg once daily with only little relief.  Continues to take Cymbalta and gabapentin.  She is unsure if the gabapentin is helping her at all.      OARRS:  Richard Zuniga MD on 11/12/2024  1:52 PM  I have personally reviewed the OARRS report for Shelbi Delaney. I have considered the risks of abuse, dependence, addiction and diversion    Is the patient prescribed a combination of a benzodiazepine and opioid?  No    Last Urine Drug Screen / ordered today: No  Recent Results (from the past 8760 hours)   Opiate Confirmation, Urine    Collection Time: 02/27/24  3:03 PM   Result Value Ref Range    6-Acetylmorphine <25 <25 ng/mL    Codeine <50 <50 ng/mL    Hydrocodone <25 <25 ng/mL    Hydromorphone <25 <25 ng/mL    Morphine  <50 <50 ng/mL    Norhydrocodone <25 <25 ng/mL    Noroxycodone 279 (H) <25 ng/mL    Oxycodone 35 (H) <25 ng/mL    Oxymorphone 330 (H) <25 ng/mL   Drug Screen, Urine With Reflex to Confirmation    Collection Time: 02/27/24  3:03 PM   Result Value Ref Range    Amphetamine Screen, Urine Presumptive Negative Presumptive Negative     Barbiturate Screen, Urine Presumptive Negative Presumptive Negative    Benzodiazepines Screen, Urine Presumptive Negative Presumptive Negative    Cannabinoid Screen, Urine Presumptive Negative Presumptive Negative    Cocaine Metabolite Screen, Urine Presumptive Negative Presumptive Negative    Fentanyl Screen, Urine Presumptive Negative Presumptive Negative    Opiate Screen, Urine Presumptive Negative Presumptive Negative    Oxycodone Screen, Urine Presumptive Positive (A) Presumptive Negative    PCP Screen, Urine Presumptive Negative Presumptive Negative     Results are as expected.       Review of Systems     Current Outpatient Medications   Medication Instructions    acetaminophen (TYLENOL) 975 mg, oral, Every 6 hours PRN    calcium carbonate (CALCIUM 600) 600 mg, Daily    dexAMETHasone (DECADRON) 8 mg, oral, 2 times daily, Take on the day before, day of and day after treatment.    dexAMETHasone 1 mg, oral, 2 times daily PRN, Swish and spit. Do not swallow    docusate sodium (COLACE) 100 mg, oral, 2 times daily    DULoxetine (CYMBALTA) 20 mg, oral, Daily, Do not crush or chew.    gabapentin (NEURONTIN) 300 mg, oral, 2 times daily    hydrOXYzine HCL (Atarax) 25 mg tablet 1 tablet, 3 times daily PRN    levothyroxine (SYNTHROID, LEVOXYL) 25 mcg, oral, Daily    loperamide (IMODIUM) 4 mg, oral, 4 times daily PRN    magic mouthwash (lidocaine, diphenhydrAMINE, Maalox 1:1:1) 10 mL, Swish & Spit, Every 6 hours PRN    metoprolol tartrate (Lopressor) 50 mg tablet take 0.5 tablets by mouth 2 times a day    multivitamin with minerals tablet 1 tablet, Daily    naloxone (Narcan) 4 mg/0.1 mL nasal spray 1 spray, As needed    nystatin (Mycostatin) cream 1 Application, 2 times daily    ondansetron (ZOFRAN) 8 mg, oral, Every 8 hours PRN    oxyCODONE (ROXICODONE) 5 mg, oral, Every 8 hours    oxyCODONE (ROXICODONE) 10 mg, oral, 3 times daily PRN    polyethylene glycol (GLYCOLAX, MIRALAX) 17 g, oral, Daily PRN    prochlorperazine  (COMPAZINE) 10 mg, oral, Every 6 hours PRN    tiZANidine (ZANAFLEX) 4 mg, oral, 3 times daily    traZODone (DESYREL) 50 mg, oral, Nightly PRN        Past Medical History:   Diagnosis Date    Anxiety     Body mass index (BMI)30.0-30.9, adult 08/07/2019    BMI 30.0-30.9,adult    Cervix cancer (Multi)     Chemotherapy-induced peripheral neuropathy (Multi)     Chronic kidney disease     Hypertension     Hypothyroidism     Tachycardia, unspecified     Tachycardia        Past Surgical History:   Procedure Laterality Date    CT GUIDED IMAGING FOR NEEDLE PLACEMENT  01/12/2023    CT GUIDED IMAGING FOR NEEDLE PLACEMENT [facility]    IR  NEPHROURETERAL PLACEMENT Bilateral 6/18/2024    IR  NEPHROURETERAL PLACEMENT 6/18/2024 [facility]    IR NEPHROSTOMY TUBE EXCHANGE  10/20/2023    IR NEPHROSTOMY TUBE EXCHANGE 10/20/2023 Manuel Marvin MD [facility]    IR NEPHROSTOMY TUBE EXCHANGE  12/19/2023    IR NEPHROSTOMY TUBE EXCHANGE 12/19/2023 [facility]    MEDIPORT      NEPHROSTOMY      ORIF TIBIA FRACTURE Right     TRANSURETHRAL RESECTION OF BLADDER TUMOR      US GUIDED PERCUTANEOUS PLACEMENT  11/14/2022    US GUIDED PERCUTANEOUS PLACEMENT [facility]    US GUIDED PERCUTANEOUS PLACEMENT  08/28/2023    US GUIDED PERCUTANEOUS PLACEMENT [facility]        Family History   Problem Relation Name Age of Onset    No Known Problems Mother      Other (Acute myocardial infarction) Father          Allergies   Allergen Reactions    L-Lysine [Lysine] Rash     Head to toe red rash    Adhesive Rash     Skin irritation and rash         No results found for this or any previous visit from the past 1000 days.      Objective     Vitals:    11/12/24 1320   BP: 104/73   Pulse: 108   Resp: 16        Physical Exam  General: NAD, well groomed, well nourished. Frail looking.   Eyes: Non-icteric sclera, EOMI.   Ears, Nose, Mouth, and Throat: External ears and nose appear to be without deformity or rash. No lesions or masses  noted. Hearing is grossly intact.   Neck: Trachea midline.   Respiratory: Nonlabored breathing.   Cardiovascular: Warm and well perfused extremities.   Skin: No rashes or open lesions/ulcers identified on skin.  Psychiatric: Alert, orientation to person, place, and time. Cooperative.    Back:   Inspection/Observation: Appropriate muscle bulk. No observable muscular atrophy.   Palpation: No tenderness to palpation  in spine or lumbar paraspinals.  Range of motion: Functional range or motion.    Neurologic:   Cranial nerves II-XII grossly intact.   Motor: mainly 4/5 strength in the bilateral lower extremities with knee flexion, knee extension, knee flexion, ankle dorsiflexion, ankle plantarflexion      Assessment/Plan   Problem List Items Addressed This Visit       Cancer related pain - Primary    Relevant Medications    oxyCODONE (Roxicodone) 10 mg immediate release tablet    docusate sodium (Colace) 100 mg capsule    polyethylene glycol (Glycolax, Miralax) 17 gram packet    Encounter for long-term (current) use of high-risk medication    Relevant Medications    oxyCODONE (Roxicodone) 10 mg immediate release tablet    docusate sodium (Colace) 100 mg capsule    polyethylene glycol (Glycolax, Miralax) 17 gram packet   Is a very pleasant 64-year-old female with PMH of bladder cancer s/p nephrostomy tubes, cervical cancer on chemotherapy, and herniated lumbar disc presenting today for follow-up cancer related pain.  Since her last visit she has been taking oxycodone 20 mg daily and does not feel this is controlling her pain well.  Continues to take gabapentin although was not sure if it is helping and continues to take Cymbalta with good effect.  Overall pain is getting more severe as cancer burden has been increasing since we last saw her.  She is up-to-date on urine drug screen and controlled substance agreement.  I am not concerned given that it is cancer related pain about escalating her dosing well.  I did discuss  as well that I see she has a referral into supportive oncology and has an appointment with them next month.  We did discuss that I am happy to continue managing her pain medications but she can certainly transfer medication management over to supportive oncology so she does not have to have multiple visits periodically with me.    Plan for today:  - We will start by increasing her oxycodone today to 10 mg 3 times daily.  May need to consider adding a long-acting opiate soon as well.  - She does not think the gabapentin is helping her; I advised that she could wean off of this if she desires to due to overall medication burden  - Continue with Cymbalta 20 mg daily  - Stool softeners including MiraLAX and docusate ordered while she is on opiate therapy  - She will follow-up with me in 3 months or sooner for issues  - She does have referral placed for supportive oncology; I am happy to continue to prescribe her pain medicines or if supportive along wishes to take over management they are more than welcome to.    Patient seen and discussed with attending. Note is not finalized until signed by attending physician.     Madhu Steiner,  PGY3  Physical Medicine & Rehabilitation       This note was generated with the aid of dictation software, there may be typos despite my attempts at proofreading.     I saw and evaluated the patient. I personally obtained the key and critical portions of the history and physical exam or was physically present for key and critical portions performed by the resident/fellow. I reviewed the resident/fellow's documentation and discussed the patient with the resident/fellow. I agree with the resident/fellow's medical decision making as documented in the note.    Richard Zuniga MD

## 2024-11-13 ENCOUNTER — LAB (OUTPATIENT)
Dept: LAB | Facility: LAB | Age: 64
End: 2024-11-13
Payer: COMMERCIAL

## 2024-11-13 ENCOUNTER — TELEPHONE (OUTPATIENT)
Dept: GYNECOLOGIC ONCOLOGY | Facility: HOSPITAL | Age: 64
End: 2024-11-13

## 2024-11-13 DIAGNOSIS — C53.9 CERVICAL CANCER, FIGO STAGE IVA: ICD-10-CM

## 2024-11-13 LAB
ALBUMIN SERPL BCP-MCNC: 3.7 G/DL (ref 3.4–5)
ALP SERPL-CCNC: 62 U/L (ref 33–136)
ALT SERPL W P-5'-P-CCNC: 4 U/L (ref 7–45)
ANION GAP SERPL CALC-SCNC: 16 MMOL/L (ref 10–20)
AST SERPL W P-5'-P-CCNC: 15 U/L (ref 9–39)
BASOPHILS # BLD AUTO: 0.17 X10*3/UL (ref 0–0.1)
BASOPHILS NFR BLD AUTO: 1.7 %
BILIRUB SERPL-MCNC: 0.3 MG/DL (ref 0–1.2)
BUN SERPL-MCNC: 35 MG/DL (ref 6–23)
CALCIUM SERPL-MCNC: 8.9 MG/DL (ref 8.6–10.6)
CHLORIDE SERPL-SCNC: 106 MMOL/L (ref 98–107)
CO2 SERPL-SCNC: 24 MMOL/L (ref 21–32)
CREAT SERPL-MCNC: 2.31 MG/DL (ref 0.5–1.05)
EGFRCR SERPLBLD CKD-EPI 2021: 23 ML/MIN/1.73M*2
EOSINOPHIL # BLD AUTO: 0.44 X10*3/UL (ref 0–0.7)
EOSINOPHIL NFR BLD AUTO: 4.4 %
ERYTHROCYTE [DISTWIDTH] IN BLOOD BY AUTOMATED COUNT: 18.2 % (ref 11.5–14.5)
GLUCOSE SERPL-MCNC: 83 MG/DL (ref 74–99)
HCT VFR BLD AUTO: 28.1 % (ref 36–46)
HGB BLD-MCNC: 8.8 G/DL (ref 12–16)
IMM GRANULOCYTES # BLD AUTO: 0.07 X10*3/UL (ref 0–0.7)
IMM GRANULOCYTES NFR BLD AUTO: 0.7 % (ref 0–0.9)
LYMPHOCYTES # BLD AUTO: 1.47 X10*3/UL (ref 1.2–4.8)
LYMPHOCYTES NFR BLD AUTO: 14.8 %
MCH RBC QN AUTO: 31.4 PG (ref 26–34)
MCHC RBC AUTO-ENTMCNC: 31.3 G/DL (ref 32–36)
MCV RBC AUTO: 100 FL (ref 80–100)
MONOCYTES # BLD AUTO: 1.18 X10*3/UL (ref 0.1–1)
MONOCYTES NFR BLD AUTO: 11.9 %
NEUTROPHILS # BLD AUTO: 6.58 X10*3/UL (ref 1.2–7.7)
NEUTROPHILS NFR BLD AUTO: 66.5 %
NRBC BLD-RTO: 0 /100 WBCS (ref 0–0)
PLATELET # BLD AUTO: 431 X10*3/UL (ref 150–450)
POTASSIUM SERPL-SCNC: 4.7 MMOL/L (ref 3.5–5.3)
PROT SERPL-MCNC: 7.2 G/DL (ref 6.4–8.2)
RBC # BLD AUTO: 2.8 X10*6/UL (ref 4–5.2)
SODIUM SERPL-SCNC: 141 MMOL/L (ref 136–145)
WBC # BLD AUTO: 9.9 X10*3/UL (ref 4.4–11.3)

## 2024-11-13 PROCEDURE — 85025 COMPLETE CBC W/AUTO DIFF WBC: CPT

## 2024-11-13 PROCEDURE — 80053 COMPREHEN METABOLIC PANEL: CPT

## 2024-11-13 NOTE — TELEPHONE ENCOUNTER
Patient's treatment changed to weekly Gemzar, day 1 and 15 with OnPro Neulasta on day 15. Call to patient and reviewed the schedule. She is familiar with the schedule since it is the same as previous regimen.

## 2024-11-14 ENCOUNTER — INFUSION (OUTPATIENT)
Dept: HEMATOLOGY/ONCOLOGY | Facility: CLINIC | Age: 64
End: 2024-11-14
Payer: COMMERCIAL

## 2024-11-14 VITALS
BODY MASS INDEX: 26.57 KG/M2 | DIASTOLIC BLOOD PRESSURE: 71 MMHG | SYSTOLIC BLOOD PRESSURE: 109 MMHG | OXYGEN SATURATION: 96 % | RESPIRATION RATE: 18 BRPM | HEIGHT: 66 IN | TEMPERATURE: 96.3 F | HEART RATE: 77 BPM | WEIGHT: 165.34 LBS

## 2024-11-14 DIAGNOSIS — C53.9 CERVICAL CANCER, FIGO STAGE IVA: ICD-10-CM

## 2024-11-14 PROCEDURE — 2500000004 HC RX 250 GENERAL PHARMACY W/ HCPCS (ALT 636 FOR OP/ED): Performed by: STUDENT IN AN ORGANIZED HEALTH CARE EDUCATION/TRAINING PROGRAM

## 2024-11-14 PROCEDURE — 96413 CHEMO IV INFUSION 1 HR: CPT

## 2024-11-14 PROCEDURE — 96375 TX/PRO/DX INJ NEW DRUG ADDON: CPT | Mod: INF

## 2024-11-14 RX ORDER — PROCHLORPERAZINE MALEATE 10 MG
10 TABLET ORAL EVERY 6 HOURS PRN
Status: DISCONTINUED | OUTPATIENT
Start: 2024-11-14 | End: 2024-11-14 | Stop reason: HOSPADM

## 2024-11-14 RX ORDER — HEPARIN 100 UNIT/ML
500 SYRINGE INTRAVENOUS AS NEEDED
Status: DISCONTINUED | OUTPATIENT
Start: 2024-11-14 | End: 2024-11-14 | Stop reason: HOSPADM

## 2024-11-14 RX ORDER — DIPHENHYDRAMINE HYDROCHLORIDE 50 MG/ML
50 INJECTION INTRAMUSCULAR; INTRAVENOUS AS NEEDED
Status: DISCONTINUED | OUTPATIENT
Start: 2024-11-14 | End: 2024-11-14 | Stop reason: HOSPADM

## 2024-11-14 RX ORDER — HEPARIN SODIUM,PORCINE/PF 10 UNIT/ML
50 SYRINGE (ML) INTRAVENOUS AS NEEDED
Status: DISCONTINUED | OUTPATIENT
Start: 2024-11-14 | End: 2024-11-14 | Stop reason: HOSPADM

## 2024-11-14 RX ORDER — HEPARIN 100 UNIT/ML
500 SYRINGE INTRAVENOUS AS NEEDED
OUTPATIENT
Start: 2024-11-14

## 2024-11-14 RX ORDER — ONDANSETRON HYDROCHLORIDE 2 MG/ML
8 INJECTION, SOLUTION INTRAVENOUS ONCE
Status: COMPLETED | OUTPATIENT
Start: 2024-11-14 | End: 2024-11-14

## 2024-11-14 RX ORDER — FAMOTIDINE 10 MG/ML
20 INJECTION INTRAVENOUS ONCE AS NEEDED
Status: DISCONTINUED | OUTPATIENT
Start: 2024-11-14 | End: 2024-11-14 | Stop reason: HOSPADM

## 2024-11-14 RX ORDER — EPINEPHRINE 0.3 MG/.3ML
0.3 INJECTION SUBCUTANEOUS EVERY 5 MIN PRN
Status: DISCONTINUED | OUTPATIENT
Start: 2024-11-14 | End: 2024-11-14 | Stop reason: HOSPADM

## 2024-11-14 RX ORDER — HEPARIN SODIUM,PORCINE/PF 10 UNIT/ML
50 SYRINGE (ML) INTRAVENOUS AS NEEDED
OUTPATIENT
Start: 2024-11-14

## 2024-11-14 RX ORDER — PROCHLORPERAZINE EDISYLATE 5 MG/ML
10 INJECTION INTRAMUSCULAR; INTRAVENOUS EVERY 6 HOURS PRN
Status: DISCONTINUED | OUTPATIENT
Start: 2024-11-14 | End: 2024-11-14 | Stop reason: HOSPADM

## 2024-11-14 RX ORDER — ALBUTEROL SULFATE 0.83 MG/ML
3 SOLUTION RESPIRATORY (INHALATION) AS NEEDED
Status: DISCONTINUED | OUTPATIENT
Start: 2024-11-14 | End: 2024-11-14 | Stop reason: HOSPADM

## 2024-11-14 ASSESSMENT — PAIN SCALES - GENERAL: PAINLEVEL_OUTOF10: 6

## 2024-11-21 ENCOUNTER — APPOINTMENT (OUTPATIENT)
Dept: HEMATOLOGY/ONCOLOGY | Facility: CLINIC | Age: 64
End: 2024-11-21
Payer: COMMERCIAL

## 2024-11-27 RX ORDER — HYDROXYZINE HYDROCHLORIDE 25 MG/1
25 TABLET, FILM COATED ORAL 2 TIMES DAILY PRN
Qty: 180 TABLET | Refills: 0 | OUTPATIENT
Start: 2024-11-27

## 2024-11-29 ENCOUNTER — INFUSION (OUTPATIENT)
Dept: HEMATOLOGY/ONCOLOGY | Facility: CLINIC | Age: 64
End: 2024-11-29
Payer: COMMERCIAL

## 2024-11-29 VITALS
BODY MASS INDEX: 27.81 KG/M2 | DIASTOLIC BLOOD PRESSURE: 77 MMHG | HEART RATE: 77 BPM | OXYGEN SATURATION: 97 % | SYSTOLIC BLOOD PRESSURE: 117 MMHG | TEMPERATURE: 97.2 F | WEIGHT: 172.62 LBS | RESPIRATION RATE: 18 BRPM

## 2024-11-29 DIAGNOSIS — C53.9 CERVICAL CANCER, FIGO STAGE IVA: ICD-10-CM

## 2024-11-29 LAB
BASOPHILS # BLD AUTO: 0.07 X10*3/UL (ref 0–0.1)
BASOPHILS NFR BLD AUTO: 0.5 %
EOSINOPHIL # BLD AUTO: 0.18 X10*3/UL (ref 0–0.7)
EOSINOPHIL NFR BLD AUTO: 1.4 %
ERYTHROCYTE [DISTWIDTH] IN BLOOD BY AUTOMATED COUNT: 17.2 % (ref 11.5–14.5)
HCT VFR BLD AUTO: 24.3 % (ref 36–46)
HGB BLD-MCNC: 7.5 G/DL (ref 12–16)
IMM GRANULOCYTES # BLD AUTO: 0.15 X10*3/UL (ref 0–0.7)
IMM GRANULOCYTES NFR BLD AUTO: 1.1 % (ref 0–0.9)
LYMPHOCYTES # BLD AUTO: 0.74 X10*3/UL (ref 1.2–4.8)
LYMPHOCYTES NFR BLD AUTO: 5.6 %
MCH RBC QN AUTO: 30.9 PG (ref 26–34)
MCHC RBC AUTO-ENTMCNC: 30.9 G/DL (ref 32–36)
MCV RBC AUTO: 100 FL (ref 80–100)
MONOCYTES # BLD AUTO: 1.13 X10*3/UL (ref 0.1–1)
MONOCYTES NFR BLD AUTO: 8.6 %
NEUTROPHILS # BLD AUTO: 10.92 X10*3/UL (ref 1.2–7.7)
NEUTROPHILS NFR BLD AUTO: 82.8 %
NRBC BLD-RTO: 0 /100 WBCS (ref 0–0)
PLATELET # BLD AUTO: 375 X10*3/UL (ref 150–450)
RBC # BLD AUTO: 2.43 X10*6/UL (ref 4–5.2)
WBC # BLD AUTO: 13.2 X10*3/UL (ref 4.4–11.3)

## 2024-11-29 PROCEDURE — 96375 TX/PRO/DX INJ NEW DRUG ADDON: CPT | Mod: INF

## 2024-11-29 PROCEDURE — 96413 CHEMO IV INFUSION 1 HR: CPT

## 2024-11-29 PROCEDURE — 85025 COMPLETE CBC W/AUTO DIFF WBC: CPT

## 2024-11-29 PROCEDURE — 2500000004 HC RX 250 GENERAL PHARMACY W/ HCPCS (ALT 636 FOR OP/ED): Mod: JZ | Performed by: PHARMACIST

## 2024-11-29 PROCEDURE — 2500000004 HC RX 250 GENERAL PHARMACY W/ HCPCS (ALT 636 FOR OP/ED): Performed by: STUDENT IN AN ORGANIZED HEALTH CARE EDUCATION/TRAINING PROGRAM

## 2024-11-29 PROCEDURE — 96377 APPLICATON ON-BODY INJECTOR: CPT

## 2024-11-29 RX ORDER — ONDANSETRON HYDROCHLORIDE 2 MG/ML
8 INJECTION, SOLUTION INTRAVENOUS ONCE
Status: COMPLETED | OUTPATIENT
Start: 2024-11-29 | End: 2024-11-29

## 2024-11-29 RX ORDER — DIPHENHYDRAMINE HYDROCHLORIDE 50 MG/ML
50 INJECTION INTRAMUSCULAR; INTRAVENOUS AS NEEDED
OUTPATIENT
Start: 2024-11-29

## 2024-11-29 RX ORDER — FAMOTIDINE 10 MG/ML
20 INJECTION INTRAVENOUS ONCE AS NEEDED
Status: DISCONTINUED | OUTPATIENT
Start: 2024-11-29 | End: 2024-11-29 | Stop reason: HOSPADM

## 2024-11-29 RX ORDER — HEPARIN SODIUM,PORCINE/PF 10 UNIT/ML
50 SYRINGE (ML) INTRAVENOUS AS NEEDED
Status: DISCONTINUED | OUTPATIENT
Start: 2024-11-29 | End: 2024-11-29 | Stop reason: HOSPADM

## 2024-11-29 RX ORDER — DIPHENHYDRAMINE HYDROCHLORIDE 50 MG/ML
50 INJECTION INTRAMUSCULAR; INTRAVENOUS AS NEEDED
Status: DISCONTINUED | OUTPATIENT
Start: 2024-11-29 | End: 2024-11-29 | Stop reason: HOSPADM

## 2024-11-29 RX ORDER — FAMOTIDINE 10 MG/ML
20 INJECTION INTRAVENOUS ONCE AS NEEDED
OUTPATIENT
Start: 2024-11-29

## 2024-11-29 RX ORDER — ALBUTEROL SULFATE 0.83 MG/ML
3 SOLUTION RESPIRATORY (INHALATION) AS NEEDED
OUTPATIENT
Start: 2024-11-29

## 2024-11-29 RX ORDER — EPINEPHRINE 0.3 MG/.3ML
0.3 INJECTION SUBCUTANEOUS EVERY 5 MIN PRN
Status: DISCONTINUED | OUTPATIENT
Start: 2024-11-29 | End: 2024-11-29 | Stop reason: HOSPADM

## 2024-11-29 RX ORDER — PROCHLORPERAZINE MALEATE 10 MG
10 TABLET ORAL EVERY 6 HOURS PRN
Status: DISCONTINUED | OUTPATIENT
Start: 2024-11-29 | End: 2024-11-29 | Stop reason: HOSPADM

## 2024-11-29 RX ORDER — HEPARIN 100 UNIT/ML
500 SYRINGE INTRAVENOUS AS NEEDED
OUTPATIENT
Start: 2024-11-29

## 2024-11-29 RX ORDER — EPINEPHRINE 0.3 MG/.3ML
0.3 INJECTION SUBCUTANEOUS EVERY 5 MIN PRN
OUTPATIENT
Start: 2024-11-29

## 2024-11-29 RX ORDER — HEPARIN SODIUM,PORCINE/PF 10 UNIT/ML
50 SYRINGE (ML) INTRAVENOUS AS NEEDED
OUTPATIENT
Start: 2024-11-29

## 2024-11-29 RX ORDER — ALBUTEROL SULFATE 0.83 MG/ML
3 SOLUTION RESPIRATORY (INHALATION) AS NEEDED
Status: DISCONTINUED | OUTPATIENT
Start: 2024-11-29 | End: 2024-11-29 | Stop reason: HOSPADM

## 2024-11-29 RX ORDER — PROCHLORPERAZINE EDISYLATE 5 MG/ML
10 INJECTION INTRAMUSCULAR; INTRAVENOUS EVERY 6 HOURS PRN
Status: DISCONTINUED | OUTPATIENT
Start: 2024-11-29 | End: 2024-11-29 | Stop reason: HOSPADM

## 2024-11-29 RX ORDER — HEPARIN 100 UNIT/ML
500 SYRINGE INTRAVENOUS AS NEEDED
Status: DISCONTINUED | OUTPATIENT
Start: 2024-11-29 | End: 2024-11-29 | Stop reason: HOSPADM

## 2024-11-29 ASSESSMENT — PAIN SCALES - GENERAL: PAINLEVEL_OUTOF10: 7

## 2024-12-02 ENCOUNTER — LAB (OUTPATIENT)
Dept: LAB | Facility: CLINIC | Age: 64
End: 2024-12-02
Payer: COMMERCIAL

## 2024-12-02 ENCOUNTER — INFUSION (OUTPATIENT)
Dept: HEMATOLOGY/ONCOLOGY | Facility: CLINIC | Age: 64
End: 2024-12-02
Payer: COMMERCIAL

## 2024-12-02 DIAGNOSIS — C53.9 CERVICAL CANCER, FIGO STAGE IVA: ICD-10-CM

## 2024-12-02 LAB
ABO GROUP (TYPE) IN BLOOD: NORMAL
ANTIBODY SCREEN: NORMAL
RH FACTOR (ANTIGEN D): NORMAL

## 2024-12-02 PROCEDURE — 86923 COMPATIBILITY TEST ELECTRIC: CPT

## 2024-12-02 PROCEDURE — 86901 BLOOD TYPING SEROLOGIC RH(D): CPT

## 2024-12-02 RX ORDER — EPINEPHRINE 0.3 MG/.3ML
0.3 INJECTION SUBCUTANEOUS EVERY 5 MIN PRN
Status: CANCELLED | OUTPATIENT
Start: 2024-12-02

## 2024-12-02 RX ORDER — DIPHENHYDRAMINE HYDROCHLORIDE 50 MG/ML
50 INJECTION INTRAMUSCULAR; INTRAVENOUS AS NEEDED
Status: CANCELLED | OUTPATIENT
Start: 2024-12-02

## 2024-12-02 RX ORDER — FAMOTIDINE 10 MG/ML
20 INJECTION INTRAVENOUS ONCE AS NEEDED
Status: CANCELLED | OUTPATIENT
Start: 2024-12-02

## 2024-12-02 RX ORDER — ALBUTEROL SULFATE 0.83 MG/ML
3 SOLUTION RESPIRATORY (INHALATION) AS NEEDED
Status: CANCELLED | OUTPATIENT
Start: 2024-12-02

## 2024-12-03 ENCOUNTER — APPOINTMENT (OUTPATIENT)
Dept: PALLIATIVE MEDICINE | Facility: CLINIC | Age: 64
End: 2024-12-03
Payer: COMMERCIAL

## 2024-12-03 ENCOUNTER — APPOINTMENT (OUTPATIENT)
Dept: HEMATOLOGY/ONCOLOGY | Facility: CLINIC | Age: 64
End: 2024-12-03
Payer: COMMERCIAL

## 2024-12-03 ENCOUNTER — TELEPHONE (OUTPATIENT)
Dept: GYNECOLOGIC ONCOLOGY | Facility: HOSPITAL | Age: 64
End: 2024-12-03
Payer: COMMERCIAL

## 2024-12-03 DIAGNOSIS — C53.9 CERVICAL CANCER, FIGO STAGE IVA: ICD-10-CM

## 2024-12-03 DIAGNOSIS — R60.0 LEG EDEMA, LEFT: Primary | ICD-10-CM

## 2024-12-03 RX ORDER — ALBUTEROL SULFATE 0.83 MG/ML
3 SOLUTION RESPIRATORY (INHALATION) AS NEEDED
OUTPATIENT
Start: 2024-12-03

## 2024-12-03 RX ORDER — EPINEPHRINE 0.3 MG/.3ML
0.3 INJECTION SUBCUTANEOUS EVERY 5 MIN PRN
OUTPATIENT
Start: 2024-12-03

## 2024-12-03 RX ORDER — FAMOTIDINE 10 MG/ML
20 INJECTION INTRAVENOUS ONCE AS NEEDED
OUTPATIENT
Start: 2024-12-03

## 2024-12-03 RX ORDER — DIPHENHYDRAMINE HYDROCHLORIDE 50 MG/ML
50 INJECTION INTRAMUSCULAR; INTRAVENOUS AS NEEDED
OUTPATIENT
Start: 2024-12-03

## 2024-12-03 NOTE — TELEPHONE ENCOUNTER
Patient was unable to go for blood transfusion today due to not being able to walk. Call to patient's SO. He states patient's left leg is edematous, red, and painful. Her right leg is normal. Per  Dr. Jefferson, obtain a duplex of left leg to r/o DVT. Patient is scheduled for duplex at [place] tomorrow at 2:00.

## 2024-12-04 ENCOUNTER — HOSPITAL ENCOUNTER (OUTPATIENT)
Dept: RADIOLOGY | Facility: CLINIC | Age: 64
Discharge: HOME | End: 2024-12-04
Payer: COMMERCIAL

## 2024-12-04 ENCOUNTER — INFUSION (OUTPATIENT)
Dept: HEMATOLOGY/ONCOLOGY | Facility: CLINIC | Age: 64
End: 2024-12-04
Payer: COMMERCIAL

## 2024-12-04 VITALS
DIASTOLIC BLOOD PRESSURE: 60 MMHG | SYSTOLIC BLOOD PRESSURE: 91 MMHG | TEMPERATURE: 97.2 F | WEIGHT: 174.49 LBS | OXYGEN SATURATION: 98 % | RESPIRATION RATE: 18 BRPM | HEART RATE: 74 BPM | BODY MASS INDEX: 28.11 KG/M2

## 2024-12-04 DIAGNOSIS — C53.9 CERVICAL CANCER, FIGO STAGE IVA: ICD-10-CM

## 2024-12-04 DIAGNOSIS — R60.0 BILATERAL LOWER EXTREMITY EDEMA: ICD-10-CM

## 2024-12-04 LAB
BLOOD EXPIRATION DATE: NORMAL
DISPENSE STATUS: NORMAL
PRODUCT BLOOD TYPE: 9500
PRODUCT CODE: NORMAL
UNIT ABO: NORMAL
UNIT NUMBER: NORMAL
UNIT RH: NORMAL
UNIT VOLUME: 400
XM INTEP: NORMAL

## 2024-12-04 PROCEDURE — 36430 TRANSFUSION BLD/BLD COMPNT: CPT

## 2024-12-04 PROCEDURE — 93971 EXTREMITY STUDY: CPT

## 2024-12-04 PROCEDURE — 93971 EXTREMITY STUDY: CPT | Performed by: RADIOLOGY

## 2024-12-04 PROCEDURE — P9040 RBC LEUKOREDUCED IRRADIATED: HCPCS

## 2024-12-04 PROCEDURE — 2500000004 HC RX 250 GENERAL PHARMACY W/ HCPCS (ALT 636 FOR OP/ED): Performed by: STUDENT IN AN ORGANIZED HEALTH CARE EDUCATION/TRAINING PROGRAM

## 2024-12-04 RX ORDER — HEPARIN 100 UNIT/ML
500 SYRINGE INTRAVENOUS AS NEEDED
OUTPATIENT
Start: 2024-12-04

## 2024-12-04 RX ORDER — HEPARIN SODIUM,PORCINE/PF 10 UNIT/ML
50 SYRINGE (ML) INTRAVENOUS AS NEEDED
OUTPATIENT
Start: 2024-12-04

## 2024-12-04 RX ORDER — HEPARIN 100 UNIT/ML
500 SYRINGE INTRAVENOUS AS NEEDED
Status: DISCONTINUED | OUTPATIENT
Start: 2024-12-04 | End: 2024-12-04 | Stop reason: HOSPADM

## 2024-12-04 ASSESSMENT — PAIN SCALES - GENERAL: PAINLEVEL_OUTOF10: 8

## 2024-12-04 NOTE — PROGRESS NOTES
Patient in clinic for 1 unit PRBC. Tolerated without issue. BP slightly low with VS but patient and  state this is not abnormal. Patient has edema in bilateral lower extremities. Pitting. Patient reports tenderness and tightness in both legs. US of legs scheduled immediately after this appointment. Patient and  did report that the patient lost her balance when getting into the car and fell back into a pile of snow but since the snow was so high it was almost like she just sat in a chair. Both  and Shelbi denied her hitting her head or anything other than her bottom. She denied any problem or pain related to this. No other issue or concerns addressed.  escorted the patient to US via wheelchair.

## 2024-12-05 ENCOUNTER — APPOINTMENT (OUTPATIENT)
Dept: GYNECOLOGIC ONCOLOGY | Facility: CLINIC | Age: 64
End: 2024-12-05
Payer: COMMERCIAL

## 2024-12-05 ENCOUNTER — DOCUMENTATION (OUTPATIENT)
Dept: GYNECOLOGIC ONCOLOGY | Facility: HOSPITAL | Age: 64
End: 2024-12-05
Payer: COMMERCIAL

## 2024-12-05 ENCOUNTER — TELEPHONE (OUTPATIENT)
Dept: GYNECOLOGIC ONCOLOGY | Facility: HOSPITAL | Age: 64
End: 2024-12-05
Payer: COMMERCIAL

## 2024-12-05 ENCOUNTER — APPOINTMENT (OUTPATIENT)
Dept: HEMATOLOGY/ONCOLOGY | Facility: CLINIC | Age: 64
End: 2024-12-05
Payer: COMMERCIAL

## 2024-12-05 NOTE — TELEPHONE ENCOUNTER
Patient's LLE duplex is negative for DVT. Call to patient and spoke to Kenn. Recommend that she put on a compression stocking. She has them but her leg is tender and he cannot get it on her. Recommend using an ace bandage. Recommend elevation with 3 pillows and one long wise under her knee to reduce pressure on her knee and lower her head all the way down. She should try this multiple times per day and at night. Discussed that compression and elevation are the 2 best ways to manage leg edema. He verbalized understanding.

## 2024-12-06 ENCOUNTER — TELEPHONE (OUTPATIENT)
Dept: GYNECOLOGIC ONCOLOGY | Facility: HOSPITAL | Age: 64
End: 2024-12-06
Payer: COMMERCIAL

## 2024-12-06 DIAGNOSIS — C53.9 CERVICAL CANCER, FIGO STAGE IVA: ICD-10-CM

## 2024-12-06 NOTE — TELEPHONE ENCOUNTER
Call from Kenn who reports patients legs are more swollen bilaterally and she has developed a blister on the back of her calf. He has applied the ace bandages and has elevated her legs for most of the day yesterday. Will talk with Dr. Jefferson about a wound care clinic referral. Patient has had this happen in the past and has been to the wound care clinic at [facility]. The treatment there was very effective. Sending message to Dr. Jefferson. Kenn will call [facility] wound care clinic to make an appointment.

## 2024-12-07 ENCOUNTER — TELEPHONE (OUTPATIENT)
Dept: OPERATING ROOM | Facility: HOSPITAL | Age: 64
End: 2024-12-07
Payer: COMMERCIAL

## 2024-12-07 DIAGNOSIS — N39.0 URINARY TRACT INFECTION WITHOUT HEMATURIA, SITE UNSPECIFIED: Primary | ICD-10-CM

## 2024-12-07 RX ORDER — SULFAMETHOXAZOLE AND TRIMETHOPRIM 800; 160 MG/1; MG/1
1 TABLET ORAL 2 TIMES DAILY
Qty: 14 TABLET | Refills: 0 | Status: ON HOLD | OUTPATIENT
Start: 2024-12-07 | End: 2024-12-14

## 2024-12-07 NOTE — PROGRESS NOTES
Returned page regarding multiple complaints.  Malodorous urine, confusion at night time, bilateral swelling in legs.  Received chemo 8 days ago.  Recommended ER for labs and in person evaluation.  They strongly prefer to stay home if possible.  Will treat presumed uti with PO bactrim however strict precautions given to present with fever, altered mental status, chest pain/fever, or any other concerning symptoms.

## 2024-12-11 ENCOUNTER — HOSPITAL ENCOUNTER (EMERGENCY)
Facility: HOSPITAL | Age: 64
End: 2024-12-11
Payer: COMMERCIAL

## 2024-12-11 ENCOUNTER — HOSPITAL ENCOUNTER (INPATIENT)
Facility: HOSPITAL | Age: 64
LOS: 1 days | Discharge: HOSPICE | DRG: 812 | End: 2024-12-13
Attending: EMERGENCY MEDICINE | Admitting: STUDENT IN AN ORGANIZED HEALTH CARE EDUCATION/TRAINING PROGRAM
Payer: COMMERCIAL

## 2024-12-11 ENCOUNTER — LAB (OUTPATIENT)
Dept: LAB | Facility: LAB | Age: 64
End: 2024-12-11
Payer: COMMERCIAL

## 2024-12-11 ENCOUNTER — APPOINTMENT (OUTPATIENT)
Dept: RADIOLOGY | Facility: HOSPITAL | Age: 64
DRG: 812 | End: 2024-12-11
Payer: COMMERCIAL

## 2024-12-11 ENCOUNTER — TELEPHONE (OUTPATIENT)
Dept: GYNECOLOGIC ONCOLOGY | Facility: HOSPITAL | Age: 64
End: 2024-12-11
Payer: COMMERCIAL

## 2024-12-11 ENCOUNTER — OFFICE VISIT (OUTPATIENT)
Dept: WOUND CARE | Facility: HOSPITAL | Age: 64
End: 2024-12-11
Payer: COMMERCIAL

## 2024-12-11 ENCOUNTER — TELEPHONE (OUTPATIENT)
Dept: GYNECOLOGIC ONCOLOGY | Facility: HOSPITAL | Age: 64
End: 2024-12-11

## 2024-12-11 DIAGNOSIS — C53.9 CERVICAL CANCER, FIGO STAGE IVA: ICD-10-CM

## 2024-12-11 DIAGNOSIS — Z79.899 ENCOUNTER FOR LONG-TERM (CURRENT) USE OF HIGH-RISK MEDICATION: ICD-10-CM

## 2024-12-11 DIAGNOSIS — G89.3 CANCER RELATED PAIN: ICD-10-CM

## 2024-12-11 DIAGNOSIS — E87.5 HYPERKALEMIA: ICD-10-CM

## 2024-12-11 DIAGNOSIS — N17.9 ACUTE KIDNEY INJURY (CMS-HCC): ICD-10-CM

## 2024-12-11 DIAGNOSIS — D64.9 ANEMIA, UNSPECIFIED TYPE: Primary | ICD-10-CM

## 2024-12-11 LAB
ALBUMIN SERPL BCP-MCNC: 2.6 G/DL (ref 3.4–5)
ALP SERPL-CCNC: 109 U/L (ref 33–136)
ALT SERPL W P-5'-P-CCNC: 10 U/L (ref 7–45)
ANION GAP SERPL CALCULATED.3IONS-SCNC: 20 MMOL/L (ref 10–20)
AST SERPL W P-5'-P-CCNC: 29 U/L (ref 9–39)
BASOPHILS # BLD AUTO: 0.04 X10*3/UL (ref 0–0.1)
BASOPHILS NFR BLD AUTO: 0.2 %
BILIRUB SERPL-MCNC: 0.3 MG/DL (ref 0–1.2)
BUN SERPL-MCNC: 115 MG/DL (ref 6–23)
CALCIUM SERPL-MCNC: 7.9 MG/DL (ref 8.6–10.3)
CHLORIDE SERPL-SCNC: 101 MMOL/L (ref 98–107)
CO2 SERPL-SCNC: 15 MMOL/L (ref 21–32)
CREAT SERPL-MCNC: 5.31 MG/DL (ref 0.5–1.05)
EGFRCR SERPLBLD CKD-EPI 2021: 9 ML/MIN/1.73M*2
EOSINOPHIL # BLD AUTO: 0.01 X10*3/UL (ref 0–0.7)
EOSINOPHIL NFR BLD AUTO: 0 %
ERYTHROCYTE [DISTWIDTH] IN BLOOD BY AUTOMATED COUNT: 19.3 % (ref 11.5–14.5)
GLUCOSE SERPL-MCNC: 113 MG/DL (ref 74–99)
HCT VFR BLD AUTO: 20.8 % (ref 36–46)
HGB BLD-MCNC: 6.4 G/DL (ref 12–16)
IMM GRANULOCYTES # BLD AUTO: 1.16 X10*3/UL (ref 0–0.7)
IMM GRANULOCYTES NFR BLD AUTO: 4.8 % (ref 0–0.9)
LYMPHOCYTES # BLD AUTO: 0.23 X10*3/UL (ref 1.2–4.8)
LYMPHOCYTES NFR BLD AUTO: 0.9 %
MCH RBC QN AUTO: 31.2 PG (ref 26–34)
MCHC RBC AUTO-ENTMCNC: 30.8 G/DL (ref 32–36)
MCV RBC AUTO: 102 FL (ref 80–100)
MONOCYTES # BLD AUTO: 0.31 X10*3/UL (ref 0.1–1)
MONOCYTES NFR BLD AUTO: 1.3 %
NEUTROPHILS # BLD AUTO: 22.54 X10*3/UL (ref 1.2–7.7)
NEUTROPHILS NFR BLD AUTO: 92.8 %
NRBC BLD-RTO: 0.3 /100 WBCS (ref 0–0)
PLATELET # BLD AUTO: 265 X10*3/UL (ref 150–450)
POTASSIUM SERPL-SCNC: 6.3 MMOL/L (ref 3.5–5.3)
PROT SERPL-MCNC: 6 G/DL (ref 6.4–8.2)
RBC # BLD AUTO: 2.05 X10*6/UL (ref 4–5.2)
SODIUM SERPL-SCNC: 130 MMOL/L (ref 136–145)
WBC # BLD AUTO: 24.3 X10*3/UL (ref 4.4–11.3)

## 2024-12-11 PROCEDURE — 80053 COMPREHEN METABOLIC PANEL: CPT

## 2024-12-11 PROCEDURE — 81001 URINALYSIS AUTO W/SCOPE: CPT

## 2024-12-11 PROCEDURE — 85025 COMPLETE CBC W/AUTO DIFF WBC: CPT

## 2024-12-11 PROCEDURE — 84484 ASSAY OF TROPONIN QUANT: CPT

## 2024-12-11 PROCEDURE — 99213 OFFICE O/P EST LOW 20 MIN: CPT | Mod: 25

## 2024-12-11 PROCEDURE — 74176 CT ABD & PELVIS W/O CONTRAST: CPT

## 2024-12-11 PROCEDURE — 93005 ELECTROCARDIOGRAM TRACING: CPT

## 2024-12-11 PROCEDURE — 86923 COMPATIBILITY TEST ELECTRIC: CPT

## 2024-12-11 PROCEDURE — 86901 BLOOD TYPING SEROLOGIC RH(D): CPT

## 2024-12-11 PROCEDURE — 87086 URINE CULTURE/COLONY COUNT: CPT | Mod: WESLAB

## 2024-12-11 PROCEDURE — 99285 EMERGENCY DEPT VISIT HI MDM: CPT | Mod: 25 | Performed by: EMERGENCY MEDICINE

## 2024-12-11 PROCEDURE — 99291 CRITICAL CARE FIRST HOUR: CPT

## 2024-12-11 PROCEDURE — 83735 ASSAY OF MAGNESIUM: CPT

## 2024-12-11 PROCEDURE — 30233N1 TRANSFUSION OF NONAUTOLOGOUS RED BLOOD CELLS INTO PERIPHERAL VEIN, PERCUTANEOUS APPROACH: ICD-10-PCS | Performed by: INTERNAL MEDICINE

## 2024-12-11 PROCEDURE — 29580 STRAPPING UNNA BOOT: CPT

## 2024-12-11 PROCEDURE — 2500000004 HC RX 250 GENERAL PHARMACY W/ HCPCS (ALT 636 FOR OP/ED)

## 2024-12-11 RX ORDER — OXYCODONE HYDROCHLORIDE 10 MG/1
10 TABLET ORAL 3 TIMES DAILY PRN
Qty: 90 TABLET | Refills: 0 | Status: ON HOLD | OUTPATIENT
Start: 2024-12-11 | End: 2025-01-10

## 2024-12-11 ASSESSMENT — PAIN SCALES - GENERAL: PAINLEVEL_OUTOF10: 0 - NO PAIN

## 2024-12-11 ASSESSMENT — LIFESTYLE VARIABLES
EVER FELT BAD OR GUILTY ABOUT YOUR DRINKING: NO
HAVE PEOPLE ANNOYED YOU BY CRITICIZING YOUR DRINKING: NO
EVER HAD A DRINK FIRST THING IN THE MORNING TO STEADY YOUR NERVES TO GET RID OF A HANGOVER: NO
HAVE YOU EVER FELT YOU SHOULD CUT DOWN ON YOUR DRINKING: NO
TOTAL SCORE: 0

## 2024-12-11 ASSESSMENT — PAIN - FUNCTIONAL ASSESSMENT: PAIN_FUNCTIONAL_ASSESSMENT: 0-10

## 2024-12-11 NOTE — TELEPHONE ENCOUNTER
Patient requesting oxycodone refill. OARRS reviewed, no issues. CSA/UDS up to date. Last fill 11/12/24.

## 2024-12-11 NOTE — TELEPHONE ENCOUNTER
Call to patient to follow up after patient called Dr. Carbajal on the weekend. Spoke to SO Kenn. He reports patient's urine is improved and patient is still taking her ATBs. He reports that patient is still not right. She seems to be confused. She was going to the car to go somewhere but she didn't know where. Kenn got to her before she was able to leave. She is going to the wound care clinic for her legs today. Her legs are still swollen. There are no areas that look infected. He reports the last chemo was very hard on her. She is due to see Dr. Jefferson tomorrow for next cycle. She has not had pretreatment labs. Recommend that she have labs today so Dr. Jefferson can see if there are any electrolyte changes that could be causing her confusion. He states he will try to get her to the wound care clinic and lab work. He does not think she will be ready for her treatment tomorrow. Discussed that Dr. Jefferson will evaluate her and that not being ready for treatment is ok but that it will be very important for Dr. Jefferson to evaluate her condition. Kenn is agreeable to this plan.

## 2024-12-11 NOTE — TELEPHONE ENCOUNTER
"Call to pt for toxicity assessment. Her  reports that she is still very weak and is confused at times. She does not have a fever and her urine \"is improved\" after initiating antibiotics. She will have labs today.   "

## 2024-12-12 ENCOUNTER — APPOINTMENT (OUTPATIENT)
Dept: CARDIOLOGY | Facility: HOSPITAL | Age: 64
DRG: 812 | End: 2024-12-12
Payer: COMMERCIAL

## 2024-12-12 ENCOUNTER — APPOINTMENT (OUTPATIENT)
Dept: RADIOLOGY | Facility: HOSPITAL | Age: 64
DRG: 812 | End: 2024-12-12
Payer: COMMERCIAL

## 2024-12-12 ENCOUNTER — APPOINTMENT (OUTPATIENT)
Dept: HEMATOLOGY/ONCOLOGY | Facility: CLINIC | Age: 64
End: 2024-12-12
Payer: COMMERCIAL

## 2024-12-12 ENCOUNTER — APPOINTMENT (OUTPATIENT)
Dept: GYNECOLOGIC ONCOLOGY | Facility: CLINIC | Age: 64
End: 2024-12-12
Payer: COMMERCIAL

## 2024-12-12 ENCOUNTER — APPOINTMENT (OUTPATIENT)
Dept: CARDIOLOGY | Facility: HOSPITAL | Age: 64
End: 2024-12-12
Payer: COMMERCIAL

## 2024-12-12 PROBLEM — R39.89 SUSPECTED UTI: Status: ACTIVE | Noted: 2024-12-12

## 2024-12-12 PROBLEM — N18.9 ACUTE RENAL FAILURE SUPERIMPOSED ON CHRONIC KIDNEY DISEASE (CMS-HCC): Status: ACTIVE | Noted: 2024-12-12

## 2024-12-12 PROBLEM — S81.801A OPEN WOUND OF BOTH LOWER EXTREMITIES: Status: ACTIVE | Noted: 2024-12-12

## 2024-12-12 PROBLEM — N17.9 ACUTE RENAL FAILURE SUPERIMPOSED ON CHRONIC KIDNEY DISEASE (CMS-HCC): Status: ACTIVE | Noted: 2024-12-12

## 2024-12-12 PROBLEM — S81.802A OPEN WOUND OF BOTH LOWER EXTREMITIES: Status: ACTIVE | Noted: 2024-12-12

## 2024-12-12 PROBLEM — D64.9 ANEMIA, UNSPECIFIED TYPE: Status: ACTIVE | Noted: 2024-12-12

## 2024-12-12 LAB
ABO GROUP (TYPE) IN BLOOD: NORMAL
ALBUMIN SERPL BCP-MCNC: 2.4 G/DL (ref 3.4–5)
ALP SERPL-CCNC: 101 U/L (ref 33–136)
ALT SERPL W P-5'-P-CCNC: 8 U/L (ref 7–45)
ANION GAP SERPL CALCULATED.3IONS-SCNC: 14 MMOL/L (ref 10–20)
ANION GAP SERPL CALCULATED.3IONS-SCNC: 16 MMOL/L (ref 10–20)
ANION GAP SERPL CALCULATED.3IONS-SCNC: 18 MMOL/L (ref 10–20)
ANTIBODY SCREEN: NORMAL
APPEARANCE UR: ABNORMAL
AST SERPL W P-5'-P-CCNC: 21 U/L (ref 9–39)
ATRIAL RATE: 95 BPM
BACTERIA #/AREA URNS AUTO: ABNORMAL /HPF
BASOPHILS # BLD AUTO: 0.03 X10*3/UL (ref 0–0.1)
BASOPHILS NFR BLD AUTO: 0.1 %
BILIRUB SERPL-MCNC: 0.3 MG/DL (ref 0–1.2)
BILIRUB UR STRIP.AUTO-MCNC: NEGATIVE MG/DL
BLOOD EXPIRATION DATE: NORMAL
BUN SERPL-MCNC: 112 MG/DL (ref 6–23)
BUN SERPL-MCNC: 112 MG/DL (ref 6–23)
BUN SERPL-MCNC: 97 MG/DL (ref 6–23)
CALCIUM SERPL-MCNC: 6.3 MG/DL (ref 8.6–10.3)
CALCIUM SERPL-MCNC: 7.4 MG/DL (ref 8.6–10.3)
CALCIUM SERPL-MCNC: 8 MG/DL (ref 8.6–10.3)
CARDIAC TROPONIN I PNL SERPL HS: 11 NG/L (ref 0–13)
CARDIAC TROPONIN I PNL SERPL HS: 8 NG/L (ref 0–13)
CHLORIDE SERPL-SCNC: 101 MMOL/L (ref 98–107)
CHLORIDE SERPL-SCNC: 104 MMOL/L (ref 98–107)
CHLORIDE SERPL-SCNC: 110 MMOL/L (ref 98–107)
CO2 SERPL-SCNC: 13 MMOL/L (ref 21–32)
CO2 SERPL-SCNC: 15 MMOL/L (ref 21–32)
CO2 SERPL-SCNC: 16 MMOL/L (ref 21–32)
COLOR UR: YELLOW
CREAT SERPL-MCNC: 4.32 MG/DL (ref 0.5–1.05)
CREAT SERPL-MCNC: 4.98 MG/DL (ref 0.5–1.05)
CREAT SERPL-MCNC: 5.24 MG/DL (ref 0.5–1.05)
DISPENSE STATUS: NORMAL
EGFRCR SERPLBLD CKD-EPI 2021: 11 ML/MIN/1.73M*2
EGFRCR SERPLBLD CKD-EPI 2021: 9 ML/MIN/1.73M*2
EGFRCR SERPLBLD CKD-EPI 2021: 9 ML/MIN/1.73M*2
EOSINOPHIL # BLD AUTO: 0.01 X10*3/UL (ref 0–0.7)
EOSINOPHIL NFR BLD AUTO: 0 %
ERYTHROCYTE [DISTWIDTH] IN BLOOD BY AUTOMATED COUNT: 19 % (ref 11.5–14.5)
FERRITIN SERPL-MCNC: 5570 NG/ML (ref 8–150)
GLUCOSE BLD MANUAL STRIP-MCNC: 104 MG/DL (ref 74–99)
GLUCOSE BLD MANUAL STRIP-MCNC: 130 MG/DL (ref 74–99)
GLUCOSE SERPL-MCNC: 113 MG/DL (ref 74–99)
GLUCOSE SERPL-MCNC: 133 MG/DL (ref 74–99)
GLUCOSE SERPL-MCNC: 95 MG/DL (ref 74–99)
GLUCOSE UR STRIP.AUTO-MCNC: NORMAL MG/DL
HCT VFR BLD AUTO: 18.7 % (ref 36–46)
HCT VFR BLD AUTO: 20.2 % (ref 36–46)
HCT VFR BLD AUTO: 23.8 % (ref 36–46)
HGB BLD-MCNC: 5.9 G/DL (ref 12–16)
HGB BLD-MCNC: 6.7 G/DL (ref 12–16)
HGB BLD-MCNC: 7.9 G/DL (ref 12–16)
HOLD SPECIMEN: NORMAL
IMM GRANULOCYTES # BLD AUTO: 0.98 X10*3/UL (ref 0–0.7)
IMM GRANULOCYTES NFR BLD AUTO: 4.3 % (ref 0–0.9)
IRON SATN MFR SERPL: 35 % (ref 25–45)
IRON SERPL-MCNC: 49 UG/DL (ref 35–150)
KETONES UR STRIP.AUTO-MCNC: NEGATIVE MG/DL
LEUKOCYTE ESTERASE UR QL STRIP.AUTO: ABNORMAL
LYMPHOCYTES # BLD AUTO: 0.26 X10*3/UL (ref 1.2–4.8)
LYMPHOCYTES NFR BLD AUTO: 1.1 %
MAGNESIUM SERPL-MCNC: 1.76 MG/DL (ref 1.6–2.4)
MCH RBC QN AUTO: 30.9 PG (ref 26–34)
MCHC RBC AUTO-ENTMCNC: 31.6 G/DL (ref 32–36)
MCV RBC AUTO: 98 FL (ref 80–100)
MONOCYTES # BLD AUTO: 0.2 X10*3/UL (ref 0.1–1)
MONOCYTES NFR BLD AUTO: 0.9 %
NEUTROPHILS # BLD AUTO: 21.32 X10*3/UL (ref 1.2–7.7)
NEUTROPHILS NFR BLD AUTO: 93.6 %
NITRITE UR QL STRIP.AUTO: NEGATIVE
NRBC BLD-RTO: 0.4 /100 WBCS (ref 0–0)
P AXIS: 48 DEGREES
P OFFSET: 198 MS
P ONSET: 148 MS
PH UR STRIP.AUTO: 5.5 [PH]
PLATELET # BLD AUTO: 257 X10*3/UL (ref 150–450)
POTASSIUM SERPL-SCNC: 5.3 MMOL/L (ref 3.5–5.3)
POTASSIUM SERPL-SCNC: 5.5 MMOL/L (ref 3.5–5.3)
POTASSIUM SERPL-SCNC: 6.3 MMOL/L (ref 3.5–5.3)
PR INTERVAL: 156 MS
PRODUCT BLOOD TYPE: 5100
PRODUCT BLOOD TYPE: 5100
PRODUCT BLOOD TYPE: 600
PRODUCT CODE: NORMAL
PROT SERPL-MCNC: 6.1 G/DL (ref 6.4–8.2)
PROT UR STRIP.AUTO-MCNC: ABNORMAL MG/DL
Q ONSET: 226 MS
QRS COUNT: 16 BEATS
QRS DURATION: 84 MS
QT INTERVAL: 358 MS
QTC CALCULATION(BAZETT): 449 MS
QTC FREDERICIA: 417 MS
R AXIS: 14 DEGREES
RBC # BLD AUTO: 1.91 X10*6/UL (ref 4–5.2)
RBC # UR STRIP.AUTO: ABNORMAL /UL
RBC #/AREA URNS AUTO: ABNORMAL /HPF
RH FACTOR (ANTIGEN D): NORMAL
SODIUM SERPL-SCNC: 129 MMOL/L (ref 136–145)
SODIUM SERPL-SCNC: 129 MMOL/L (ref 136–145)
SODIUM SERPL-SCNC: 132 MMOL/L (ref 136–145)
SP GR UR STRIP.AUTO: 1.01
SQUAMOUS #/AREA URNS AUTO: ABNORMAL /HPF
T AXIS: 8 DEGREES
T OFFSET: 405 MS
TIBC SERPL-MCNC: 139 UG/DL (ref 240–445)
UIBC SERPL-MCNC: 90 UG/DL (ref 110–370)
UNIT ABO: NORMAL
UNIT NUMBER: NORMAL
UNIT RH: NORMAL
UNIT VOLUME: 350
UROBILINOGEN UR STRIP.AUTO-MCNC: NORMAL MG/DL
VENTRICULAR RATE: 95 BPM
WBC # BLD AUTO: 22.8 X10*3/UL (ref 4.4–11.3)
WBC #/AREA URNS AUTO: ABNORMAL /HPF
WBC CLUMPS #/AREA URNS AUTO: ABNORMAL /HPF
XM INTEP: NORMAL

## 2024-12-12 PROCEDURE — 71045 X-RAY EXAM CHEST 1 VIEW: CPT

## 2024-12-12 PROCEDURE — 2500000004 HC RX 250 GENERAL PHARMACY W/ HCPCS (ALT 636 FOR OP/ED): Mod: JZ | Performed by: STUDENT IN AN ORGANIZED HEALTH CARE EDUCATION/TRAINING PROGRAM

## 2024-12-12 PROCEDURE — 99223 1ST HOSP IP/OBS HIGH 75: CPT | Performed by: STUDENT IN AN ORGANIZED HEALTH CARE EDUCATION/TRAINING PROGRAM

## 2024-12-12 PROCEDURE — 82947 ASSAY GLUCOSE BLOOD QUANT: CPT

## 2024-12-12 PROCEDURE — 93005 ELECTROCARDIOGRAM TRACING: CPT

## 2024-12-12 PROCEDURE — 2500000002 HC RX 250 W HCPCS SELF ADMINISTERED DRUGS (ALT 637 FOR MEDICARE OP, ALT 636 FOR OP/ED): Performed by: STUDENT IN AN ORGANIZED HEALTH CARE EDUCATION/TRAINING PROGRAM

## 2024-12-12 PROCEDURE — 2060000001 HC INTERMEDIATE ICU ROOM DAILY

## 2024-12-12 PROCEDURE — P9040 RBC LEUKOREDUCED IRRADIATED: HCPCS

## 2024-12-12 PROCEDURE — 2500000004 HC RX 250 GENERAL PHARMACY W/ HCPCS (ALT 636 FOR OP/ED): Performed by: INTERNAL MEDICINE

## 2024-12-12 PROCEDURE — 94664 DEMO&/EVAL PT USE INHALER: CPT

## 2024-12-12 PROCEDURE — 71045 X-RAY EXAM CHEST 1 VIEW: CPT | Performed by: RADIOLOGY

## 2024-12-12 PROCEDURE — 85014 HEMATOCRIT: CPT | Performed by: STUDENT IN AN ORGANIZED HEALTH CARE EDUCATION/TRAINING PROGRAM

## 2024-12-12 PROCEDURE — 82728 ASSAY OF FERRITIN: CPT | Performed by: INTERNAL MEDICINE

## 2024-12-12 PROCEDURE — 85014 HEMATOCRIT: CPT | Performed by: INTERNAL MEDICINE

## 2024-12-12 PROCEDURE — 80048 BASIC METABOLIC PNL TOTAL CA: CPT | Performed by: INTERNAL MEDICINE

## 2024-12-12 PROCEDURE — 99233 SBSQ HOSP IP/OBS HIGH 50: CPT | Performed by: INTERNAL MEDICINE

## 2024-12-12 PROCEDURE — 74176 CT ABD & PELVIS W/O CONTRAST: CPT | Performed by: RADIOLOGY

## 2024-12-12 PROCEDURE — 2500000002 HC RX 250 W HCPCS SELF ADMINISTERED DRUGS (ALT 637 FOR MEDICARE OP, ALT 636 FOR OP/ED): Performed by: INTERNAL MEDICINE

## 2024-12-12 PROCEDURE — 2500000004 HC RX 250 GENERAL PHARMACY W/ HCPCS (ALT 636 FOR OP/ED): Performed by: EMERGENCY MEDICINE

## 2024-12-12 PROCEDURE — 94640 AIRWAY INHALATION TREATMENT: CPT

## 2024-12-12 PROCEDURE — 96360 HYDRATION IV INFUSION INIT: CPT | Mod: 59

## 2024-12-12 PROCEDURE — 36430 TRANSFUSION BLD/BLD COMPNT: CPT

## 2024-12-12 PROCEDURE — 2500000001 HC RX 250 WO HCPCS SELF ADMINISTERED DRUGS (ALT 637 FOR MEDICARE OP): Performed by: STUDENT IN AN ORGANIZED HEALTH CARE EDUCATION/TRAINING PROGRAM

## 2024-12-12 PROCEDURE — 2500000005 HC RX 250 GENERAL PHARMACY W/O HCPCS: Performed by: STUDENT IN AN ORGANIZED HEALTH CARE EDUCATION/TRAINING PROGRAM

## 2024-12-12 PROCEDURE — 84484 ASSAY OF TROPONIN QUANT: CPT

## 2024-12-12 PROCEDURE — 83540 ASSAY OF IRON: CPT | Performed by: INTERNAL MEDICINE

## 2024-12-12 RX ORDER — PANTOPRAZOLE SODIUM 40 MG/10ML
40 INJECTION, POWDER, LYOPHILIZED, FOR SOLUTION INTRAVENOUS
Status: DISCONTINUED | OUTPATIENT
Start: 2024-12-12 | End: 2024-12-13 | Stop reason: HOSPADM

## 2024-12-12 RX ORDER — ACETAMINOPHEN 325 MG/1
650 TABLET ORAL EVERY 4 HOURS PRN
Status: DISCONTINUED | OUTPATIENT
Start: 2024-12-12 | End: 2024-12-13 | Stop reason: HOSPADM

## 2024-12-12 RX ORDER — ACETAMINOPHEN 160 MG/5ML
650 SOLUTION ORAL EVERY 4 HOURS PRN
Status: DISCONTINUED | OUTPATIENT
Start: 2024-12-12 | End: 2024-12-13 | Stop reason: HOSPADM

## 2024-12-12 RX ORDER — ONDANSETRON 4 MG/1
8 TABLET, FILM COATED ORAL EVERY 8 HOURS PRN
Status: DISCONTINUED | OUTPATIENT
Start: 2024-12-12 | End: 2024-12-13 | Stop reason: HOSPADM

## 2024-12-12 RX ORDER — SODIUM POLYSTYRENE SULFONATE 15 G/60ML
30 SUSPENSION ORAL; RECTAL ONCE
Status: DISCONTINUED | OUTPATIENT
Start: 2024-12-12 | End: 2024-12-12

## 2024-12-12 RX ORDER — GABAPENTIN 300 MG/1
300 CAPSULE ORAL 2 TIMES DAILY
Status: DISCONTINUED | OUTPATIENT
Start: 2024-12-12 | End: 2024-12-12

## 2024-12-12 RX ORDER — PANTOPRAZOLE SODIUM 40 MG/1
40 TABLET, DELAYED RELEASE ORAL
Status: DISCONTINUED | OUTPATIENT
Start: 2024-12-12 | End: 2024-12-13 | Stop reason: HOSPADM

## 2024-12-12 RX ORDER — GABAPENTIN 300 MG/1
300 CAPSULE ORAL DAILY
Status: DISCONTINUED | OUTPATIENT
Start: 2024-12-13 | End: 2024-12-13 | Stop reason: HOSPADM

## 2024-12-12 RX ORDER — CEFTRIAXONE 1 G/50ML
1 INJECTION, SOLUTION INTRAVENOUS EVERY 24 HOURS
Status: DISCONTINUED | OUTPATIENT
Start: 2024-12-13 | End: 2024-12-13 | Stop reason: HOSPADM

## 2024-12-12 RX ORDER — ACETAMINOPHEN 650 MG/1
650 SUPPOSITORY RECTAL EVERY 4 HOURS PRN
Status: DISCONTINUED | OUTPATIENT
Start: 2024-12-12 | End: 2024-12-13 | Stop reason: HOSPADM

## 2024-12-12 RX ORDER — HYDROXYZINE HYDROCHLORIDE 25 MG/1
25 TABLET, FILM COATED ORAL 3 TIMES DAILY PRN
Status: DISCONTINUED | OUTPATIENT
Start: 2024-12-12 | End: 2024-12-13 | Stop reason: HOSPADM

## 2024-12-12 RX ORDER — METOPROLOL TARTRATE 25 MG/1
25 TABLET, FILM COATED ORAL 2 TIMES DAILY
Status: DISCONTINUED | OUTPATIENT
Start: 2024-12-12 | End: 2024-12-13 | Stop reason: HOSPADM

## 2024-12-12 RX ORDER — OXYCODONE HYDROCHLORIDE 5 MG/1
10 TABLET ORAL 3 TIMES DAILY PRN
Status: DISCONTINUED | OUTPATIENT
Start: 2024-12-12 | End: 2024-12-12

## 2024-12-12 RX ORDER — LEVOTHYROXINE SODIUM 25 UG/1
25 TABLET ORAL DAILY
Status: DISCONTINUED | OUTPATIENT
Start: 2024-12-12 | End: 2024-12-13 | Stop reason: HOSPADM

## 2024-12-12 RX ORDER — DEXTROSE MONOHYDRATE 100 MG/ML
50 INJECTION, SOLUTION INTRAVENOUS CONTINUOUS
Status: DISPENSED | OUTPATIENT
Start: 2024-12-12 | End: 2024-12-13

## 2024-12-12 RX ORDER — SODIUM CHLORIDE 9 MG/ML
100 INJECTION, SOLUTION INTRAVENOUS CONTINUOUS
Status: DISCONTINUED | OUTPATIENT
Start: 2024-12-12 | End: 2024-12-12

## 2024-12-12 RX ORDER — TRAZODONE HYDROCHLORIDE 50 MG/1
50 TABLET ORAL NIGHTLY PRN
Status: DISCONTINUED | OUTPATIENT
Start: 2024-12-12 | End: 2024-12-13 | Stop reason: HOSPADM

## 2024-12-12 RX ORDER — CALCIUM GLUCONATE 20 MG/ML
2 INJECTION, SOLUTION INTRAVENOUS ONCE
Status: COMPLETED | OUTPATIENT
Start: 2024-12-12 | End: 2024-12-12

## 2024-12-12 RX ORDER — DEXTROSE 50 % IN WATER (D50W) INTRAVENOUS SYRINGE
25 ONCE
Status: COMPLETED | OUTPATIENT
Start: 2024-12-12 | End: 2024-12-12

## 2024-12-12 RX ORDER — SODIUM BICARBONATE 1 MEQ/ML
50 SYRINGE (ML) INTRAVENOUS ONCE
Status: DISCONTINUED | OUTPATIENT
Start: 2024-12-12 | End: 2024-12-13 | Stop reason: HOSPADM

## 2024-12-12 RX ORDER — ALBUTEROL SULFATE 0.83 MG/ML
20 SOLUTION RESPIRATORY (INHALATION) ONCE
Status: COMPLETED | OUTPATIENT
Start: 2024-12-12 | End: 2024-12-12

## 2024-12-12 RX ORDER — CEFTRIAXONE 1 G/50ML
1 INJECTION, SOLUTION INTRAVENOUS ONCE
Status: COMPLETED | OUTPATIENT
Start: 2024-12-12 | End: 2024-12-12

## 2024-12-12 RX ORDER — SODIUM BICARBONATE 1 MEQ/ML
50 SYRINGE (ML) INTRAVENOUS ONCE
Status: COMPLETED | OUTPATIENT
Start: 2024-12-12 | End: 2024-12-12

## 2024-12-12 RX ORDER — POLYETHYLENE GLYCOL 3350 17 G/17G
17 POWDER, FOR SOLUTION ORAL DAILY
Status: DISCONTINUED | OUTPATIENT
Start: 2024-12-12 | End: 2024-12-13 | Stop reason: HOSPADM

## 2024-12-12 RX ORDER — SODIUM CHLORIDE 450 MG/100ML
500 INJECTION, SOLUTION INTRAVENOUS ONCE
Status: COMPLETED | OUTPATIENT
Start: 2024-12-12 | End: 2024-12-12

## 2024-12-12 RX ORDER — DULOXETIN HYDROCHLORIDE 20 MG/1
20 CAPSULE, DELAYED RELEASE ORAL DAILY
Status: DISCONTINUED | OUTPATIENT
Start: 2024-12-12 | End: 2024-12-13 | Stop reason: HOSPADM

## 2024-12-12 RX ORDER — PROCHLORPERAZINE MALEATE 10 MG
10 TABLET ORAL EVERY 6 HOURS PRN
Status: DISCONTINUED | OUTPATIENT
Start: 2024-12-12 | End: 2024-12-13 | Stop reason: HOSPADM

## 2024-12-12 SDOH — SOCIAL STABILITY: SOCIAL INSECURITY: DOES ANYONE TRY TO KEEP YOU FROM HAVING/CONTACTING OTHER FRIENDS OR DOING THINGS OUTSIDE YOUR HOME?: NO

## 2024-12-12 SDOH — ECONOMIC STABILITY: INCOME INSECURITY: IN THE PAST 12 MONTHS HAS THE ELECTRIC, GAS, OIL, OR WATER COMPANY THREATENED TO SHUT OFF SERVICES IN YOUR HOME?: NO

## 2024-12-12 SDOH — SOCIAL STABILITY: SOCIAL INSECURITY: ABUSE: ADULT

## 2024-12-12 SDOH — SOCIAL STABILITY: SOCIAL INSECURITY: WITHIN THE LAST YEAR, HAVE YOU BEEN HUMILIATED OR EMOTIONALLY ABUSED IN OTHER WAYS BY YOUR PARTNER OR EX-PARTNER?: NO

## 2024-12-12 SDOH — ECONOMIC STABILITY: TRANSPORTATION INSECURITY: IN THE PAST 12 MONTHS, HAS LACK OF TRANSPORTATION KEPT YOU FROM MEDICAL APPOINTMENTS OR FROM GETTING MEDICATIONS?: NO

## 2024-12-12 SDOH — SOCIAL STABILITY: SOCIAL INSECURITY: WITHIN THE LAST YEAR, HAVE YOU BEEN AFRAID OF YOUR PARTNER OR EX-PARTNER?: NO

## 2024-12-12 SDOH — SOCIAL STABILITY: SOCIAL NETWORK: HOW OFTEN DO YOU ATTEND CHURCH OR RELIGIOUS SERVICES?: NEVER

## 2024-12-12 SDOH — ECONOMIC STABILITY: HOUSING INSECURITY: AT ANY TIME IN THE PAST 12 MONTHS, WERE YOU HOMELESS OR LIVING IN A SHELTER (INCLUDING NOW)?: NO

## 2024-12-12 SDOH — SOCIAL STABILITY: SOCIAL INSECURITY: HAS ANYONE EVER THREATENED TO HURT YOUR FAMILY OR YOUR PETS?: NO

## 2024-12-12 SDOH — HEALTH STABILITY: MENTAL HEALTH: HOW OFTEN DO YOU HAVE SIX OR MORE DRINKS ON ONE OCCASION?: NEVER

## 2024-12-12 SDOH — ECONOMIC STABILITY: FOOD INSECURITY: HOW HARD IS IT FOR YOU TO PAY FOR THE VERY BASICS LIKE FOOD, HOUSING, MEDICAL CARE, AND HEATING?: HARD

## 2024-12-12 SDOH — SOCIAL STABILITY: SOCIAL INSECURITY: HAVE YOU HAD THOUGHTS OF HARMING ANYONE ELSE?: NO

## 2024-12-12 SDOH — ECONOMIC STABILITY: FOOD INSECURITY: HOW HARD IS IT FOR YOU TO PAY FOR THE VERY BASICS LIKE FOOD, HOUSING, MEDICAL CARE, AND HEATING?: NOT VERY HARD

## 2024-12-12 SDOH — HEALTH STABILITY: MENTAL HEALTH: HOW OFTEN DO YOU HAVE A DRINK CONTAINING ALCOHOL?: NEVER

## 2024-12-12 SDOH — ECONOMIC STABILITY: HOUSING INSECURITY: IN THE LAST 12 MONTHS, WAS THERE A TIME WHEN YOU WERE NOT ABLE TO PAY THE MORTGAGE OR RENT ON TIME?: NO

## 2024-12-12 SDOH — HEALTH STABILITY: MENTAL HEALTH
DO YOU FEEL STRESS - TENSE, RESTLESS, NERVOUS, OR ANXIOUS, OR UNABLE TO SLEEP AT NIGHT BECAUSE YOUR MIND IS TROUBLED ALL THE TIME - THESE DAYS?: ONLY A LITTLE

## 2024-12-12 SDOH — SOCIAL STABILITY: SOCIAL INSECURITY: ARE THERE ANY APPARENT SIGNS OF INJURIES/BEHAVIORS THAT COULD BE RELATED TO ABUSE/NEGLECT?: NO

## 2024-12-12 SDOH — HEALTH STABILITY: PHYSICAL HEALTH
HOW OFTEN DO YOU NEED TO HAVE SOMEONE HELP YOU WHEN YOU READ INSTRUCTIONS, PAMPHLETS, OR OTHER WRITTEN MATERIAL FROM YOUR DOCTOR OR PHARMACY?: NEVER

## 2024-12-12 SDOH — SOCIAL STABILITY: SOCIAL INSECURITY
WITHIN THE LAST YEAR, HAVE YOU BEEN RAPED OR FORCED TO HAVE ANY KIND OF SEXUAL ACTIVITY BY YOUR PARTNER OR EX-PARTNER?: NO

## 2024-12-12 SDOH — ECONOMIC STABILITY: FOOD INSECURITY: WITHIN THE PAST 12 MONTHS, THE FOOD YOU BOUGHT JUST DIDN'T LAST AND YOU DIDN'T HAVE MONEY TO GET MORE.: NEVER TRUE

## 2024-12-12 SDOH — HEALTH STABILITY: PHYSICAL HEALTH: ON AVERAGE, HOW MANY DAYS PER WEEK DO YOU ENGAGE IN MODERATE TO STRENUOUS EXERCISE (LIKE A BRISK WALK)?: 0 DAYS

## 2024-12-12 SDOH — HEALTH STABILITY: MENTAL HEALTH: HOW MANY DRINKS CONTAINING ALCOHOL DO YOU HAVE ON A TYPICAL DAY WHEN YOU ARE DRINKING?: PATIENT DOES NOT DRINK

## 2024-12-12 SDOH — SOCIAL STABILITY: SOCIAL INSECURITY: DO YOU FEEL ANYONE HAS EXPLOITED OR TAKEN ADVANTAGE OF YOU FINANCIALLY OR OF YOUR PERSONAL PROPERTY?: NO

## 2024-12-12 SDOH — HEALTH STABILITY: PHYSICAL HEALTH: ON AVERAGE, HOW MANY MINUTES DO YOU ENGAGE IN EXERCISE AT THIS LEVEL?: 0 MIN

## 2024-12-12 SDOH — SOCIAL STABILITY: SOCIAL INSECURITY: WERE YOU ABLE TO COMPLETE ALL THE BEHAVIORAL HEALTH SCREENINGS?: YES

## 2024-12-12 SDOH — ECONOMIC STABILITY: FOOD INSECURITY: WITHIN THE PAST 12 MONTHS, YOU WORRIED THAT YOUR FOOD WOULD RUN OUT BEFORE YOU GOT THE MONEY TO BUY MORE.: NEVER TRUE

## 2024-12-12 SDOH — SOCIAL STABILITY: SOCIAL INSECURITY: HAVE YOU HAD ANY THOUGHTS OF HARMING ANYONE ELSE?: NO

## 2024-12-12 SDOH — SOCIAL STABILITY: SOCIAL NETWORK: HOW OFTEN DO YOU GET TOGETHER WITH FRIENDS OR RELATIVES?: ONCE A WEEK

## 2024-12-12 SDOH — SOCIAL STABILITY: SOCIAL NETWORK: HOW OFTEN DO YOU ATTEND MEETINGS OF THE CLUBS OR ORGANIZATIONS YOU BELONG TO?: NEVER

## 2024-12-12 SDOH — SOCIAL STABILITY: SOCIAL INSECURITY: ARE YOU MARRIED, WIDOWED, DIVORCED, SEPARATED, NEVER MARRIED, OR LIVING WITH A PARTNER?: MARRIED

## 2024-12-12 SDOH — SOCIAL STABILITY: SOCIAL NETWORK
DO YOU BELONG TO ANY CLUBS OR ORGANIZATIONS SUCH AS CHURCH GROUPS, UNIONS, FRATERNAL OR ATHLETIC GROUPS, OR SCHOOL GROUPS?: NO

## 2024-12-12 SDOH — SOCIAL STABILITY: SOCIAL INSECURITY: DO YOU FEEL UNSAFE GOING BACK TO THE PLACE WHERE YOU ARE LIVING?: NO

## 2024-12-12 SDOH — SOCIAL STABILITY: SOCIAL NETWORK: IN A TYPICAL WEEK, HOW MANY TIMES DO YOU TALK ON THE PHONE WITH FAMILY, FRIENDS, OR NEIGHBORS?: ONCE A WEEK

## 2024-12-12 SDOH — SOCIAL STABILITY: SOCIAL INSECURITY: ARE YOU OR HAVE YOU BEEN THREATENED OR ABUSED PHYSICALLY, EMOTIONALLY, OR SEXUALLY BY ANYONE?: NO

## 2024-12-12 SDOH — SOCIAL STABILITY: SOCIAL NETWORK: HOW OFTEN DO YOU ATTEND CHURCH OR RELIGIOUS SERVICES?: 1 TO 4 TIMES PER YEAR

## 2024-12-12 SDOH — ECONOMIC STABILITY: HOUSING INSECURITY: IN THE PAST 12 MONTHS, HOW MANY TIMES HAVE YOU MOVED WHERE YOU WERE LIVING?: 0

## 2024-12-12 ASSESSMENT — COGNITIVE AND FUNCTIONAL STATUS - GENERAL
STANDING UP FROM CHAIR USING ARMS: A LITTLE
DRESSING REGULAR LOWER BODY CLOTHING: A LITTLE
MOVING FROM LYING ON BACK TO SITTING ON SIDE OF FLAT BED WITH BEDRAILS: A LITTLE
EATING MEALS: A LITTLE
MOVING TO AND FROM BED TO CHAIR: A LITTLE
MOBILITY SCORE: 18
DRESSING REGULAR UPPER BODY CLOTHING: A LITTLE
PERSONAL GROOMING: A LITTLE
DAILY ACTIVITIY SCORE: 18
WALKING IN HOSPITAL ROOM: A LITTLE
PATIENT BASELINE BEDBOUND: NO
CLIMB 3 TO 5 STEPS WITH RAILING: A LITTLE
TURNING FROM BACK TO SIDE WHILE IN FLAT BAD: A LITTLE
HELP NEEDED FOR BATHING: A LITTLE
TOILETING: A LITTLE

## 2024-12-12 ASSESSMENT — ENCOUNTER SYMPTOMS
ARTHRALGIAS: 0
FREQUENCY: 0
SLEEP DISTURBANCE: 0
VOMITING: 1
WOUND: 0
NAUSEA: 1
CONFUSION: 0
BACK PAIN: 0
SEIZURES: 0
BRUISES/BLEEDS EASILY: 0
CHILLS: 0
SINUS PRESSURE: 0
ADENOPATHY: 0
CONSTIPATION: 0
ACTIVITY CHANGE: 1
FATIGUE: 1
BLOOD IN STOOL: 0
RECTAL PAIN: 0
DIARRHEA: 0
POLYDIPSIA: 0
DIZZINESS: 0
PALPITATIONS: 0
SPEECH DIFFICULTY: 0
APNEA: 0
COUGH: 0
LIGHT-HEADEDNESS: 0
APPETITE CHANGE: 1
POLYPHAGIA: 0
WEAKNESS: 1
JOINT SWELLING: 0
HEADACHES: 0
SORE THROAT: 0
NUMBNESS: 0
MYALGIAS: 0
COLOR CHANGE: 0
WHEEZING: 0
ABDOMINAL PAIN: 0
DYSURIA: 0
HALLUCINATIONS: 0
SHORTNESS OF BREATH: 1
TREMORS: 0
NECK PAIN: 0
PHOTOPHOBIA: 0
FEVER: 0
HEMATURIA: 0

## 2024-12-12 ASSESSMENT — LIFESTYLE VARIABLES
SKIP TO QUESTIONS 9-10: 1
AUDIT-C TOTAL SCORE: 0
PRESCIPTION_ABUSE_PAST_12_MONTHS: NO
PRESCIPTION_ABUSE_PAST_12_MONTHS: NO
HOW MANY STANDARD DRINKS CONTAINING ALCOHOL DO YOU HAVE ON A TYPICAL DAY: PATIENT DOES NOT DRINK
SKIP TO QUESTIONS 9-10: 1
AUDIT-C TOTAL SCORE: 0
AUDIT-C TOTAL SCORE: 0
HOW OFTEN DO YOU HAVE 6 OR MORE DRINKS ON ONE OCCASION: NEVER
SKIP TO QUESTIONS 9-10: 1
SUBSTANCE_ABUSE_PAST_12_MONTHS: NO
SUBSTANCE_ABUSE_PAST_12_MONTHS: NO
AUDIT-C TOTAL SCORE: 0
HOW OFTEN DO YOU HAVE A DRINK CONTAINING ALCOHOL: NEVER

## 2024-12-12 ASSESSMENT — ACTIVITIES OF DAILY LIVING (ADL)
JUDGMENT_ADEQUATE_SAFELY_COMPLETE_DAILY_ACTIVITIES: YES
LACK_OF_TRANSPORTATION: NO
LACK_OF_TRANSPORTATION: NO
TOILETING: NEEDS ASSISTANCE
JUDGMENT_ADEQUATE_SAFELY_COMPLETE_DAILY_ACTIVITIES: YES
HEARING - LEFT EAR: FUNCTIONAL
HEARING - RIGHT EAR: FUNCTIONAL
GROOMING: NEEDS ASSISTANCE
HEARING - LEFT EAR: FUNCTIONAL
FEEDING YOURSELF: NEEDS ASSISTANCE
ADEQUATE_TO_COMPLETE_ADL: YES
LACK_OF_TRANSPORTATION: NO
ADEQUATE_TO_COMPLETE_ADL: YES
PATIENT'S MEMORY ADEQUATE TO SAFELY COMPLETE DAILY ACTIVITIES?: YES
WALKS IN HOME: NEEDS ASSISTANCE
HEARING - RIGHT EAR: FUNCTIONAL
DRESSING YOURSELF: NEEDS ASSISTANCE
LACK_OF_TRANSPORTATION: NO
ASSISTIVE_DEVICE: WALKER
PATIENT'S MEMORY ADEQUATE TO SAFELY COMPLETE DAILY ACTIVITIES?: YES
BATHING: NEEDS ASSISTANCE
WALKS IN HOME: NEEDS ASSISTANCE
GROOMING: NEEDS ASSISTANCE
ASSISTIVE_DEVICE: WALKER
LACK_OF_TRANSPORTATION: NO
LACK_OF_TRANSPORTATION: NO
FEEDING YOURSELF: NEEDS ASSISTANCE
BATHING: NEEDS ASSISTANCE
DRESSING YOURSELF: NEEDS ASSISTANCE
TOILETING: NEEDS ASSISTANCE

## 2024-12-12 ASSESSMENT — PATIENT HEALTH QUESTIONNAIRE - PHQ9
SUM OF ALL RESPONSES TO PHQ9 QUESTIONS 1 & 2: 0
1. LITTLE INTEREST OR PLEASURE IN DOING THINGS: NOT AT ALL
2. FEELING DOWN, DEPRESSED OR HOPELESS: NOT AT ALL

## 2024-12-12 ASSESSMENT — PAIN SCALES - GENERAL
PAINLEVEL_OUTOF10: 0 - NO PAIN

## 2024-12-12 NOTE — ED PROVIDER NOTES
HPI   Chief Complaint   Patient presents with    Weakness, Gen     Generalized weakness. Pt got blood work today and was told to come to the ED. States she has bene throwing up today and has generalized pain.        Patient is a 64-year-old female with a history of stage IV cervical cancer presenting to the emergency department for evaluation of generalized weakness and abnormal labs.  Patient reportedly had chemotherapy last Friday.  She states she has since felt unwell.  She was seen in the hospital and had to have a blood transfusion 1 week ago.  She states she also has increased swelling in her bilateral lower extremities in which she is following up with wound care.  She went to wound care today and had bilateral Unna boots placed.  She states she had blood work done today and they noted her to have low hemoglobin as well as worsening kidney function and elevated potassium and recommended that patient come to the emergency department immediately.  Initially per chart review it appears that patient refused to come to the emergency department however decided to come after some time.  Patient states she is feeling weak and tired.   at bedside states that she has been altered recently and just not seeing herself.  Patient does have bilateral nephrostomy tubes and  states that patient was recently placed on antibiotics due to some cloudiness in the left tube.  He said that her right kidney has minimal function and therefore she never has as much output in the right bag as she does in the left.  Patient is supposed to have another kidney infusion tomorrow per .  Patient admits to some occasional abdominal pain however nothing worse than what she normally has due to the cancer.  She admits to nausea and vomiting today.  She denies any chest pain, shortness of breath, fevers, chills.               Patient History   Past Medical History:   Diagnosis Date    Anxiety     Body mass index  (BMI)30.0-30.9, adult 2019    BMI 30.0-30.9,adult    Cervix cancer (Multi)     Chemotherapy-induced peripheral neuropathy (Multi)     Chronic kidney disease     Hypertension     Hypothyroidism     Tachycardia, unspecified     Tachycardia     Past Surgical History:   Procedure Laterality Date    CT GUIDED IMAGING FOR NEEDLE PLACEMENT  2023    CT GUIDED IMAGING FOR NEEDLE PLACEMENT [facility]    IR  NEPHROURETERAL PLACEMENT Bilateral 2024    IR  NEPHROURETERAL PLACEMENT 2024 [facility]    IR NEPHROSTOMY TUBE EXCHANGE  10/20/2023    IR NEPHROSTOMY TUBE EXCHANGE 10/20/2023 Manuel Marvin MD [facility]    IR NEPHROSTOMY TUBE EXCHANGE  2023    IR NEPHROSTOMY TUBE EXCHANGE 2023 [facility]    MEDIPORT      NEPHROSTOMY      ORIF TIBIA FRACTURE Right     TRANSURETHRAL RESECTION OF BLADDER TUMOR      US GUIDED PERCUTANEOUS PLACEMENT  2022    US GUIDED PERCUTANEOUS PLACEMENT [facility]    US GUIDED PERCUTANEOUS PLACEMENT  2023    US GUIDED PERCUTANEOUS PLACEMENT [facility]     Family History   Problem Relation Name Age of Onset    No Known Problems Mother      Other (Acute myocardial infarction) Father       Social History     Tobacco Use    Smoking status: Former     Current packs/day: 0.00     Average packs/day: 0.2 packs/day for 42.9 years (10.7 ttl pk-yrs)     Types: Cigarettes     Start date:      Quit date: 2019     Years since quittin.0     Passive exposure: Past    Smokeless tobacco: Never   Vaping Use    Vaping status: Never Used   Substance Use Topics    Alcohol use: Not Currently     Alcohol/week: 8.0 standard drinks of alcohol     Types: 4 Shots of liquor, 4 Standard drinks or equivalent per week     Comment: quit 2019    Drug use: Never       Physical Exam   ED Triage Vitals [24 2302]   Temperature Heart Rate Respirations BP   36.9 °C (98.4 °F) 97 18 99/64      Pulse Ox Temp Source Heart Rate Source Patient  Position   100 % Oral Monitor Lying      BP Location FiO2 (%)     Right arm --       Physical Exam  Vitals and nursing note reviewed.   Constitutional:       General: She is not in acute distress.     Appearance: Normal appearance. She is not ill-appearing or toxic-appearing.      Comments: Pale   HENT:      Head: Normocephalic and atraumatic.      Nose: Nose normal.      Mouth/Throat:      Mouth: Mucous membranes are dry.   Eyes:      Pupils: Pupils are equal, round, and reactive to light.   Cardiovascular:      Rate and Rhythm: Regular rhythm. Tachycardia present.      Heart sounds: Murmur heard.   Pulmonary:      Effort: Pulmonary effort is normal.      Breath sounds: No wheezing, rhonchi or rales.   Abdominal:      Palpations: Abdomen is soft.      Tenderness: There is abdominal tenderness (Generalized).   Musculoskeletal:         General: Normal range of motion.      Cervical back: Normal range of motion.      Right lower leg: Edema present.      Left lower leg: Edema present.      Comments: Bilateral Unna boots placed in the lower extremities by wound care today   Skin:     General: Skin is warm and dry.   Neurological:      General: No focal deficit present.      Mental Status: She is alert and oriented to person, place, and time.   Psychiatric:         Mood and Affect: Mood normal.         Behavior: Behavior normal.           ED Course & MDM   Diagnoses as of 12/12/24 0117   Anemia, unspecified type   Acute kidney injury (CMS-HCC)   Hyperkalemia                 No data recorded     Jerald Coma Scale Score: 15 (12/11/24 2301 : Rosy Gonzales, ABDOUL)                           Medical Decision Making  **Disclaimer parts of this chart have been completed using voice recognition software. Please excuse any errors of transcription.     Patient seen in conjunction with attending physician .     HPI: Detailed above.    Exam: A medically appropriate exam performed, outlined above, given the known history and  presentation.    History obtained from: Patient    EKG: Reviewed and interpreted by my attending physician    Labs/Diagnostics:  Labs Reviewed   CBC WITH AUTO DIFFERENTIAL - Abnormal       Result Value    WBC 22.8 (*)     nRBC 0.4 (*)     RBC 1.91 (*)     Hemoglobin 5.9 (*)     Hematocrit 18.7 (*)     MCV 98      MCH 30.9      MCHC 31.6 (*)     RDW 19.0 (*)     Platelets 257      Neutrophils % 93.6      Immature Granulocytes %, Automated 4.3 (*)     Lymphocytes % 1.1      Monocytes % 0.9      Eosinophils % 0.0      Basophils % 0.1      Neutrophils Absolute 21.32 (*)     Immature Granulocytes Absolute, Automated 0.98 (*)     Lymphocytes Absolute 0.26 (*)     Monocytes Absolute 0.20      Eosinophils Absolute 0.01      Basophils Absolute 0.03     COMPREHENSIVE METABOLIC PANEL - Abnormal    Glucose 113 (*)     Sodium 129 (*)     Potassium 5.5 (*)     Chloride 101      Bicarbonate 16 (*)     Anion Gap 18      Urea Nitrogen 112 (*)     Creatinine 5.24 (*)     eGFR 9 (*)     Calcium 8.0 (*)     Albumin 2.4 (*)     Alkaline Phosphatase 101      Total Protein 6.1 (*)     AST 21      Bilirubin, Total 0.3      ALT 8     URINALYSIS WITH REFLEX CULTURE AND MICROSCOPIC - Abnormal    Color, Urine Yellow      Appearance, Urine Ex.Turbid (*)     Specific Gravity, Urine 1.013      pH, Urine 5.5      Protein, Urine 100 (2+) (*)     Glucose, Urine Normal      Blood, Urine 0.5 (2+) (*)     Ketones, Urine NEGATIVE      Bilirubin, Urine NEGATIVE      Urobilinogen, Urine Normal      Nitrite, Urine NEGATIVE      Leukocyte Esterase, Urine 500 Yue/µL (*)    MICROSCOPIC ONLY, URINE - Abnormal    WBC, Urine 21-50 (*)     WBC Clumps, Urine MODERATE      RBC, Urine 1-2      Squamous Epithelial Cells, Urine 1-9 (SPARSE)      Bacteria, Urine 2+ (*)    MAGNESIUM - Normal    Magnesium 1.76     SERIAL TROPONIN-INITIAL - Normal    Troponin I, High Sensitivity 11      Narrative:     Less than 99th percentile of normal range cutoff-  Female and children  under 18 years old <14 ng/L; Male <21 ng/L: Negative  Repeat testing should be performed if clinically indicated.     Female and children under 18 years old 14-50 ng/L; Male 21-50 ng/L:  Consistent with possible cardiac damage and possible increased clinical   risk. Serial measurements may help to assess extent of myocardial damage.     >50 ng/L: Consistent with cardiac damage, increased clinical risk and  myocardial infarction. Serial measurements may help assess extent of   myocardial damage.      NOTE: Children less than 1 year old may have higher baseline troponin   levels and results should be interpreted in conjunction with the overall   clinical context.     NOTE: Troponin I testing is performed using a different   testing methodology at [facility] than at other   [facility]. Direct result comparisons should only   be made within the same method.   URINE CULTURE   TYPE AND SCREEN    ABO TYPE AB      Rh TYPE POS      ANTIBODY SCREEN NEG     URINALYSIS WITH REFLEX CULTURE AND MICROSCOPIC    Narrative:     The following orders were created for panel order Urinalysis with Reflex Culture and Microscopic.  Procedure                               Abnormality         Status                     ---------                               -----------         ------                     Urinalysis with Reflex C...[[id number]]  Abnormal            Final result               Extra Urine Gray Tube[[id number]]                            In process                   Please view results for these tests on the individual orders.   TROPONIN SERIES- (INITIAL, 1 HR)    Narrative:     The following orders were created for panel order Troponin I Series, High Sensitivity (0, 1 HR).  Procedure                               Abnormality         Status                     ---------                               -----------         ------                     Troponin I, High Sensiti...[[id number]]  Normal              Final  result               Troponin, High Sensitivi...[[id number]]                      In process                   Please view results for these tests on the individual orders.   EXTRA URINE GRAY TUBE   SERIAL TROPONIN, 1 HOUR   PREPARE RBC    PRODUCT CODE V3717S55      Unit Number [id number]      Unit ABO O      Unit RH POS      XM INTEP COMP      Dispense Status XM      Blood Expiration Date 12/19/2024 11:59:00 PM EST      PRODUCT BLOOD TYPE 5100      UNIT VOLUME 350      PRODUCT CODE K4964S46      Unit Number [id number]      Unit ABO A      Unit RH NEG      XM INTEP COMP      Dispense Status XM      Blood Expiration Date 12/31/2024 11:59:00 PM EST      PRODUCT BLOOD TYPE 0600      UNIT VOLUME 350       CT abdomen pelvis wo IV contrast    (Results Pending)     EMERGENCY DEPARTMENT COURSE and DIFFERENTIAL DIAGNOSIS/MDM:  Patient is a 64-year-old female presenting to the emergency department for evaluation of weakness and abnormal labs.  On physical exam vital signs remarkable for borderline hypotension and initial tachycardia but otherwise stable and patient is in no acute distress.  She has a pale appearing with a benign heart murmur.  She has some generalized tenderness to palpation of the abdomen which is chronic.  She has bilateral lower extremity pitting edema with bilateral Unna boots placed by wound care today.  Diagnostic labs ordered as well as CT of the abdomen and pelvis.  Troponin 11.  CBC remarkable for leukocytosis and anemia with a hemoglobin of 5.9.  Therefore 2 units of blood ordered.  CMP remarkable for a BUN of 112, creatinine of 5.24, EGFR of 9, potassium of 5.5, sodium of 129.  Suspect patient's potassium will decrease with fluids and not feel that any emergent interventions need to be done at this time.  Urine showed evidence of infection with 500 leukocyte esterase and 2+ bacteria with spares epithelial cells.  CT of the abdomen and pelvis is pending at the time of my departure.  At this  "time patient will likely be admitted for anemia as well as AIDE and further management of her metastatic disease.  Handoff given to attending physician  as it has reached the end of my shift.     The patient presented with a chief complaint of weakness and abnormal labs. The differential diagnosis associated with this patient's presentation includes electrolyte abnormalities,.     Vitals:    Vitals:    12/11/24 2302 12/12/24 0000 12/12/24 0105   BP: 99/64 91/54 93/53   BP Location: Right arm     Patient Position: Lying     Pulse: 97 95 (!) 114   Resp: 18 16 15   Temp: 36.9 °C (98.4 °F)     TempSrc: Oral     SpO2: 100% 96% 99%   Weight: 80.7 kg (178 lb)     Height: 1.702 m (5' 7\")       History Limited by:    None    Independent history obtained from:    Significant Other/Spouse    External records reviewed:    Outpatient Note oncology and telephone encounter from today recommending that the patient come to the emergency department due to her potassium, creatinine, and hemoglobin    Diagnostics interpreted by me:    None    Discussions with other clinicians:    None    Chronic conditions impacting care:    Cancer    Social determinants of health affecting care:    None    Diagnostic tests considered but not performed: None    ED Medications managed:    Medications   sodium chloride 0.9 % bolus 1,000 mL (0 mL intravenous Stopped 12/12/24 0106)       Prescription drugs considered:    None    Screenings:              Procedure  Procedures     Miriam Amin PA-C  12/12/24 0117    "

## 2024-12-12 NOTE — ASSESSMENT & PLAN NOTE
With metastasis to liver.  Receives weekly chemotherapy at [facility].  Patient has been accepted and awaiting bed.  Consulting hematology oncology for management until patient is transferred.  Analgesics   Pain medication as needed

## 2024-12-12 NOTE — HOSPITAL COURSE
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     OSH Course:     In the ED on admission vital signs notable for hypotension and multiple electrolyte derangements and started on IV ceftriaxone empirically given leukocytosis pending Urine Cx results.     CT A/P showed no acute abdominal and pelvic process, no hydronephrosis, stable to slightly decreased in size partially calcified uterine mass, grossly stable lymphadenopathy with hepatic metastasis and no significant change in lytic bone lesion the inferior pubic ramus.    At OSH, patient  showed improvement with IVF and a bicarb drip, no plans for dialysis currently per Nephrology at [facility].     She was given 2 units PRBCs for Hgb 5.9 in the ED with post-transfusion Hgb 6.7 after which a 3rd unit RBCs was given and subsequent post-transfusion Hgb was 7.9. GI was consulted due to Hgb not rising appropriately and pt with dark stools and recommended non-urgent EGD/colonosocpy. Anemia resolved upon arrival to [facility] Course:   Upon arrival to [facility], nephrology and urology consulted here. Patient was started on IV sodium bicarbonate and lokelma with no acute intervention noted by urology.  Her creatinine remained stably elevated throughout the weekend and IR was consulted on 12/16 for possible interrogation of the left per-cutaneous nephrostomy tube given significantly lower output compared to right. Per IR, they were able to flush both drains with no plan for exchange due to appropriate drain function and downtrending creatinine without hydronephrosis on  imaging. Her creatinine remained stably elevated throughout her course and decision was made to not collect routine AM labs in the setting of patient desiring hospice care. She was given IV ceftriaxone daily however for potential suspected UTI at OSH due to + LE on UA without urine culture. Patient endorsing diarrhea as well on initial admission, however this resolved during her stay and so C. Diff path was not collected.     Patient requested a hospice care meeting on 12/16. This was scheduled to occur at home. Patient deemed satisfactory for discharge on ***.     Past Medical History:  Anemia likely 2/2 chemotherapy and chronic disease  Anxiety  Cervical cancer  Grade 2 Chemotherapy-induced peripheral neuropathy  Stage IV CKD (Baseline BUN 40s, CR 2.3, GFR>20)  Obstructive uropathy with bilateral PCNs in place. Left kidney less functional with low urine output.  Fluid retention  Hypothyroidism  HTN  Tachycardia  Pseudomonas UTI   Open wounds on bilateral LE (followed by wound care, Unna boots placed 12/11/24).    Past Surgical History:  TURBT 1/20/23  Bilateral nephrostomy tube placement  ORIF tibia fracture (Right)  Mediport placement    Oncology History:  Stage RONIT grade 2 squamous cell carcinoma of cervix  1/20/23: TURBT - poorly differentiated squamaous cell carcinoma; CPS10.  review with history of cervical mass consistent with GYN primary  - Extension to rectal mucosa, pubic ramus with ?reactive mediastinal LN  - Bilateral nephrostomy placement  2/16/23 - 6/1/23: Carbo AUC5/Taxol 175 +Avastin 15mg/kg, Pembrolizumab 200mg/m2  - C2-C4 +Avastin - further treatments held due to progressive gr2 proteinuria  - Grade 3 anemia declining transfusion,  7/20/23 - 12/18/23: Pembrolizumab 200mg/m2, progressive disease  1/11/24 - 3/14/24: Tivdak x3, progressive disease  - C1: 0.9mg/kg in setting of CrCl < 30  3/2024-7/2024: Taxotere, progressive disease  8/1/24 - 11/5/24: Topotecan q week x3, progressive  disease  11/29/24:  Gemzar (palliative intent) + Neulasta     Molecular Testing  PLD1: CPS 10  1/2024 Tempus xT - PIK3CA p.[id number] missense (gain): consider alpelisib  Loss of function: BCORL1, KMT2C, NSD1, KMT2D, RAD21  TMB 7.9mB - RACHELE  - Her2 Neg    Social History:  She reports that she quit smoking about 5 years ago. Her smoking use included cigarettes. She started smoking about 47 years ago. She has a 10.7 pack-year smoking history. She has been exposed to tobacco smoke. She has never used smokeless tobacco. She reports that she does not currently use alcohol after a past usage of about 8.0 standard drinks of alcohol per week. She reports that she does not use drugs.  HCPOA: Kenn Stevens (Partner) [phone]     Family History:   Mother: no known problems  Father: acute MI  No known gyn malignancy history    Allergies:  L-lysine [lysine] and Adhesive

## 2024-12-12 NOTE — H&P
History Of Present Illness  Shelbi Delaney is a 64 y.o. female presenting with generalized weakness and abnormal labs.    This is a 64-year-old female with history of active stage IV cervical cancer with mets who gets treatment at [facility], had labs performed today for her scheduled weekly chemotherapy tomorrow.  Has felt unwell since chemotherapy last Friday, had a blood transfusion 1 week ago.  Complains of increasing bilateral lower extremity edema, follows with wound care.  Went to wound care today and had bilateral Unna boots placed.  Labs today showed low hemoglobin and worsening kidney function with elevated potassium.  Patient was recommended to report to ED immediately.   accompanied with patient reports patient is a bit more altered than usual.  Bilateral nephrostomy tubes present with not much output from the left kidney, she mentioned her kidney is only working 5% on the left.  She has also had nausea and nonbloody non bilious vomiting for the past 2 days.  Denies vaginal bleeding, hematuria, hematochezia or melena.  Patient denies fever, chills, shortness of breath, chest pain or palpitations.    In the ED on admission vital signs notable for soft blood pressures as low as 84/60, heart rate as high as 114.  Labs notable for sodium 129, potassium 5.5, , creatinine 5.24, EGFR 9, calcium 8.0, albumin 2.4, magnesium 1.76, troponin 11-8, WBC 22.8, hemoglobin 5.9, platelets 257,  LE, 2+ bacteria, 21-50 WBC.    Received 2 units of RBC in the ED as well as started on IV antibiotics.  She was also given 1 L bolus of normal saline for soft blood pressure.    EKG: NSR, low voltage QRS.  QTc 449    CT abdomen pelvis:  No acute abdominal or pelvic process.   The known partially calcified uterine mass is stable to slightly   decreased in size. Additional bilateral adnexal masses are grossly   stable in size.   Grossly stable abdominopelvic lymphadenopathy and hepatic masses,   consistent with  metastatic disease.   No significant change in lytic/destructive lesion of the right   inferior pubic ramus.   Stable chronic findings as above.     Patient has been accepted to [facility] but currently awaiting bed.  Admitting patient for AIDE and acute anemia requiring transfusion.      Past Medical History  Past Medical History:   Diagnosis Date    Anxiety     Body mass index (BMI)30.0-30.9, adult 08/07/2019    BMI 30.0-30.9,adult    Cervix cancer (Multi)     Chemotherapy-induced peripheral neuropathy (Multi)     Chronic kidney disease     Hypertension     Hypothyroidism     Tachycardia, unspecified     Tachycardia       Surgical History  Past Surgical History:   Procedure Laterality Date    CT GUIDED IMAGING FOR NEEDLE PLACEMENT  01/12/2023    CT GUIDED IMAGING FOR NEEDLE PLACEMENT [facility]    IR  NEPHROURETERAL PLACEMENT Bilateral 6/18/2024    IR  NEPHROURETERAL PLACEMENT 6/18/2024 [facility]    IR NEPHROSTOMY TUBE EXCHANGE  10/20/2023    IR NEPHROSTOMY TUBE EXCHANGE 10/20/2023 Manuel Marvin MD [facility]    IR NEPHROSTOMY TUBE EXCHANGE  12/19/2023    IR NEPHROSTOMY TUBE EXCHANGE 12/19/2023 [facility]    MEDIPORT      NEPHROSTOMY      ORIF TIBIA FRACTURE Right     TRANSURETHRAL RESECTION OF BLADDER TUMOR      US GUIDED PERCUTANEOUS PLACEMENT  11/14/2022    US GUIDED PERCUTANEOUS PLACEMENT [facility]    US GUIDED PERCUTANEOUS PLACEMENT  08/28/2023    US GUIDED PERCUTANEOUS PLACEMENT [facility]        Social History  She reports that she quit smoking about 5 years ago. Her smoking use included cigarettes. She started smoking about 47 years ago. She has a 10.7 pack-year smoking history. She has been exposed to tobacco smoke. She has never used smokeless tobacco. She reports that she does not currently use alcohol after a past usage of about 8.0 standard drinks of alcohol per week. She reports that she does not use drugs.    Family History  Family History   Problem  "Relation Name Age of Onset    No Known Problems Mother      Other (Acute myocardial infarction) Father          Allergies  L-lysine [lysine] and Adhesive    Review of Systems  General: no fatigue, no malaise, no fevers/chills   HENT: no rhinorrhea, no sore throat, no ear pain   Eyes: no change in vision, denies eye pain or discharge   Lungs: no SOB, no cough, no hemoptysis   CV: no chest pain, no palpitations, no leg edema   Abd: no nausea/vomiting, no constipation/diarrhea, no abdominal pain   : no dysuria, no frequency, no nocturia, no flank pain   Endocrine: no polydipsia/polyuria, no hot or cold intolerance   Neuro: no headaches, no syncope, no seizures   MSK: no back pain, no neck pain, no joint problems   Psych: no anxiety, no depression, no hallucinations    Physical Exam   General: alert, no diaphoresis   HENT: mucous membranes moist, external ears normal, no rhinorrhea   Eyes: no icterus or injection, no discharge   Lungs: CTA BL   Heart: RRR, systolic murmur, bilateral 3+ pitting edema extending to her hips   GI: abdomen soft, nontender, nondistended, BS present   MSK: no joint effusion or deformity   Skin: no rashes, erythema, or ecchymosis   Neuro: grossly normal cognition, motor strength, sensation    Last Recorded Vitals  Blood pressure 84/60, pulse 80, temperature 36.6 °C (97.9 °F), temperature source Oral, resp. rate 15, height 1.702 m (5' 7\"), weight 80.7 kg (178 lb), SpO2 96%.    Relevant Results  Scheduled medications  cefTRIAXone, 1 g, intravenous, Once  cefTRIAXone, 1 g, intravenous, q24h  DULoxetine, 20 mg, oral, Daily  gabapentin, 300 mg, oral, BID  levothyroxine, 25 mcg, oral, Daily  metoprolol tartrate, 25 mg, oral, BID  pantoprazole, 40 mg, oral, Daily before breakfast   Or  pantoprazole, 40 mg, intravenous, Daily before breakfast  polyethylene glycol, 17 g, oral, Daily      Continuous medications     PRN medications  PRN medications: acetaminophen **OR** acetaminophen **OR** " acetaminophen, acetaminophen **OR** acetaminophen **OR** acetaminophen, hydrOXYzine HCL, magic mouthwash (lidocaine, diphenhydrAMINE, Maalox 1:1:1), ondansetron, prochlorperazine, traZODone  Results for orders placed or performed during the hospital encounter of 12/11/24 (from the past 24 hours)   CBC and Auto Differential   Result Value Ref Range    WBC 22.8 (H) 4.4 - 11.3 x10*3/uL    nRBC 0.4 (H) 0.0 - 0.0 /100 WBCs    RBC 1.91 (L) 4.00 - 5.20 x10*6/uL    Hemoglobin 5.9 (LL) 12.0 - 16.0 g/dL    Hematocrit 18.7 (L) 36.0 - 46.0 %    MCV 98 80 - 100 fL    MCH 30.9 26.0 - 34.0 pg    MCHC 31.6 (L) 32.0 - 36.0 g/dL    RDW 19.0 (H) 11.5 - 14.5 %    Platelets 257 150 - 450 x10*3/uL    Neutrophils % 93.6 40.0 - 80.0 %    Immature Granulocytes %, Automated 4.3 (H) 0.0 - 0.9 %    Lymphocytes % 1.1 13.0 - 44.0 %    Monocytes % 0.9 2.0 - 10.0 %    Eosinophils % 0.0 0.0 - 6.0 %    Basophils % 0.1 0.0 - 2.0 %    Neutrophils Absolute 21.32 (H) 1.20 - 7.70 x10*3/uL    Immature Granulocytes Absolute, Automated 0.98 (H) 0.00 - 0.70 x10*3/uL    Lymphocytes Absolute 0.26 (L) 1.20 - 4.80 x10*3/uL    Monocytes Absolute 0.20 0.10 - 1.00 x10*3/uL    Eosinophils Absolute 0.01 0.00 - 0.70 x10*3/uL    Basophils Absolute 0.03 0.00 - 0.10 x10*3/uL   Comprehensive metabolic panel   Result Value Ref Range    Glucose 113 (H) 74 - 99 mg/dL    Sodium 129 (L) 136 - 145 mmol/L    Potassium 5.5 (H) 3.5 - 5.3 mmol/L    Chloride 101 98 - 107 mmol/L    Bicarbonate 16 (L) 21 - 32 mmol/L    Anion Gap 18 10 - 20 mmol/L    Urea Nitrogen 112 (HH) 6 - 23 mg/dL    Creatinine 5.24 (H) 0.50 - 1.05 mg/dL    eGFR 9 (L) >60 mL/min/1.73m*2    Calcium 8.0 (L) 8.6 - 10.3 mg/dL    Albumin 2.4 (L) 3.4 - 5.0 g/dL    Alkaline Phosphatase 101 33 - 136 U/L    Total Protein 6.1 (L) 6.4 - 8.2 g/dL    AST 21 9 - 39 U/L    Bilirubin, Total 0.3 0.0 - 1.2 mg/dL    ALT 8 7 - 45 U/L   Magnesium   Result Value Ref Range    Magnesium 1.76 1.60 - 2.40 mg/dL   Type and Screen   Result  Value Ref Range    ABO TYPE AB     Rh TYPE POS     ANTIBODY SCREEN NEG    Troponin I, High Sensitivity, Initial   Result Value Ref Range    Troponin I, High Sensitivity 11 0 - 13 ng/L   Urinalysis with Reflex Culture and Microscopic   Result Value Ref Range    Color, Urine Yellow Light-Yellow, Yellow, Dark-Yellow    Appearance, Urine Ex.Turbid (N) Clear    Specific Gravity, Urine 1.013 1.005 - 1.035    pH, Urine 5.5 5.0, 5.5, 6.0, 6.5, 7.0, 7.5, 8.0    Protein, Urine 100 (2+) (A) NEGATIVE, 10 (TRACE), 20 (TRACE) mg/dL    Glucose, Urine Normal Normal mg/dL    Blood, Urine 0.5 (2+) (A) NEGATIVE    Ketones, Urine NEGATIVE NEGATIVE mg/dL    Bilirubin, Urine NEGATIVE NEGATIVE    Urobilinogen, Urine Normal Normal mg/dL    Nitrite, Urine NEGATIVE NEGATIVE    Leukocyte Esterase, Urine 500 Yue/µL (A) NEGATIVE   Microscopic Only, Urine   Result Value Ref Range    WBC, Urine 21-50 (A) 1-5, NONE /HPF    WBC Clumps, Urine MODERATE Reference range not established. /HPF    RBC, Urine 1-2 NONE, 1-2, 3-5 /HPF    Squamous Epithelial Cells, Urine 1-9 (SPARSE) Reference range not established. /HPF    Bacteria, Urine 2+ (A) NONE SEEN /HPF   Troponin, High Sensitivity, 1 Hour   Result Value Ref Range    Troponin I, High Sensitivity 8 0 - 13 ng/L   Prepare RBC: 2 Units, Irradiated, Leukocytes Reduced (CMV reduced risk)   Result Value Ref Range    PRODUCT CODE B6236P54     Unit Number [id number]     Unit ABO O     Unit RH POS     XM INTEP COMP     Dispense Status IS     Blood Expiration Date 12/19/2024 11:59:00 PM EST     PRODUCT BLOOD TYPE 5100     UNIT VOLUME 350     PRODUCT CODE K8682D25     Unit Number [id number]     Unit ABO A     Unit RH NEG     XM INTEP COMP     Dispense Status TR     Blood Expiration Date 12/31/2024 11:59:00 PM EST     PRODUCT BLOOD TYPE 0600     UNIT VOLUME 350      *Note: Due to a large number of results and/or encounters for the requested time period, some results have not been displayed. A complete  set of results can be found in Results Review.          Assessment/Plan   Assessment & Plan  Acute renal failure superimposed on chronic kidney disease (CMS-HCC)  On admission , CR 5.24, EGFR 9.  Baseline BUN 40s, CR 2.3, GFR>20  Bilateral nephrostomy tubes present, left kidney less functional with low urine output.  Avoid nephrotoxic agents  Nephrology consulted    Anemia  Likely secondary to chemotherapy treatment.  Reported no active vaginal or GI bleeding.  Recent RBC transfusion approximately 1 week ago.  Hemoglobin 5.9 on admission, received 2 units of RBC in the emergency department.  No anticoagulation due to anemia   could be secondary to recent steroid use or GI bleed  Stool occult pending, consider GI consult if positive stool occult.  Hemoglobin hematocrit ordered for 8 AM.  Hematology oncology consulted    Cervical cancer, FIGO stage RONIT  With metastasis to liver.  Receives weekly chemotherapy at [facility].  Patient has been accepted and awaiting bed.  Consulting hematology oncology for management until patient is transferred.  Control nausea with Zofran first-line, Compazine second line  Pain management per heme-onc    Suspected UTI  UA with 500 LE, WBC and bacteria.  Denies dysuria.  Continue empiric IV ceftriaxone  Follow urine culture    Essential (primary) hypertension  Hypotensive on admission, improved with 1 L IV normal saline and 2 units RBC to 109/59, MAP 75.    Open wound of both lower extremities  Both lower extremities covered in dressing.  Patient does follow wound care.  Wound care consulted.  DVT prophylaxis: SCDs,  GI prophylaxis: PPI    CODE STATUS: Full code    Disposition: Patient is excepted at [facility] and awaiting bed.    I spent 65 minutes in the professional and overall care of this patient.      Zhen Garrett MD

## 2024-12-12 NOTE — ASSESSMENT & PLAN NOTE
Likely secondary to chemotherapy treatment.  Reported no active vaginal or GI bleeding.  Recent RBC transfusion approximately 1 week ago.  Hemoglobin 5.9 on admission, received 2 units of RBC in the emergency department.  No anticoagulation due to anemia   could be secondary to recent steroid use or GI bleed  Stool occult pending, consider GI consult if positive stool occult.  Hemoglobin hematocrit ordered for 8 AM.  Hematology oncology consulted

## 2024-12-12 NOTE — PROGRESS NOTES
Physical Therapy                 Therapy Communication Note    Patient Name: Shelbi Delaney  MRN: [id number]  Department: [facility] ED  Room: TR02/TR02  Today's Date: 12/12/2024     Discipline: Physical Therapy    Missed Visit Reason: Cancel. Order received, chart reviewed & pt  discussed with RN via Jane. Pt with low H/H (6.7/20.2; lower than rehab parameters for functional mobility) even after receiving 2 units RBCs. Will hold eval at this time for maximal safety.    Missed Time: Cancelled at 09:22.    Addendum: New order for transfusion of 1 unit irritated RBC over 3 hours entered at 10:11.

## 2024-12-12 NOTE — ASSESSMENT & PLAN NOTE
On admission , CR 5.24, EGFR 9.  Baseline BUN 40s, CR 2.3, GFR>20  Bilateral nephrostomy tubes present, left kidney less functional with low urine output.  Avoid nephrotoxic agents  Nephrology consulted

## 2024-12-12 NOTE — ASSESSMENT & PLAN NOTE
Bilateral nephrostomy tubes present, left kidney less functional with low urine output.  Avoid nephrotoxic agents  IV hydration  Nephrology consulted

## 2024-12-12 NOTE — ASSESSMENT & PLAN NOTE
With metastasis to liver.  Receives weekly chemotherapy at [facility].  Patient has been accepted and awaiting bed.  Consulting hematology oncology for management until patient is transferred.  Control nausea with Zofran first-line, Compazine second line  Pain management per heme-onc

## 2024-12-12 NOTE — PROGRESS NOTES
Occupational Therapy                 Therapy Communication Note    Patient Name: Shelbi Delaney  MRN: [id number]  Department: [facility] ED  Room: TR02/TR02  Today's Date: 12/12/2024     Discipline: Occupational Therapy    Missed Visit Reason: Missed Visit Reason: Cancel (Abnormal labs)    Missed Time: Cancel    Comment: Patient is a 64 year old female with a hx of active stage IV cervical CA with mets. Patient presented with c/o B LE edema and abnormal labs. Orders received and chart reviewed. Discussed case with RN and patient continues to have low H&H 6.7/20.2 even after receiving 2 units of blood transfusions. Patient currently receiving another transfusion. Will hold therapy this date. OT eval deferred.

## 2024-12-12 NOTE — ASSESSMENT & PLAN NOTE
UA with 500 LE, WBC and bacteria.  Denies dysuria.  Continue empiric IV ceftriaxone  Follow urine culture

## 2024-12-12 NOTE — TELEPHONE ENCOUNTER
Received page from the  lab about a critical lab value-Hgb 6.4. On review of labs, WBC 24 (up from 13), Cr 5.31, , K 6.3. Called patient and her  several times to discuss lab values and strongly recommend presentation to the ER to immediate evaluation, especially given her recent confusion and weakness. The patient and her  refused despite extensive counseling about the risks. Her  stated that she is A&O x3, is afebrile, is voiding and eating and she did not want to go to the ER again tonight. They did agree to ER presentation in the morning at [facility]. Counseled her  that it is our recommendation she be emergently evaluated given the above and that if they re-consider sooner to present immediately or call an ambulance if he is unable to  her himself.

## 2024-12-12 NOTE — CONSULTS
.Reason For Consult  Acute kidney injury and hyperkalemia with metabolic acidosis    History Of Present Illness  Shelbi Delaney is a 64 y.o. female who is known to have underlying CKD stage IV usually her baseline creatinine level 2.4 amazingly she told me she never seen a nephrologist before and she follows up with urologist down at [facility] had obstructive uropathy and had bilateral nephrostomy tubes inserted about 2 years ago she gets them changed every 3 months according to the patient she is still getting chemotherapy for her stage IV cervical cancer she gets Gemzar 1.49 7.2 mg monthly with Neulasta 6 mg she presented to our emergency room this morning because of generalized weakness she was complaints of nausea and vomiting further evaluation she was found to be extremely anemic hemoglobin was in the 5 range and also she had hyperkalemia with a potassium of 6.3 and a creatinine level of 5.3 is why renal consultation is obtained the patient already receiving third unit of packed red blood cells at this time she is extremely nauseous however she denies any abdominal pain she does make urine through both nephrostomy tubes the urine is cloudy there is CT scan of the abdomen pelvis which showed no acute abdominal and pelvic process she had a known partially calcified uterine mass which is stable she does have lymphadenopathy with hepatic metastasis and also had a lytic bone lesion the inferior pubic ramus there is no evidence of hydronephrosis     Review of Systems  Review of Systems   Constitutional:  Positive for activity change, appetite change and fatigue. Negative for chills and fever.   HENT:  Negative for sinus pressure, sore throat and tinnitus.    Eyes:  Negative for photophobia and visual disturbance.   Respiratory:  Positive for shortness of breath. Negative for apnea, cough and wheezing.    Cardiovascular:  Negative for chest pain, palpitations and leg swelling.   Gastrointestinal:  Positive  for nausea and vomiting. Negative for abdominal pain, blood in stool, constipation, diarrhea and rectal pain.   Endocrine: Negative for cold intolerance, heat intolerance, polydipsia, polyphagia and polyuria.   Genitourinary:  Positive for decreased urine volume. Negative for dysuria, frequency, hematuria and urgency.   Musculoskeletal:  Negative for arthralgias, back pain, joint swelling, myalgias and neck pain.   Skin:  Negative for color change, pallor, rash and wound.   Neurological:  Positive for weakness. Negative for dizziness, tremors, seizures, syncope, speech difficulty, light-headedness, numbness and headaches.   Hematological:  Negative for adenopathy. Does not bruise/bleed easily.   Psychiatric/Behavioral:  Negative for confusion, hallucinations, sleep disturbance and suicidal ideas.         Past Medical History  She has a past medical history of Anxiety, Body mass index (BMI)30.0-30.9, adult (08/07/2019), Cervix cancer (Multi), Chemotherapy-induced peripheral neuropathy (Multi), Chronic kidney disease, Hypertension, Hypothyroidism, and Tachycardia, unspecified.    Surgical History  She has a past surgical history that includes US guided percutaneous placement (11/14/2022); CT guided imaging for needle placement (01/12/2023); US guided percutaneous placement (08/28/2023); IR nephrostomy tube exchange (10/20/2023); Nephrostomy; Transurethral resection of bladder tumor; ORIF tibia fracture (Right); mediport; IR nephrostomy tube exchange (12/19/2023); and IR  nephroureteral placement (Bilateral, 6/18/2024).     Social History  She reports that she quit smoking about 5 years ago. Her smoking use included cigarettes. She started smoking about 47 years ago. She has a 10.7 pack-year smoking history. She has been exposed to tobacco smoke. She has never used smokeless tobacco. She reports that she does not currently use alcohol after a past usage of about 8.0 standard drinks of alcohol per week. She reports  that she does not use drugs.    Family History  Family History   Problem Relation Name Age of Onset    No Known Problems Mother      Other (Acute myocardial infarction) Father          Current Facility-Administered Medications:     acetaminophen (Tylenol) tablet 650 mg, 650 mg, oral, q4h PRN **OR** acetaminophen (Tylenol) oral liquid 650 mg, 650 mg, nasogastric tube, q4h PRN **OR** acetaminophen (Tylenol) suppository 650 mg, 650 mg, rectal, q4h PRN, Zhen Garrett MD    acetaminophen (Tylenol) tablet 650 mg, 650 mg, oral, q4h PRN **OR** acetaminophen (Tylenol) oral liquid 650 mg, 650 mg, oral, q4h PRN **OR** acetaminophen (Tylenol) suppository 650 mg, 650 mg, rectal, q4h PRN, Zhen Garrett MD    [START ON 12/13/2024] cefTRIAXone (Rocephin) 1 g in dextrose (iso) IV 50 mL, 1 g, intravenous, q24h, Zhen Garrett MD    DULoxetine (Cymbalta) DR capsule 20 mg, 20 mg, oral, Daily, hZen Garrett MD    gabapentin (Neurontin) capsule 300 mg, 300 mg, oral, BID, Zhen Garrett MD, 300 mg at 12/12/24 0927    hydrOXYzine HCL (Atarax) tablet 25 mg, 25 mg, oral, TID PRN, Zhen Garrett MD    levothyroxine (Synthroid, Levoxyl) tablet 25 mcg, 25 mcg, oral, Daily, Zhen Garrett MD, 25 mcg at 12/12/24 0543    magic mouthwash (lidocaine, diphenhydrAMINE, Maalox 1:1:1), 10 mL, Swish & Spit, q6h PRN, Zhen Garrett MD    [Held by provider] metoprolol tartrate (Lopressor) tablet 25 mg, 25 mg, oral, BID, Zhen Garrett MD    ondansetron (Zofran) tablet 8 mg, 8 mg, oral, q8h PRN, Zhen Garrett MD    pantoprazole (ProtoNix) EC tablet 40 mg, 40 mg, oral, Daily before breakfast, 40 mg at 12/12/24 0653 **OR** pantoprazole (ProtoNix) injection 40 mg, 40 mg, intravenous, Daily before breakfast, Zhen Garrett MD    polyethylene glycol (Glycolax, Miralax) packet 17 g, 17 g, oral, Daily, Zhen Garrett MD    prochlorperazine (Compazine) tablet 10 mg, 10 mg, oral, q6h PRN, Zhen Garrett MD    sodium bicarbonate 8.4 % (1 mEq/mL) 50 mEq, 50 mEq, intravenous,  Once, Valerie Dubose MD    sodium chloride 0.9% infusion, 100 mL/hr, intravenous, Continuous, Valerie Dubose MD, Last Rate: 100 mL/hr at 12/12/24 1049, 100 mL/hr at 12/12/24 1049    sodium polystyrene (Kayexalate) suspension 30 g, 30 g, oral, Once, Valerie Dubose MD    traZODone (Desyrel) tablet 50 mg, 50 mg, oral, Nightly PRN, Zhen Garrett MD    Current Outpatient Medications:     acetaminophen (Tylenol) 325 mg tablet, Take 3 tablets (975 mg) by mouth every 6 hours if needed for mild pain (1 - 3)., Disp: , Rfl:     calcium carbonate (Calcium 600) 600 mg calcium (1,500 mg) tablet, Take 600 mg by mouth once daily., Disp: , Rfl:     dexAMETHasone (Decadron) 4 mg tablet, Take 2 tablets (8 mg) by mouth 2 times a day. Take on the day before, day of and day after treatment. (Patient not taking: Reported on 11/7/2024), Disp: 36 tablet, Rfl: 3    dexAMETHasone 0.5 mg/5 mL oral liquid, Take 10 mL (1 mg) by mouth 2 times a day as needed (mucositis). Swish and spit. Do not swallow (Patient not taking: Reported on 11/7/2024), Disp: 100 mL, Rfl: 0    docusate sodium (Colace) 100 mg capsule, Take 1 capsule (100 mg) by mouth 2 times a day., Disp: 180 capsule, Rfl: 3    DULoxetine (Cymbalta) 20 mg DR capsule, Take 1 capsule (20 mg) by mouth once daily. Do not crush or chew. (Patient not taking: Reported on 11/7/2024), Disp: 30 capsule, Rfl: 11    gabapentin (Neurontin) 300 mg capsule, Take 1 capsule (300 mg) by mouth 2 times a day., Disp: 60 capsule, Rfl: 3    hydrOXYzine HCL (Atarax) 25 mg tablet, Take 1 tablet (25 mg) by mouth 3 times a day as needed for anxiety., Disp: , Rfl:     levothyroxine (Synthroid, Levoxyl) 25 mcg tablet, Take 1 tablet (25 mcg) by mouth once daily., Disp: 90 tablet, Rfl: 3    loperamide (Imodium) 2 mg capsule, Take 2 capsules (4 mg) by mouth 4 times a day as needed for diarrhea., Disp: , Rfl:     magic mouthwash (lidocaine, diphenhydrAMINE, Maalox 1:1:1), Swish and spit 10 mL  every 6 hours if needed for mucositis. (Patient not taking: Reported on 11/7/2024), Disp: 250 mL, Rfl: 2    metoprolol tartrate (Lopressor) 50 mg tablet, take 0.5 tablets by mouth 2 times a day, Disp: 30 tablet, Rfl: 5    multivitamin with minerals tablet, Take 1 tablet by mouth once daily., Disp: , Rfl:     naloxone (Narcan) 4 mg/0.1 mL nasal spray, Administer 1 spray (4 mg) into affected nostril(s) if needed for opioid reversal. May repeat every 2-3 minutes if needed, alternating nostrils, until medical assistance becomes available. (Patient not taking: Reported on 11/7/2024), Disp: , Rfl:     nystatin (Mycostatin) cream, Apply 1 Application topically 2 times a day. (Patient not taking: Reported on 11/7/2024), Disp: , Rfl:     ondansetron (Zofran) 8 mg tablet, Take 1 tablet (8 mg) by mouth every 8 hours if needed for nausea or vomiting., Disp: 30 tablet, Rfl: 5    oxyCODONE (Roxicodone) 10 mg immediate release tablet, Take 1 tablet (10 mg) by mouth 3 times a day as needed for moderate pain (4 - 6)., Disp: 90 tablet, Rfl: 0    polyethylene glycol (Glycolax, Miralax) 17 gram packet, Take 17 g by mouth once daily as needed (constipation)., Disp: 90 packet, Rfl: 3    prochlorperazine (Compazine) 10 mg tablet, Take 1 tablet (10 mg) by mouth every 6 hours if needed for nausea or vomiting., Disp: 30 tablet, Rfl: 5    sulfamethoxazole-trimethoprim (Bactrim DS) 800-160 mg tablet, Take 1 tablet by mouth 2 times a day for 7 days., Disp: 14 tablet, Rfl: 0    tiZANidine (Zanaflex) 4 mg tablet, Take 1 tablet (4 mg) by mouth 3 times a day., Disp: 270 tablet, Rfl: 1    traZODone (Desyrel) 50 mg tablet, Take 1 tablet (50 mg) by mouth as needed at bedtime for sleep., Disp: 90 tablet, Rfl: 1   Allergies  L-lysine [lysine] and Adhesive         Physical Exam  Physical Exam         I&O 24HR    Intake/Output Summary (Last 24 hours) at 12/12/2024 1154  Last data filed at 12/12/2024 0631  Gross per 24 hour   Intake 2100 ml   Output --  "  Net 2100 ml       Vitals 24HR  Heart Rate:  []   Temperature:  [36.5 °C (97.7 °F)-36.9 °C (98.4 °F)]   Respirations:  [14-21]   BP: ()/(50-74)   Height:  [170.2 cm (5' 7\")]   Weight:  [80.7 kg (178 lb)]   Pulse Ox:  [96 %-100 %]     Relevant Results        Results for orders placed or performed during the hospital encounter of 12/11/24 (from the past 96 hours)   CBC and Auto Differential   Result Value Ref Range    WBC 22.8 (H) 4.4 - 11.3 x10*3/uL    nRBC 0.4 (H) 0.0 - 0.0 /100 WBCs    RBC 1.91 (L) 4.00 - 5.20 x10*6/uL    Hemoglobin 5.9 (LL) 12.0 - 16.0 g/dL    Hematocrit 18.7 (L) 36.0 - 46.0 %    MCV 98 80 - 100 fL    MCH 30.9 26.0 - 34.0 pg    MCHC 31.6 (L) 32.0 - 36.0 g/dL    RDW 19.0 (H) 11.5 - 14.5 %    Platelets 257 150 - 450 x10*3/uL    Neutrophils % 93.6 40.0 - 80.0 %    Immature Granulocytes %, Automated 4.3 (H) 0.0 - 0.9 %    Lymphocytes % 1.1 13.0 - 44.0 %    Monocytes % 0.9 2.0 - 10.0 %    Eosinophils % 0.0 0.0 - 6.0 %    Basophils % 0.1 0.0 - 2.0 %    Neutrophils Absolute 21.32 (H) 1.20 - 7.70 x10*3/uL    Immature Granulocytes Absolute, Automated 0.98 (H) 0.00 - 0.70 x10*3/uL    Lymphocytes Absolute 0.26 (L) 1.20 - 4.80 x10*3/uL    Monocytes Absolute 0.20 0.10 - 1.00 x10*3/uL    Eosinophils Absolute 0.01 0.00 - 0.70 x10*3/uL    Basophils Absolute 0.03 0.00 - 0.10 x10*3/uL   Comprehensive metabolic panel   Result Value Ref Range    Glucose 113 (H) 74 - 99 mg/dL    Sodium 129 (L) 136 - 145 mmol/L    Potassium 5.5 (H) 3.5 - 5.3 mmol/L    Chloride 101 98 - 107 mmol/L    Bicarbonate 16 (L) 21 - 32 mmol/L    Anion Gap 18 10 - 20 mmol/L    Urea Nitrogen 112 (HH) 6 - 23 mg/dL    Creatinine 5.24 (H) 0.50 - 1.05 mg/dL    eGFR 9 (L) >60 mL/min/1.73m*2    Calcium 8.0 (L) 8.6 - 10.3 mg/dL    Albumin 2.4 (L) 3.4 - 5.0 g/dL    Alkaline Phosphatase 101 33 - 136 U/L    Total Protein 6.1 (L) 6.4 - 8.2 g/dL    AST 21 9 - 39 U/L    Bilirubin, Total 0.3 0.0 - 1.2 mg/dL    ALT 8 7 - 45 U/L   Magnesium   Result " Value Ref Range    Magnesium 1.76 1.60 - 2.40 mg/dL   Type and Screen   Result Value Ref Range    ABO TYPE AB     Rh TYPE POS     ANTIBODY SCREEN NEG    Troponin I, High Sensitivity, Initial   Result Value Ref Range    Troponin I, High Sensitivity 11 0 - 13 ng/L   Urinalysis with Reflex Culture and Microscopic   Result Value Ref Range    Color, Urine Yellow Light-Yellow, Yellow, Dark-Yellow    Appearance, Urine Ex.Turbid (N) Clear    Specific Gravity, Urine 1.013 1.005 - 1.035    pH, Urine 5.5 5.0, 5.5, 6.0, 6.5, 7.0, 7.5, 8.0    Protein, Urine 100 (2+) (A) NEGATIVE, 10 (TRACE), 20 (TRACE) mg/dL    Glucose, Urine Normal Normal mg/dL    Blood, Urine 0.5 (2+) (A) NEGATIVE    Ketones, Urine NEGATIVE NEGATIVE mg/dL    Bilirubin, Urine NEGATIVE NEGATIVE    Urobilinogen, Urine Normal Normal mg/dL    Nitrite, Urine NEGATIVE NEGATIVE    Leukocyte Esterase, Urine 500 Yue/µL (A) NEGATIVE   Extra Urine Gray Tube   Result Value Ref Range    Extra Tube Hold for add-ons.    Microscopic Only, Urine   Result Value Ref Range    WBC, Urine 21-50 (A) 1-5, NONE /HPF    WBC Clumps, Urine MODERATE Reference range not established. /HPF    RBC, Urine 1-2 NONE, 1-2, 3-5 /HPF    Squamous Epithelial Cells, Urine 1-9 (SPARSE) Reference range not established. /HPF    Bacteria, Urine 2+ (A) NONE SEEN /HPF   ECG 12 lead   Result Value Ref Range    Ventricular Rate 95 BPM    Atrial Rate 95 BPM    NJ Interval 156 ms    QRS Duration 84 ms    QT Interval 358 ms    QTC Calculation(Bazett) 449 ms    P Axis 48 degrees    R Axis 14 degrees    T Axis 8 degrees    QRS Count 16 beats    Q Onset 226 ms    P Onset 148 ms    P Offset 198 ms    T Offset 405 ms    QTC Fredericia 417 ms   Troponin, High Sensitivity, 1 Hour   Result Value Ref Range    Troponin I, High Sensitivity 8 0 - 13 ng/L   Prepare RBC: 2 Units, Irradiated, Leukocytes Reduced (CMV reduced risk)   Result Value Ref Range    PRODUCT CODE T2754L52     Unit Number [id number]     Unit ABO O      Unit RH POS     XM INTEP COMP     Dispense Status TR     Blood Expiration Date 12/19/2024 11:59:00 PM EST     PRODUCT BLOOD TYPE 5100     UNIT VOLUME 350     PRODUCT CODE T7171I65     Unit Number [id number]     Unit ABO A     Unit RH NEG     XM INTEP COMP     Dispense Status TR     Blood Expiration Date 12/31/2024 11:59:00 PM EST     PRODUCT BLOOD TYPE 0600     UNIT VOLUME 350    Hemoglobin and hematocrit, blood   Result Value Ref Range    Hemoglobin 6.7 (L) 12.0 - 16.0 g/dL    Hematocrit 20.2 (L) 36.0 - 46.0 %   Prepare RBC: 1 Units, Irradiated, Leukocytes Reduced (CMV reduced risk)   Result Value Ref Range    PRODUCT CODE D9848C32     Unit Number [id number]     Unit ABO O     Unit RH POS     XM INTEP COMP     Dispense Status IS     Blood Expiration Date 12/26/2024 11:59:00 PM EST     PRODUCT BLOOD TYPE 5100     UNIT VOLUME 350    Basic Metabolic Panel   Result Value Ref Range    Glucose 95 74 - 99 mg/dL    Sodium 132 (L) 136 - 145 mmol/L    Potassium 5.3 3.5 - 5.3 mmol/L    Chloride 110 (H) 98 - 107 mmol/L    Bicarbonate 13 (L) 21 - 32 mmol/L    Anion Gap 14 10 - 20 mmol/L    Urea Nitrogen 97 (HH) 6 - 23 mg/dL    Creatinine 4.32 (H) 0.50 - 1.05 mg/dL    eGFR 11 (L) >60 mL/min/1.73m*2    Calcium 6.3 (L) 8.6 - 10.3 mg/dL     *Note: Due to a large number of results and/or encounters for the requested time period, some results have not been displayed. A complete set of results can be found in Results Review.          Assessment/Plan     ECG 12 lead    Result Date: 12/12/2024  Normal sinus rhythm Low voltage QRS Poor R-wave progression Abnormal ECG When compared with ECG of 19-AUG-2024 03:28, Low voltage QRS is now evident Confirmed by Vidal Tadeo ([id number]) on 12/12/2024 11:28:30 AM    CT abdomen pelvis wo IV contrast    Result Date: 12/12/2024  Interpreted By:  Tawana Mays, STUDY: CT ABDOMEN PELVIS WO IV CONTRAST;  12/12/2024 12:15 am   INDICATION: Signs/Symptoms:abdominal pain, nausea, vomiting.    COMPARISON: 11/04/2024   ACCESSION NUMBER(S): [id number]   ORDERING CLINICIAN: ALMA BAUMANN   TECHNIQUE: Axial noncontrast CT images of the abdomen and pelvis with coronal and sagittal reconstructed images.   FINDINGS: LOWER CHEST: No acute abnormality of the lung bases. BONES: Stable destructive lesion involving the right inferior pubic ramus and pubic body. Mild diffuse degenerative disc changes. Mild bilateral hip osteoarthrosis. ABDOMINAL WALL: Stable small fat containing supraumbilical ventral hernia.   ABDOMEN: Lack of intravenous contrast limits evaluation of vessels and solid organs. LIVER: Several ill-defined hypodense lesions in the right lobe of the liver grossly stable. The largest is in segment 8 and measures approximately 3.9 cm. BILE DUCTS: No biliary dilatation. GALLBLADDER: No calcified gallstones. No pericholecystic inflammatory changes. PANCREAS: No peripancreatic inflammatory stranding or duct dilatation. SPLEEN: Within normal limits. ADRENALS: Within normal limits.   KIDNEYS, URETERS, URINARY BLADDER: Stable position of left percutaneous nephrostomy catheter and right percutaneous nephroureteral catheter. No hydronephrosis. Stable atrophy of the left kidney.  Urinary bladder is nondistended.   VESSELS: No aortic aneurysm. RETROPERITONEUM: Stable appearance of multiple enlarged retrocrural, retroperitoneal, external iliac chain and inguinal lymph nodes, most of which are partially calcified. Numerous small subcentimeter calcified perirectal lymph nodes are stable.   PELVIS:   REPRODUCTIVE ORGANS: Uterine mass is stable to slightly decreased in size, measuring 7.9 x 6.4 cm (previously 7.8 x 7.1 cm). Right adnexal mass measuring 4 x 4.5 cm previously measured 4.4 x 4.7 cm. Left adnexal mass measures 2.8 cm, previously 2.5 cm. Is the slight differences in these measurements may be related to slight change in position. Additional fat containing right adnexal mass is stable. No significant free  pelvic fluid.   BOWEL: No dilated bowel.  Normal appendix. PERITONEUM: No ascites or free air, no fluid collection.       No acute abdominal or pelvic process.   The known partially calcified uterine mass is stable to slightly decreased in size. Additional bilateral adnexal masses are grossly stable in size.   Grossly stable abdominopelvic lymphadenopathy and hepatic masses, consistent with metastatic disease.   No significant change in lytic/destructive lesion of the right inferior pubic ramus.   Stable chronic findings as above.   MACRO: None   Signed by: Tawana Mays 12/12/2024 1:20 AM Dictation workstation:   [id number]     Impression:  Acute kidney injury superimposed on chronic kidney disease stage IV secondary severe anemia and blood loss and intravascular volume depletion rule out sepsis no evidence of obstruction by CAT scan of the abdomen pelvis possibly worsened by antibiotics mainly Bactrim  Chronic kidney disease stage IV secondary to obstructive uropathy  Stage IV cervical cancer with severe metastatic disease involving severe lymphadenopathy as well as liver mets and bone mets  Metabolic acidosis secondary to acute renal failure  Hyperkalemia secondary to acute renal failure and metabolic acidosis  Rule out urinary tract infection and sepsis    Recommendations:  Blood transfusion  IV hydration  IV sodium bicarbonate drip  Lokelma 10 g 3 times a day for 3 doses  Monitor potassium and renal function acid-base very closely  Since hyperkalemia is improved and the renal function is improving there is no need for immediate hemodialysis at this time  Blood and urine cultures  IV antibiotics I agree with Rocephin for now  Reduce gabapentin dose to 300 mg daily because of the reduced GFR    Avoid nephrotoxins  Change diet to renal diet    Thank you for your consultation  Jonathan Donis MDInpatient consult to Renal Care  Consult performed by: Jonathan Donis MD  Consult ordered by: Zhen Garrett MD

## 2024-12-12 NOTE — ASSESSMENT & PLAN NOTE
Both lower extremities covered in dressing.  Patient does follow wound care.  Wound care consulted.

## 2024-12-12 NOTE — PROGRESS NOTES
"Shelbi Delaney is a 64 y.o. female on day 0 of admission presenting with Acute renal failure superimposed on chronic kidney disease (CMS-HCC) and severe anemia.     Subjective   She was transfused with 2 units of PRBCs. She had hyperkalemia yesterday with potassium improving from 6.3 to 5.5.   She has metastatic cervical cancer. She has had dark stool. She takes iron pills.   She had upper endoscopy several years ago.     She was awake and alert. She had recently become hypotensive with a reported BP of 63/27 per her ED nurse. BP was rechecked and was 88/45.   The patient was seen, she was awake and alert. She had been dizzy earlier, but dizziness had resolved. She said her usually systolic BP is about 110.   At the time of this encounter, repeat BP was 95/50.   She had a post transfusion hemoglobin of 6.7 and a 3rd unit of PRBCs has been ordered.   A repeat BMP has been ordered this morning.        Objective     Physical Exam  General: awake, alert, oriented, responsive  Cardiovascular: regular, normal S1 and S2  Lungs: good air entry bilaterally, clear to auscultation  Abdomen: soft, nontender, bowel sounds present, normoactive   Back: she had left and right nephrostomy tubes in place. The left nephrostomy bag was empty.   Extremities: no peripheral cyanosis, no pedal edema  Neuro: alert, oriented x 3, no focal weakness      Last Recorded Vitals  Blood pressure 95/50, pulse 52, temperature 36.5 °C (97.7 °F), resp. rate 17, height 1.702 m (5' 7\"), weight 80.7 kg (178 lb), SpO2 98%.  Intake/Output last 3 Shifts:  I/O last 3 completed shifts:  In: 2100 (26 mL/kg) [Blood:1050; IV Piggyback:1050]  Out: - (0 mL/kg)   Weight: 80.7 kg     Relevant Results  Lab Results   Component Value Date    WBC 22.8 (H) 12/11/2024    HGB 6.7 (L) 12/12/2024    HCT 20.2 (L) 12/12/2024    MCV 98 12/11/2024     12/11/2024     Lab Results   Component Value Date    GLUCOSE 113 (H) 12/11/2024    CALCIUM 8.0 (L) 12/11/2024     (L) " 12/11/2024    K 5.5 (H) 12/11/2024    CO2 16 (L) 12/11/2024     12/11/2024     (HH) 12/11/2024    CREATININE 5.24 (H) 12/11/2024     Scheduled medications  [START ON 12/13/2024] cefTRIAXone, 1 g, intravenous, q24h  DULoxetine, 20 mg, oral, Daily  gabapentin, 300 mg, oral, BID  levothyroxine, 25 mcg, oral, Daily  [Held by provider] metoprolol tartrate, 25 mg, oral, BID  pantoprazole, 40 mg, oral, Daily before breakfast   Or  pantoprazole, 40 mg, intravenous, Daily before breakfast  polyethylene glycol, 17 g, oral, Daily  sodium chloride, 1,000 mL, intravenous, Once      Continuous medications     PRN medications  PRN medications: acetaminophen **OR** acetaminophen **OR** acetaminophen, acetaminophen **OR** acetaminophen **OR** acetaminophen, hydrOXYzine HCL, magic mouthwash (lidocaine, diphenhydrAMINE, Maalox 1:1:1), ondansetron, prochlorperazine, traZODone        Assessment/Plan   Assessment & Plan  Acute renal failure superimposed on chronic kidney disease (CMS-HCC)  Bilateral nephrostomy tubes present, left kidney less functional with low urine output.  Avoid nephrotoxic agents  IV hydration  Nephrology consulted  Anemia  Secondary to chemotherapy vs GI bleed, she has dark stool.   Hemoglobin 5.9 on admission, received 2 units of RBC in the emergency department.  No anticoagulation due to anemia  Cervical cancer, FIGO stage RONIT  With metastasis to liver.  Receives weekly chemotherapy at [facility].  Patient has been accepted and awaiting bed.  Consulting hematology oncology for management until patient is transferred.  Analgesics   Pain medication as needed  Suspected UTI  UA with 500 LE, WBC and bacteria.  Denies dysuria.  Continue empiric IV ceftriaxone  Follow urine culture  Essential (primary) hypertension  Hypotensive on presentation, Not on BP medication.   Open wound of both lower extremities  Both lower extremities covered in dressing.  Patient does follow wound care.  Wound care  consulted.    Plan  Hypotensive, improvin liter of IV normal saline (bolus) ordered  Gentle IV hydration  Hold metoprolol which she said she takes only as needed for rapid heart rate. Her heart rate was in the 40s and 50s at the time of this encounter. Metoprolol was not given this morning.   Transfuse another unit of PRBCs for post transfusion hemoglobin of 6.7.   Gastroenterology consult  Chest x-ray  Repeat hemoglobin post transfusion      Valerie Dubose MD

## 2024-12-12 NOTE — ASSESSMENT & PLAN NOTE
Secondary to chemotherapy vs GI bleed, she has dark stool.   Hemoglobin 5.9 on admission, received 2 units of RBC in the emergency department.  No anticoagulation due to anemia

## 2024-12-12 NOTE — CARE PLAN
Pt does not feel well enough to answer questions at this time  Pt to be transferred to [facility]  Hx of stage IV cervical CA with mets

## 2024-12-13 ENCOUNTER — OFFICE VISIT (OUTPATIENT)
Dept: SURGICAL ONCOLOGY | Facility: HOSPITAL | Age: 64
DRG: 683 | End: 2024-12-13
Payer: COMMERCIAL

## 2024-12-13 ENCOUNTER — HOSPITAL ENCOUNTER (INPATIENT)
Facility: HOSPITAL | Age: 64
DRG: 683 | End: 2024-12-13
Attending: OBSTETRICS & GYNECOLOGY | Admitting: OBSTETRICS & GYNECOLOGY
Payer: COMMERCIAL

## 2024-12-13 VITALS
DIASTOLIC BLOOD PRESSURE: 58 MMHG | BODY MASS INDEX: 29.86 KG/M2 | TEMPERATURE: 98.1 F | SYSTOLIC BLOOD PRESSURE: 100 MMHG | WEIGHT: 190.26 LBS | HEIGHT: 67 IN | HEART RATE: 97 BPM | OXYGEN SATURATION: 96 % | RESPIRATION RATE: 19 BRPM

## 2024-12-13 DIAGNOSIS — E87.8 ELECTROLYTE ABNORMALITY: Primary | ICD-10-CM

## 2024-12-13 DIAGNOSIS — G89.3 CANCER RELATED PAIN: ICD-10-CM

## 2024-12-13 DIAGNOSIS — N13.30 HYDRONEPHROSIS, UNSPECIFIED HYDRONEPHROSIS TYPE: ICD-10-CM

## 2024-12-13 DIAGNOSIS — E87.20 METABOLIC ACIDOSIS: ICD-10-CM

## 2024-12-13 PROBLEM — I95.9 HYPOTENSION: Status: ACTIVE | Noted: 2024-12-13

## 2024-12-13 LAB
ALBUMIN SERPL BCP-MCNC: 2.2 G/DL (ref 3.4–5)
ALBUMIN SERPL BCP-MCNC: 2.2 G/DL (ref 3.4–5)
ALP SERPL-CCNC: 88 U/L (ref 33–136)
ALP SERPL-CCNC: 89 U/L (ref 33–136)
ALT SERPL W P-5'-P-CCNC: 7 U/L (ref 7–45)
ALT SERPL W P-5'-P-CCNC: 7 U/L (ref 7–45)
ANION GAP SERPL CALC-SCNC: 20 MMOL/L (ref 10–20)
ANION GAP SERPL CALCULATED.3IONS-SCNC: 17 MMOL/L (ref 10–20)
ANION GAP SERPL CALCULATED.3IONS-SCNC: 18 MMOL/L (ref 10–20)
AST SERPL W P-5'-P-CCNC: 18 U/L (ref 9–39)
AST SERPL W P-5'-P-CCNC: 18 U/L (ref 9–39)
BACTERIA UR CULT: NORMAL
BILIRUB SERPL-MCNC: 0.3 MG/DL (ref 0–1.2)
BILIRUB SERPL-MCNC: 0.3 MG/DL (ref 0–1.2)
BUN SERPL-MCNC: 106 MG/DL (ref 6–23)
BUN SERPL-MCNC: 107 MG/DL (ref 6–23)
BUN SERPL-MCNC: 112 MG/DL (ref 6–23)
CALCIUM SERPL-MCNC: 7.3 MG/DL (ref 8.6–10.6)
CALCIUM SERPL-MCNC: 7.5 MG/DL (ref 8.6–10.3)
CALCIUM SERPL-MCNC: 7.5 MG/DL (ref 8.6–10.3)
CHLORIDE SERPL-SCNC: 101 MMOL/L (ref 98–107)
CHLORIDE SERPL-SCNC: 103 MMOL/L (ref 98–107)
CHLORIDE SERPL-SCNC: 99 MMOL/L (ref 98–107)
CK SERPL-CCNC: 19 U/L (ref 0–215)
CO2 SERPL-SCNC: 16 MMOL/L (ref 21–32)
CO2 SERPL-SCNC: 18 MMOL/L (ref 21–32)
CO2 SERPL-SCNC: 20 MMOL/L (ref 21–32)
CREAT SERPL-MCNC: 4.78 MG/DL (ref 0.5–1.05)
CREAT SERPL-MCNC: 4.82 MG/DL (ref 0.5–1.05)
CREAT SERPL-MCNC: 4.84 MG/DL (ref 0.5–1.05)
EGFRCR SERPLBLD CKD-EPI 2021: 10 ML/MIN/1.73M*2
ERYTHROCYTE [DISTWIDTH] IN BLOOD BY AUTOMATED COUNT: 18.4 % (ref 11.5–14.5)
ERYTHROCYTE [DISTWIDTH] IN BLOOD BY AUTOMATED COUNT: 18.6 % (ref 11.5–14.5)
GLUCOSE BLD MANUAL STRIP-MCNC: 117 MG/DL (ref 74–99)
GLUCOSE SERPL-MCNC: 120 MG/DL (ref 74–99)
GLUCOSE SERPL-MCNC: 122 MG/DL (ref 74–99)
GLUCOSE SERPL-MCNC: 128 MG/DL (ref 74–99)
HCT VFR BLD AUTO: 22.4 % (ref 36–46)
HCT VFR BLD AUTO: 24.7 % (ref 36–46)
HGB BLD-MCNC: 7.6 G/DL (ref 12–16)
HGB BLD-MCNC: 8 G/DL (ref 12–16)
LACTATE BLDV-SCNC: 0.9 MMOL/L (ref 0.4–2)
LACTATE BLDV-SCNC: 1.2 MMOL/L (ref 0.4–2)
LACTATE BLDV-SCNC: 2.5 MMOL/L (ref 0.4–2)
MAGNESIUM SERPL-MCNC: 1.74 MG/DL (ref 1.6–2.4)
MAGNESIUM SERPL-MCNC: 1.84 MG/DL (ref 1.6–2.4)
MCH RBC QN AUTO: 30.9 PG (ref 26–34)
MCH RBC QN AUTO: 31.3 PG (ref 26–34)
MCHC RBC AUTO-ENTMCNC: 32.4 G/DL (ref 32–36)
MCHC RBC AUTO-ENTMCNC: 33.9 G/DL (ref 32–36)
MCV RBC AUTO: 91 FL (ref 80–100)
MCV RBC AUTO: 97 FL (ref 80–100)
NRBC BLD-RTO: 0.4 /100 WBCS (ref 0–0)
NRBC BLD-RTO: 0.5 /100 WBCS (ref 0–0)
PHOSPHATE SERPL-MCNC: 4.7 MG/DL (ref 2.5–4.9)
PHOSPHATE SERPL-MCNC: 5 MG/DL (ref 2.5–4.9)
PLATELET # BLD AUTO: 257 X10*3/UL (ref 150–450)
PLATELET # BLD AUTO: 298 X10*3/UL (ref 150–450)
POTASSIUM SERPL-SCNC: 4.5 MMOL/L (ref 3.5–5.3)
POTASSIUM SERPL-SCNC: 4.7 MMOL/L (ref 3.5–5.3)
POTASSIUM SERPL-SCNC: 4.9 MMOL/L (ref 3.5–5.3)
PROT SERPL-MCNC: 5.1 G/DL (ref 6.4–8.2)
PROT SERPL-MCNC: 5.4 G/DL (ref 6.4–8.2)
RBC # BLD AUTO: 2.46 X10*6/UL (ref 4–5.2)
RBC # BLD AUTO: 2.56 X10*6/UL (ref 4–5.2)
SODIUM SERPL-SCNC: 131 MMOL/L (ref 136–145)
SODIUM SERPL-SCNC: 132 MMOL/L (ref 136–145)
SODIUM SERPL-SCNC: 134 MMOL/L (ref 136–145)
WBC # BLD AUTO: 15.7 X10*3/UL (ref 4.4–11.3)
WBC # BLD AUTO: 16.9 X10*3/UL (ref 4.4–11.3)

## 2024-12-13 PROCEDURE — 85027 COMPLETE CBC AUTOMATED: CPT | Performed by: STUDENT IN AN ORGANIZED HEALTH CARE EDUCATION/TRAINING PROGRAM

## 2024-12-13 PROCEDURE — 83605 ASSAY OF LACTIC ACID: CPT

## 2024-12-13 PROCEDURE — 2500000004 HC RX 250 GENERAL PHARMACY W/ HCPCS (ALT 636 FOR OP/ED)

## 2024-12-13 PROCEDURE — 84100 ASSAY OF PHOSPHORUS: CPT

## 2024-12-13 PROCEDURE — 97162 PT EVAL MOD COMPLEX 30 MIN: CPT | Mod: GP

## 2024-12-13 PROCEDURE — 2500000002 HC RX 250 W HCPCS SELF ADMINISTERED DRUGS (ALT 637 FOR MEDICARE OP, ALT 636 FOR OP/ED): Performed by: INTERNAL MEDICINE

## 2024-12-13 PROCEDURE — 99223 1ST HOSP IP/OBS HIGH 75: CPT | Performed by: INTERNAL MEDICINE

## 2024-12-13 PROCEDURE — 2500000004 HC RX 250 GENERAL PHARMACY W/ HCPCS (ALT 636 FOR OP/ED): Performed by: INTERNAL MEDICINE

## 2024-12-13 PROCEDURE — 83735 ASSAY OF MAGNESIUM: CPT | Performed by: STUDENT IN AN ORGANIZED HEALTH CARE EDUCATION/TRAINING PROGRAM

## 2024-12-13 PROCEDURE — 2500000004 HC RX 250 GENERAL PHARMACY W/ HCPCS (ALT 636 FOR OP/ED): Performed by: STUDENT IN AN ORGANIZED HEALTH CARE EDUCATION/TRAINING PROGRAM

## 2024-12-13 PROCEDURE — 97166 OT EVAL MOD COMPLEX 45 MIN: CPT | Mod: GO

## 2024-12-13 PROCEDURE — 2500000001 HC RX 250 WO HCPCS SELF ADMINISTERED DRUGS (ALT 637 FOR MEDICARE OP)

## 2024-12-13 PROCEDURE — 84075 ASSAY ALKALINE PHOSPHATASE: CPT | Performed by: STUDENT IN AN ORGANIZED HEALTH CARE EDUCATION/TRAINING PROGRAM

## 2024-12-13 PROCEDURE — 94640 AIRWAY INHALATION TREATMENT: CPT

## 2024-12-13 PROCEDURE — 82947 ASSAY GLUCOSE BLOOD QUANT: CPT

## 2024-12-13 PROCEDURE — 99222 1ST HOSP IP/OBS MODERATE 55: CPT

## 2024-12-13 PROCEDURE — 80048 BASIC METABOLIC PNL TOTAL CA: CPT

## 2024-12-13 PROCEDURE — 97535 SELF CARE MNGMENT TRAINING: CPT | Mod: GO

## 2024-12-13 PROCEDURE — 2500000001 HC RX 250 WO HCPCS SELF ADMINISTERED DRUGS (ALT 637 FOR MEDICARE OP): Performed by: INTERNAL MEDICINE

## 2024-12-13 PROCEDURE — 1170000001 HC PRIVATE ONCOLOGY ROOM DAILY

## 2024-12-13 PROCEDURE — 85027 COMPLETE CBC AUTOMATED: CPT

## 2024-12-13 PROCEDURE — 80053 COMPREHEN METABOLIC PANEL: CPT | Performed by: STUDENT IN AN ORGANIZED HEALTH CARE EDUCATION/TRAINING PROGRAM

## 2024-12-13 PROCEDURE — 2500000001 HC RX 250 WO HCPCS SELF ADMINISTERED DRUGS (ALT 637 FOR MEDICARE OP): Performed by: STUDENT IN AN ORGANIZED HEALTH CARE EDUCATION/TRAINING PROGRAM

## 2024-12-13 PROCEDURE — 2500000005 HC RX 250 GENERAL PHARMACY W/O HCPCS: Performed by: INTERNAL MEDICINE

## 2024-12-13 PROCEDURE — 93005 ELECTROCARDIOGRAM TRACING: CPT

## 2024-12-13 PROCEDURE — 99238 HOSP IP/OBS DSCHRG MGMT 30/<: CPT | Performed by: INTERNAL MEDICINE

## 2024-12-13 PROCEDURE — 82550 ASSAY OF CK (CPK): CPT | Performed by: STUDENT IN AN ORGANIZED HEALTH CARE EDUCATION/TRAINING PROGRAM

## 2024-12-13 PROCEDURE — 84100 ASSAY OF PHOSPHORUS: CPT | Performed by: STUDENT IN AN ORGANIZED HEALTH CARE EDUCATION/TRAINING PROGRAM

## 2024-12-13 PROCEDURE — 83735 ASSAY OF MAGNESIUM: CPT

## 2024-12-13 PROCEDURE — 83605 ASSAY OF LACTIC ACID: CPT | Performed by: STUDENT IN AN ORGANIZED HEALTH CARE EDUCATION/TRAINING PROGRAM

## 2024-12-13 RX ORDER — OXYCODONE HYDROCHLORIDE 5 MG/1
5 TABLET ORAL EVERY 6 HOURS PRN
Status: DISCONTINUED | OUTPATIENT
Start: 2024-12-13 | End: 2024-12-13

## 2024-12-13 RX ORDER — LEVOTHYROXINE SODIUM 25 UG/1
25 TABLET ORAL DAILY
Status: DISCONTINUED | OUTPATIENT
Start: 2024-12-14 | End: 2024-12-18 | Stop reason: HOSPADM

## 2024-12-13 RX ORDER — METOCLOPRAMIDE 10 MG/1
10 TABLET ORAL EVERY 8 HOURS PRN
Status: DISCONTINUED | OUTPATIENT
Start: 2024-12-13 | End: 2024-12-18 | Stop reason: HOSPADM

## 2024-12-13 RX ORDER — ONDANSETRON HYDROCHLORIDE 2 MG/ML
4 INJECTION, SOLUTION INTRAVENOUS EVERY 6 HOURS PRN
Status: DISCONTINUED | OUTPATIENT
Start: 2024-12-13 | End: 2024-12-18 | Stop reason: HOSPADM

## 2024-12-13 RX ORDER — ACETAMINOPHEN 325 MG/1
975 TABLET ORAL EVERY 6 HOURS PRN
Status: DISCONTINUED | OUTPATIENT
Start: 2024-12-13 | End: 2024-12-18 | Stop reason: HOSPADM

## 2024-12-13 RX ORDER — SODIUM CHLORIDE 9 MG/ML
125 INJECTION, SOLUTION INTRAVENOUS CONTINUOUS
Status: DISCONTINUED | OUTPATIENT
Start: 2024-12-13 | End: 2024-12-13

## 2024-12-13 RX ORDER — TRAZODONE HYDROCHLORIDE 50 MG/1
50 TABLET ORAL NIGHTLY PRN
Status: DISCONTINUED | OUTPATIENT
Start: 2024-12-13 | End: 2024-12-18 | Stop reason: HOSPADM

## 2024-12-13 RX ORDER — HEPARIN SODIUM,PORCINE/PF 10 UNIT/ML
50 SYRINGE (ML) INTRAVENOUS EVERY 12 HOURS
Status: DISCONTINUED | OUTPATIENT
Start: 2024-12-13 | End: 2024-12-18 | Stop reason: HOSPADM

## 2024-12-13 RX ORDER — OXYCODONE HYDROCHLORIDE 5 MG/1
10 TABLET ORAL EVERY 4 HOURS PRN
Status: DISCONTINUED | OUTPATIENT
Start: 2024-12-13 | End: 2024-12-18 | Stop reason: HOSPADM

## 2024-12-13 RX ORDER — CEFTRIAXONE 1 G/50ML
1 INJECTION, SOLUTION INTRAVENOUS EVERY 24 HOURS
Status: DISCONTINUED | OUTPATIENT
Start: 2024-12-14 | End: 2024-12-18 | Stop reason: HOSPADM

## 2024-12-13 RX ORDER — DULOXETIN HYDROCHLORIDE 20 MG/1
20 CAPSULE, DELAYED RELEASE ORAL DAILY
Status: DISCONTINUED | OUTPATIENT
Start: 2024-12-14 | End: 2024-12-18 | Stop reason: HOSPADM

## 2024-12-13 RX ORDER — HYDROMORPHONE HYDROCHLORIDE 1 MG/ML
0.2 INJECTION, SOLUTION INTRAMUSCULAR; INTRAVENOUS; SUBCUTANEOUS EVERY 2 HOUR PRN
Status: DISCONTINUED | OUTPATIENT
Start: 2024-12-13 | End: 2024-12-18 | Stop reason: HOSPADM

## 2024-12-13 RX ORDER — ONDANSETRON 4 MG/1
4 TABLET, FILM COATED ORAL EVERY 6 HOURS PRN
Status: DISCONTINUED | OUTPATIENT
Start: 2024-12-13 | End: 2024-12-18 | Stop reason: HOSPADM

## 2024-12-13 RX ORDER — METOCLOPRAMIDE HYDROCHLORIDE 5 MG/ML
10 INJECTION INTRAMUSCULAR; INTRAVENOUS EVERY 6 HOURS PRN
Status: DISCONTINUED | OUTPATIENT
Start: 2024-12-13 | End: 2024-12-18 | Stop reason: HOSPADM

## 2024-12-13 RX ORDER — HEPARIN SODIUM 5000 [USP'U]/ML
5000 INJECTION, SOLUTION INTRAVENOUS; SUBCUTANEOUS EVERY 8 HOURS
Status: DISCONTINUED | OUTPATIENT
Start: 2024-12-13 | End: 2024-12-18 | Stop reason: HOSPADM

## 2024-12-13 RX ORDER — DIPHENHYDRAMINE HYDROCHLORIDE 50 MG/ML
50 INJECTION INTRAMUSCULAR; INTRAVENOUS ONCE
Status: COMPLETED | OUTPATIENT
Start: 2024-12-13 | End: 2024-12-13

## 2024-12-13 RX ORDER — ALBUTEROL SULFATE 0.83 MG/ML
2.5 SOLUTION RESPIRATORY (INHALATION) EVERY 2 HOUR PRN
Status: DISCONTINUED | OUTPATIENT
Start: 2024-12-13 | End: 2024-12-13 | Stop reason: HOSPADM

## 2024-12-13 RX ORDER — HEPARIN SODIUM,PORCINE/PF 10 UNIT/ML
50 SYRINGE (ML) INTRAVENOUS AS NEEDED
Status: DISCONTINUED | OUTPATIENT
Start: 2024-12-13 | End: 2024-12-18 | Stop reason: HOSPADM

## 2024-12-13 RX ORDER — METOPROLOL TARTRATE 50 MG/1
50 TABLET ORAL 2 TIMES DAILY
Status: DISCONTINUED | OUTPATIENT
Start: 2024-12-14 | End: 2024-12-16

## 2024-12-13 RX ORDER — HYDROMORPHONE HYDROCHLORIDE 1 MG/ML
0.2 INJECTION, SOLUTION INTRAMUSCULAR; INTRAVENOUS; SUBCUTANEOUS EVERY 2 HOUR PRN
Status: DISCONTINUED | OUTPATIENT
Start: 2024-12-13 | End: 2024-12-13

## 2024-12-13 RX ORDER — GABAPENTIN 300 MG/1
300 CAPSULE ORAL 2 TIMES DAILY
Status: DISCONTINUED | OUTPATIENT
Start: 2024-12-14 | End: 2024-12-14

## 2024-12-13 RX ORDER — LIDOCAINE 560 MG/1
1 PATCH PERCUTANEOUS; TOPICAL; TRANSDERMAL DAILY
Status: DISCONTINUED | OUTPATIENT
Start: 2024-12-14 | End: 2024-12-18 | Stop reason: HOSPADM

## 2024-12-13 RX ORDER — OXYCODONE HYDROCHLORIDE 5 MG/1
10 TABLET ORAL 3 TIMES DAILY PRN
Status: DISCONTINUED | OUTPATIENT
Start: 2024-12-13 | End: 2024-12-13 | Stop reason: HOSPADM

## 2024-12-13 SDOH — SOCIAL STABILITY: SOCIAL INSECURITY: DO YOU FEEL ANYONE HAS EXPLOITED OR TAKEN ADVANTAGE OF YOU FINANCIALLY OR OF YOUR PERSONAL PROPERTY?: NO

## 2024-12-13 SDOH — SOCIAL STABILITY: SOCIAL INSECURITY: WERE YOU ABLE TO COMPLETE ALL THE BEHAVIORAL HEALTH SCREENINGS?: YES

## 2024-12-13 SDOH — ECONOMIC STABILITY: FOOD INSECURITY: WITHIN THE PAST 12 MONTHS, YOU WORRIED THAT YOUR FOOD WOULD RUN OUT BEFORE YOU GOT THE MONEY TO BUY MORE.: NEVER TRUE

## 2024-12-13 SDOH — SOCIAL STABILITY: SOCIAL INSECURITY: WITHIN THE LAST YEAR, HAVE YOU BEEN HUMILIATED OR EMOTIONALLY ABUSED IN OTHER WAYS BY YOUR PARTNER OR EX-PARTNER?: NO

## 2024-12-13 SDOH — ECONOMIC STABILITY: FOOD INSECURITY: WITHIN THE PAST 12 MONTHS, THE FOOD YOU BOUGHT JUST DIDN'T LAST AND YOU DIDN'T HAVE MONEY TO GET MORE.: NEVER TRUE

## 2024-12-13 SDOH — SOCIAL STABILITY: SOCIAL INSECURITY: HAVE YOU HAD THOUGHTS OF HARMING ANYONE ELSE?: NO

## 2024-12-13 SDOH — SOCIAL STABILITY: SOCIAL INSECURITY: ARE YOU OR HAVE YOU BEEN THREATENED OR ABUSED PHYSICALLY, EMOTIONALLY, OR SEXUALLY BY ANYONE?: NO

## 2024-12-13 SDOH — SOCIAL STABILITY: SOCIAL INSECURITY: HAS ANYONE EVER THREATENED TO HURT YOUR FAMILY OR YOUR PETS?: NO

## 2024-12-13 SDOH — SOCIAL STABILITY: SOCIAL INSECURITY: WITHIN THE LAST YEAR, HAVE YOU BEEN AFRAID OF YOUR PARTNER OR EX-PARTNER?: NO

## 2024-12-13 SDOH — ECONOMIC STABILITY: INCOME INSECURITY: IN THE PAST 12 MONTHS HAS THE ELECTRIC, GAS, OIL, OR WATER COMPANY THREATENED TO SHUT OFF SERVICES IN YOUR HOME?: NO

## 2024-12-13 SDOH — SOCIAL STABILITY: SOCIAL INSECURITY: DOES ANYONE TRY TO KEEP YOU FROM HAVING/CONTACTING OTHER FRIENDS OR DOING THINGS OUTSIDE YOUR HOME?: NO

## 2024-12-13 SDOH — SOCIAL STABILITY: SOCIAL INSECURITY: ABUSE: ADULT

## 2024-12-13 SDOH — SOCIAL STABILITY: SOCIAL INSECURITY: DO YOU FEEL UNSAFE GOING BACK TO THE PLACE WHERE YOU ARE LIVING?: NO

## 2024-12-13 SDOH — SOCIAL STABILITY: SOCIAL INSECURITY: ARE THERE ANY APPARENT SIGNS OF INJURIES/BEHAVIORS THAT COULD BE RELATED TO ABUSE/NEGLECT?: NO

## 2024-12-13 ASSESSMENT — ENCOUNTER SYMPTOMS
MUSCULOSKELETAL NEGATIVE: 1
ENDOCRINE NEGATIVE: 1
CONSTITUTIONAL NEGATIVE: 1
PSYCHIATRIC NEGATIVE: 1
FEVER: 0
FATIGUE: 1
HEMATOLOGIC/LYMPHATIC NEGATIVE: 1
NAUSEA: 1
EYES NEGATIVE: 1
ABDOMINAL PAIN: 1
CARDIOVASCULAR NEGATIVE: 1
ALLERGIC/IMMUNOLOGIC NEGATIVE: 1
BLOOD IN STOOL: 0
RESPIRATORY NEGATIVE: 1
NEUROLOGICAL NEGATIVE: 1

## 2024-12-13 ASSESSMENT — PAIN SCALES - GENERAL
PAINLEVEL_OUTOF10: 4
PAINLEVEL_OUTOF10: 7
PAINLEVEL_OUTOF10: 8
PAINLEVEL_OUTOF10: 6
PAINLEVEL_OUTOF10: 4
PAINLEVEL_OUTOF10: 10 - WORST POSSIBLE PAIN
PAINLEVEL_OUTOF10: 8
PAINLEVEL_OUTOF10: 4
PAINLEVEL_OUTOF10: 7
PAINLEVEL_OUTOF10: 0 - NO PAIN
PAINLEVEL_OUTOF10: 7

## 2024-12-13 ASSESSMENT — COGNITIVE AND FUNCTIONAL STATUS - GENERAL
PATIENT BASELINE BEDBOUND: NO
STANDING UP FROM CHAIR USING ARMS: A LITTLE
DRESSING REGULAR UPPER BODY CLOTHING: A LITTLE
PATIENT BASELINE BEDBOUND: NO
MOBILITY SCORE: 19
WALKING IN HOSPITAL ROOM: A LOT
STANDING UP FROM CHAIR USING ARMS: A LITTLE
PERSONAL GROOMING: A LITTLE
DRESSING REGULAR LOWER BODY CLOTHING: A LITTLE
MOVING TO AND FROM BED TO CHAIR: A LITTLE
HELP NEEDED FOR BATHING: A LITTLE
CLIMB 3 TO 5 STEPS WITH RAILING: A LOT
CLIMB 3 TO 5 STEPS WITH RAILING: A LOT
DRESSING REGULAR LOWER BODY CLOTHING: A LITTLE
TURNING FROM BACK TO SIDE WHILE IN FLAT BAD: A LITTLE
DAILY ACTIVITIY SCORE: 20
WALKING IN HOSPITAL ROOM: A LITTLE
DAILY ACTIVITIY SCORE: 17
HELP NEEDED FOR BATHING: A LITTLE
DRESSING REGULAR LOWER BODY CLOTHING: A LOT
MOBILITY SCORE: 17
DRESSING REGULAR UPPER BODY CLOTHING: A LITTLE
MOVING TO AND FROM BED TO CHAIR: A LITTLE
MOVING FROM LYING ON BACK TO SITTING ON SIDE OF FLAT BED WITH BEDRAILS: A LITTLE
HELP NEEDED FOR BATHING: A LITTLE
DAILY ACTIVITIY SCORE: 20
EATING MEALS: A LITTLE
TOILETING: A LITTLE
STANDING UP FROM CHAIR USING ARMS: A LOT
MOVING FROM LYING ON BACK TO SITTING ON SIDE OF FLAT BED WITH BEDRAILS: A LITTLE
WALKING IN HOSPITAL ROOM: A LITTLE
TOILETING: A LITTLE
MOBILITY SCORE: 15
CLIMB 3 TO 5 STEPS WITH RAILING: A LOT
MOVING TO AND FROM BED TO CHAIR: A LITTLE
TOILETING: A LITTLE
TURNING FROM BACK TO SIDE WHILE IN FLAT BAD: A LITTLE
DRESSING REGULAR UPPER BODY CLOTHING: A LITTLE

## 2024-12-13 ASSESSMENT — ACTIVITIES OF DAILY LIVING (ADL)
LACK_OF_TRANSPORTATION: NO
GROOMING: INDEPENDENT
BATHING: NEEDS ASSISTANCE
JUDGMENT_ADEQUATE_SAFELY_COMPLETE_DAILY_ACTIVITIES: YES
HEARING - RIGHT EAR: FUNCTIONAL
HOME_MANAGEMENT_TIME_ENTRY: 10
ADL_ASSISTANCE: INDEPENDENT
FEEDING YOURSELF: INDEPENDENT
HEARING - LEFT EAR: FUNCTIONAL
WALKS IN HOME: NEEDS ASSISTANCE
TOILETING: NEEDS ASSISTANCE
ADL_ASSISTANCE: INDEPENDENT
BATHING_ASSISTANCE: MINIMAL
ADEQUATE_TO_COMPLETE_ADL: YES
DRESSING YOURSELF: NEEDS ASSISTANCE
ASSISTIVE_DEVICE: WALKER
PATIENT'S MEMORY ADEQUATE TO SAFELY COMPLETE DAILY ACTIVITIES?: YES
LACK_OF_TRANSPORTATION: NO

## 2024-12-13 ASSESSMENT — PAIN DESCRIPTION - LOCATION
LOCATION: ABDOMEN
LOCATION: ABDOMEN

## 2024-12-13 ASSESSMENT — PAIN - FUNCTIONAL ASSESSMENT
PAIN_FUNCTIONAL_ASSESSMENT: 0-10

## 2024-12-13 ASSESSMENT — LIFESTYLE VARIABLES
HOW OFTEN DO YOU HAVE A DRINK CONTAINING ALCOHOL: NEVER
HOW OFTEN DO YOU HAVE 6 OR MORE DRINKS ON ONE OCCASION: NEVER
AUDIT-C TOTAL SCORE: 0
SKIP TO QUESTIONS 9-10: 1
AUDIT-C TOTAL SCORE: 0
HOW MANY STANDARD DRINKS CONTAINING ALCOHOL DO YOU HAVE ON A TYPICAL DAY: PATIENT DOES NOT DRINK

## 2024-12-13 ASSESSMENT — PAIN DESCRIPTION - DESCRIPTORS: DESCRIPTORS: ACHING;DISCOMFORT;DULL

## 2024-12-13 ASSESSMENT — PATIENT HEALTH QUESTIONNAIRE - PHQ9
1. LITTLE INTEREST OR PLEASURE IN DOING THINGS: NOT AT ALL
2. FEELING DOWN, DEPRESSED OR HOPELESS: NOT AT ALL
SUM OF ALL RESPONSES TO PHQ9 QUESTIONS 1 & 2: 0

## 2024-12-13 NOTE — CARE PLAN
" Pt said that she does not feel well enough to answer questions and her voice is raspy and she is afraid of it worsening if she talks.  Pt does not want her sig other Kenn Herrera called, \"I wouldn't bother him\"  Pt assessed pt as low intensity AMPAC is \"17\"  Pt was accepted at [facility], waiting for bed    DISCHARGE PLAN: TRANSFER TO [facility]  "

## 2024-12-13 NOTE — CARE PLAN
Problem: Pain - Adult  Goal: Verbalizes/displays adequate comfort level or baseline comfort level  Outcome: Progressing     Problem: Safety - Adult  Goal: Free from fall injury  Outcome: Progressing     Problem: Discharge Planning  Goal: Discharge to home or other facility with appropriate resources  Outcome: Progressing     Problem: Chronic Conditions and Co-morbidities  Goal: Patient's chronic conditions and co-morbidity symptoms are monitored and maintained or improved  Outcome: Progressing     Problem: Skin  Goal: Decreased wound size/increased tissue granulation at next dressing change  Outcome: Progressing  Goal: Participates in plan/prevention/treatment measures  Outcome: Progressing  Goal: Prevent/manage excess moisture  Outcome: Progressing  Goal: Prevent/minimize sheer/friction injuries  Outcome: Progressing  Goal: Promote/optimize nutrition  Outcome: Progressing  Goal: Promote skin healing  Outcome: Progressing     Problem: Fall/Injury  Goal: Not fall by end of shift  Outcome: Progressing  Goal: Be free from injury by end of the shift  Outcome: Progressing  Goal: Verbalize understanding of personal risk factors for fall in the hospital  Outcome: Progressing  Goal: Verbalize understanding of risk factor reduction measures to prevent injury from fall in the home  Outcome: Progressing  Goal: Use assistive devices by end of the shift  Outcome: Progressing  Goal: Pace activities to prevent fatigue by end of the shift  Outcome: Progressing   The patient's goals for the shift include safe    The clinical goals for the shift include no falls this shift.    Over the shift, the patient did not make progress toward the following goals. Barriers to progression include generalized weakness. Recommendations to address these barriers include PT/OT, bed alarm.

## 2024-12-13 NOTE — ASSESSMENT & PLAN NOTE
With metastasis to liver.  Receives weekly chemotherapy at [facility].  Patient has been accepted and awaiting bed.  Consulting hematology oncology for management until patient is transferred.  Analgesics   Pain medication as needed

## 2024-12-13 NOTE — DOCUMENTATION CLARIFICATION NOTE
"    PATIENT:               MARBELLA ANGLIN  ACCT #:                  [id number]  MRN:                       [id number]  :                       1960  ADMIT DATE:       2024 11:05 PM  DISCH DATE:  RESPONDING PROVIDER #:        [id number]          PROVIDER RESPONSE TEXT:    Acute kidney injury without acute tubular necrosis    CDI QUERY TEXT:    Clarification    Instruction:    Based on your assessment of the patient and the clinical information, please provide the requested documentation by clicking on the appropriate radio button and enter any additional information if prompted.    Question: Please further clarify the diagnosis of acute kidney injury as    When answering this query, please exercise your independent professional judgment. The fact that a question is being asked, does not imply that any particular answer is desired or expected.    The patient's clinical indicators include:  Clinical Information: Clinical Information: 64-year-old female with history of active stage IV cervical cancer with mets  to liver presenting with generalized weakness with labs today showing low hemoglobin and worsening kidney function with elevated potassium.    Clinical Indicators:    Cr:   5.31   4.24   4.32   4.98   4.82   4.78     BP:  99/64. Also reported \"as low as 63/27 in ED\" per  cardiology note (see below)     Hgb 5.9, Hct 18.7     HR 97  -114     H&P: \"Acute renal failure superimposed on chronic kidney disease (CMS-HCC)  On admission , CR 5.24, EGFR 9.  Baseline BUN 40s, CR 2.3, GFR>20  Bilateral nephrostomy tubes present, left kidney less functional with low urine output.\"     Neph consult: Acute kidney injury superimposed on chronic kidney disease stage IV secondary severe anemia and blood loss and intravascular volume depletion rule out sepsis no evidence of obstruction by CAT scan of the abdomen pelvis possibly worsened by antibiotics " "mainly Bactrim    12/13 Card consult:  \"Patient has been intermittently hypotensive with blood pressures as low as 63/27 in the emergency department. The patient has received a total of 3 units of packed red blood cells this hospitalization.\"    Treatment:  -Sodium Chloride .9% bolus 1000 mL x 2  -3U RBC's  -lab monitoring  -Avoid nephrotoxic agents  -Nephrology consulted    Risk Factors:  -64 y.o with metastatic cervical cancer on chemo with hypotension and recent use of antibiotic Bactrim  Options provided:  -- Acute kidney injury with acute tubular necrosis  -- Acute kidney injury without acute tubular necrosis  -- Other - I will add my own diagnosis  -- Refer to Clinical Documentation Reviewer    Query created by: Chely Aguero on 12/13/2024 10:36 AM      Electronically signed by:  JAMIA MERCADO MD 12/13/2024 5:00 PM          "

## 2024-12-13 NOTE — CONSULTS
Nutrition Assessement Note    Nutrition Assessment    Reason for Assessment: Admission nursing screening (MST 2)    Reason for Hospital Admission:  Shelbi Delaney is a 64 y.o. female who is admitted for AIDE, weakness. Hx of stage 4 cervical CA w/mets to liver/bone. Patient reports losing 15# a while ago but has since regained the weight. States appetite was low but is improving. Patient drinks nutritional drinks at home. Will provide low potassium Mighty Shakes TID.    Malnutrition Screening Tool (MST)  Have you recently lost weight without trying?: Yes  If yes, how much weight have you lost?: Lost 2 - 13 pounds  Weight Loss Score: 1  Have you been eating poorly because of a decreased appetite?: Yes  Malnutrition Score: 2  Nutrition Screen  Stage 3 or 4 Pressure Injury or Multiple Non-Healing Wounds: No  Home Tube Feeding or Total Parenteral Nutrition (TPN): No  Dietitian Consult Needed: No    Past Medical History:   Diagnosis Date    Anxiety     Body mass index (BMI)30.0-30.9, adult 08/07/2019    BMI 30.0-30.9,adult    Cervix cancer (Multi)     Chemotherapy-induced peripheral neuropathy (Multi)     Chronic kidney disease     Hypertension     Hypothyroidism     Tachycardia, unspecified     Tachycardia      Past Surgical History:   Procedure Laterality Date    CT GUIDED IMAGING FOR NEEDLE PLACEMENT  01/12/2023    CT GUIDED IMAGING FOR NEEDLE PLACEMENT [facility]    IR  NEPHROURETERAL PLACEMENT Bilateral 6/18/2024    IR  NEPHROURETERAL PLACEMENT 6/18/2024 [facility]    IR NEPHROSTOMY TUBE EXCHANGE  10/20/2023    IR NEPHROSTOMY TUBE EXCHANGE 10/20/2023 Manuel Marvin MD [facility]    IR NEPHROSTOMY TUBE EXCHANGE  12/19/2023    IR NEPHROSTOMY TUBE EXCHANGE 12/19/2023 [facility]    MEDIPORT      NEPHROSTOMY      ORIF TIBIA FRACTURE Right     TRANSURETHRAL RESECTION OF BLADDER TUMOR      US GUIDED PERCUTANEOUS PLACEMENT  11/14/2022    US GUIDED PERCUTANEOUS PLACEMENT [facility]    US GUIDED  "PERCUTANEOUS PLACEMENT  08/28/2023    US GUIDED PERCUTANEOUS PLACEMENT [facility]       Nutrition History:  Food and Nutrient History: patient reports appetite is improving  Energy Intake: Fair 50-75 %     Food Allergies/Intolerances:  None  GI Symptoms: None  Oral Problems: None    Anthropometrics:  Ht: 170.2 cm (5' 7\"), Wt: 86.3 kg (190 lb 4.1 oz), BMI: 29.79  IBW/kg (Dietitian Calculated): 61.36 kg  Percent of IBW: 141 %     Weight Change:  Daily Weight  12/13/24 : 86.3 kg (190 lb 4.1 oz)  12/04/24 : 79.2 kg (174 lb 7.9 oz)  11/29/24 : 78.3 kg (172 lb 9.9 oz)  11/14/24 : 75 kg (165 lb 5.5 oz)  11/07/24 : 75 kg (165 lb 7.3 oz)  10/25/24 : 78.2 kg (172 lb 6.4 oz)  10/24/24 : 78.4 kg (172 lb 15.2 oz)  10/10/24 : 78 kg (171 lb 15.3 oz)  09/27/24 : 80.8 kg (178 lb 2.1 oz)  09/26/24 : 80.4 kg (177 lb 4 oz)       Significant Weight Loss: No     Nutrition Focused Physical Exam Findings:   Subcutaneous Fat Loss  Orbital Fat Pads: Mild-Moderate (slight dark circles and slight hollowing)  Buccal Fat Pads: Mild-Moderate (flat cheeks, minimal bounce)  Triceps: Well nourished (ample fat tissue)    Muscle Wasting  Temporalis: Mild-Moderate (slight depression)  Pectoralis (Clavicular Region): Well nourished (clavicle not visible)  Deltoid/Trapezius: Well nourished (rounded appearance at arm, shoulder, neck)  Interosseous: Well nourished (muscle bulges)     Nutrition Significant Labs:  Lab Results   Component Value Date    WBC 16.9 (H) 12/13/2024    HGB 7.6 (L) 12/13/2024    HCT 22.4 (L) 12/13/2024     12/13/2024    CHOL 216 (H) 08/18/2021    TRIG 61 08/18/2021     08/18/2021    ALT 7 12/13/2024    AST 18 12/13/2024     (L) 12/13/2024    K 4.7 12/13/2024     12/13/2024    CREATININE 4.78 (H) 12/13/2024     (HH) 12/13/2024    CO2 18 (L) 12/13/2024    TSH 2.70 08/17/2024    INR 1.0 11/08/2024     Nutrition Specific Medications:  cefTRIAXone, 1 g, intravenous, q24h  DULoxetine, 20 mg, oral, " Daily  gabapentin, 300 mg, oral, Daily  levothyroxine, 25 mcg, oral, Daily  [Held by provider] metoprolol tartrate, 25 mg, oral, BID  pantoprazole, 40 mg, oral, Daily before breakfast   Or  pantoprazole, 40 mg, intravenous, Daily before breakfast  polyethylene glycol, 17 g, oral, Daily  sodium bicarbonate, 50 mEq, intravenous, Once  sodium zirconium cyclosilicate, 10 g, oral, TID      sodium bicarbonate, 100 mL/hr, Last Rate: 100 mL/hr (12/13/24 0900)      Dietary Orders (From admission, onward)       Start     Ordered    12/13/24 1324  Oral nutritional supplements  Until discontinued        Comments: vanilla   Question Answer Comment   Deliver with All meals    Select supplement: Sugar Free Mighty Shake        12/13/24 1323    12/12/24 1531  May Participate in Room Service With Assistance  ( ROOM SERVICE MAY PARTICIPATE WITH ASSISTANCE)  Once        Question:  .  Answer:  Yes    12/12/24 1530    12/12/24 1202  Adult diet Renal; Potassium Restricted 2 gm (50mEq); 2 - 3 grams Sodium  Diet effective now        Question Answer Comment   Diet type Renal    Potassium restriction: Potassium Restricted 2 gm (50mEq)    Sodium restriction: 2 - 3 grams Sodium        12/12/24 1206                   Estimated Needs:   Estimated Energy Needs  Total Energy Estimated Needs (kCal): 1830 kCal  Total Estimated Energy Need per Day (kCal/kg): 30 kCal/kg  Method for Estimating Needs: IBW    Estimated Protein Needs  Total Protein Estimated Needs (g): 61 g  Total Protein Estimated Needs (g/kg): 1 g/kg  Method for Estimating Needs: IBW    Estimated Fluid Needs  Total Fluid Estimated Needs (mL): 1830 mL  Method for Estimating Needs: 1 mL/Kcal      Nutrition Diagnosis   Nutrition Diagnosis:     Nutrition Diagnosis  Patient has Nutrition Diagnosis: Yes  Diagnosis Status (1): New  Nutrition Diagnosis 1: Increased nutrient needs  Related to (1): increased demand for nutrients  As Evidenced by (1): Active cancer treatment     Nutrition  Interventions/Recommendations   Nutrition Interventions and Recommendations:    Nutrition Prescription:  Individualized Nutrition Prescription Provided for : 1830 calories, 61 gm protein to be provided via diet/nutritional supplement    Nutrition Interventions:   Food and/or Nutrient Delivery Interventions  Interventions: Meals and snacks, Medical food supplement  Meals and Snacks: Mineral-modified diet, Protein-modified diet  Goal: provide as ordered  Medical Food Supplement: Modified beverage  Goal: mighty shake TID to provide 200 kcals and 7g protein each    Education Documentation  No documentation found.         Nutrition Monitoring and Evaluation   Monitoring/Evaluation:   Food/Nutrient Related History Monitoring  Monitoring and Evaluation Plan: Energy intake  Energy Intake: Estimated energy intake  Criteria: pt to consume >/= 75% estimated needs       Time Spent/Follow-up:   Follow Up  Time Spent (min): 30 minutes  Last Date of Nutrition Visit: 12/13/24  Nutrition Follow-Up Needed?: 7-10 days  Follow up Comment: 12/20/24

## 2024-12-13 NOTE — SIGNIFICANT EVENT
Alert that potassium jumped to 6.5, ordered EKG, administered albuterol, Lokelma, sodium bicarb push, calcium gluconate 2 g IV, insulin and D10.  Recheck BMP 3 hours. Case discussed with Dr. Donis.  BP 83/59, HR 78, MAP 69 at this time.  Gave additional 500 cc half-normal saline over 2 hours.  Bladder scan normal.  Potassium at 1:30 AM down to 4.8.    Around 4:30 AM /169 (checked 4 times per nursing), prior to as needed antihypertensives blood pressure was checked 1 more time and she was normotensive.  Patient has developed muscle twitching and bilateral upper and lower extremities.  Corrected calcium is 8.9.  .  Will add magnesium and phosphate to a.m. labs

## 2024-12-13 NOTE — PROGRESS NOTES
"Shelbi Delaney is a 64 y.o. female on day 1 of admission presenting with Acute renal failure superimposed on chronic kidney disease (CMS-HCC).    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   Cardiovascular:      Rate and Rhythm: Normal rate and regular rhythm.      Heart sounds: No murmur heard.     No friction rub. No gallop.   Pulmonary:      Breath sounds: No wheezing, rhonchi or rales.   Chest:      Chest wall: No tenderness.   Abdominal:      General: There is no distension.      Tenderness: There is no abdominal tenderness. There is no guarding or rebound.   Musculoskeletal:         General: No swelling or tenderness.      Cervical back: Neck supple.      Right lower leg: No edema.      Left lower leg: No edema.   Lymphadenopathy:      Cervical: No cervical adenopathy.         Last Recorded Vitals  Blood pressure 100/58, pulse 86, temperature 36.7 °C (98.1 °F), temperature source Axillary, resp. rate 16, height 1.702 m (5' 7\"), weight 86.3 kg (190 lb 4.1 oz), SpO2 98%.    Intake/Output last 3 Shifts:  I/O last 3 completed shifts:  In: 4707.5 (54.5 mL/kg) [P.O.:315; I.V.:1642.5 (19 mL/kg); Blood:1650; IV Piggyback:1100]  Out: 925 (10.7 mL/kg) [Urine:925 (0.3 mL/kg/hr)]  Weight: 86.3 kg     Current Facility-Administered Medications:     acetaminophen (Tylenol) tablet 650 mg, 650 mg, oral, q4h PRN **OR** acetaminophen (Tylenol) oral liquid 650 mg, 650 mg, nasogastric tube, q4h PRN **OR** acetaminophen (Tylenol) suppository 650 mg, 650 mg, rectal, q4h PRN, Zhen Garrett, " MD    acetaminophen (Tylenol) tablet 650 mg, 650 mg, oral, q4h PRN **OR** acetaminophen (Tylenol) oral liquid 650 mg, 650 mg, oral, q4h PRN **OR** acetaminophen (Tylenol) suppository 650 mg, 650 mg, rectal, q4h PRN, Zhen Garrett MD    albuterol 2.5 mg /3 mL (0.083 %) nebulizer solution 2.5 mg, 2.5 mg, nebulization, q2h PRN, Valerie Dubose MD    cefTRIAXone (Rocephin) 1 g in dextrose (iso) IV 50 mL, 1 g, intravenous, q24h, Zhen Garrett MD, Stopped at 12/13/24 0637    DULoxetine (Cymbalta) DR capsule 20 mg, 20 mg, oral, Daily, Zhen Garrett MD, 20 mg at 12/13/24 0939    gabapentin (Neurontin) capsule 300 mg, 300 mg, oral, Daily, Jonathan Donis MD, 300 mg at 12/13/24 0939    hydrOXYzine HCL (Atarax) tablet 25 mg, 25 mg, oral, TID PRN, Zhen Garrett MD, 25 mg at 12/12/24 1749    levothyroxine (Synthroid, Levoxyl) tablet 25 mcg, 25 mcg, oral, Daily, Zhen Garrett MD, 25 mcg at 12/13/24 0526    magic mouthwash (lidocaine, diphenhydrAMINE, Maalox 1:1:1), 10 mL, Swish & Spit, q6h PRN, Zhen Garrett MD    [Held by provider] metoprolol tartrate (Lopressor) tablet 25 mg, 25 mg, oral, BID, Zhen Garrett MD    ondansetron (Zofran) tablet 8 mg, 8 mg, oral, q8h PRN, Zhen Garrett MD, 8 mg at 12/12/24 1749    pantoprazole (ProtoNix) EC tablet 40 mg, 40 mg, oral, Daily before breakfast, 40 mg at 12/12/24 0653 **OR** pantoprazole (ProtoNix) injection 40 mg, 40 mg, intravenous, Daily before breakfast, Zhen Garrett MD, 40 mg at 12/13/24 0547    polyethylene glycol (Glycolax, Miralax) packet 17 g, 17 g, oral, Daily, Zhen Garrett MD, 17 g at 12/13/24 0827    prochlorperazine (Compazine) tablet 10 mg, 10 mg, oral, q6h PRN, Zhen Garrett MD    sodium bicarbonate 1 mEq/mL (8.4 %) 150 mEq in dextrose 5% 1,150 mL infusion, 100 mL/hr, intravenous, Continuous, Jonathan Donis MD, Last Rate: 100 mL/hr at 12/13/24 0323, 100 mL/hr at 12/13/24 0323    sodium bicarbonate 8.4 % (1 mEq/mL) 50 mEq, 50 mEq, intravenous, Once, Oludamiinocencio CROCKER  MD Sedrick    sodium zirconium cyclosilicate (Lokelma) packet 10 g, 10 g, oral, TID, Jonathan Donis MD, 10 g at 12/12/24 2118    traZODone (Desyrel) tablet 50 mg, 50 mg, oral, Nightly PRN, Zhen Garrett MD   Relevant Results    Results for orders placed or performed during the hospital encounter of 12/11/24 (from the past 96 hours)   CBC and Auto Differential   Result Value Ref Range    WBC 22.8 (H) 4.4 - 11.3 x10*3/uL    nRBC 0.4 (H) 0.0 - 0.0 /100 WBCs    RBC 1.91 (L) 4.00 - 5.20 x10*6/uL    Hemoglobin 5.9 (LL) 12.0 - 16.0 g/dL    Hematocrit 18.7 (L) 36.0 - 46.0 %    MCV 98 80 - 100 fL    MCH 30.9 26.0 - 34.0 pg    MCHC 31.6 (L) 32.0 - 36.0 g/dL    RDW 19.0 (H) 11.5 - 14.5 %    Platelets 257 150 - 450 x10*3/uL    Neutrophils % 93.6 40.0 - 80.0 %    Immature Granulocytes %, Automated 4.3 (H) 0.0 - 0.9 %    Lymphocytes % 1.1 13.0 - 44.0 %    Monocytes % 0.9 2.0 - 10.0 %    Eosinophils % 0.0 0.0 - 6.0 %    Basophils % 0.1 0.0 - 2.0 %    Neutrophils Absolute 21.32 (H) 1.20 - 7.70 x10*3/uL    Immature Granulocytes Absolute, Automated 0.98 (H) 0.00 - 0.70 x10*3/uL    Lymphocytes Absolute 0.26 (L) 1.20 - 4.80 x10*3/uL    Monocytes Absolute 0.20 0.10 - 1.00 x10*3/uL    Eosinophils Absolute 0.01 0.00 - 0.70 x10*3/uL    Basophils Absolute 0.03 0.00 - 0.10 x10*3/uL   Comprehensive metabolic panel   Result Value Ref Range    Glucose 113 (H) 74 - 99 mg/dL    Sodium 129 (L) 136 - 145 mmol/L    Potassium 5.5 (H) 3.5 - 5.3 mmol/L    Chloride 101 98 - 107 mmol/L    Bicarbonate 16 (L) 21 - 32 mmol/L    Anion Gap 18 10 - 20 mmol/L    Urea Nitrogen 112 (HH) 6 - 23 mg/dL    Creatinine 5.24 (H) 0.50 - 1.05 mg/dL    eGFR 9 (L) >60 mL/min/1.73m*2    Calcium 8.0 (L) 8.6 - 10.3 mg/dL    Albumin 2.4 (L) 3.4 - 5.0 g/dL    Alkaline Phosphatase 101 33 - 136 U/L    Total Protein 6.1 (L) 6.4 - 8.2 g/dL    AST 21 9 - 39 U/L    Bilirubin, Total 0.3 0.0 - 1.2 mg/dL    ALT 8 7 - 45 U/L   Magnesium   Result Value Ref Range    Magnesium 1.76 1.60 -  2.40 mg/dL   Type and Screen   Result Value Ref Range    ABO TYPE AB     Rh TYPE POS     ANTIBODY SCREEN NEG    Troponin I, High Sensitivity, Initial   Result Value Ref Range    Troponin I, High Sensitivity 11 0 - 13 ng/L   Urinalysis with Reflex Culture and Microscopic   Result Value Ref Range    Color, Urine Yellow Light-Yellow, Yellow, Dark-Yellow    Appearance, Urine Ex.Turbid (N) Clear    Specific Gravity, Urine 1.013 1.005 - 1.035    pH, Urine 5.5 5.0, 5.5, 6.0, 6.5, 7.0, 7.5, 8.0    Protein, Urine 100 (2+) (A) NEGATIVE, 10 (TRACE), 20 (TRACE) mg/dL    Glucose, Urine Normal Normal mg/dL    Blood, Urine 0.5 (2+) (A) NEGATIVE    Ketones, Urine NEGATIVE NEGATIVE mg/dL    Bilirubin, Urine NEGATIVE NEGATIVE    Urobilinogen, Urine Normal Normal mg/dL    Nitrite, Urine NEGATIVE NEGATIVE    Leukocyte Esterase, Urine 500 Yue/µL (A) NEGATIVE   Extra Urine Gray Tube   Result Value Ref Range    Extra Tube Hold for add-ons.    Microscopic Only, Urine   Result Value Ref Range    WBC, Urine 21-50 (A) 1-5, NONE /HPF    WBC Clumps, Urine MODERATE Reference range not established. /HPF    RBC, Urine 1-2 NONE, 1-2, 3-5 /HPF    Squamous Epithelial Cells, Urine 1-9 (SPARSE) Reference range not established. /HPF    Bacteria, Urine 2+ (A) NONE SEEN /HPF   Urine Culture    Specimen: Nephrostomy Tube; Urine   Result Value Ref Range    Urine Culture       Growth indicates contamination with mixed bacterial navid. Repeat culture if clinically indicated.   ECG 12 lead   Result Value Ref Range    Ventricular Rate 95 BPM    Atrial Rate 95 BPM    UT Interval 156 ms    QRS Duration 84 ms    QT Interval 358 ms    QTC Calculation(Bazett) 449 ms    P Axis 48 degrees    R Axis 14 degrees    T Axis 8 degrees    QRS Count 16 beats    Q Onset 226 ms    P Onset 148 ms    P Offset 198 ms    T Offset 405 ms    QTC Fredericia 417 ms   Troponin, High Sensitivity, 1 Hour   Result Value Ref Range    Troponin I, High Sensitivity 8 0 - 13 ng/L   Prepare RBC:  2 Units, Irradiated, Leukocytes Reduced (CMV reduced risk)   Result Value Ref Range    PRODUCT CODE C1752X51     Unit Number [id number]     Unit ABO O     Unit RH POS     XM INTEP COMP     Dispense Status TR     Blood Expiration Date 12/19/2024 11:59:00 PM EST     PRODUCT BLOOD TYPE 5100     UNIT VOLUME 350     PRODUCT CODE I2274Q19     Unit Number [id number]     Unit ABO A     Unit RH NEG     XM INTEP COMP     Dispense Status TR     Blood Expiration Date 12/31/2024 11:59:00 PM EST     PRODUCT BLOOD TYPE 0600     UNIT VOLUME 350    Hemoglobin and hematocrit, blood   Result Value Ref Range    Hemoglobin 6.7 (L) 12.0 - 16.0 g/dL    Hematocrit 20.2 (L) 36.0 - 46.0 %   Prepare RBC: 1 Units, Irradiated, Leukocytes Reduced (CMV reduced risk)   Result Value Ref Range    PRODUCT CODE N2725W17     Unit Number [id number]     Unit ABO O     Unit RH POS     XM INTEP COMP     Dispense Status TR     Blood Expiration Date 12/26/2024 11:59:00 PM EST     PRODUCT BLOOD TYPE 5100     UNIT VOLUME 350    Basic Metabolic Panel   Result Value Ref Range    Glucose 95 74 - 99 mg/dL    Sodium 132 (L) 136 - 145 mmol/L    Potassium 5.3 3.5 - 5.3 mmol/L    Chloride 110 (H) 98 - 107 mmol/L    Bicarbonate 13 (L) 21 - 32 mmol/L    Anion Gap 14 10 - 20 mmol/L    Urea Nitrogen 97 (HH) 6 - 23 mg/dL    Creatinine 4.32 (H) 0.50 - 1.05 mg/dL    eGFR 11 (L) >60 mL/min/1.73m*2    Calcium 6.3 (L) 8.6 - 10.3 mg/dL   Hemoglobin and hematocrit, blood   Result Value Ref Range    Hemoglobin 7.9 (L) 12.0 - 16.0 g/dL    Hematocrit 23.8 (L) 36.0 - 46.0 %   Basic metabolic panel   Result Value Ref Range    Glucose 133 (H) 74 - 99 mg/dL    Sodium 129 (L) 136 - 145 mmol/L    Potassium 6.3 (HH) 3.5 - 5.3 mmol/L    Chloride 104 98 - 107 mmol/L    Bicarbonate 15 (L) 21 - 32 mmol/L    Anion Gap 16 10 - 20 mmol/L    Urea Nitrogen 112 (HH) 6 - 23 mg/dL    Creatinine 4.98 (H) 0.50 - 1.05 mg/dL    eGFR 9 (L) >60 mL/min/1.73m*2    Calcium 7.4 (L) 8.6 - 10.3  mg/dL   Iron and TIBC   Result Value Ref Range    Iron 49 35 - 150 ug/dL    UIBC 90 (L) 110 - 370 ug/dL    TIBC 139 (L) 240 - 445 ug/dL    % Saturation 35 25 - 45 %   Ferritin   Result Value Ref Range    Ferritin 5,570 (H) 8 - 150 ng/mL   POCT GLUCOSE   Result Value Ref Range    POCT Glucose 104 (H) 74 - 99 mg/dL   POCT GLUCOSE   Result Value Ref Range    POCT Glucose 130 (H) 74 - 99 mg/dL   Comprehensive metabolic panel   Result Value Ref Range    Glucose 128 (H) 74 - 99 mg/dL    Sodium 131 (L) 136 - 145 mmol/L    Potassium 4.9 3.5 - 5.3 mmol/L    Chloride 103 98 - 107 mmol/L    Bicarbonate 16 (L) 21 - 32 mmol/L    Anion Gap 17 10 - 20 mmol/L    Urea Nitrogen 112 (HH) 6 - 23 mg/dL    Creatinine 4.82 (H) 0.50 - 1.05 mg/dL    eGFR 10 (L) >60 mL/min/1.73m*2    Calcium 7.5 (L) 8.6 - 10.3 mg/dL    Albumin 2.2 (L) 3.4 - 5.0 g/dL    Alkaline Phosphatase 89 33 - 136 U/L    Total Protein 5.1 (L) 6.4 - 8.2 g/dL    AST 18 9 - 39 U/L    Bilirubin, Total 0.3 0.0 - 1.2 mg/dL    ALT 7 7 - 45 U/L   BLOOD GAS LACTIC ACID, VENOUS   Result Value Ref Range    POCT Lactate, Venous 2.5 (H) 0.4 - 2.0 mmol/L   CBC   Result Value Ref Range    WBC 16.9 (H) 4.4 - 11.3 x10*3/uL    nRBC 0.5 (H) 0.0 - 0.0 /100 WBCs    RBC 2.46 (L) 4.00 - 5.20 x10*6/uL    Hemoglobin 7.6 (L) 12.0 - 16.0 g/dL    Hematocrit 22.4 (L) 36.0 - 46.0 %    MCV 91 80 - 100 fL    MCH 30.9 26.0 - 34.0 pg    MCHC 33.9 32.0 - 36.0 g/dL    RDW 18.6 (H) 11.5 - 14.5 %    Platelets 257 150 - 450 x10*3/uL   Comprehensive metabolic panel   Result Value Ref Range    Glucose 122 (H) 74 - 99 mg/dL    Sodium 132 (L) 136 - 145 mmol/L    Potassium 4.7 3.5 - 5.3 mmol/L    Chloride 101 98 - 107 mmol/L    Bicarbonate 18 (L) 21 - 32 mmol/L    Anion Gap 18 10 - 20 mmol/L    Urea Nitrogen 107 (HH) 6 - 23 mg/dL    Creatinine 4.78 (H) 0.50 - 1.05 mg/dL    eGFR 10 (L) >60 mL/min/1.73m*2    Calcium 7.5 (L) 8.6 - 10.3 mg/dL    Albumin 2.2 (L) 3.4 - 5.0 g/dL    Alkaline Phosphatase 88 33 - 136 U/L     Total Protein 5.4 (L) 6.4 - 8.2 g/dL    AST 18 9 - 39 U/L    Bilirubin, Total 0.3 0.0 - 1.2 mg/dL    ALT 7 7 - 45 U/L   Creatine Kinase   Result Value Ref Range    Creatine Kinase 19 0 - 215 U/L   Magnesium   Result Value Ref Range    Magnesium 1.74 1.60 - 2.40 mg/dL   Phosphorus   Result Value Ref Range    Phosphorus 4.7 2.5 - 4.9 mg/dL   POCT GLUCOSE   Result Value Ref Range    POCT Glucose 117 (H) 74 - 99 mg/dL   Blood Gas Lactic Acid, Venous   Result Value Ref Range    POCT Lactate, Venous 1.2 0.4 - 2.0 mmol/L       Assessment/Plan   Acute kidney injury superimposed on chronic kidney disease stage IV creatinine level is stable at 4.78 today my plan to continue with IV hydration and sodium bicarb drip no immediate indication for dialysis however if renal function deteriorates and potassium becomes an issue she will need dialysis therapy cussed that with her in details in spite of her multiple medical issues and metastatic cancer she wants to go on dialysis if needed  Chronic kidney disease stage IV secondary to obstructive uropathy  Stage IV cervical cancer with severe metastatic disease involving severe lymphadenopathy as well as liver mets and bone mets  Metabolic acidosis secondary to acute renal failure with IV sodium bicarb drip  Hyperkalemia secondary to acute renal failure and metabolic acidosis it is resolved  Rule out urinary tract infection and sepsis    Jonathan Donis MD

## 2024-12-13 NOTE — CONSULTS
Inpatient consult to Hematology-Oncology  Consult performed by: Ino Saldaña MD  Consult ordered by: Zhen Garrett MD  Reason for consult: Cervical cancer  Assessment/Recommendations: Metastatic cervical cancer. Started on gemcitabine single agent in Nov 2024. P/w generalized fatigue. Had low BP, hemoglobin, and renal failure.   Feels better now after IV fluids. Denies bleeding in stool or in urine.    Abdomen is soft. B/L nephrostomy catheters in place.    CT scans show stable abdominal LAPs and liver lesions.     - Continue IV fluids and FU Nephrology.   - Monitor CBC. Anemia likely due to blood loss. Iron indices are OK  - Can continue chemo as outpatient          Reason For Consult  Cervical cancer    History Of Present Illness  Shelbi Delaney is a 64 y.o. female presenting with abdominal pain.     Past Medical History  She has a past medical history of Anxiety, Body mass index (BMI)30.0-30.9, adult (08/07/2019), Cervix cancer (Multi), Chemotherapy-induced peripheral neuropathy (Multi), Chronic kidney disease, Hypertension, Hypothyroidism, and Tachycardia, unspecified.    Surgical History  She has a past surgical history that includes US guided percutaneous placement (11/14/2022); CT guided imaging for needle placement (01/12/2023); US guided percutaneous placement (08/28/2023); IR nephrostomy tube exchange (10/20/2023); Nephrostomy; Transurethral resection of bladder tumor; ORIF tibia fracture (Right); mediport; IR nephrostomy tube exchange (12/19/2023); and IR  nephroureteral placement (Bilateral, 6/18/2024).     Social History  She reports that she quit smoking about 5 years ago. Her smoking use included cigarettes. She started smoking about 47 years ago. She has a 10.7 pack-year smoking history. She has been exposed to tobacco smoke. She has never used smokeless tobacco. She reports that she does not currently use alcohol after a past usage of about 8.0 standard drinks of alcohol per week. She reports  "that she does not use drugs.    Family History  Family History   Problem Relation Name Age of Onset    No Known Problems Mother      Other (Acute myocardial infarction) Father          Allergies  L-lysine [lysine] and Adhesive    Review of Systems   Constitutional:  Positive for fatigue. Negative for fever.   Gastrointestinal:  Positive for abdominal pain. Negative for blood in stool.        Physical Exam  Cardiovascular:      Rate and Rhythm: Normal rate.   Abdominal:      General: Abdomen is flat.      Palpations: There is no mass.      Tenderness: There is no abdominal tenderness.   Neurological:      Mental Status: She is alert.          Last Recorded Vitals  Blood pressure 104/63, pulse 78, temperature 37 °C (98.6 °F), temperature source Temporal, resp. rate 17, height 1.702 m (5' 7\"), weight 80.7 kg (178 lb), SpO2 94%.    Relevant Results  Se above     Assessment/Plan     See above    I spent 50 minutes in the professional and overall care of this patient.          "

## 2024-12-13 NOTE — DOCUMENTATION CLARIFICATION NOTE
"    PATIENT:               MARBELLA ANGLIN  ACCT #:                  [id number]  MRN:                       [id number]  :                       1960  ADMIT DATE:       2024 11:05 PM  DISCH DATE:  RESPONDING PROVIDER #:        [id number]          PROVIDER RESPONSE TEXT:    Hypotension only without shock    CDI QUERY TEXT:    Clarification    Instruction:    Based on your assessment of the patient and the clinical information, please provide the requested documentation by clicking on the appropriate radio button and enter any additional information if prompted.    Question: Is there a diagnosis associated with the clinical information    When answering this query, please exercise your independent professional judgment. The fact that a question is being asked, does not imply that any particular answer is desired or expected.    The patient's clinical indicators include:  Clinical Information: 64-year-old female with history of active stage IV cervical cancer with mets  to liver presenting with generalized weakness with labs today showing low hemoglobin and worsening kidney function with elevated potassium.    Clinical Indicators:   Hgb 5.9, Hct 18.7     HR 97  -114     BP:  99/64. also reported \"as low as 63/27 in ED\" per  cardiology note (see below)         ED provider note: \"The labwork returned showing a hemoglobin of 5.9 with a white blood cell count of 22.8.  She is a significant uremia at 112 with a creatinine of 5.24.  Potassium is elevated but improved.  It was 6.3 on initial lab work earlier today and 5.5 currently...Patient's blood pressure remained borderline though improved with continued fluids and blood transfusion.\"     H&P: Received 2 units of RBC in the ED as well as started on IV antibiotics.  She was also given 1 L bolus of normal saline for soft blood pressure...Assessment: Acute renal failure superimposed on chronic kidney disease (CMS-HCC), On admission " ", CR 5.24, EGFR 9.  Baseline BUN 40s, CR 2.3, GFR>20, Anemia Likely secondary to chemotherapy treatment...\"    12/12 Nephrology consult: \"Impression: 1. Acute kidney injury superimposed on chronic kidney disease stage IV secondary severe anemia and blood loss and intravascular volume depletion rule out sepsis no evidence of obstruction by CAT scan of the abdomen pelvis possibly worsened by antibiotics mainly Bactrim...\"    12/13 Card consult:  \"Patient has been intermittently hypotensive with blood pressures as low as 63/27 in the emergency department. The patient has received a total of 3 units of packed red blood cells this hospitalization.\"    Treatment:  -Sodium Chloride .9% bolus 1000 mL x 2  -3U RBC's  -lab monitoring    Risk Factors:  64 y.o with metastatic cervical cancer on chemo  Options provided:  -- Hypovolemic shock  -- Hypotension only without shock  -- Other - I will add my own diagnosis  -- Refer to Clinical Documentation Reviewer    Query created by: Chely Aguero on 12/13/2024 10:37 AM      Electronically signed by:  JAMIA MERCADO MD 12/13/2024 5:00 PM          "

## 2024-12-13 NOTE — ASSESSMENT & PLAN NOTE
Secondary to chemotherapy vs GI bleed, she has dark stool.  No active bleeding.  She has received a total of 3 units of PRBCs on this admission  No anticoagulation due to anemia

## 2024-12-13 NOTE — PROGRESS NOTES
Physical Therapy    Physical Therapy Evaluation    Patient Name: Shelbi Delaney  MRN: [id number]  Department: [address]  Room: 13/13-B  Today's Date: 12/13/2024   Time Calculation  Start Time: 1131  Stop Time: 1155  Time Calculation (min): 24 min    Assessment/Plan   PT Assessment  PT Assessment Results: Decreased strength, Decreased endurance, Impaired balance, Decreased mobility, Decreased coordination, Impaired judgement, Decreased safety awareness, Impaired vision, Impaired sensation, Decreased skin integrity, Pain  Rehab Prognosis: Good  Barriers to Discharge Home: Physical needs  Physical Needs: Intermittent mobility assistance needed, Intermittent ADL assistance needed, High falls risk due to function or environment  Evaluation/Treatment Tolerance: Patient limited by fatigue  Medical Staff Made Aware: Yes  Strengths: Support of Caregivers  Barriers to Participation: Comorbidities  End of Session Communication: Bedside nurse  Assessment Comment: The patient is a 64 y.o. female admitted to the hospital for increasing weakness. The patient currently requires Karina for transfers and ambulation with HHA. The patient would continue to benefit from skilled therapy services to address functional deficits.  End of Session Patient Position: Bed, 3 rail up, Alarm on  IP OR SWING BED PT PLAN  Inpatient or Swing Bed: Inpatient  PT Plan  Treatment/Interventions: Bed mobility, Transfer training, Gait training, Stair training, Balance training, Neuromuscular re-education, Strengthening, Endurance training, Therapeutic exercise, Range of motion, Therapeutic activity, Home exercise program  PT Plan: Ongoing PT  PT Frequency: 4 times per week  PT Discharge Recommendations: Low intensity level of continued care  Equipment Recommended upon Discharge: Wheeled walker, Straight cane  PT Recommended Transfer Status: Assist x1  PT - OK to Discharge: Yes    Subjective   General Visit Information:  General  Reason for Referral: mobility  impairment due to AIDE  Referred By: Zhen Garrett MD  Past Medical History Relevant to Rehab: cervical cancer with mets, chemo, neuropathy, CKD, HTN, hypothyroidism  Family/Caregiver Present: No  Co-Treatment: OT  Co-Treatment Reason: to optimize pt outcomes  Prior to Session Communication: Bedside nurse  Patient Position Received: Up in bathroom  Preferred Learning Style: verbal, visual  General Comment: The patient is a 64 y.o. female admitted to the hospital for increasing weakness; found to have AIDE.  Home Living:  Home Living  Type of Home: House  Lives With: Significant other  Home Adaptive Equipment: None  Home Layout: Multi-level, Stairs to alternate level with rails  Alternate Level Stairs-Rails:  (unilateral)  Alternate Level Stairs-Number of Steps: 7  Home Access: Stairs to enter without rails  Entrance Stairs-Rails: None  Entrance Stairs-Number of Steps: 2  Bathroom Toilet: Standard  Home Living Comments: split-level home  Prior Level of Function:  Prior Function Per Pt/Caregiver Report  Level of [place]: Independent with ADLs and functional transfers, Needs assistance with homemaking  Receives Help From: Family (SO)  ADL Assistance: Independent  Homemaking Assistance: Needs assistance (SO assists as needed)  Ambulatory Assistance: Independent  Prior Function Comments: pt reports increasing difficulty with mobility  Precautions:  Precautions  Hearing/Visual Limitations: glasses  Medical Precautions: Fall precautions  Precautions Comment: cancer with mets; norberto nephrostomy tubes    Vital Signs Comment: HR between  bpm     Objective   Pain:  Pain Assessment  Pain Assessment: 0-10  0-10 (Numeric) Pain Score: 4  Pain Type: Acute pain, Chronic pain  Pain Location: Abdomen  Pain Interventions: Repositioned  Response to Interventions: Resting quietly  Cognition:  Cognition  Overall Cognitive Status: Within Functional Limits  Orientation Level: Oriented X4  Insight: Moderate  Impulsive:  Mildly    General Assessments:  General Observation  General Observation: pleasant; fatigues easily     Activity Tolerance  Endurance: Tolerates less than 10 min exercise, no significant change in vital signs  Activity Tolerance Comments: limited due to pain and fatigue  Rate of Perceived Exertion (RPE): 6/10    Sensation  Sensation Comment: h/o neuropathy BLE    Strength  Strength Comments: BLE grossly 3-3+/5  Coordination  Coordination Comment: decreased rate and accuracy of BLE movement    Postural Control  Posture Comment: mild forward head posture    Static Standing Balance  Static Standing-Balance Support:  (HHA)  Static Standing-Level of Assistance: Minimum assistance  Static Standing-Comment/Number of Minutes: forward flexed posture  Dynamic Standing Balance  Dynamic Standing-Balance Support:  (HHA)  Dynamic Standing-Level of Assistance: Minimum assistance  Dynamic Standing-Comments: mildly unsteady gait with HHA; decreased james with increased lateral sway  Functional Assessments:  Bed Mobility  Bed Mobility: Yes  Bed Mobility 1  Bed Mobility 1: Sitting to supine  Level of Assistance 1: Minimum assistance  Bed Mobility Comments 1: intermittent assist with return to supine; assist with BLE    Transfers  Transfer: Yes  Transfer 1  Transfer From 1: Stand to  Transfer to 1: Bed  Technique 1: Stand to sit  Transfer Level of Assistance 1: Minimum assistance  Trials/Comments 1: VCs for safe sequencing    Ambulation/Gait Training  Ambulation/Gait Training Performed: Yes  Ambulation/Gait Training 1  Surface 1: Level tile  Device 1: No device  Assistance 1: Hand held assistance, Minimum assistance  Quality of Gait 1: Narrow base of support, Shuffling gait  Comments/Distance (ft) 1: 15ftx1 with HHA and Karina; decreased james with shuffled-like gait; mildly unsteady gait.  Extremity/Trunk Assessments:  RLE   RLE :  (grossly 3-3+/5)  LLE   LLE :  (grossly 3-3+/5)  Outcome Measures:  [facility] Basic Mobility  Turning from  your back to your side while in a flat bed without using bedrails: A little  Moving from lying on your back to sitting on the side of a flat bed without using bedrails: A little  Moving to and from bed to chair (including a wheelchair): A little  Standing up from a chair using your arms (e.g. wheelchair or bedside chair): A little  To walk in hospital room: A little  Climbing 3-5 steps with railing: A lot  Basic Mobility - Total Score: 17    Encounter Problems       Encounter Problems (Active)       PT Problem       Strength (Progressing)       Start:  12/13/24    Expected End:  01/13/25       The patient will demonstrate an overall strength of 4/5 in BLE to assist with completion of functional mobility.           Functional Mobility (Progressing)       Start:  12/13/24    Expected End:  01/13/25       The patient will complete functional mobility (bed mobility, transfers, etc.) at a mod indep level with LRAD by DC.           Ambulation (Progressing)       Start:  12/13/24    Expected End:  01/13/25       The patient will be able to ambulate at a mod indep level for >50ftx1 with LRAD.          Stairs (Progressing)       Start:  12/13/24    Expected End:  01/13/25       The patient will be able to ascend/descend 7 steps with use of 1 rail at a mod indep level by DC.          Balance (Progressing)       Start:  12/13/24    Expected End:  01/13/25       The patient will demonstrate good dynamic standing balance during activity with LRAD.             Pain - Adult              Education Documentation  Mobility Training, taught by Yulissa Almonte PT at 12/13/2024  1:26 PM.  Learner: Patient  Readiness: Acceptance  Method: Explanation  Response: Verbalizes Understanding  Comment: VCs for safety awareness throughout mobility    Education Comments  No comments found.

## 2024-12-13 NOTE — PROGRESS NOTES
Occupational Therapy    Evaluation/Treatment    Patient Name: Shelbi Delaney  MRN: [id number]  Department: [facility] 3 E  Room: 13/13-B  Today's Date: 12/13/24  Time Calculation  Start Time: 1132  Stop Time: 1156  Time Calculation (min): 24 min       Assessment:  OT Assessment: OT order received, chart reviewed, evalaution completed. Pt demonstrated deficits in ADLS and functional transfer with generalized weakness and fatigue. Pt would benefit from acute OT services to facilitate return to [facility].  Prognosis: Good  Barriers to Discharge Home: No anticipated barriers  Evaluation/Treatment Tolerance: Patient limited by fatigue  Medical Staff Made Aware: Yes  End of Session Communication: Bedside nurse  End of Session Patient Position: Bed, 3 rail up, Alarm on  OT Assessment Results: Decreased ADL status, Decreased upper extremity strength, Decreased safe judgment during ADL, Decreased endurance, Decreased functional mobility, Decreased IADLs  Prognosis: Good  Barriers to Discharge: None  Evaluation/Treatment Tolerance: Patient limited by fatigue  Medical Staff Made Aware: Yes  Strengths: Support of Caregivers  Barriers to Participation: Comorbidities  Plan:  Treatment Interventions: Functional transfer training, UE strengthening/ROM, Endurance training, Patient/family training, Equipment evaluation/education, Compensatory technique education  OT Frequency: 3 times per week  OT Discharge Recommendations: Low intensity level of continued care  Equipment Recommended upon Discharge: Wheeled walker, Straight cane  OT Recommended Transfer Status: Minimal assist  OT - OK to Discharge: Yes  Treatment Interventions: Functional transfer training, UE strengthening/ROM, Endurance training, Patient/family training, Equipment evaluation/education, Compensatory technique education    Subjective   Current Problem:  1. Anemia, unspecified type        2. Acute kidney injury (CMS-HCC)        3. Hyperkalemia          General:   OT Received On:  12/13/24  General  Reason for Referral: activities of daily living, AIDE  Referred By: Zhen Garrett MD  Past Medical History Relevant to Rehab: cervical cancer with mets, chemo, neuropathy, CKD, HTN, hypothyroidism  Family/Caregiver Present: No  Co-Treatment: PT  Co-Treatment Reason: to optimize pt outcomes  Prior to Session Communication: Bedside nurse  Patient Position Received: Up in bathroom  Preferred Learning Style: verbal, visual  General Comment: The patient is a 64 y.o. female admitted to the hospital for increasing weakness; found to have AIDE.   Precautions:  Hearing/Visual Limitations: glasses  Medical Precautions: Fall precautions  Precautions Comment: cancer with mets; norberto nephrostomy tubes      Vital Signs Comment: HR between  bpm     Pain:  Pain Assessment  Pain Assessment: 0-10  0-10 (Numeric) Pain Score: 4  Pain Type: Acute pain, Chronic pain  Pain Location: Abdomen  Pain Interventions: Repositioned  Response to Interventions: Resting quietly    Objective   Cognition:  Overall Cognitive Status: Within Functional Limits  Orientation Level: Oriented X4  Insight: Moderate  Impulsive: Mildly           Home Living:  Type of Home: House  Lives With: Significant other  Home Adaptive Equipment: None  Home Layout: Multi-level, Stairs to alternate level with rails  Alternate Level Stairs-Rails:  (1)  Alternate Level Stairs-Number of Steps: 7  Home Access: Stairs to enter without rails  Entrance Stairs-Rails: None  Entrance Stairs-Number of Steps: 2  Bathroom Toilet: Standard  Home Living Comments: split-level home  Prior Function:  Level of [place]: Independent with ADLs and functional transfers, Needs assistance with homemaking  Receives Help From: Family (SO)  ADL Assistance: Independent  Homemaking Assistance: Needs assistance (SO assists as needed)  Ambulatory Assistance: Independent  Prior Function Comments: pt reports increasing difficulty with mobility    ADL:  Eating Assistance:  Independent  Grooming Assistance: Stand by  Grooming Deficit: Supervision/safety, Increased time to complete, Wash/dry hands (standing at sink)  Bathing Assistance: Minimal  Bathing Deficit:  (projected due to current functional status)  UE Dressing Assistance: Minimal  UE Dressing Deficit: Thread RUE, Thread LUE, Pull around back (donning backside gown)  LE Dressing Assistance: Maximal  LE Dressing Deficit: Don/doff L sock, Don/doff R sock  Toileting Deficit: Supervison/safety (Pt able to manage clothing/hygiene)    Activity Tolerance:  Endurance: Tolerates less than 10 min exercise, no significant change in vital signs  Activity Tolerance Comments: limited due to pain and fatigue  Rate of Perceived Exertion (RPE): 6/10  Functional Standing Tolerance:     Bed Mobility/Transfers: Bed Mobility  Bed Mobility: Yes  Bed Mobility 1  Bed Mobility 1: Sitting to supine  Level of Assistance 1: Minimum assistance  Bed Mobility Comments 1: Assist for B LEs into supine needs in reach    Transfers  Transfer: Yes  Transfer 1  Transfer From 1: Stand to  Transfer to 1: Bed  Technique 1: Stand to sit  Transfer Level of Assistance 1: Minimum assistance  Trials/Comments 1: cues for safe hand placement      Functional Mobility:  Functional Mobility  Functional Mobility Performed: Yes  Functional Mobility 1  Surface 1: Level tile  Device 1: No device  Assistance 1: Minimum assistance  Comments 1: Min A due to multiple lines, impaired balance, cues for safety.    Sensation:  Sensation Comment: h/o neuropathy BLE  Strength:  Strength Comments: B UEs 3/5 overall    Coordination:  Movements are Fluid and Coordinated: Yes (for upper body)   Hand Function:  Hand Function  Gross Grasp: Functional  Coordination: Functional  Extremities: RUE   RUE : Within Functional Limits and LUE   LUE: Within Functional Limits    Outcome Measures: [facility] Daily Activity  Putting on and taking off regular lower body clothing: A little  Bathing (including  washing, rinsing, drying): A little  Putting on and taking off regular upper body clothing: A little  Toileting, which includes using toilet, bedpan or urinal: A little  Taking care of personal grooming such as brushing teeth: None  Eating Meals: None  Daily Activity - Total Score: 20        Education Documentation  ADL Training, taught by Cheryl Smiley OT at 12/13/2024  3:43 PM.  Learner: Patient  Readiness: Acceptance  Method: Explanation  Response: Verbalizes Understanding  Comment: pt edu on OT POC    Education Comments  Pt educated on occupational therapy plan of care, safety/fall precautions, call light use.       Goals:  Encounter Problems       Encounter Problems (Active)       OT Goals       ADLs       Start:  12/13/24    Expected End:  01/10/25       Pt will complete ADL tasks with Mod I, using AE as needed, in order to complete self-care tasks.           Functional transfers       Start:  12/13/24    Expected End:  01/10/25       Pt will perform functional transfers independently           Functional mobility       Start:  12/13/24    Expected End:  01/10/25       Pt will perform functional mobility household distance independently

## 2024-12-13 NOTE — CONSULTS
Wound Care Consult     Visit Date: 12/13/2024      Patient Name: Shelbi Delaney         MRN: [id number]           YOB: 1960     Reason for Consult: multiple wounds of bilateral lower extremities        Wound History: deferred to the patient's chart     Pertinent Labs:   Albumin   Date Value Ref Range Status   12/13/2024 2.2 (L) 3.4 - 5.0 g/dL Final       Wound Assessment:  Wound 08/16/24 Other (comment) Back Medial (Active)       Wound 08/16/24 Other (comment) Pretibial Right (Active)   Wound Image   12/13/24 0841   Dressing Status Clean;Dry;Occlusive 12/12/24 1435       Wound 12/12/24 Pretibial Left (Active)   Wound Image   12/13/24 0843   Shape rounded x 3 12/13/24 0843   Wound Length (cm) 2.6 cm 12/13/24 0843   Wound Width (cm) 2.4 cm 12/13/24 0843   Wound Surface Area (cm^2) 6.24 cm^2 12/13/24 0843   Wound Depth (cm) 0 cm 12/13/24 0843   Wound Volume (cm^3) 0 cm^3 12/13/24 0843   Margins Well-defined edges 12/13/24 0843   Drainage Description None 12/13/24 0843   Drainage Amount None 12/13/24 0843   Dressing Compression bandage;Gauze;Hydrocolloid;Kerlix/rolled gauze 12/13/24 0843   Dressing Changed New 12/13/24 0843   Dressing Status Dry;Clean 12/13/24 0843       Wound 12/12/24 Pretibial Right (Active)   Wound Image   12/13/24 0841   Shape irregular diffuse 12/13/24 0841   Wound Length (cm) 8.5 cm 12/13/24 0841   Wound Width (cm) 8 cm 12/13/24 0841   Wound Surface Area (cm^2) 68 cm^2 12/13/24 0841   Wound Depth (cm) 0.1 cm 12/13/24 0841   Wound Volume (cm^3) 6.8 cm^3 12/13/24 0841   Margins Attached edges;Well-defined edges 12/13/24 0841   Drainage Description Serous 12/13/24 0841   Drainage Amount Small 12/13/24 0841   Dressing Compression bandage;Hydrocolloid;Kerlix/rolled gauze;Other (Comment);Gauze 12/13/24 0841   Dressing Changed New 12/13/24 0841   Dressing Status Dry;Clean 12/13/24 0841       Wound 12/12/24 Tibial Dorsal;Left (Active)   Wound Image   12/13/24 0843   Shape irregular 12/13/24  0843   Wound Length (cm) 6 cm 12/13/24 0843   Wound Width (cm) 2.5 cm 12/13/24 0843   Wound Surface Area (cm^2) 15 cm^2 12/13/24 0843   Wound Depth (cm) 0.1 cm 12/13/24 0843   Wound Volume (cm^3) 1.5 cm^3 12/13/24 0843   Margins Well-defined edges;Attached edges 12/13/24 0843   Drainage Description Serous 12/13/24 0843   Drainage Amount Small 12/13/24 0843   Dressing Alginate;Compression bandage;Hydrocolloid;Kerlix/rolled gauze 12/13/24 0843   Dressing Changed New 12/13/24 0843   Dressing Status Clean;Dry 12/13/24 0843       Wound 12/12/24 Tibial Dorsal;Right (Active)   Shape irregular 12/13/24 0843   Wound Length (cm) 7 cm 12/13/24 0843   Wound Width (cm) 2.5 cm 12/13/24 0843   Wound Surface Area (cm^2) 17.5 cm^2 12/13/24 0843   Wound Depth (cm) 0.1 cm 12/13/24 0843   Wound Volume (cm^3) 1.75 cm^3 12/13/24 0843   Margins Attached edges;Well-defined edges 12/13/24 0843   Drainage Description Serous 12/13/24 0843   Drainage Amount Small 12/13/24 0843   Dressing Alginate;Compression bandage;Hydrocolloid;Kerlix/rolled gauze;Other (Comment) 12/13/24 0843   Dressing Changed New 12/13/24 0843   Dressing Status Clean;Dry 12/13/24 0843   Preventative measures recommended :  Waffle mattress to bed and chair surfaces.  Héctor-View boots in lieu of foam padding to bilateral heels    Wound Team Summary Assessment: Wound care Recommendations:   Wash lower extremities with soap and watre and pat dry. Apply Medihoney to all wounds. Wound beds that present with moderate to heavy drainage cover with Melgisorb Ag over top of Medihoney. Wound beds that present as dry cover with 2x2 gauze over top of Medihoney.  Wrap lower extremities with Jenn wraps from metatarsals to tibial tuberosities and ace wraps as follows: # inch ace from metatarsals to ankle, then 6 inch ace wrap from ankle to tibial tuberosities. This process repeated bilaterally. Change dressings every 2-3 days  Wound Team Plan: Follow up to wound care as needed     Seun Licona  ABDOUL Escalera  12/13/2024  10:31 AM

## 2024-12-13 NOTE — CONSULTS
Inpatient consult to Gastroenterology  Consult performed by: Liset Olivas, APRN-CNP  Consult ordered by: Valerie Dubose MD          Reason For Consult  Anemia    History Of Present Illness  Shelbi Delaney is a 64 y.o. female with a past medical history of stage IV cervical cancer with mets who gets treatment at [facility].  She is followed by oncology and was started on gemcitabine single agent in Nov 2024. She reports general weakness and fatigue since her last chemotherapy last Friday.  ER labs show normocytic anemia with a hemoglobin of 6.7 and worsening kidney function with BUN/Creat 115/5.3 with elevated potassium of 6.3. CT scans show stable abdominal LAPs and liver lesions. She reports she did receive blood transfusion last week.  She denies any melena, hematochezia. She reports she had a BM yesterday that was brown in color.  No reported hematemesis.  She denies taking any blood thinners. She complains of generalized lower abdominal pain. Denies any nausea vomiting. She denies any prior EGD/colonoscopy.     Past Medical History  She has a past medical history of Anxiety, Body mass index (BMI)30.0-30.9, adult (08/07/2019), Cervix cancer (Multi), Chemotherapy-induced peripheral neuropathy (Multi), Chronic kidney disease, Hypertension, Hypothyroidism, and Tachycardia, unspecified.    Surgical History  She has a past surgical history that includes US guided percutaneous placement (11/14/2022); CT guided imaging for needle placement (01/12/2023); US guided percutaneous placement (08/28/2023); IR nephrostomy tube exchange (10/20/2023); Nephrostomy; Transurethral resection of bladder tumor; ORIF tibia fracture (Right); mediport; IR nephrostomy tube exchange (12/19/2023); and IR  nephroureteral placement (Bilateral, 6/18/2024).     Social History  She reports that she quit smoking about 5 years ago. Her smoking use included cigarettes. She started smoking about 47 years ago. She has a 10.7  "pack-year smoking history. She has been exposed to tobacco smoke. She has never used smokeless tobacco. She reports that she does not currently use alcohol after a past usage of about 8.0 standard drinks of alcohol per week. She reports that she does not use drugs.    Family History  Family History   Problem Relation Name Age of Onset    No Known Problems Mother      Other (Acute myocardial infarction) Father          Allergies  L-lysine [lysine] and Adhesive    Review of Systems   Constitutional: Negative.    HENT: Negative.     Eyes: Negative.    Respiratory: Negative.     Cardiovascular: Negative.    Gastrointestinal:  Positive for abdominal pain.   Endocrine: Negative.    Genitourinary: Negative.    Musculoskeletal: Negative.    Skin: Negative.    Allergic/Immunologic: Negative.    Neurological: Negative.    Hematological: Negative.    Psychiatric/Behavioral: Negative.          Physical Exam  HENT:      Head: Normocephalic.      Right Ear: Tympanic membrane normal.      Nose: Nose normal.      Mouth/Throat:      Mouth: Mucous membranes are moist.   Eyes:      Pupils: Pupils are equal, round, and reactive to light.   Cardiovascular:      Rate and Rhythm: Normal rate.      Pulses: Normal pulses.   Pulmonary:      Effort: Pulmonary effort is normal.   Abdominal:      Palpations: Abdomen is soft.   Genitourinary:     General: Normal vulva.   Musculoskeletal:         General: Normal range of motion.      Cervical back: Normal range of motion.   Skin:     General: Skin is warm.   Neurological:      General: No focal deficit present.      Mental Status: She is alert.   Psychiatric:         Mood and Affect: Mood normal.          Last Recorded Vitals  Blood pressure 100/58, pulse 86, temperature 36.7 °C (98.1 °F), temperature source Axillary, resp. rate 16, height 1.702 m (5' 7\"), weight 86.3 kg (190 lb 4.1 oz), SpO2 98%.    Relevant Results  XR chest 1 view    Result Date: 12/12/2024  Interpreted By:  Harris Carballo, " STUDY: XR CHEST 1 VIEW; 12/12/2024 11:38 am   INDICATION: CLINICAL INFORMATION: Signs/Symptoms:Metastatic cancer.   COMPARISON: 08/16/2024   ACCESSION NUMBER(S): [id number]   ORDERING CLINICIAN: JAMIA MERCADO   TECHNIQUE: Portable chest one view.   FINDINGS: The cardiac size is indeterminate in view of the AP projection.  The aorta is tortuous. MediPort catheter overlies the right apex catheter tip overlying the SVC above the right atrium. No infiltrates or effusions are identified.       No acute pathologic findings are identified.   MACRO: none   Signed by: Harris Carballo 12/12/2024 12:21 PM Dictation workstation:   [id number]    ECG 12 lead    Result Date: 12/12/2024  Normal sinus rhythm Low voltage QRS Poor R-wave progression Abnormal ECG When compared with ECG of 19-AUG-2024 03:28, Low voltage QRS is now evident Confirmed by Vidal Tadeo ([id number]) on 12/12/2024 11:28:30 AM    CT abdomen pelvis wo IV contrast    Result Date: 12/12/2024  Interpreted By:  Tawana Mays, STUDY: CT ABDOMEN PELVIS WO IV CONTRAST;  12/12/2024 12:15 am   INDICATION: Signs/Symptoms:abdominal pain, nausea, vomiting.   COMPARISON: 11/04/2024   ACCESSION NUMBER(S): [id number]   ORDERING CLINICIAN: ALMA BAUMANN   TECHNIQUE: Axial noncontrast CT images of the abdomen and pelvis with coronal and sagittal reconstructed images.   FINDINGS: LOWER CHEST: No acute abnormality of the lung bases. BONES: Stable destructive lesion involving the right inferior pubic ramus and pubic body. Mild diffuse degenerative disc changes. Mild bilateral hip osteoarthrosis. ABDOMINAL WALL: Stable small fat containing supraumbilical ventral hernia.   ABDOMEN: Lack of intravenous contrast limits evaluation of vessels and solid organs. LIVER: Several ill-defined hypodense lesions in the right lobe of the liver grossly stable. The largest is in segment 8 and measures approximately 3.9 cm. BILE DUCTS: No biliary dilatation. GALLBLADDER: No calcified  gallstones. No pericholecystic inflammatory changes. PANCREAS: No peripancreatic inflammatory stranding or duct dilatation. SPLEEN: Within normal limits. ADRENALS: Within normal limits.   KIDNEYS, URETERS, URINARY BLADDER: Stable position of left percutaneous nephrostomy catheter and right percutaneous nephroureteral catheter. No hydronephrosis. Stable atrophy of the left kidney.  Urinary bladder is nondistended.   VESSELS: No aortic aneurysm. RETROPERITONEUM: Stable appearance of multiple enlarged retrocrural, retroperitoneal, external iliac chain and inguinal lymph nodes, most of which are partially calcified. Numerous small subcentimeter calcified perirectal lymph nodes are stable.   PELVIS:   REPRODUCTIVE ORGANS: Uterine mass is stable to slightly decreased in size, measuring 7.9 x 6.4 cm (previously 7.8 x 7.1 cm). Right adnexal mass measuring 4 x 4.5 cm previously measured 4.4 x 4.7 cm. Left adnexal mass measures 2.8 cm, previously 2.5 cm. Is the slight differences in these measurements may be related to slight change in position. Additional fat containing right adnexal mass is stable. No significant free pelvic fluid.   BOWEL: No dilated bowel.  Normal appendix. PERITONEUM: No ascites or free air, no fluid collection.       No acute abdominal or pelvic process.   The known partially calcified uterine mass is stable to slightly decreased in size. Additional bilateral adnexal masses are grossly stable in size.   Grossly stable abdominopelvic lymphadenopathy and hepatic masses, consistent with metastatic disease.   No significant change in lytic/destructive lesion of the right inferior pubic ramus.   Stable chronic findings as above.   MACRO: None   Signed by: Tawana Mays 12/12/2024 1:20 AM Dictation workstation:   [id number]    Scheduled medications  cefTRIAXone, 1 g, intravenous, q24h  DULoxetine, 20 mg, oral, Daily  gabapentin, 300 mg, oral, Daily  levothyroxine, 25 mcg, oral, Daily  [Held by provider]  metoprolol tartrate, 25 mg, oral, BID  pantoprazole, 40 mg, oral, Daily before breakfast   Or  pantoprazole, 40 mg, intravenous, Daily before breakfast  polyethylene glycol, 17 g, oral, Daily  sodium bicarbonate, 50 mEq, intravenous, Once  sodium zirconium cyclosilicate, 10 g, oral, TID      Continuous medications  sodium bicarbonate, 100 mL/hr, Last Rate: 100 mL/hr (12/13/24 0323)      PRN medications  PRN medications: acetaminophen **OR** acetaminophen **OR** acetaminophen, acetaminophen **OR** acetaminophen **OR** acetaminophen, albuterol, hydrOXYzine HCL, magic mouthwash (lidocaine, diphenhydrAMINE, Maalox 1:1:1), ondansetron, prochlorperazine, traZODone  Results for orders placed or performed during the hospital encounter of 12/11/24 (from the past 24 hours)   Prepare RBC: 1 Units, Irradiated, Leukocytes Reduced (CMV reduced risk)   Result Value Ref Range    PRODUCT CODE Q0162A14     Unit Number [id number]     Unit ABO O     Unit RH POS     XM INTEP COMP     Dispense Status TR     Blood Expiration Date 12/26/2024 11:59:00 PM EST     PRODUCT BLOOD TYPE 5100     UNIT VOLUME 350    Basic Metabolic Panel   Result Value Ref Range    Glucose 95 74 - 99 mg/dL    Sodium 132 (L) 136 - 145 mmol/L    Potassium 5.3 3.5 - 5.3 mmol/L    Chloride 110 (H) 98 - 107 mmol/L    Bicarbonate 13 (L) 21 - 32 mmol/L    Anion Gap 14 10 - 20 mmol/L    Urea Nitrogen 97 (HH) 6 - 23 mg/dL    Creatinine 4.32 (H) 0.50 - 1.05 mg/dL    eGFR 11 (L) >60 mL/min/1.73m*2    Calcium 6.3 (L) 8.6 - 10.3 mg/dL   Hemoglobin and hematocrit, blood   Result Value Ref Range    Hemoglobin 7.9 (L) 12.0 - 16.0 g/dL    Hematocrit 23.8 (L) 36.0 - 46.0 %   Basic metabolic panel   Result Value Ref Range    Glucose 133 (H) 74 - 99 mg/dL    Sodium 129 (L) 136 - 145 mmol/L    Potassium 6.3 (HH) 3.5 - 5.3 mmol/L    Chloride 104 98 - 107 mmol/L    Bicarbonate 15 (L) 21 - 32 mmol/L    Anion Gap 16 10 - 20 mmol/L    Urea Nitrogen 112 (HH) 6 - 23 mg/dL    Creatinine  4.98 (H) 0.50 - 1.05 mg/dL    eGFR 9 (L) >60 mL/min/1.73m*2    Calcium 7.4 (L) 8.6 - 10.3 mg/dL   Iron and TIBC   Result Value Ref Range    Iron 49 35 - 150 ug/dL    UIBC 90 (L) 110 - 370 ug/dL    TIBC 139 (L) 240 - 445 ug/dL    % Saturation 35 25 - 45 %   Ferritin   Result Value Ref Range    Ferritin 5,570 (H) 8 - 150 ng/mL   POCT GLUCOSE   Result Value Ref Range    POCT Glucose 104 (H) 74 - 99 mg/dL   POCT GLUCOSE   Result Value Ref Range    POCT Glucose 130 (H) 74 - 99 mg/dL   Comprehensive metabolic panel   Result Value Ref Range    Glucose 128 (H) 74 - 99 mg/dL    Sodium 131 (L) 136 - 145 mmol/L    Potassium 4.9 3.5 - 5.3 mmol/L    Chloride 103 98 - 107 mmol/L    Bicarbonate 16 (L) 21 - 32 mmol/L    Anion Gap 17 10 - 20 mmol/L    Urea Nitrogen 112 (HH) 6 - 23 mg/dL    Creatinine 4.82 (H) 0.50 - 1.05 mg/dL    eGFR 10 (L) >60 mL/min/1.73m*2    Calcium 7.5 (L) 8.6 - 10.3 mg/dL    Albumin 2.2 (L) 3.4 - 5.0 g/dL    Alkaline Phosphatase 89 33 - 136 U/L    Total Protein 5.1 (L) 6.4 - 8.2 g/dL    AST 18 9 - 39 U/L    Bilirubin, Total 0.3 0.0 - 1.2 mg/dL    ALT 7 7 - 45 U/L   BLOOD GAS LACTIC ACID, VENOUS   Result Value Ref Range    POCT Lactate, Venous 2.5 (H) 0.4 - 2.0 mmol/L   CBC   Result Value Ref Range    WBC 16.9 (H) 4.4 - 11.3 x10*3/uL    nRBC 0.5 (H) 0.0 - 0.0 /100 WBCs    RBC 2.46 (L) 4.00 - 5.20 x10*6/uL    Hemoglobin 7.6 (L) 12.0 - 16.0 g/dL    Hematocrit 22.4 (L) 36.0 - 46.0 %    MCV 91 80 - 100 fL    MCH 30.9 26.0 - 34.0 pg    MCHC 33.9 32.0 - 36.0 g/dL    RDW 18.6 (H) 11.5 - 14.5 %    Platelets 257 150 - 450 x10*3/uL   Comprehensive metabolic panel   Result Value Ref Range    Glucose 122 (H) 74 - 99 mg/dL    Sodium 132 (L) 136 - 145 mmol/L    Potassium 4.7 3.5 - 5.3 mmol/L    Chloride 101 98 - 107 mmol/L    Bicarbonate 18 (L) 21 - 32 mmol/L    Anion Gap 18 10 - 20 mmol/L    Urea Nitrogen 107 (HH) 6 - 23 mg/dL    Creatinine 4.78 (H) 0.50 - 1.05 mg/dL    eGFR 10 (L) >60 mL/min/1.73m*2    Calcium 7.5 (L) 8.6  - 10.3 mg/dL    Albumin 2.2 (L) 3.4 - 5.0 g/dL    Alkaline Phosphatase 88 33 - 136 U/L    Total Protein 5.4 (L) 6.4 - 8.2 g/dL    AST 18 9 - 39 U/L    Bilirubin, Total 0.3 0.0 - 1.2 mg/dL    ALT 7 7 - 45 U/L   Creatine Kinase   Result Value Ref Range    Creatine Kinase 19 0 - 215 U/L   Magnesium   Result Value Ref Range    Magnesium 1.74 1.60 - 2.40 mg/dL   Phosphorus   Result Value Ref Range    Phosphorus 4.7 2.5 - 4.9 mg/dL   POCT GLUCOSE   Result Value Ref Range    POCT Glucose 117 (H) 74 - 99 mg/dL   Blood Gas Lactic Acid, Venous   Result Value Ref Range    POCT Lactate, Venous 1.2 0.4 - 2.0 mmol/L     *Note: Due to a large number of results and/or encounters for the requested time period, some results have not been displayed. A complete set of results can be found in Results Review.        Assessment/Plan   Anemia/Hx of Stage IV cervical cancer with severe metastatic disease involving severe lymphadenopathy as well as liver mets and bone mets.   Normocytic anemia initial 6.7 status post 2 units transfusion 7.6 Anemia is likely multifactorial. She currently she denies any overt bleeding. She has no prior EGD/colonoscopy and would recommend at some point however no urgent indication as there are no signs of active bleeding. Will continue to monitor closely.   - Monitor  CBC, CMP, INR,  - Transfuse to keep hgb >7  - Protonix 40 mg IV BID    Liset Olivas, APRN-CNP

## 2024-12-13 NOTE — PROGRESS NOTES
"Shelbi Delaney is a 64 y.o. female on day 1 of admission presenting with Acute renal failure superimposed on chronic kidney disease (CMS-HCC) and severe anemia.     Subjective   She was initially transfused with 2 units of PRBCs.  Posttransfusion hemoglobin was 6.7 and she received 1/3 unit of PRBCs on 12/12.  She had hyperkalemia which has now resolved.  She had hypotension which has also improved.      Objective     Physical Exam  General: awake, alert, oriented, responsive  Cardiovascular: regular, normal S1 and S2  Lungs: good air entry bilaterally, clear to auscultation  Abdomen: soft, nontender, bowel sounds present, normoactive   Back: she had left and right nephrostomy tubes in place. The left nephrostomy bag was empty.   Extremities: no peripheral cyanosis, no pedal edema  Neuro: alert, oriented x 3, no focal weakness      Last Recorded Vitals  Blood pressure (!) 100/48, pulse (!) 127, temperature 36 °C (96.8 °F), temperature source Temporal, resp. rate 18, height 1.702 m (5' 7\"), weight 86.3 kg (190 lb 4.1 oz), SpO2 96%.  Intake/Output last 3 Shifts:  I/O last 3 completed shifts:  In: 4707.5 (54.5 mL/kg) [P.O.:315; I.V.:1642.5 (19 mL/kg); Blood:1650; IV Piggyback:1100]  Out: 925 (10.7 mL/kg) [Urine:925 (0.3 mL/kg/hr)]  Weight: 86.3 kg     Relevant Results  Lab Results   Component Value Date    WBC 16.9 (H) 12/13/2024    HGB 7.6 (L) 12/13/2024    HCT 22.4 (L) 12/13/2024    MCV 91 12/13/2024     12/13/2024     Lab Results   Component Value Date    GLUCOSE 122 (H) 12/13/2024    CALCIUM 7.5 (L) 12/13/2024     (L) 12/13/2024    K 4.7 12/13/2024    CO2 18 (L) 12/13/2024     12/13/2024     (HH) 12/13/2024    CREATININE 4.78 (H) 12/13/2024     Scheduled medications  cefTRIAXone, 1 g, intravenous, q24h  DULoxetine, 20 mg, oral, Daily  gabapentin, 300 mg, oral, Daily  levothyroxine, 25 mcg, oral, Daily  [Held by provider] metoprolol tartrate, 25 mg, oral, BID  pantoprazole, 40 mg, oral, " Daily before breakfast   Or  pantoprazole, 40 mg, intravenous, Daily before breakfast  polyethylene glycol, 17 g, oral, Daily  sodium bicarbonate, 50 mEq, intravenous, Once  sodium zirconium cyclosilicate, 10 g, oral, TID      Continuous medications  sodium bicarbonate, 100 mL/hr, Last Rate: 100 mL/hr (12/13/24 0900)      PRN medications  PRN medications: acetaminophen **OR** acetaminophen **OR** acetaminophen, acetaminophen **OR** acetaminophen **OR** acetaminophen, albuterol, hydrOXYzine HCL, magic mouthwash (lidocaine, diphenhydrAMINE, Maalox 1:1:1), ondansetron, oxyCODONE, prochlorperazine, traZODone        Assessment/Plan   Assessment & Plan  Acute renal failure superimposed on chronic kidney disease (CMS-HCC)  She has left and right nephrostomy tubes.  She typically has no input from the left nephrostomy tube.  Avoid nephrotoxic medication  IV fluid  Nephrology following  Metabolic acidosis  IV sodium bicarbonate infusion per nephrology  Anemia  Secondary to chemotherapy vs GI bleed, she has dark stool.  No active bleeding.  She has received a total of 3 units of PRBCs on this admission  No anticoagulation due to anemia  Cervical cancer, FIGO stage RONIT  With metastasis to liver.  Receives weekly chemotherapy at [facility].  Patient has been accepted and awaiting bed.  Consulting hematology oncology for management until patient is transferred.  Analgesics   Pain medication as needed  Suspected UTI  Abnormal  urinalysis  On IV ceftriaxone  Urine culture in progress  Hypotension  Improved, monitor blood pressure  Open wound of both lower extremities  Both lower extremities covered in dressing.  Patient does follow wound care.  Wound care consulted.  Hyperkalemia  Resolved    Plan  Continue IV sodium bicarbonate infusion per nephrology  Monitor kidney function  Seen by gastroenterology: No urgent endoscopy planned  She has been accepted at [facility] and is awaiting a bed    Valerie CROCKER  MD [place]

## 2024-12-13 NOTE — CONSULTS
Inpatient consult to Cardiology  Consult performed by: JUN Logan-CNP  Consult ordered by: Valerie Dubose MD  Reason for consult: bradycardia, junctional rhythm, asymptomatic        History Of Present Illness:    Shelbi Delaney is a 64 y.o. female presenting with generalized weakness and abnormal labs.  Patient does not follow with a cardiologist currently.  She is active stage IV cervical cancer with mets and is undergoing treatment at [facility].  The patient had labs completed prior to scheduled chemotherapy and was found to have a potassium level of 6.3 and was advised to present to the emergency department.  The patient also reports that since her last chemotherapy treatment last week she has felt generally unwell.  She has noticed increased swelling in her bilateral lower extremities and feeling fatigued and weak.  Patient denies chest pain, pressure or palpitations.  She does report nausea and vomiting.  In the emergency department at [facility] lab work significant for sodium of 129, potassium 5.5, creatinine 5.24 and a hemoglobin of 5.9.  CT of the abdomen pelvis with no acute abdominal or pelvic processes.  Chest x-ray without acute pathologic findings.  EKG completed in the emergency department revealing sinus rhythm with a rate of 95 bpm and no evidence of acute ischemia.  The patient was given K cocktail in the emergency department as well as started on IV antibiotics and given multiple fluid boluses and was admitted to the hospital on telemetry for further assessment and management.  Cardiology were consulted for reported junctional rhythm on telemetry.    Review of Systems   Constitutional: Positive for malaise/fatigue.   Gastrointestinal:  Positive for nausea.   All other systems reviewed and are negative.         Last Recorded Vitals:  Vitals:    12/13/24 0417 12/13/24 0450 12/13/24 0510 12/13/24 0515   BP: (!) 186/169 110/64 103/84    BP Location: Right arm Left  "arm Left arm    Patient Position: Lying      Pulse: 81 96 86    Resp: 18      Temp: 36.8 °C (98.2 °F)      TempSrc: Temporal      SpO2: 99%      Weight:    86.3 kg (190 lb 4.1 oz)   Height:           Last Labs:  CBC - 12/13/2024:  5:49 AM  16.9 7.6 257    22.4      CMP - 12/13/2024:  5:49 AM  7.5 5.4 18 --- 0.3   4.7 2.2 7 88      PTT - 8/16/2024:  4:20 AM  1.0   10.4 31     Troponin I, High Sensitivity   Date/Time Value Ref Range Status   12/12/2024 12:43 AM 8 0 - 13 ng/L Final   12/11/2024 11:44 PM 11 0 - 13 ng/L Final     LDL Calculated   Date/Time Value Ref Range Status   08/18/2021 12:20  65 - 130 MG/DL Final     VLDL   Date/Time Value Ref Range Status   08/24/2020 09:24 AM 14 0 - 40 mg/dL Final      Last I/O:  I/O last 3 completed shifts:  In: 4707.5 (54.5 mL/kg) [P.O.:315; I.V.:1642.5 (19 mL/kg); Blood:1650; IV Piggyback:1100]  Out: 925 (10.7 mL/kg) [Urine:925 (0.3 mL/kg/hr)]  Weight: 86.3 kg     Past Cardiology Tests (Last 3 Years):  EKG:  ECG 12 lead 12/11/2024      Electrocardiogram, 12-lead PRN ACS symptoms 08/19/2024      ECG 12 Lead 08/17/2024      Electrocardiogram, 12-lead PRN ACS symptoms 08/16/2024    Echo:  No results found for this or any previous visit from the past 1095 days.    Ejection Fractions:  No results found for: \"EF\"  Cath:  No results found for this or any previous visit from the past 1095 days.    Stress Test:  No results found for this or any previous visit from the past 1095 days.    Cardiac Imaging:  No results found for this or any previous visit from the past 1095 days.      Past Medical History:  She has a past medical history of Anxiety, Body mass index (BMI)30.0-30.9, adult (08/07/2019), Cervix cancer (Multi), Chemotherapy-induced peripheral neuropathy (Multi), Chronic kidney disease, Hypertension, Hypothyroidism, and Tachycardia, unspecified.    Past Surgical History:  She has a past surgical history that includes US guided percutaneous placement (11/14/2022); CT guided " imaging for needle placement (01/12/2023); US guided percutaneous placement (08/28/2023); IR nephrostomy tube exchange (10/20/2023); Nephrostomy; Transurethral resection of bladder tumor; ORIF tibia fracture (Right); mediport; IR nephrostomy tube exchange (12/19/2023); and IR  nephroureteral placement (Bilateral, 6/18/2024).      Social History:  She reports that she quit smoking about 5 years ago. Her smoking use included cigarettes. She started smoking about 47 years ago. She has a 10.7 pack-year smoking history. She has been exposed to tobacco smoke. She has never used smokeless tobacco. She reports that she does not currently use alcohol after a past usage of about 8.0 standard drinks of alcohol per week. She reports that she does not use drugs.    Family History:  Family History   Problem Relation Name Age of Onset    No Known Problems Mother      Other (Acute myocardial infarction) Father          Allergies:  L-lysine [lysine] and Adhesive    Inpatient Medications:  Scheduled medications   Medication Dose Route Frequency    cefTRIAXone  1 g intravenous q24h    DULoxetine  20 mg oral Daily    gabapentin  300 mg oral Daily    levothyroxine  25 mcg oral Daily    [Held by provider] metoprolol tartrate  25 mg oral BID    pantoprazole  40 mg oral Daily before breakfast    Or    pantoprazole  40 mg intravenous Daily before breakfast    polyethylene glycol  17 g oral Daily    sodium bicarbonate  50 mEq intravenous Once    sodium zirconium cyclosilicate  10 g oral TID     PRN medications   Medication    acetaminophen    Or    acetaminophen    Or    acetaminophen    acetaminophen    Or    acetaminophen    Or    acetaminophen    albuterol    hydrOXYzine HCL    magic mouthwash (lidocaine, diphenhydrAMINE, Maalox 1:1:1)    ondansetron    prochlorperazine    traZODone     Continuous Medications   Medication Dose Last Rate    sodium bicarbonate  100 mL/hr 100 mL/hr (12/13/24 0323)     Outpatient Medications:  Current  Outpatient Medications   Medication Instructions    acetaminophen (TYLENOL) 975 mg, oral, Every 6 hours PRN    calcium carbonate (CALCIUM 600) 600 mg, Daily    dexAMETHasone (DECADRON) 8 mg, oral, 2 times daily, Take on the day before, day of and day after treatment.    dexAMETHasone 1 mg, oral, 2 times daily PRN, Swish and spit. Do not swallow    docusate sodium (COLACE) 100 mg, oral, 2 times daily    DULoxetine (CYMBALTA) 20 mg, oral, Daily, Do not crush or chew.    gabapentin (NEURONTIN) 300 mg, oral, 2 times daily    hydrOXYzine HCL (Atarax) 25 mg tablet 1 tablet, 3 times daily PRN    levothyroxine (SYNTHROID, LEVOXYL) 25 mcg, oral, Daily    loperamide (IMODIUM) 4 mg, oral, 4 times daily PRN    magic mouthwash (lidocaine, diphenhydrAMINE, Maalox 1:1:1) 10 mL, Swish & Spit, Every 6 hours PRN    metoprolol tartrate (Lopressor) 50 mg tablet take 0.5 tablets by mouth 2 times a day    multivitamin with minerals tablet 1 tablet, Daily    naloxone (Narcan) 4 mg/0.1 mL nasal spray 1 spray, As needed    nystatin (Mycostatin) cream 1 Application, 2 times daily    ondansetron (ZOFRAN) 8 mg, oral, Every 8 hours PRN    oxyCODONE (ROXICODONE) 10 mg, oral, 3 times daily PRN    polyethylene glycol (GLYCOLAX, MIRALAX) 17 g, oral, Daily PRN    prochlorperazine (COMPAZINE) 10 mg, oral, Every 6 hours PRN    sulfamethoxazole-trimethoprim (Bactrim DS) 800-160 mg tablet 1 tablet, oral, 2 times daily    tiZANidine (ZANAFLEX) 4 mg, oral, 3 times daily    traZODone (DESYREL) 50 mg, oral, Nightly PRN     Physical Exam  Vitals reviewed.   Constitutional:       Appearance: She is ill-appearing.   Cardiovascular:      Rate and Rhythm: Normal rate and regular rhythm.      Heart sounds: No murmur heard.     No friction rub. No gallop.   Pulmonary:      Effort: Pulmonary effort is normal.      Breath sounds: Normal breath sounds. No wheezing, rhonchi or rales.   Abdominal:      General: Bowel sounds are normal.      Palpations: Abdomen is soft.       Comments: Bilateral nephrostomy tubes in place   Musculoskeletal:      Right lower leg: Edema present.      Left lower leg: Edema present.   Skin:     Comments: Right chest wall Mediport with Tegaderm clean dry and intact.  Bilateral lower extremity wraps, clean dry and intact.            Assessment/Plan   Bradycardia/Junctional Rhythm  Hyperkalemia  Metabolic Acidosis  Stage IV Cervical Cancer  Chronic Kidney Disease    Impression and Plan:    12/13: Patient presenting with abnormal lab work as well as malaise and fatigue.  Patient has been intermittently hypotensive with blood pressures as low as 63/27 in the emergency department.  The patient has received a total of 3 units of packed red blood cells this hospitalization.  Cardiology were consulted for evidence of bradycardia and junctional rhythm on telemetry.  EKG completed revealing junctional rhythm with a rate of 44 bpm but no evidence of acute ischemia.  Of note at the time that this junctional rhythm was seen the patient's potassium was found to be quite elevated at 6.3.  The patient was reportedly asymptomatic of this bradycardia.  She was given a potassium cocktail and her potassium is improved this morning to 4.7.  I reviewed other lab work which revealed a sodium of 132, potassium 4.7, creatinine 4.78 and a hemoglobin of 7.6.  This morning during my exam she is resting comfortably in bed.  Blood pressures this morning low normal with last recorded 103/84.  She is breathing comfortably on room air with pulse oximetry of 99%.  Currently on telemetry she is in sinus rhythm with rates in the 80s and 90s. Agree with holding her beta-blocker in the setting of bradycardia last evening especially given her seemingly labile blood pressures. Nephrology is following for her electrolyte imbalances.  Of note, there is currently a transfer request in so that the patient can be transferred downtown to the [facility]. Otherwise, no further  recommendations from the cardiac perspective as her bradycardia has resolved with correction of her potassium. We will sign off at this time. Please do not hesitate to reach out with further questions or concerns.     Implantable Port Right Chest Single lumen port (Active)   Line Necessity Intravenous medication therapy 12/13/24 0110   Site Assessment Clean;Dry;Intact 12/13/24 0110   Line Status (1) Infusing 12/13/24 0110   Cap Change Cap changed 12/13/24 0110   Tubing Change Tubing changed 12/13/24 0110   Dressing Type Antimicrobial patch;Transparent 12/13/24 0110   Dressing Change Due 12/19/24 12/13/24 0110   Dressing Status Clean;Dry;Occlusive;New drainage 12/13/24 0110   Dressing Intervention Dressing changed 12/13/24 0110   Number of days:        Code Status:  Full Code          Kimberly Ernandez, APRN-CNP

## 2024-12-13 NOTE — ASSESSMENT & PLAN NOTE
IV sodium bicarbonate infusion per nephrology   Hemigard Postcare Instructions: The HEMIGARD strips are to remain completely dry for at least 5-7 days.

## 2024-12-13 NOTE — ASSESSMENT & PLAN NOTE
She has left and right nephrostomy tubes.  She typically has no input from the left nephrostomy tube.  Avoid nephrotoxic medication  IV fluid  Nephrology following

## 2024-12-14 ENCOUNTER — OFFICE VISIT (OUTPATIENT)
Dept: SURGICAL ONCOLOGY | Facility: HOSPITAL | Age: 64
DRG: 683 | End: 2024-12-14
Payer: COMMERCIAL

## 2024-12-14 LAB
ABO GROUP (TYPE) IN BLOOD: NORMAL
ANION GAP SERPL CALC-SCNC: 18 MMOL/L (ref 10–20)
ANION GAP SERPL CALC-SCNC: 19 MMOL/L (ref 10–20)
ANION GAP SERPL CALC-SCNC: 20 MMOL/L (ref 10–20)
ANTIBODY SCREEN: NORMAL
BUN SERPL-MCNC: 100 MG/DL (ref 6–23)
BUN SERPL-MCNC: 104 MG/DL (ref 6–23)
BUN SERPL-MCNC: 99 MG/DL (ref 6–23)
CALCIUM SERPL-MCNC: 7.2 MG/DL (ref 8.6–10.6)
CALCIUM SERPL-MCNC: 7.3 MG/DL (ref 8.6–10.6)
CALCIUM SERPL-MCNC: 7.4 MG/DL (ref 8.6–10.6)
CHLORIDE SERPL-SCNC: 98 MMOL/L (ref 98–107)
CHLORIDE SERPL-SCNC: 98 MMOL/L (ref 98–107)
CHLORIDE SERPL-SCNC: 99 MMOL/L (ref 98–107)
CO2 SERPL-SCNC: 21 MMOL/L (ref 21–32)
CO2 SERPL-SCNC: 21 MMOL/L (ref 21–32)
CO2 SERPL-SCNC: 22 MMOL/L (ref 21–32)
CREAT SERPL-MCNC: 4.59 MG/DL (ref 0.5–1.05)
CREAT SERPL-MCNC: 4.65 MG/DL (ref 0.5–1.05)
CREAT SERPL-MCNC: 4.69 MG/DL (ref 0.5–1.05)
EGFRCR SERPLBLD CKD-EPI 2021: 10 ML/MIN/1.73M*2
ERYTHROCYTE [DISTWIDTH] IN BLOOD BY AUTOMATED COUNT: 18 % (ref 11.5–14.5)
ERYTHROCYTE [DISTWIDTH] IN BLOOD BY AUTOMATED COUNT: 18.1 % (ref 11.5–14.5)
ERYTHROCYTE [DISTWIDTH] IN BLOOD BY AUTOMATED COUNT: 18.3 % (ref 11.5–14.5)
GLUCOSE BLD MANUAL STRIP-MCNC: 90 MG/DL (ref 74–99)
GLUCOSE SERPL-MCNC: 110 MG/DL (ref 74–99)
GLUCOSE SERPL-MCNC: 110 MG/DL (ref 74–99)
GLUCOSE SERPL-MCNC: 99 MG/DL (ref 74–99)
HCT VFR BLD AUTO: 23.2 % (ref 36–46)
HCT VFR BLD AUTO: 24 % (ref 36–46)
HCT VFR BLD AUTO: 25.5 % (ref 36–46)
HGB BLD-MCNC: 7.5 G/DL (ref 12–16)
HGB BLD-MCNC: 7.8 G/DL (ref 12–16)
HGB BLD-MCNC: 8.5 G/DL (ref 12–16)
MAGNESIUM SERPL-MCNC: 1.69 MG/DL (ref 1.6–2.4)
MAGNESIUM SERPL-MCNC: 1.7 MG/DL (ref 1.6–2.4)
MAGNESIUM SERPL-MCNC: 1.75 MG/DL (ref 1.6–2.4)
MCH RBC QN AUTO: 30.5 PG (ref 26–34)
MCH RBC QN AUTO: 30.8 PG (ref 26–34)
MCH RBC QN AUTO: 31.3 PG (ref 26–34)
MCHC RBC AUTO-ENTMCNC: 32.3 G/DL (ref 32–36)
MCHC RBC AUTO-ENTMCNC: 32.5 G/DL (ref 32–36)
MCHC RBC AUTO-ENTMCNC: 33.3 G/DL (ref 32–36)
MCV RBC AUTO: 94 FL (ref 80–100)
MCV RBC AUTO: 94 FL (ref 80–100)
MCV RBC AUTO: 95 FL (ref 80–100)
NRBC BLD-RTO: 0.4 /100 WBCS (ref 0–0)
NRBC BLD-RTO: 0.5 /100 WBCS (ref 0–0)
NRBC BLD-RTO: 0.5 /100 WBCS (ref 0–0)
PHOSPHATE SERPL-MCNC: 5 MG/DL (ref 2.5–4.9)
PLATELET # BLD AUTO: 308 X10*3/UL (ref 150–450)
PLATELET # BLD AUTO: 324 X10*3/UL (ref 150–450)
PLATELET # BLD AUTO: 355 X10*3/UL (ref 150–450)
POTASSIUM SERPL-SCNC: 4.7 MMOL/L (ref 3.5–5.3)
POTASSIUM SERPL-SCNC: 4.8 MMOL/L (ref 3.5–5.3)
POTASSIUM SERPL-SCNC: 4.8 MMOL/L (ref 3.5–5.3)
RBC # BLD AUTO: 2.46 X10*6/UL (ref 4–5.2)
RBC # BLD AUTO: 2.53 X10*6/UL (ref 4–5.2)
RBC # BLD AUTO: 2.72 X10*6/UL (ref 4–5.2)
RH FACTOR (ANTIGEN D): NORMAL
SODIUM SERPL-SCNC: 133 MMOL/L (ref 136–145)
SODIUM SERPL-SCNC: 134 MMOL/L (ref 136–145)
SODIUM SERPL-SCNC: 134 MMOL/L (ref 136–145)
WBC # BLD AUTO: 17 X10*3/UL (ref 4.4–11.3)
WBC # BLD AUTO: 17.2 X10*3/UL (ref 4.4–11.3)
WBC # BLD AUTO: 19.8 X10*3/UL (ref 4.4–11.3)

## 2024-12-14 PROCEDURE — 2500000001 HC RX 250 WO HCPCS SELF ADMINISTERED DRUGS (ALT 637 FOR MEDICARE OP)

## 2024-12-14 PROCEDURE — 82947 ASSAY GLUCOSE BLOOD QUANT: CPT

## 2024-12-14 PROCEDURE — 99222 1ST HOSP IP/OBS MODERATE 55: CPT

## 2024-12-14 PROCEDURE — 99232 SBSQ HOSP IP/OBS MODERATE 35: CPT

## 2024-12-14 PROCEDURE — 86901 BLOOD TYPING SEROLOGIC RH(D): CPT

## 2024-12-14 PROCEDURE — 36415 COLL VENOUS BLD VENIPUNCTURE: CPT

## 2024-12-14 PROCEDURE — 93010 ELECTROCARDIOGRAM REPORT: CPT | Performed by: INTERNAL MEDICINE

## 2024-12-14 PROCEDURE — 2500000005 HC RX 250 GENERAL PHARMACY W/O HCPCS

## 2024-12-14 PROCEDURE — 85027 COMPLETE CBC AUTOMATED: CPT

## 2024-12-14 PROCEDURE — 85027 COMPLETE CBC AUTOMATED: CPT | Performed by: STUDENT IN AN ORGANIZED HEALTH CARE EDUCATION/TRAINING PROGRAM

## 2024-12-14 PROCEDURE — 93005 ELECTROCARDIOGRAM TRACING: CPT

## 2024-12-14 PROCEDURE — 83735 ASSAY OF MAGNESIUM: CPT

## 2024-12-14 PROCEDURE — 80048 BASIC METABOLIC PNL TOTAL CA: CPT

## 2024-12-14 PROCEDURE — 1200000003 HC ONCOLOGY  ROOM WITH TELEMETRY DAILY

## 2024-12-14 PROCEDURE — 87086 URINE CULTURE/COLONY COUNT: CPT

## 2024-12-14 PROCEDURE — 84100 ASSAY OF PHOSPHORUS: CPT

## 2024-12-14 PROCEDURE — 99223 1ST HOSP IP/OBS HIGH 75: CPT | Performed by: NURSE PRACTITIONER

## 2024-12-14 PROCEDURE — 87040 BLOOD CULTURE FOR BACTERIA: CPT

## 2024-12-14 PROCEDURE — 2500000004 HC RX 250 GENERAL PHARMACY W/ HCPCS (ALT 636 FOR OP/ED)

## 2024-12-14 RX ORDER — SODIUM BICARBONATE 650 MG/1
650 TABLET ORAL EVERY 12 HOURS
Status: DISCONTINUED | OUTPATIENT
Start: 2024-12-14 | End: 2024-12-18 | Stop reason: HOSPADM

## 2024-12-14 RX ORDER — SODIUM CHLORIDE, SODIUM LACTATE, POTASSIUM CHLORIDE, CALCIUM CHLORIDE 600; 310; 30; 20 MG/100ML; MG/100ML; MG/100ML; MG/100ML
100 INJECTION, SOLUTION INTRAVENOUS CONTINUOUS
Status: ACTIVE | OUTPATIENT
Start: 2024-12-14 | End: 2024-12-15

## 2024-12-14 RX ORDER — LANOLIN ALCOHOL/MO/W.PET/CERES
400 CREAM (GRAM) TOPICAL ONCE
Status: COMPLETED | OUTPATIENT
Start: 2024-12-14 | End: 2024-12-14

## 2024-12-14 RX ORDER — LANOLIN ALCOHOL/MO/W.PET/CERES
400 CREAM (GRAM) TOPICAL ONCE
Status: DISCONTINUED | OUTPATIENT
Start: 2024-12-14 | End: 2024-12-14

## 2024-12-14 RX ORDER — GABAPENTIN 300 MG/1
300 CAPSULE ORAL NIGHTLY
Status: DISCONTINUED | OUTPATIENT
Start: 2024-12-14 | End: 2024-12-18 | Stop reason: HOSPADM

## 2024-12-14 ASSESSMENT — COGNITIVE AND FUNCTIONAL STATUS - GENERAL
DRESSING REGULAR LOWER BODY CLOTHING: A LITTLE
MOBILITY SCORE: 19
MOVING TO AND FROM BED TO CHAIR: A LITTLE
DAILY ACTIVITIY SCORE: 18
DRESSING REGULAR UPPER BODY CLOTHING: A LITTLE
TOILETING: A LITTLE
MOBILITY SCORE: 18
PERSONAL GROOMING: A LITTLE
STANDING UP FROM CHAIR USING ARMS: A LITTLE
WALKING IN HOSPITAL ROOM: A LITTLE
DRESSING REGULAR LOWER BODY CLOTHING: A LITTLE
DAILY ACTIVITIY SCORE: 20
CLIMB 3 TO 5 STEPS WITH RAILING: A LITTLE
STANDING UP FROM CHAIR USING ARMS: A LITTLE
HELP NEEDED FOR BATHING: A LITTLE
TOILETING: A LITTLE
TURNING FROM BACK TO SIDE WHILE IN FLAT BAD: A LITTLE
MOVING FROM LYING ON BACK TO SITTING ON SIDE OF FLAT BED WITH BEDRAILS: A LITTLE
MOVING TO AND FROM BED TO CHAIR: A LITTLE
CLIMB 3 TO 5 STEPS WITH RAILING: A LOT
WALKING IN HOSPITAL ROOM: A LITTLE
EATING MEALS: A LITTLE
DRESSING REGULAR UPPER BODY CLOTHING: A LITTLE
HELP NEEDED FOR BATHING: A LITTLE

## 2024-12-14 ASSESSMENT — PAIN SCALES - GENERAL
PAINLEVEL_OUTOF10: 8
PAINLEVEL_OUTOF10: 0 - NO PAIN
PAINLEVEL_OUTOF10: 7
PAINLEVEL_OUTOF10: 4
PAINLEVEL_OUTOF10: 5 - MODERATE PAIN
PAINLEVEL_OUTOF10: 8
PAINLEVEL_OUTOF10: 2
PAINLEVEL_OUTOF10: 0 - NO PAIN

## 2024-12-14 ASSESSMENT — PAIN - FUNCTIONAL ASSESSMENT
PAIN_FUNCTIONAL_ASSESSMENT: 0-10

## 2024-12-14 ASSESSMENT — PAIN DESCRIPTION - ORIENTATION
ORIENTATION: LOWER
ORIENTATION: LOWER

## 2024-12-14 ASSESSMENT — PAIN DESCRIPTION - LOCATION
LOCATION: BACK
LOCATION: BACK
LOCATION: ABDOMEN

## 2024-12-14 NOTE — PROGRESS NOTES
Nurse to nurse report called to Marie at [facility]. Per transfer center, pt to be picked up at 1830. Her room number will be 6023. Phone number for report is [phone]. Per their needs, port is now de-accessed and IV will remain in and continue to run the sodium bicarb for the transport.

## 2024-12-14 NOTE — DISCHARGE SUMMARY
Discharge Diagnosis  Acute renal failure superimposed on chronic kidney disease (CMS-HCC)  Hyperkalemia  Metabolic acidosis  Anemia  Stage IV cervical cancer  Hypotension      Issues Requiring Follow-Up  The patient was transferred to [facility]    Test Results Pending At Discharge  Pending Labs       No current pending labs.            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  She had been feeling unwell and reported weakness, nausea and vomiting.  She had lab testing which showed severe anemia and she was referred to the emergency department.  Hemoglobin was 5.9.  She also had hyperkalemia with potassium of 6.3 and acute kidney injury superimposed on chronic kidney disease.  [facility] was contacted and she was accepted for admission pending availability of a bed.  In the meantime she was admitted at [facility].  She received a total of 3 units of packed red blood cells and also received IV fluids for hypotension.  Anemia and kidney function improved.  Hyperkalemia resolved.  She was transferred to [facility] on 12/13 when a bed became available.      Outpatient Follow-Up  Future Appointments   Date Time Provider Department Center   12/20/2024  2:00 PM Pedro Lazo MD [facility]   12/26/2024 12:00 PM INF 10 M[facility] [id number] [facility]   1/7/2025  1:30 PM Liset Ibarra APRN-CNP [id number] [facility]   1/9/2025 10:20 AM Adrianna Jefferson MD [id number] [facility]   1/9/2025 12:00 PM INF 10 M[facility]OR [id number] [facility]   1/23/2025  2:00 PM INF 10 M[facility]OR [id number] [facility]       Valerie Dubose MD

## 2024-12-14 NOTE — NURSING NOTE
0.2 IV dilaudid retrieved by this RN and rest wasted with Holly SCHREIBER. However a different patient received this medication as this nurse thought it was pulled for room 6024. This pt did not received IV dilaudid 0.2 mg IV. See VINCENT LOYOLA notified and pharmacy notified and aware. Charge nurse aware as well.

## 2024-12-14 NOTE — H&P
Gynecology Oncology History & Physical     History Of Present Illness  Shelbi Delaney is a 64 y.o. 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     Patient reported that she has felt unwell since chemotherapy last Friday 11/29, reporting generalized weakness, nausea and vomiting, confusion at night, and worsening b/l LE edema (follows w/wound care, had Unna boots placed 12/11). States that sx have worsened over the past week, particularly her lower abdominal pain. She is able to tolerate PO intake without N/V. Her last bowel movement was 2 days ago; she is passing flatus.     Summary of [facility] Course  - in [facility] ED, vital signs notable for soft blood pressures as low as 84/60, heart rate as high as 114.  Labs notable for sodium 129, potassium 5.5, , creatinine 5.24, EGFR 9, calcium 8.0, albumin 2.4, magnesium 1.76, troponin 11-8, WBC 22.8, hemoglobin 5.9, platelets 257,  LE, 2+ bacteria, 21-50 WBC.  - Received 2 units of RBC in the ED as well as started on IV antibiotics.  She was also given 1 L bolus of normal saline for soft blood pressure.  - EKG: NSR, low voltage QRS.  QTc 449  - Admitted to Internal Medicine; given 1 additional unit of pRBC on 12/12  - Nephrology consulted - recommend IV hydration, IV sodium bicarb drip, Lokelma 10g TID for 3 doses   - Oncology consulted- CT scan with stable abdominal LAPs and liver lesions; continue chemo as outpatient   - 12/13: patient with K of 6.5, given albuterol, Lokelma, sodium bicarb push, calcium gluconate 2 g IV, insulin and D10   - Cardiology consulted due to EKG with junctional rhythm; hold beta blocker in the setting of  bradycardia and labile BP   - GI consulted d/t normocytic anemia; no overt bleeding. Recommend non-urgent EGD/Colonoscopy  - Wound care, nutrition consulted   - 12/13- patient transferred to [facility] with sodium bicarb gtt during transport     Past Medical History  She has a past medical history of Anxiety, Body mass index (BMI)30.0-30.9, adult (08/07/2019), Cervix cancer (Multi), Chemotherapy-induced peripheral neuropathy (Multi), Chronic kidney disease, Hypertension, Hypothyroidism, and Tachycardia, unspecified.    Surgical History  She has a past surgical history that includes US guided percutaneous placement (11/14/2022); CT guided imaging for needle placement (01/12/2023); US guided percutaneous placement (08/28/2023); IR nephrostomy tube exchange (10/20/2023); Nephrostomy; Transurethral resection of bladder tumor; ORIF tibia fracture (Right); mediport; IR nephrostomy tube exchange (12/19/2023); and IR  nephroureteral placement (Bilateral, 6/18/2024).    Oncology History Overview Note   Stage RONIT grade 2 squamous cell carcinoma of cervix  1/20/23: TURBT - poorly differentiated squamaous cell carcinoma; CPS10.  review with history of cervical mass consistent with GYN primary  - Extension to rectal mucosa, pubic ramus with ?reactive mediastinal LN   - Bilateral nephrostomy placement   2/16/23 - 6/1/23: Carbo AUC5/Taxol 175 +Avastin 15mg/kg, Pembrolizumab 200mg/m2  - C2-C4 +Avastin - further treatments held due to progressive gr2 proteinuria   - Grade 3 anemia declining transfusion,   7/20/23 - 12/18/23: Pembrolizumab 200mg/m2, progressive disease  1/11/24 - 3/14/24: Tivdak x3, progressive disease   - C1: 0.9mg/kg in setting of CrCl < 30  3/2024-7/2024: Taxotere, progressive disease  8/1/24 - 11/5/24: Topotecan q week x3, progressive disease    Molecular Testing  PLD1: CPS 10  1/2024 Tempus xT - PIK3CA p.E726K missense (gain): consider alpelisib  Loss of function: BCORL1, KMT2C, NSD1, KMT2D, RAD21  TMB 7.9mB -  "RACHELE  - Her2 Neg     Cervical cancer, FIGO stage RONIT   3/21/2022 Cancer Staged    Staging form: Cervix Uteri, AJCC Version 9, Clinical stage from 3/21/2022: FIGO Stage RONIT (cT4, cN2, cM0) - Signed by Adrianna Jefferson MD on 10/12/2023, Prognostic indicators: CPS 10     7/20/2023 - 11/30/2023 Chemotherapy    Pembrolizumab, 21 Day Cycles     1/12/2024 - 2/22/2024 Chemotherapy    Tisotumab vedotin, 21 Day Cycles     3/21/2024 - 7/5/2024 Chemotherapy    DOCEtaxel, 21 Day Cycles     8/1/2024 - 10/24/2024 Chemotherapy    Topotecan (Weekly), 28 Day Cycles      11/14/2024 -  Chemotherapy    Gemcitabine, 28 Day Cycles     3/13/2025 - 3/13/2025 Chemotherapy    PACLitaxel / CARBOplatin, 21 Day Cycles - Gyn     3/13/2025 - 3/13/2025 Chemotherapy    PACLitaxel / CARBOplatin, 21 Day Cycles - Gyn         Social History  She reports that she quit smoking about 5 years ago. Her smoking use included cigarettes. She started smoking about 47 years ago. She has a 10.7 pack-year smoking history. She has been exposed to tobacco smoke. She has never used smokeless tobacco. She reports that she does not currently use alcohol after a past usage of about 8.0 standard drinks of alcohol per week. She reports that she does not use drugs.     Allergies  L-lysine [lysine] and Adhesive    Review of Systems  Negative Except per HPI      Physical Exam  General: no acute distress  HEENT: normocephalic, atraumatic  CV: warm and well perfused  Lungs: breathing comfortably on room air  Abdomen: Tender to deep palpation to lower abdomen   Extremities: moving all extremities spontaneously  Neuro: awake and conversant  Psych: appropriate mood and affect       Last Recorded Vitals  Blood pressure 105/60, pulse 89, temperature 36.7 °C (98.1 °F), resp. rate 18, height 1.702 m (5' 7.01\"), weight 87.1 kg (192 lb 0.3 oz), SpO2 98%.    Relevant Results  Results for orders placed or performed during the hospital encounter of 12/13/24 (from the past 96 hours)   Basic " Metabolic Panel   Result Value Ref Range    Glucose 120 (H) 74 - 99 mg/dL    Sodium 134 (L) 136 - 145 mmol/L    Potassium 4.5 3.5 - 5.3 mmol/L    Chloride 99 98 - 107 mmol/L    Bicarbonate 20 (L) 21 - 32 mmol/L    Anion Gap 20 10 - 20 mmol/L    Urea Nitrogen 106 (HH) 6 - 23 mg/dL    Creatinine 4.84 (H) 0.50 - 1.05 mg/dL    eGFR 10 (L) >60 mL/min/1.73m*2    Calcium 7.3 (L) 8.6 - 10.6 mg/dL   Magnesium   Result Value Ref Range    Magnesium 1.84 1.60 - 2.40 mg/dL   Phosphorus   Result Value Ref Range    Phosphorus 5.0 (H) 2.5 - 4.9 mg/dL   CBC   Result Value Ref Range    WBC 15.7 (H) 4.4 - 11.3 x10*3/uL    nRBC 0.4 (H) 0.0 - 0.0 /100 WBCs    RBC 2.56 (L) 4.00 - 5.20 x10*6/uL    Hemoglobin 8.0 (L) 12.0 - 16.0 g/dL    Hematocrit 24.7 (L) 36.0 - 46.0 %    MCV 97 80 - 100 fL    MCH 31.3 26.0 - 34.0 pg    MCHC 32.4 32.0 - 36.0 g/dL    RDW 18.4 (H) 11.5 - 14.5 %    Platelets 298 150 - 450 x10*3/uL   BLOOD GAS LACTIC ACID, VENOUS   Result Value Ref Range    POCT Lactate, Venous 0.9 0.4 - 2.0 mmol/L     *Note: Due to a large number of results and/or encounters for the requested time period, some results have not been displayed. A complete set of results can be found in Results Review.      CT Abdomen/ Pelvis (12/12)   PELVIS:  REPRODUCTIVE ORGANS: Uterine mass is stable to slightly decreased in  size, measuring 7.9 x 6.4 cm (previously 7.8 x 7.1 cm). Right adnexal  mass measuring 4 x 4.5 cm previously measured 4.4 x 4.7 cm. Left  adnexal mass measures 2.8 cm, previously 2.5 cm. Is the slight  differences in these measurements may be related to slight change in  position. Additional fat containing right adnexal mass is stable. No  significant free pelvic fluid.      IMPRESSION:  No acute abdominal or pelvic process.      The known partially calcified uterine mass is stable to slightly  decreased in size. Additional bilateral adnexal masses are grossly  stable in size.      Grossly stable abdominopelvic lymphadenopathy and  hepatic masses,  consistent with metastatic disease.      No significant change in lytic/destructive lesion of the right  inferior pubic ramus.      Stable chronic findings as above.    XR Chest (12/12)   IMPRESSION:  No acute pathologic findings are identified.    Assessment & Plan    Shelbi Delaney is a 64 y.o. 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 in the 2's; on admission to [facility], was noted to have Cr of 5.24, EGFR of 9; hyperkalemia and metabolic acidosis secondary to renal failure   - Patient with bilateral nephrostomy tubes, left kidney less functional with low urine output.   - Nephrology consulted at OSH with initiation of IV hydration, IV sodium bicarbonate drip and Lokelma 10g TID x 3 doses.   - [facility] admission labs notable for hgb.of 8.0, plts 298, K of 4.5, Mag of 1.84, phos of 5.0; lactate wnl  - 400mg Mag repleted; sodium bicarb gtt initially continued on arrival   - Nephrology consulted - given improvement in labs, recommend discontinuing sodium bicarb gtt and starting PO sodium bicarb 650mg BID - ordered. To see patient and provide additional recommendations in AM.   - For Urology consult in the AM for possible interrogation of nephrostomy tubes in setting of decreased UOP.   - Q6h labs ordered to monitor trend; replete PRN  - Continue mIVF; avoid nephrotoxins     Anemia  - s/p 3u of pRBC at OSH for initial hgb. Of 5.9   - Patient with  admission Hgb. Of 8.0   - Likely secondary to chemotherapy   - GI was following at [facility] given c/f dark stool. Stool occult blood test was ordered, never collected. At that time, GI team recommended non-urgent  EGD/Colonoscopy (has never previously had either done)   - Continue to monitor; if anemic persists, consider  GI consult   - No overt signs of bleeding; denies hematemesis   - Anticoagulation currently HELD; consider re-starting if AM labs stable   - T&C x2u pRBC     Suspected UTI  - Patient with 500 LE on UA at OSH, reflex urine culture with contamination.   - Previous c/f possible episodes of AMS at OSH, possibly due to UTI. Patient A&Ox3 on admission here, appears at baseline   - IV Ceftriaxone 1g q24h continued on admission pending repeat urine cultures (ordered)     Lower Extremity Wounds   - Open wounds covered with dressing; wound care followed at OSH. Please see pictures in chart.  - For wound care consult during admission     Cervical cancer, FIGO stage RONIT   - Follows with Dr. Jefferson; recent imaging with progression of disease   - Most recent outpatient visit on 11/7 at which time symptom-directed treatment over cancer-directed therapy was discussed; patient opted to proceed with D1/D15 gemcitabine (+d16 gCSF); goals of care additionally discussed   - Continue supportive care, pain control   - Continue GOC conversations as clinically indicated     Dispo: For inpatient admission     To be seen & d/w FREDERIC Perez MD  PGY-II, Obstetrics & Gynecology   [facility]

## 2024-12-14 NOTE — PROGRESS NOTES
"Shelbi Delaney is a 64 y.o. female on day 1 of admission presenting with Electrolyte abnormality.      Subjective   Patient resting in bed, denies any chest pain or SOB.         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  review with history of cervical mass consistent with GYN primary  - Extension to rectal mucosa, pubic ramus with ?reactive mediastinal LN   - Bilateral nephrostomy placement   2/16/23 - 6/1/23: Carbo AUC5/Taxol 175 +Avastin 15mg/kg, Pembrolizumab 200mg/m2  - C2-C4 +Avastin - further treatments held due to progressive gr2 proteinuria   - Grade 3 anemia declining transfusion,   7/20/23 - 12/18/23: Pembrolizumab 200mg/m2, progressive disease  1/11/24 - 3/14/24: Tivdak x3, progressive disease   - C1: 0.9mg/kg in setting of CrCl < 30  3/2024-7/2024: Taxotere, progressive disease  8/1/24 - 11/5/24: Topotecan q week x3, progressive disease    Molecular Testing  PLD1: CPS 10  1/2024 Tempus xT - PIK3CA p.E726K missense (gain): consider alpelisib  Loss of function: BCORL1, KMT2C, NSD1, KMT2D, RAD21  TMB 7.9mB - RACHELE  - Her2 Neg     Cervical cancer, FIGO stage RONIT       Last Recorded Vitals  Blood pressure 96/57, pulse 86, temperature 36.9 °C (98.4 °F), resp. rate 18, height 1.702 m (5' 7.01\"), weight 87.1 kg (192 lb 0.3 oz), SpO2 92%.  Intake/Output last 3 Shifts:    Intake/Output Summary (Last 24 hours) at 12/14/2024 1248  Last data filed at 12/14/2024 1121  Gross per 24 hour   Intake 1845.42 ml   Output 775 ml   Net 1070.42 ml       Physical Exam  General: no acute distress  HEENT: normocephalic, atraumatic  CV: warm and well perfused  Lungs: breathing comfortably on room air  Abdomen: Tender to deep palpation to lower abdomen   Extremities: moving all extremities spontaneously  Neuro: awake and conversant  Psych: appropriate mood and affect      Assessment/Plan     Assessment " & Plan  Electrolyte abnormality    Shelbi Delaney is a 64 y.o. 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 in the 2's; on admission to [facility], was noted to have Cr of 5.24, EGFR of 9; hyperkalemia and metabolic acidosis secondary to renal failure   - Patient with bilateral nephrostomy tubes, left kidney less functional with low urine output. ->  will consult Urology for evaluation this AM  - Nephrology consulted at OSH with initiation of IV hydration, IV sodium bicarbonate drip and Lokelma 10g TID x 3 doses.   - [facility] admission labs notable for hgb.of 8.0, plts 298, K of 4.5, Mag of 1.84, phos of 5.0; lactate wnl  - 400mg Mag repleted; sodium bicarb gtt initially continued on arrival   - Nephrology consulted - given improvement in labs, recommend discontinuing sodium bicarb gtt and starting PO sodium bicarb 650mg BID - ordered, will follow-up Nephrology recommendations this AM  - Q6h labs ordered to monitor trend; replete PRN  - Continue mIVF; avoid nephrotoxins      Anemia  - s/p 3u of pRBC at OSH for initial hgb. Of 5.9   - Patient with  admission Hgb. Of 8.0   - Likely secondary to chemotherapy   - GI was following at [facility] given c/f dark stool. Stool occult blood test was ordered, never collected. At that time, GI team recommended non-urgent EGD/Colonoscopy (has never previously had either done)   - Continue to monitor; if anemic persists or concern for recurrent symptoms, will consider GI consult  - No overt signs of bleeding; denies hematemesis   - T&C x2u pRBC      Suspected UTI  - Patient with 500 LE on UA at OSH, reflex urine culture with contamination.   -  Previous c/f possible episodes of AMS at OSH, possibly due to UTI. Patient A&Ox3 on admission here, appears at baseline   - IV Ceftriaxone 1g q24h continued on admission pending repeat urine cultures     Lower Extremity Wounds   - Open wounds covered with dressing; wound care followed at OSH. Please see pictures in chart.  - For wound care consult during admission      Cervical cancer, FIGO stage RONIT   - Follows with Dr. Jefferson; recent imaging with progression of disease   - Most recent outpatient visit on 11/7 at which time symptom-directed treatment over cancer-directed therapy was discussed; patient opted to proceed with D1/D15 gemcitabine (+d16 gCSF); goals of care additionally discussed   - Continue supportive care, pain control   - Supportive Onc consulted  - Planning for GOC converstaion in 12/15 with family at bedside, code status discussion this AM and patient elects for DNR/DNI      Dispo: continue inpatient management     Seen and discussed with Dr. Li Rogers MD PGY-3  Gyn Onc pager [phone]

## 2024-12-14 NOTE — CONSULTS
Reason For Consult  Decreased nephrostomy tube output    History Of Present Illness  Shelbi Delaney is a 64 y.o. female with stage RONIT mod differentiated SCC of cervix with progressive disease on Topotecan, now on palliative Gemzar admitted after numerous lab abnormalities. Hgb 6.4, WBC 24 (up from 13 on day of Neulasta), Cr 5.31, , & K+ 6.3 (has underlying stage IV CKD w/baseline Cr 2.3-2.4).     Patient has been managed with BL nephrostomy tubes since 11/2022. Patient endorses 95% of UOP is from the right PCNT and 5% from her L PCNT as her baseline, which has been consistent during this hospitalization. Urosepsis in August due to obstructed nephrostomy tube. There was c/f possible AMS at OSH, on CTX. She received 3U pRBC at OSH. Hyperkalemia now resolved.    Urology consulted for decreased UOP via PCNTs. Patient alert and aware of the reason for hospitalization. Her PCNTs are managed at [facility], operated by [facility]. Denies current n/v/c/f/dizziness/headache.     Past Medical History  She has a past medical history of Anxiety, Body mass index (BMI)30.0-30.9, adult (08/07/2019), Cervix cancer (Multi), Chemotherapy-induced peripheral neuropathy (Multi), Chronic kidney disease, Hypertension, Hypothyroidism, and Tachycardia, unspecified.    Surgical History  She has a past surgical history that includes US guided percutaneous placement (11/14/2022); CT guided imaging for needle placement (01/12/2023); US guided percutaneous placement (08/28/2023); IR nephrostomy tube exchange (10/20/2023); Nephrostomy; Transurethral resection of bladder tumor; ORIF tibia fracture (Right); mediport; IR nephrostomy tube exchange (12/19/2023); and IR  nephroureteral placement (Bilateral, 6/18/2024).     Social History  She reports that she quit smoking about 5 years ago. Her smoking use included cigarettes. She started smoking about 47 years ago. She has a 10.7 pack-year smoking history. She has been exposed to tobacco smoke. She has never used  "smokeless tobacco. She reports that she does not currently use alcohol after a past usage of about 8.0 standard drinks of alcohol per week. She reports that she does not use drugs.    Family History  Family History   Problem Relation Name Age of Onset    No Known Problems Mother      Other (Acute myocardial infarction) Father          Allergies  L-lysine [lysine] and Adhesive    Review of Systems  12 pt ROS reviewed and negative except as in HPI     Physical Exam  NAD  NC/AT  Non labored on RA  Soft, NT ND  DELA CRUZ  BL PCNTs draining clear yellow urine.    PCNTs flushed and aspirated appropriately.    Last Recorded Vitals  Blood pressure 96/57, pulse 86, temperature 36.9 °C (98.4 °F), resp. rate 18, height 1.702 m (5' 7.01\"), weight 87.1 kg (192 lb 0.3 oz), SpO2 92%.    Relevant Results  WBC 17<19.8<15.7<16.9  Cr 4.69<4.59<4.84<4.32  UA neg nitrite, +LE    Assessment/Plan   65 y/o F with stage RONIT mod differentiated SCC of cervix with progressive disease on Topotecan, now on palliative Gemzar admitted after numerous lab abnormalities.     Patient alert and oriented, no current complaints at this time. Afeb, mildly hypotensive.  BL PCNTs draining clear yellow urine. R>L as per baseline.   PCNTs flushed with 10cc of NS. Aspirated easily.    Recommendations  - no acute urologic intervention  - PCNTs draining and aspirated appropriately. If further concern, can consult IR for interrogation  - nephrology consult for AIDE  - please page with further questions    Discussed with Dr. Debbie West MD PGY4  [id number]    "

## 2024-12-14 NOTE — CONSULTS
SUPPORTIVE AND PALLIATIVE ONCOLOGY CONSULT    Inpatient consult to [facility]  Consult performed by: KENDALL Talley  Consult ordered by: Liset Finn MD        SERVICE DATE: 12/14/2024      PALLIATIVE MEDICINE OUTPATIENT PROVIDER:  KENDALL Jiang- new pt visit scheduled for 1/7/25  CURRENT ATTENDING PROVIDER: Liset Finn MD     Medical Oncologist: MD Ashanti Melgar MD Stacy A Smrz, MD MPH   Radiation Oncologist: No care team member to display  Primary Physician: Adrianna Jefferson  [phone]    REASON FOR CONSULT/CHIEF CONSULT COMPLAINT: Introduction to Supportive and Palliative Oncology Services    Subjective   HISTORY OF PRESENT ILLNESS: Shelbi Delaney is a 64 y.o. 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 PMH significant for CKD due to obstructive uropathy with baseline Cr. in the 2's and anemia.  Admitted 12/13/2024 for further evaluation and management of pre-chemo lab abnormalities.. Supportive and Palliative Oncology is consulted for Introduction to Supportive and Palliative Oncology Services.     Pain Assessment:  Location:  ABD and back  Duration: Constant  Characteristics:   Rating: Severe   Descriptors: cramping   Aggravating: movement    Relieving: Analgesics, Positioning, and Modifying activity   Intolerances:Shelbi Delaney is allergic to l-lysine [lysine] and adhesive.   Personal Pain Goal: 2    Interference with Function: Very Much   Coping Strategies: distraction and relaxation   Emotional Response: Anxiety and Psychological distress   Barriers to Pain Management: None    Opioid Requirements  Past 24 h opioid requirements (12/13/24 at 0800 to 12/14/24 at 0800):   Hydromorphone 0.2 mg IV x 1 doses = 0.2 mg = 6.4 OME  Oxycodone IR 10 mg PO x 3 doses = 30 mg = 45 OME  Total 24h OME use:  51.4    OARRS/PDMP reviewed - no aberrant  behavior noted.    Symptom Assessment:  Pain:none  Headache: none  Dizziness:none  Lack of energy: somewhat  Difficulty sleeping: none  Worrying: somewhat  Anxiety: somewhat  Depressive symptoms: somewhat  Pain in mouth/swallowing: none  Dry mouth: none  Taste changes: none  Shortness of breath: none  Lack of appetite: somewhat   Nausea: none  Vomiting: none  Constipation: none  Diarrhea: very much  Sore muscles: somewhat  Numbness or tingling in hands/feet/other: none  Information obtained from: chart review, interview of patient, and discussion with primary team  ______________________________________________________________________     Oncology History Overview Note   Stage RONIT grade 2 squamous cell carcinoma of cervix  1/20/23: TURBT - poorly differentiated squamaous cell carcinoma; CPS10. [facility] review with history of cervical mass consistent with GYN primary  - Extension to rectal mucosa, pubic ramus with ?reactive mediastinal LN   - Bilateral nephrostomy placement   2/16/23 - 6/1/23: Carbo AUC5/Taxol 175 +Avastin 15mg/kg, Pembrolizumab 200mg/m2  - C2-C4 +Avastin - further treatments held due to progressive gr2 proteinuria   - Grade 3 anemia declining transfusion,   7/20/23 - 12/18/23: Pembrolizumab 200mg/m2, progressive disease  1/11/24 - 3/14/24: Tivdak x3, progressive disease   - C1: 0.9mg/kg in setting of CrCl < 30  3/2024-7/2024: Taxotere, progressive disease  8/1/24 - 11/5/24: Topotecan q week x3, progressive disease    Molecular Testing  PLD1: CPS 10  1/2024 [employer] xT - PIK3CA p.E726K missense (gain): consider alpelisib  Loss of function: BCORL1, KMT2C, NSD1, KMT2D, RAD21  TMB 7.9mB - RACHELE  - Her2 Neg     Cervical cancer, FIGO stage RONIT   3/21/2022 Cancer Staged    Staging form: Cervix Uteri, AJCC Version 9, Clinical stage from 3/21/2022: FIGO Stage RONIT (cT4, cN2, cM0) - Signed by Adrianna Jefferson MD on 10/12/2023, Prognostic indicators: CPS 10     7/20/2023 - 11/30/2023 Chemotherapy    Pembrolizumab, 21 Day  Cycles     1/12/2024 - 2/22/2024 Chemotherapy    Tisotumab vedotin, 21 Day Cycles     3/21/2024 - 7/5/2024 Chemotherapy    DOCEtaxel, 21 Day Cycles     8/1/2024 - 10/24/2024 Chemotherapy    Topotecan (Weekly), 28 Day Cycles      11/14/2024 -  Chemotherapy    Gemcitabine, 28 Day Cycles     3/13/2025 - 3/13/2025 Chemotherapy    PACLitaxel / CARBOplatin, 21 Day Cycles - Gyn     3/13/2025 - 3/13/2025 Chemotherapy    PACLitaxel / CARBOplatin, 21 Day Cycles - Gyn         Past Medical History:   Diagnosis Date    Anxiety     Body mass index (BMI)30.0-30.9, adult 08/07/2019    BMI 30.0-30.9,adult    Cervix cancer (Multi)     Chemotherapy-induced peripheral neuropathy (Multi)     Chronic kidney disease     Hypertension     Hypothyroidism     Tachycardia, unspecified     Tachycardia     Past Surgical History:   Procedure Laterality Date    CT GUIDED IMAGING FOR NEEDLE PLACEMENT  01/12/2023    CT GUIDED IMAGING FOR NEEDLE PLACEMENT [facility]    IR  NEPHROURETERAL PLACEMENT Bilateral 6/18/2024    IR  NEPHROURETERAL PLACEMENT 6/18/2024 [facility]    IR NEPHROSTOMY TUBE EXCHANGE  10/20/2023    IR NEPHROSTOMY TUBE EXCHANGE 10/20/2023 Manuel Marvin MD [facility]    IR NEPHROSTOMY TUBE EXCHANGE  12/19/2023    IR NEPHROSTOMY TUBE EXCHANGE 12/19/2023 [facility]    MEDIPORT      NEPHROSTOMY      ORIF TIBIA FRACTURE Right     TRANSURETHRAL RESECTION OF BLADDER TUMOR      US GUIDED PERCUTANEOUS PLACEMENT  11/14/2022    US GUIDED PERCUTANEOUS PLACEMENT [facility]    US GUIDED PERCUTANEOUS PLACEMENT  08/28/2023    US GUIDED PERCUTANEOUS PLACEMENT [facility]     Family History   Problem Relation Name Age of Onset    No Known Problems Mother      Other (Acute myocardial infarction) Father          SOCIAL HISTORY:  Social History:  reports that she quit smoking about 5 years ago. Her smoking use included cigarettes. She started smoking about 47 years ago. She has a 10.7 pack-year smoking history. She has  been exposed to tobacco smoke. She has never used smokeless tobacco. She reports that she does not currently use alcohol after a past usage of about 8.0 standard drinks of alcohol per week. She reports that she does not use drugs.    REVIEW OF SYSTEMS:  Review of systems negative unless noted in HPI.       Objective       Lab Results   Component Value Date    WBC 17.0 (H) 12/14/2024    HGB 7.5 (L) 12/14/2024    HCT 23.2 (L) 12/14/2024    MCV 94 12/14/2024     12/14/2024      Lab Results   Component Value Date    GLUCOSE 110 (H) 12/14/2024    CALCIUM 7.2 (L) 12/14/2024     (L) 12/14/2024    K 4.7 12/14/2024    CO2 22 12/14/2024    CL 99 12/14/2024     (HH) 12/14/2024    CREATININE 4.69 (H) 12/14/2024     Lab Results   Component Value Date    ALT 7 12/13/2024    AST 18 12/13/2024    ALKPHOS 88 12/13/2024    BILITOT 0.3 12/13/2024     Estimated Creatinine Clearance: 13.7 mL/min (A) (by C-G formula based on SCr of 4.69 mg/dL (H)).     Encounter Date: 12/11/24   ECG 12 lead   Result Value    Ventricular Rate 95    Atrial Rate 95    CA Interval 156    QRS Duration 84    QT Interval 358    QTC Calculation(Bazett) 449    P Axis 48    R Axis 14    T Axis 8    QRS Count 16    Q Onset 226    P Onset 148    P Offset 198    T Offset 405    QTC Fredericia 417    Narrative    Normal sinus rhythm  Low voltage QRS  Poor R-wave progression  Abnormal ECG  When compared with ECG of 19-AUG-2024 03:28,  Low voltage QRS is now evident  Confirmed by Vidal Tadeo ([id number]) on 12/12/2024 11:28:30 AM     Wt Readings from Last 5 Encounters:   12/13/24 87.1 kg (192 lb 0.3 oz)   12/13/24 86.3 kg (190 lb 4.1 oz)   12/04/24 79.2 kg (174 lb 7.9 oz)   11/29/24 78.3 kg (172 lb 9.9 oz)   11/14/24 75 kg (165 lb 5.5 oz)       Current Outpatient Medications   Medication Instructions    acetaminophen (TYLENOL) 975 mg, oral, Every 6 hours PRN    calcium carbonate (CALCIUM 600) 600 mg, Daily    dexAMETHasone (DECADRON) 8 mg, oral, 2  times daily, Take on the day before, day of and day after treatment.    dexAMETHasone 1 mg, oral, 2 times daily PRN, Swish and spit. Do not swallow    docusate sodium (COLACE) 100 mg, oral, 2 times daily    DULoxetine (CYMBALTA) 20 mg, oral, Daily, Do not crush or chew.    gabapentin (NEURONTIN) 300 mg, oral, 2 times daily    hydrOXYzine HCL (Atarax) 25 mg tablet 1 tablet, 3 times daily PRN    levothyroxine (SYNTHROID, LEVOXYL) 25 mcg, oral, Daily    loperamide (IMODIUM) 4 mg, oral, 4 times daily PRN    magic mouthwash (lidocaine, diphenhydrAMINE, Maalox 1:1:1) 10 mL, Swish & Spit, Every 6 hours PRN    metoprolol tartrate (Lopressor) 50 mg tablet take 0.5 tablets by mouth 2 times a day    multivitamin with minerals tablet 1 tablet, Daily    naloxone (Narcan) 4 mg/0.1 mL nasal spray 1 spray, As needed    nystatin (Mycostatin) cream 1 Application, 2 times daily    ondansetron (ZOFRAN) 8 mg, oral, Every 8 hours PRN    oxyCODONE (ROXICODONE) 10 mg, oral, 3 times daily PRN    polyethylene glycol (GLYCOLAX, MIRALAX) 17 g, oral, Daily PRN    prochlorperazine (COMPAZINE) 10 mg, oral, Every 6 hours PRN    sulfamethoxazole-trimethoprim (Bactrim DS) 800-160 mg tablet 1 tablet, oral, 2 times daily    tiZANidine (ZANAFLEX) 4 mg, oral, 3 times daily    traZODone (DESYREL) 50 mg, oral, Nightly PRN     Scheduled medications   cefTRIAXone, 1 g, intravenous, q24h  [Held by provider] DULoxetine, 20 mg, oral, Daily  [Held by provider] gabapentin, 300 mg, oral, BID  heparin (porcine), 5,000 Units, subcutaneous, q8h  heparin flush, 50 Units, intra-catheter, q12h  levothyroxine, 25 mcg, oral, Daily  lidocaine, 1 patch, transdermal, Daily  [Held by provider] metoprolol tartrate, 50 mg, oral, BID  sodium bicarbonate, 650 mg, oral, q12h      Continuous medications  lactated Ringer's, 100 mL/hr, Last Rate: 100 mL/hr (12/14/24 1318)      PRN medications  acetaminophen, 975 mg, q6h PRN  heparin flush, 50 Units, PRN  HYDROmorphone, 0.2 mg, q2h  PRN  metoclopramide, 10 mg, q8h PRN   Or  metoclopramide, 10 mg, q6h PRN  ondansetron, 4 mg, q6h PRN   Or  ondansetron, 4 mg, q6h PRN  oxyCODONE, 10 mg, q4h PRN  traZODone, 50 mg, Nightly PRN         Allergies:   Allergies   Allergen Reactions    L-Lysine [Lysine] Rash     Head to toe red rash    Adhesive Rash     Skin irritation and rash                 PHYSICAL EXAMINATION:  Vital Signs:   Vital signs reviewed  Vitals:    12/14/24 1144   BP: 96/57   Pulse: 86   Resp: 18   Temp: 36.9 °C (98.4 °F)   SpO2: 92%     Pain Score: 7     Physical Exam  .spo    ASSESSMENT/PLAN:  Shelbi Delaney is a 64 y.o. 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 PMH significant for CKD due to obstructive uropathy with baseline Cr. in the 2's and anemia.  Admitted 12/13/2024 for further evaluation and management of pre-chemo lab abnormalities.. Supportive and Palliative Oncology is consulted for Introduction to Supportive and Palliative Oncology Services.      Pain:  ABD and back pain related to cancer  Pain is: cancer related pain  Type: visceral, somatic, and neuropathic  Pain control: well-controlled  Home regimen:  Oxycodone 10mg and Gabapentin 300mg BID  Intolerances/previously tried: NA  Personalized pain goal: Mild  Total OME usage for the past 24 hours:  51.4  Pt follows [facility] pain management clinic with Dr. Candelaria  Decrease to 300mg gabapentin at bedtime (AIDE)  Hold 20mg duloxetine daily (AIDE)  Continue 10mg oxycodone q4h PRN  Continue 0.2mg IV dilaudid q2h PRN    Nausea:  At risk for nausea with vomiting related to chemotherapy and opioids   Home regimen:  Ondansetron   Well-controlled  Continue 4mg zofran q6h PRN    Constipation/ diarrhea-   At risk for constipation related to medication side effects (including opioids), currently not constipated- had diarrhea x 3 today  Usual bowel pattern: every day  Home regimen:  none  LBM today x 3  Send c-diff  Imodium PRN  Goal to have BM without straining q48-72h, adjust regimen as needed    Altered Mood:  Acute on chronic anxiety and depression related to health concerns   controlled with home regimen   Home regimen: Duloxetine 20mg  Hold duloxetine (AIDE)    Medical Decision Making/Goals of Care/Advance Care Planning:  Patient's current clinical condition, including diagnosis, prognosis, and management plan, and goals of care were discussed.   Life limiting disease: metastatic malignancy  Family: Supportive   Performance status: Major  limitations due to fatigue and disease process  Joys/meaning/strength: Family and [place]  Understanding of health:   12/14:  Demonstrates fair understanding of disease process, understands her electrolytes are off s/p chemotherapy.  Plan to replete.  Pt chose DNR/DNI earlier today.  Plan for further GOC tomorrow with the family.  Pt is no longer candidate for treatment.  Information:Wants full disclosure  Prognosis:poor  Goals: symptom control  Worries and fears now and future: ongoing symptoms, inability to receive cancer treatment, and death     Advance Directives  Existence of Advance Directives:  Non- has interest  Decision maker: Surrogate decision maker is Kenn chase to complete HCPOA  Code Status: DNR DNI    Introduction to Supportive and Palliative Oncology:  Spoke with pt at bedside  Introduced the role and philosophy of Supportive and Palliative oncology in the evaluation and management of symptoms during cancer treatment  Palliative care was introduced as a service for patients with serious illness to help with symptoms, assist with goals of care conversations, navigate complex decision making, improve quality of life for patients, and provide support both patients and families.  Patient seemed to appreciate the extra layer of support.     Supportive Interventions: Interventions: Music Therapy: referral placed, Art Therapy:  referral placed, SPO Spiritual Care: referral placed, Social work referral for: Advance Directives    Disposition:  Please  start the process of having prior authorization with meds to beds deliver medications to patient prior to discharge via [facility] pharmacy. Prescriptions will need to be sent 48-72 hours prior to discharge so that a prior authorization can be completed.     Discharge date: unknown pending acute issues  Patient has an appointment with Outpatient Supportive Oncology KENDALL Jiang 1/7/25      Signature and billing:    Medical complexity was high level due to due to complexity of problems, extensive data review, and high risk of management/treatment.    DATA   Diagnostic tests and information reviewed for today's visit:  Conversation with primary team, Most recent labs and imaging results, Medications       Some elements copied from  H&P  note on 12/14/24, the elements have been updated and all reflect current decision making from today, 12/14/2024.    Plan of Care discussed with: Provider and Patient    Thank you for asking Supportive and Palliative Oncology to assist with care of this patient.  Recommendations will be communicated back to the consulting service by way of shared electronic medical record/secure chat/email or face-to-face.   We will continue to follow.  Please contact us for additional questions or concerns.      SIGNATURE: KENDALL Talley  PAGER/CONTACT:  Contact information:  Supportive and Palliative Oncology  Monday-Friday 8 AM-5 PM  Epic Secure chat or pager [phone].  After hours and weekends:  pager [phone]

## 2024-12-14 NOTE — H&P
"Shelbi Delaney is a 64 y.o. female on day 1 of admission presenting with Electrolyte abnormality.      Subjective   Patient resting in bed, denies any chest pain or SOB.         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  review with history of cervical mass consistent with GYN primary  - Extension to rectal mucosa, pubic ramus with ?reactive mediastinal LN   - Bilateral nephrostomy placement   2/16/23 - 6/1/23: Carbo AUC5/Taxol 175 +Avastin 15mg/kg, Pembrolizumab 200mg/m2  - C2-C4 +Avastin - further treatments held due to progressive gr2 proteinuria   - Grade 3 anemia declining transfusion,   7/20/23 - 12/18/23: Pembrolizumab 200mg/m2, progressive disease  1/11/24 - 3/14/24: Tivdak x3, progressive disease   - C1: 0.9mg/kg in setting of CrCl < 30  3/2024-7/2024: Taxotere, progressive disease  8/1/24 - 11/5/24: Topotecan q week x3, progressive disease    Molecular Testing  PLD1: CPS 10  1/2024 [employer] xT - PIK3CA p.E726K missense (gain): consider alpelisib  Loss of function: BCORL1, KMT2C, NSD1, KMT2D, RAD21  TMB 7.9mB - RACHELE  - Her2 Neg     Cervical cancer, FIGO stage RONIT       Last Recorded Vitals  Blood pressure 96/57, pulse 86, temperature 36.9 °C (98.4 °F), resp. rate 18, height 1.702 m (5' 7.01\"), weight 87.1 kg (192 lb 0.3 oz), SpO2 92%.  Intake/Output last 3 Shifts:    Intake/Output Summary (Last 24 hours) at 12/14/2024 1248  Last data filed at 12/14/2024 1121  Gross per 24 hour   Intake 1845.42 ml   Output 775 ml   Net 1070.42 ml       Physical Exam  General: no acute distress  HEENT: normocephalic, atraumatic  CV: warm and well perfused  Lungs: breathing comfortably on room air  Abdomen: Tender to deep palpation to lower abdomen   Extremities: moving all extremities spontaneously  Neuro: awake and conversant  Psych: appropriate mood and affect      Assessment/Plan     Assessment " & Plan  Electrolyte abnormality    Shelbi Delaney is a 64 y.o. female with stage RONIT moderately differentiated SCC of cervix with progressive disease on Topotecan, now on Gemzar with palliative intent who was admitted as a transfer from [facility] where she presented to the ED after pre-chemo labs show numerous lab abnormalities     AIDE on CKD Stage IV   Electrolyte Abnormalities   - Patient with CKD due to obstructive uropathy with baseline Cr. in the 2's; on admission to [facility], was noted to have Cr of 5.24, EGFR of 9; hyperkalemia and metabolic acidosis secondary to renal failure   - Patient with bilateral nephrostomy tubes, left kidney less functional with low urine output. ->  will consult Urology for evaluation this AM  - Nephrology consulted at OSH with initiation of IV hydration, IV sodium bicarbonate drip and Lokelma 10g TID x 3 doses.   - [facility] admission labs notable for hgb.of 8.0, plts 298, K of 4.5, Mag of 1.84, phos of 5.0; lactate wnl  - 400mg Mag repleted; sodium bicarb gtt initially continued on arrival   - Nephrology consulted - given improvement in labs, recommend discontinuing sodium bicarb gtt and starting PO sodium bicarb 650mg BID - ordered, will follow-up Nephrology recommendations this AM  - Q6h labs ordered to monitor trend; replete PRN  - Continue mIVF; avoid nephrotoxins      Anemia  - s/p 3u of pRBC at OSH for initial hgb. Of 5.9   - Patient with  admission Hgb. Of 8.0   - Likely secondary to chemotherapy   - GI was following at [facility] given c/f dark stool. Stool occult blood test was ordered, never collected. At that time, GI team recommended non-urgent EGD/Colonoscopy (has never previously had either done)   - Continue to monitor; if anemic persists or concern for recurrent symptoms, will consider GI consult  - No overt signs of bleeding; denies hematemesis   - T&C x2u pRBC      Suspected UTI  - Patient with 500 LE on UA at OSH, reflex urine culture with contamination.   -  Previous c/f possible episodes of AMS at OSH, possibly due to UTI. Patient A&Ox3 on admission here, appears at baseline   - IV Ceftriaxone 1g q24h continued on admission pending repeat urine cultures     Lower Extremity Wounds   - Open wounds covered with dressing; wound care followed at OSH. Please see pictures in chart.  - For wound care consult during admission      Cervical cancer, FIGO stage RONIT   - Follows with Dr. Jefferson; recent imaging with progression of disease   - Most recent outpatient visit on 11/7 at which time symptom-directed treatment over cancer-directed therapy was discussed; patient opted to proceed with D1/D15 gemcitabine (+d16 gCSF); goals of care additionally discussed   - Continue supportive care, pain control   - Supportive Onc consulted  - Planning for GOC converstaion in 12/15 with family at bedside, code status discussion this AM and patient elects for DNR/DNI      Dispo: continue inpatient management     Seen and discussed with Dr. iL Rogers MD PGY-3  Gyn Onc pager [phone]

## 2024-12-14 NOTE — CONSULTS
Inpatient consult to Nephrology  Consult performed by: Francois Art DO  Consult ordered by: Liset Finn MD          NEPHROLOGY NEW CONSULT NOTE   Shelbi Delaney   64 y.o.    @@  N/Room: [id number]/6023/6023-A    Reason for consult: AIDE on CKD     HPI:  Shelbi Delaney is a 64 y.o. 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  Nephrology consulted for worsening AIDE on stage IV CKD (baseline Cr 2.3 to 2.4).      Per chart review, patient is currently on Gemcitabine for palliative intent with regards to SCC of cervix, which was last given 11/29. Since then, she has been experiencing generalized weakness, N/V, and worsening bilateral edema. She then presented to [facility] ED on 12/11 PM and was found to have soft blood pressures as low as 84/60, heart rate as high as 114. Labs notable for sodium 129, potassium 5.5, , creatinine 5.24. UA was obtained 12/11 and showed  500 LE, 2+ bacteria, 21-50 WBC so she was started on antibiotics. Urine culture showed contamination with mixed bacterial navid.  CT A/P performed and demonstrated known partially calcified uterine mass is stable to slightly   decreased in size. Additional bilateral adnexal masses are grossly stable in size and Grossly stable abdominopelvic lymphadenopathy and hepatic masses, consistent with metastatic disease.     Nephrology was consulted and recommended IV hydration, IV sodium bicarb drip, Lokelma 10g TID for 3 doses. However, on 12/13, she was found to still have hyperkalemia to 6.5 and was given albuterol, Lokelma, sodium bicarb push, calcium gluconate 2 g  "IV, insulin and D10 and was transferred to [facility] for further care. Upon arrival, Cr seems to be downtrending (5.24 -> 4.8 -> 4.59) with K stable around 4.8. BUN elevated around 104. Of note, patient has bilateral nephrostomy tubes (less output in L tube v right) with last exchange in 10/20/2023. Patient was switched from bicarb gtt to bicarb tablet 650 mg BID. Patient was seen and evaluated at bedside. Denies any increasing SOB but admits to leg swelling for the past 2 weeks that has acutely gotten worse, tender to palpation.       In The ER: /60   Pulse 89   Temp 36.7 °C (98.1 °F)   Resp 18   Ht 1.702 m (5' 7.01\")   Wt 87.1 kg (192 lb 0.3 oz)   SpO2 98%   BMI 30.07 kg/m²      Past Medical History:   Diagnosis Date    Anxiety     Body mass index (BMI)30.0-30.9, adult 08/07/2019    BMI 30.0-30.9,adult    Cervix cancer (Multi)     Chemotherapy-induced peripheral neuropathy (Multi)     Chronic kidney disease     Hypertension     Hypothyroidism     Tachycardia, unspecified     Tachycardia      Past Surgical History:   Procedure Laterality Date    CT GUIDED IMAGING FOR NEEDLE PLACEMENT  01/12/2023    CT GUIDED IMAGING FOR NEEDLE PLACEMENT [facility]    IR  NEPHROURETERAL PLACEMENT Bilateral 6/18/2024    IR  NEPHROURETERAL PLACEMENT 6/18/2024 [facility]    IR NEPHROSTOMY TUBE EXCHANGE  10/20/2023    IR NEPHROSTOMY TUBE EXCHANGE 10/20/2023 Manuel Marvin MD [facility]    IR NEPHROSTOMY TUBE EXCHANGE  12/19/2023    IR NEPHROSTOMY TUBE EXCHANGE 12/19/2023 [facility]    MEDIPORT      NEPHROSTOMY      ORIF TIBIA FRACTURE Right     TRANSURETHRAL RESECTION OF BLADDER TUMOR      US GUIDED PERCUTANEOUS PLACEMENT  11/14/2022    US GUIDED PERCUTANEOUS PLACEMENT [facility]    US GUIDED PERCUTANEOUS PLACEMENT  08/28/2023    US GUIDED PERCUTANEOUS PLACEMENT [facility]      Family History   Problem Relation Name Age of Onset    No Known Problems Mother      Other (Acute myocardial " infarction) Father       Social History     Socioeconomic History    Marital status: Single     Spouse name: Not on file    Number of children: Not on file    Years of education: Not on file    Highest education level: Not on file   Occupational History    Not on file   Tobacco Use    Smoking status: Former     Current packs/day: 0.00     Average packs/day: 0.2 packs/day for 42.9 years (10.7 ttl pk-yrs)     Types: Cigarettes     Start date:      Quit date: 2019     Years since quittin.0     Passive exposure: Past    Smokeless tobacco: Never   Vaping Use    Vaping status: Never Used   Substance and Sexual Activity    Alcohol use: Not Currently     Alcohol/week: 8.0 standard drinks of alcohol     Types: 4 Shots of liquor, 4 Standard drinks or equivalent per week     Comment: quit 2019    Drug use: Never    Sexual activity: Not on file   Other Topics Concern    Not on file   Social History Narrative    Not on file     Social Drivers of Health     Financial Resource Strain: Low Risk  (2024)    Overall Financial Resource Strain (CARDIA)     Difficulty of Paying Living Expenses: Not very hard   Food Insecurity: No Food Insecurity (2024)    Hunger Vital Sign     Worried About Running Out of Food in the Last Year: Never true     Ran Out of Food in the Last Year: Never true   Transportation Needs: No Transportation Needs (2024)    PRAPARE - Transportation     Lack of Transportation (Medical): No     Lack of Transportation (Non-Medical): No   Physical Activity: Inactive (2024)    Exercise Vital Sign     Days of Exercise per Week: 0 days     Minutes of Exercise per Session: 0 min   Stress: No Stress Concern Present (2024)    [employer] [place] [employer] - Occupational Stress Questionnaire     Feeling of Stress : Only a little   Social Connections: Moderately Isolated (2024)    Social Connection and Isolation Panel [NHANES]     Frequency of Communication with  Friends and Family: Once a week     Frequency of Social Gatherings with Friends and Family: Once a week     Attends Congregational Services: 1 to 4 times per year     Active Member of Clubs or Organizations: No     Attends Club or Organization Meetings: Never     Marital Status:    Intimate Partner Violence: Not At Risk (12/13/2024)    Humiliation, Afraid, Rape, and Kick questionnaire     Fear of Current or Ex-Partner: No     Emotionally Abused: No     Physically Abused: No     Sexually Abused: No   Housing Stability: Low Risk  (12/13/2024)    Housing Stability Vital Sign     Unable to Pay for Housing in the Last Year: No     Number of Times Moved in the Last Year: 0     Homeless in the Last Year: No       Allergies   Allergen Reactions    L-Lysine [Lysine] Rash     Head to toe red rash    Adhesive Rash     Skin irritation and rash         Medications Prior to Admission   Medication Sig Dispense Refill Last Dose/Taking    acetaminophen (Tylenol) 325 mg tablet Take 3 tablets (975 mg) by mouth every 6 hours if needed for mild pain (1 - 3).       calcium carbonate (Calcium 600) 600 mg calcium (1,500 mg) tablet Take 600 mg by mouth once daily.       dexAMETHasone (Decadron) 4 mg tablet Take 2 tablets (8 mg) by mouth 2 times a day. Take on the day before, day of and day after treatment. (Patient not taking: Reported on 11/7/2024) 36 tablet 3     dexAMETHasone 0.5 mg/5 mL oral liquid Take 10 mL (1 mg) by mouth 2 times a day as needed (mucositis). Swish and spit. Do not swallow (Patient not taking: Reported on 11/7/2024) 100 mL 0     docusate sodium (Colace) 100 mg capsule Take 1 capsule (100 mg) by mouth 2 times a day. 180 capsule 3     DULoxetine (Cymbalta) 20 mg DR capsule Take 1 capsule (20 mg) by mouth once daily. Do not crush or chew. (Patient not taking: Reported on 11/7/2024) 30 capsule 11     gabapentin (Neurontin) 300 mg capsule Take 1 capsule (300 mg) by mouth 2 times a day. 60 capsule 3     hydrOXYzine HCL  (Atarax) 25 mg tablet Take 1 tablet (25 mg) by mouth 3 times a day as needed for anxiety.       levothyroxine (Synthroid, Levoxyl) 25 mcg tablet Take 1 tablet (25 mcg) by mouth once daily. 90 tablet 3     loperamide (Imodium) 2 mg capsule Take 2 capsules (4 mg) by mouth 4 times a day as needed for diarrhea.       magic mouthwash (lidocaine, diphenhydrAMINE, Maalox 1:1:1) Swish and spit 10 mL every 6 hours if needed for mucositis. (Patient not taking: Reported on 11/7/2024) 250 mL 2     metoprolol tartrate (Lopressor) 50 mg tablet take 0.5 tablets by mouth 2 times a day 30 tablet 5     multivitamin with minerals tablet Take 1 tablet by mouth once daily.       naloxone (Narcan) 4 mg/0.1 mL nasal spray Administer 1 spray (4 mg) into affected nostril(s) if needed for opioid reversal. May repeat every 2-3 minutes if needed, alternating nostrils, until medical assistance becomes available. (Patient not taking: Reported on 11/7/2024)       nystatin (Mycostatin) cream Apply 1 Application topically 2 times a day. (Patient not taking: Reported on 11/7/2024)       ondansetron (Zofran) 8 mg tablet Take 1 tablet (8 mg) by mouth every 8 hours if needed for nausea or vomiting. 30 tablet 5     oxyCODONE (Roxicodone) 10 mg immediate release tablet Take 1 tablet (10 mg) by mouth 3 times a day as needed for moderate pain (4 - 6). 90 tablet 0     polyethylene glycol (Glycolax, Miralax) 17 gram packet Take 17 g by mouth once daily as needed (constipation). 90 packet 3     prochlorperazine (Compazine) 10 mg tablet Take 1 tablet (10 mg) by mouth every 6 hours if needed for nausea or vomiting. 30 tablet 5     sulfamethoxazole-trimethoprim (Bactrim DS) 800-160 mg tablet Take 1 tablet by mouth 2 times a day for 7 days. 14 tablet 0     tiZANidine (Zanaflex) 4 mg tablet Take 1 tablet (4 mg) by mouth 3 times a day. 270 tablet 1     traZODone (Desyrel) 50 mg tablet Take 1 tablet (50 mg) by mouth as needed at bedtime for sleep. 90 tablet 1          Meds:   cefTRIAXone, 1 g, q24h  [Held by provider] DULoxetine, 20 mg, Daily  [Held by provider] gabapentin, 300 mg, BID  heparin (porcine), 5,000 Units, q8h  heparin flush, 50 Units, q12h  levothyroxine, 25 mcg, Daily  lidocaine, 1 patch, Daily  [Held by provider] metoprolol tartrate, 50 mg, BID  sodium bicarbonate, 650 mg, q12h      lactated Ringer's, Last Rate: 100 mL/hr (12/14/24 0620)      acetaminophen, 975 mg, q6h PRN  heparin flush, 50 Units, PRN  HYDROmorphone, 0.2 mg, q2h PRN  metoclopramide, 10 mg, q8h PRN   Or  metoclopramide, 10 mg, q6h PRN  ondansetron, 4 mg, q6h PRN   Or  ondansetron, 4 mg, q6h PRN  oxyCODONE, 10 mg, q4h PRN  traZODone, 50 mg, Nightly PRN        Vitals:    12/14/24 0348   BP: 105/60   Pulse: 89   Resp: 18   Temp: 36.7 °C (98.1 °F)   SpO2: 98%        12/12 1900 - 12/14 0659  In: 1443.8 [P.O.:480; I.V.:913.8]  Out: 525 [Urine:525]   Weight change:       Physical Exam:   Physical Exam  Constitutional:       Appearance: Normal appearance.   Cardiovascular:      Rate and Rhythm: Normal rate and regular rhythm.      Pulses: Normal pulses.      Heart sounds: Normal heart sounds.   Pulmonary:      Effort: Pulmonary effort is normal.      Breath sounds: Normal breath sounds.   Abdominal:      Comments: Tender to palpation in RLQ and LLQ   Skin:     Comments: Bilateral nephrostomy tubes present, draining cloudy urine   Bilateral nonpitting edema in lower extremities, tender to palpation   Neurological:      General: No focal deficit present.      Mental Status: She is alert and oriented to person, place, and time. Mental status is at baseline.            Blood Labs:  Results for orders placed or performed during the hospital encounter of 12/13/24 (from the past 24 hours)   Basic Metabolic Panel   Result Value Ref Range    Glucose 120 (H) 74 - 99 mg/dL    Sodium 134 (L) 136 - 145 mmol/L    Potassium 4.5 3.5 - 5.3 mmol/L    Chloride 99 98 - 107 mmol/L    Bicarbonate 20 (L) 21 - 32 mmol/L     Anion Gap 20 10 - 20 mmol/L    Urea Nitrogen 106 (HH) 6 - 23 mg/dL    Creatinine 4.84 (H) 0.50 - 1.05 mg/dL    eGFR 10 (L) >60 mL/min/1.73m*2    Calcium 7.3 (L) 8.6 - 10.6 mg/dL   Magnesium   Result Value Ref Range    Magnesium 1.84 1.60 - 2.40 mg/dL   Phosphorus   Result Value Ref Range    Phosphorus 5.0 (H) 2.5 - 4.9 mg/dL   CBC   Result Value Ref Range    WBC 15.7 (H) 4.4 - 11.3 x10*3/uL    nRBC 0.4 (H) 0.0 - 0.0 /100 WBCs    RBC 2.56 (L) 4.00 - 5.20 x10*6/uL    Hemoglobin 8.0 (L) 12.0 - 16.0 g/dL    Hematocrit 24.7 (L) 36.0 - 46.0 %    MCV 97 80 - 100 fL    MCH 31.3 26.0 - 34.0 pg    MCHC 32.4 32.0 - 36.0 g/dL    RDW 18.4 (H) 11.5 - 14.5 %    Platelets 298 150 - 450 x10*3/uL   BLOOD GAS LACTIC ACID, VENOUS   Result Value Ref Range    POCT Lactate, Venous 0.9 0.4 - 2.0 mmol/L   Type and screen   Result Value Ref Range    ABO TYPE AB     Rh TYPE POS     ANTIBODY SCREEN NEG    CBC   Result Value Ref Range    WBC 19.8 (H) 4.4 - 11.3 x10*3/uL    nRBC 0.5 (H) 0.0 - 0.0 /100 WBCs    RBC 2.72 (L) 4.00 - 5.20 x10*6/uL    Hemoglobin 8.5 (L) 12.0 - 16.0 g/dL    Hematocrit 25.5 (L) 36.0 - 46.0 %    MCV 94 80 - 100 fL    MCH 31.3 26.0 - 34.0 pg    MCHC 33.3 32.0 - 36.0 g/dL    RDW 18.1 (H) 11.5 - 14.5 %    Platelets 355 150 - 450 x10*3/uL   Basic Metabolic Panel   Result Value Ref Range    Glucose 110 (H) 74 - 99 mg/dL    Sodium 133 (L) 136 - 145 mmol/L    Potassium 4.8 3.5 - 5.3 mmol/L    Chloride 98 98 - 107 mmol/L    Bicarbonate 21 21 - 32 mmol/L    Anion Gap 19 10 - 20 mmol/L    Urea Nitrogen 104 (HH) 6 - 23 mg/dL    Creatinine 4.59 (H) 0.50 - 1.05 mg/dL    eGFR 10 (L) >60 mL/min/1.73m*2    Calcium 7.4 (L) 8.6 - 10.6 mg/dL   Magnesium   Result Value Ref Range    Magnesium 1.70 1.60 - 2.40 mg/dL   Phosphorus   Result Value Ref Range    Phosphorus 5.0 (H) 2.5 - 4.9 mg/dL         ASSESSMENT:  Shelbi Delaney is a 64 y.o. 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  Nephrology consulted for worsening AIDE on stage IV CKD (baseline Cr 2.3 to 2.4).     # AIDE on stage IV CKD (baseline Cr 2.3 to 2.4) likely secondary to HD instability/hypotension v secondary to chemotherapy  #Hyperkalemia, improving   #Metabolic acidosis secondary to acute renal failure, improving   #Stage IV cervical cancer with severe metastatic disease involving severe lymphadenopathy as well as liver mets and bone mets   :: Currently on Gemcitabine for palliative intent with regards to SCC of cervix, which was last given 11/29  :: CT A/P performed and demonstrated known partially calcified uterine mass is stable to slightly   decreased in size. Additional bilateral adnexal masses are grossly stable in size and Grossly stable abdominopelvic lymphadenopathy and hepatic masses, consistent with metastatic disease.  :: baseline Cr 2.3 to 2.4. Cr seems to be downtrending (5.24 -> 4.8 -> 4.59) with K stable around 4.8  ::  on 12/13, she was found to still have hyperkalemia to 6.5 and was given albuterol, Lokelma, sodium bicarb push, calcium gluconate 2 g IV, insulin and D10   :: UA was obtained 12/11 and showed  500 LE, 2+ bacteria, 21-50 WBC so she was started on antibiotics. Urine culture showed contamination with mixed bacterial navid.  :: patient has bilateral nephrostomy tubes (less output in L tube v right) with last exchange in 10/20/2023.  ::  Switched from bicarb gtt to bicarb tablet 650 mg BID 12/14    - Given that evidence of obstruction has been ruled out on imaging , new onset AIDE could be secondary to HD instability and hypotension, possibly secondary to urosepsis, which is  further supported by improvement in AIDE with supportive care. Also could be secondary to chemotherapy given recent administration of medication. Plan is to continue with supportive care with fluids and repeat urine labs at this time, no indication for dialysis at the moment.     Recommendations:  - Agree with supportive care with fluids 100 ml / hr LR for total 24 hours (started 12/14 midnight)   - Repeat UA, Urine lytes, and protein/Cr ratio.   - Continue with sodium bicarb 650 mg BID   - No indication for dialysis at this time.   - Avoid nephrotoxins, contrast if possible  - strict Is/Os  - Renal dosing for medications for latest eGFR, follow medication trough as appropriate  - Avoid hypotension/rapid fluctuations in BPs    Francois Art DO      Discussed with attending nephrologist         Francois Art DO

## 2024-12-14 NOTE — CARE PLAN
The clinical goals for the shift include pt will remain HDS and free from falls      Problem: Pain - Adult  Goal: Verbalizes/displays adequate comfort level or baseline comfort level  Outcome: Progressing     Problem: Safety - Adult  Goal: Free from fall injury  Outcome: Progressing     Problem: Discharge Planning  Goal: Discharge to home or other facility with appropriate resources  Outcome: Progressing     Problem: Chronic Conditions and Co-morbidities  Goal: Patient's chronic conditions and co-morbidity symptoms are monitored and maintained or improved  Outcome: Progressing

## 2024-12-15 ENCOUNTER — OFFICE VISIT (OUTPATIENT)
Dept: SURGICAL ONCOLOGY | Facility: HOSPITAL | Age: 64
DRG: 683 | End: 2024-12-15
Payer: COMMERCIAL

## 2024-12-15 VITALS
SYSTOLIC BLOOD PRESSURE: 112 MMHG | HEART RATE: 112 BPM | DIASTOLIC BLOOD PRESSURE: 66 MMHG | TEMPERATURE: 99 F | WEIGHT: 192.02 LBS | HEIGHT: 67 IN | BODY MASS INDEX: 30.14 KG/M2 | RESPIRATION RATE: 20 BRPM | OXYGEN SATURATION: 93 %

## 2024-12-15 LAB
ANION GAP SERPL CALC-SCNC: 16 MMOL/L (ref 10–20)
ANION GAP SERPL CALC-SCNC: 17 MMOL/L (ref 10–20)
BACTERIA BLD CULT: NORMAL
BACTERIA BLD CULT: NORMAL
BACTERIA UR CULT: NORMAL
BUN SERPL-MCNC: 96 MG/DL (ref 6–23)
BUN SERPL-MCNC: 99 MG/DL (ref 6–23)
CALCIUM SERPL-MCNC: 7.1 MG/DL (ref 8.6–10.6)
CALCIUM SERPL-MCNC: 7.2 MG/DL (ref 8.6–10.6)
CHLORIDE SERPL-SCNC: 101 MMOL/L (ref 98–107)
CHLORIDE SERPL-SCNC: 99 MMOL/L (ref 98–107)
CO2 SERPL-SCNC: 23 MMOL/L (ref 21–32)
CO2 SERPL-SCNC: 23 MMOL/L (ref 21–32)
CREAT SERPL-MCNC: 4.46 MG/DL (ref 0.5–1.05)
CREAT SERPL-MCNC: 4.5 MG/DL (ref 0.5–1.05)
EGFRCR SERPLBLD CKD-EPI 2021: 10 ML/MIN/1.73M*2
EGFRCR SERPLBLD CKD-EPI 2021: 10 ML/MIN/1.73M*2
ERYTHROCYTE [DISTWIDTH] IN BLOOD BY AUTOMATED COUNT: 18 % (ref 11.5–14.5)
GLUCOSE SERPL-MCNC: 101 MG/DL (ref 74–99)
GLUCOSE SERPL-MCNC: 98 MG/DL (ref 74–99)
HCT VFR BLD AUTO: 22.4 % (ref 36–46)
HGB BLD-MCNC: 7.3 G/DL (ref 12–16)
MAGNESIUM SERPL-MCNC: 1.7 MG/DL (ref 1.6–2.4)
MAGNESIUM SERPL-MCNC: 1.75 MG/DL (ref 1.6–2.4)
MCH RBC QN AUTO: 30.7 PG (ref 26–34)
MCHC RBC AUTO-ENTMCNC: 32.6 G/DL (ref 32–36)
MCV RBC AUTO: 94 FL (ref 80–100)
NRBC BLD-RTO: 0.5 /100 WBCS (ref 0–0)
PHOSPHATE SERPL-MCNC: 5.1 MG/DL (ref 2.5–4.9)
PHOSPHATE SERPL-MCNC: 5.1 MG/DL (ref 2.5–4.9)
PLATELET # BLD AUTO: 325 X10*3/UL (ref 150–450)
POTASSIUM SERPL-SCNC: 4.6 MMOL/L (ref 3.5–5.3)
POTASSIUM SERPL-SCNC: 4.7 MMOL/L (ref 3.5–5.3)
RBC # BLD AUTO: 2.38 X10*6/UL (ref 4–5.2)
SODIUM SERPL-SCNC: 134 MMOL/L (ref 136–145)
SODIUM SERPL-SCNC: 135 MMOL/L (ref 136–145)
WBC # BLD AUTO: 14.3 X10*3/UL (ref 4.4–11.3)

## 2024-12-15 PROCEDURE — 2500000001 HC RX 250 WO HCPCS SELF ADMINISTERED DRUGS (ALT 637 FOR MEDICARE OP)

## 2024-12-15 PROCEDURE — 84100 ASSAY OF PHOSPHORUS: CPT

## 2024-12-15 PROCEDURE — 83735 ASSAY OF MAGNESIUM: CPT

## 2024-12-15 PROCEDURE — 99232 SBSQ HOSP IP/OBS MODERATE 35: CPT

## 2024-12-15 PROCEDURE — 2500000004 HC RX 250 GENERAL PHARMACY W/ HCPCS (ALT 636 FOR OP/ED)

## 2024-12-15 PROCEDURE — 2500000001 HC RX 250 WO HCPCS SELF ADMINISTERED DRUGS (ALT 637 FOR MEDICARE OP): Performed by: STUDENT IN AN ORGANIZED HEALTH CARE EDUCATION/TRAINING PROGRAM

## 2024-12-15 PROCEDURE — 2500000005 HC RX 250 GENERAL PHARMACY W/O HCPCS

## 2024-12-15 PROCEDURE — 93005 ELECTROCARDIOGRAM TRACING: CPT

## 2024-12-15 PROCEDURE — 99231 SBSQ HOSP IP/OBS SF/LOW 25: CPT | Performed by: INTERNAL MEDICINE

## 2024-12-15 PROCEDURE — 1200000003 HC ONCOLOGY  ROOM WITH TELEMETRY DAILY

## 2024-12-15 PROCEDURE — 85027 COMPLETE CBC AUTOMATED: CPT | Performed by: STUDENT IN AN ORGANIZED HEALTH CARE EDUCATION/TRAINING PROGRAM

## 2024-12-15 PROCEDURE — 80048 BASIC METABOLIC PNL TOTAL CA: CPT

## 2024-12-15 PROCEDURE — 93010 ELECTROCARDIOGRAM REPORT: CPT | Performed by: INTERNAL MEDICINE

## 2024-12-15 RX ORDER — LOPERAMIDE HYDROCHLORIDE 2 MG/1
2 CAPSULE ORAL 4 TIMES DAILY PRN
Status: DISCONTINUED | OUTPATIENT
Start: 2024-12-15 | End: 2024-12-18 | Stop reason: HOSPADM

## 2024-12-15 ASSESSMENT — PAIN SCALES - GENERAL
PAINLEVEL_OUTOF10: 7
PAINLEVEL_OUTOF10: 3
PAINLEVEL_OUTOF10: 6
PAINLEVEL_OUTOF10: 3
PAINLEVEL_OUTOF10: 7
PAINLEVEL_OUTOF10: 8
PAINLEVEL_OUTOF10: 8
PAINLEVEL_OUTOF10: 3

## 2024-12-15 ASSESSMENT — COGNITIVE AND FUNCTIONAL STATUS - GENERAL
MOVING TO AND FROM BED TO CHAIR: A LITTLE
TURNING FROM BACK TO SIDE WHILE IN FLAT BAD: A LITTLE
EATING MEALS: A LITTLE
DRESSING REGULAR LOWER BODY CLOTHING: A LITTLE
WALKING IN HOSPITAL ROOM: A LITTLE
DAILY ACTIVITIY SCORE: 18
PERSONAL GROOMING: A LITTLE
HELP NEEDED FOR BATHING: A LITTLE
MOBILITY SCORE: 18
STANDING UP FROM CHAIR USING ARMS: A LITTLE
CLIMB 3 TO 5 STEPS WITH RAILING: A LITTLE
MOVING FROM LYING ON BACK TO SITTING ON SIDE OF FLAT BED WITH BEDRAILS: A LITTLE
DRESSING REGULAR UPPER BODY CLOTHING: A LITTLE
TOILETING: A LITTLE

## 2024-12-15 ASSESSMENT — PAIN - FUNCTIONAL ASSESSMENT
PAIN_FUNCTIONAL_ASSESSMENT: 0-10
PAIN_FUNCTIONAL_ASSESSMENT: 0-10

## 2024-12-15 ASSESSMENT — PAIN DESCRIPTION - LOCATION
LOCATION: ABDOMEN
LOCATION: BACK

## 2024-12-15 NOTE — PROGRESS NOTES
Subjective     Interval History: Shelbi Delaney has no new complaints    Medications    Current Facility-Administered Medications:     acetaminophen (Tylenol) tablet 975 mg, 975 mg, oral, q6h PRN, Jessenia Yadav MD    cefTRIAXone (Rocephin) 1 g in dextrose (iso) IV 50 mL, 1 g, intravenous, q24h, Jessenia Yadav MD, Stopped at 12/15/24 0634    [Held by provider] DULoxetine (Cymbalta) DR capsule 20 mg, 20 mg, oral, Daily, Jessenia Yadav MD    gabapentin (Neurontin) capsule 300 mg, 300 mg, oral, Nightly, Iris Rogers MD, 300 mg at 12/14/24 2121    heparin (porcine) injection 5,000 Units, 5,000 Units, subcutaneous, q8h, Jessenia Yadav MD, 5,000 Units at 12/15/24 1303    heparin flush 10 unit/mL syringe 50 Units, 50 Units, intra-catheter, PRN, Jessenia Yadav MD, 50 Units at 12/13/24 2231    heparin flush 10 unit/mL syringe 50 Units, 50 Units, intra-catheter, q12h, Jessenia Yadav MD, 50 Units at 12/13/24 2233    HYDROmorphone (Dilaudid) injection 0.2 mg, 0.2 mg, intravenous, q2h PRN, Jessenia Yadav MD, 0.2 mg at 12/13/24 2352    levothyroxine (Synthroid, Levoxyl) tablet 25 mcg, 25 mcg, oral, Daily, Jessenia Yadav MD, 25 mcg at 12/15/24 0808    lidocaine 4 % patch 1 patch, 1 patch, transdermal, Daily, Jessenia Yadav MD, 1 patch at 12/15/24 0808    loperamide (Imodium) capsule 2 mg, 2 mg, oral, 4x daily PRN, Mike Carrasco MD, 2 mg at 12/15/24 0808    metoclopramide (Reglan) tablet 10 mg, 10 mg, oral, q8h PRN **OR** metoclopramide (Reglan) injection 10 mg, 10 mg, intravenous, q6h PRN, Jessenia Yadav MD    [Held by provider] metoprolol tartrate (Lopressor) tablet 50 mg, 50 mg, oral, BID, Jessenia Yadav MD    ondansetron (Zofran) tablet 4 mg, 4 mg, oral, q6h PRN **OR** ondansetron (Zofran) injection 4 mg, 4 mg, intravenous, q6h PRN, Jessenia Yadav MD    oxyCODONE (Roxicodone) immediate release tablet 10 mg, 10 mg, oral, q4h PRN, Jessenia Yadav MD, 10 mg at 12/15/24  1154    sodium bicarbonate tablet 650 mg, 650 mg, oral, q12h, Jessenia Yadav MD, 650 mg at 12/15/24 1303    traZODone (Desyrel) tablet 50 mg, 50 mg, oral, Nightly PRN, Jessenia Yadav MD, 50 mg at 12/13/24 5658    Objective     Physical Exam  Heart S1 S2 RRR, Lungs CTA, no edema      Vital signs in last 24 hours:  Temp:  [36.4 °C (97.6 °F)-36.9 °C (98.4 °F)] 36.9 °C (98.4 °F)  Heart Rate:  [] 76  Resp:  [16-20] 16  BP: ()/(57-65) 106/65       Intake/Output last 3 shifts:  I/O last 3 completed shifts:  In: 2995.4 (34.4 mL/kg) [P.O.:480; I.V.:2465.4 (28.3 mL/kg); IV Piggyback:50]  Out: 1523 (17.5 mL/kg) [Urine:1523 (0.5 mL/kg/hr)]  Weight: 87.1 kg     Labs:  Results from last 7 days   Lab Units 12/15/24  0041   WBC AUTO x10*3/uL 14.3*   RBC AUTO x10*6/uL 2.38*   HEMOGLOBIN g/dL 7.3*   HEMATOCRIT % 22.4*     Results from last 7 days   Lab Units 12/15/24  0638 12/13/24  2217 12/13/24  0549   SODIUM mmol/L 135*   < > 132*   POTASSIUM mmol/L 4.6   < > 4.7   CHLORIDE mmol/L 101   < > 101   CO2 mmol/L 23   < > 18*   BUN mg/dL 96*   < > 107*   CREATININE mg/dL 4.50*   < > 4.78*   CALCIUM mg/dL 7.1*   < > 7.5*   PHOSPHORUS mg/dL 5.1*   < > 4.7   MAGNESIUM mg/dL 1.70   < > 1.74   BILIRUBIN TOTAL mg/dL  --   --  0.3   ALT U/L  --   --  7   AST U/L  --   --  18    < > = values in this interval not displayed.       Assessment/Plan     Assessment & Plan  Electrolyte abnormality  AIDE Delaney is a 64 y.o. 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  Nephrology consulted for worsening AIDE on stage IV CKD (baseline Cr 2.3  to 2.4).      # AIDE on stage IV CKD (baseline Cr 2.3 to 2.4) likely secondary to HD instability/hypotension v secondary to chemotherapy  #Hyperkalemia, improving     Agree with supportive care, maintaining adequate fluid balance, will follow.      Mell Willoughby MD  12/15/2024  1:10 PM

## 2024-12-15 NOTE — ASSESSMENT & PLAN NOTE
AIDE Delaney is a 64 y.o. 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  Nephrology consulted for worsening AIDE on stage IV CKD (baseline Cr 2.3 to 2.4).      # AIDE on stage IV CKD (baseline Cr 2.3 to 2.4) likely secondary to HD instability/hypotension v secondary to chemotherapy  #Hyperkalemia, improving     Agree with supportive care, maintaining adequate fluid balance, will follow.

## 2024-12-15 NOTE — PROGRESS NOTES
Subjective     Interval History: Shelbi Delaney has no new complaints    Medications    Current Facility-Administered Medications:     acetaminophen (Tylenol) tablet 975 mg, 975 mg, oral, q6h PRN, Jessenia Yadav MD    cefTRIAXone (Rocephin) 1 g in dextrose (iso) IV 50 mL, 1 g, intravenous, q24h, Jessenia Yadav MD, Stopped at 12/15/24 0634    [Held by provider] DULoxetine (Cymbalta) DR capsule 20 mg, 20 mg, oral, Daily, Jessenia Yadav MD    gabapentin (Neurontin) capsule 300 mg, 300 mg, oral, Nightly, Iris Rogers MD, 300 mg at 12/14/24 2121    heparin (porcine) injection 5,000 Units, 5,000 Units, subcutaneous, q8h, Jessenia Yadav MD, 5,000 Units at 12/15/24 1303    heparin flush 10 unit/mL syringe 50 Units, 50 Units, intra-catheter, PRN, Jessenia Yadav MD, 50 Units at 12/13/24 2231    heparin flush 10 unit/mL syringe 50 Units, 50 Units, intra-catheter, q12h, Jessenia Yadav MD, 50 Units at 12/13/24 2233    HYDROmorphone (Dilaudid) injection 0.2 mg, 0.2 mg, intravenous, q2h PRN, Jessenia Yadav MD, 0.2 mg at 12/13/24 2352    levothyroxine (Synthroid, Levoxyl) tablet 25 mcg, 25 mcg, oral, Daily, Jessenia Yadav MD, 25 mcg at 12/15/24 0808    lidocaine 4 % patch 1 patch, 1 patch, transdermal, Daily, Jessenia Yadav MD, 1 patch at 12/15/24 0808    loperamide (Imodium) capsule 2 mg, 2 mg, oral, 4x daily PRN, Mike Carrasco MD, 2 mg at 12/15/24 0808    metoclopramide (Reglan) tablet 10 mg, 10 mg, oral, q8h PRN **OR** metoclopramide (Reglan) injection 10 mg, 10 mg, intravenous, q6h PRN, Jessenia Yadav MD    [Held by provider] metoprolol tartrate (Lopressor) tablet 50 mg, 50 mg, oral, BID, Jessenia Yadav MD    ondansetron (Zofran) tablet 4 mg, 4 mg, oral, q6h PRN **OR** ondansetron (Zofran) injection 4 mg, 4 mg, intravenous, q6h PRN, Jessenia Yadav MD    oxyCODONE (Roxicodone) immediate release tablet 10 mg, 10 mg, oral, q4h PRN, Jessenia Yadav MD, 10 mg at 12/15/24  1154    sodium bicarbonate tablet 650 mg, 650 mg, oral, q12h, Jessenia Yadav MD, 650 mg at 12/15/24 1303    traZODone (Desyrel) tablet 50 mg, 50 mg, oral, Nightly PRN, Jessenia Yadav MD, 50 mg at 12/13/24 7638    Objective     Physical Exam  Heart S1 S2 RRR, Lungs CTA, no edema      Vital signs in last 24 hours:  Temp:  [36.4 °C (97.6 °F)-36.9 °C (98.4 °F)] 36.9 °C (98.4 °F)  Heart Rate:  [] 76  Resp:  [16-20] 16  BP: ()/(57-65) 106/65       Intake/Output last 3 shifts:  I/O last 3 completed shifts:  In: 2995.4 (34.4 mL/kg) [P.O.:480; I.V.:2465.4 (28.3 mL/kg); IV Piggyback:50]  Out: 1523 (17.5 mL/kg) [Urine:1523 (0.5 mL/kg/hr)]  Weight: 87.1 kg     Labs:  Results from last 7 days   Lab Units 12/15/24  0041   WBC AUTO x10*3/uL 14.3*   RBC AUTO x10*6/uL 2.38*   HEMOGLOBIN g/dL 7.3*   HEMATOCRIT % 22.4*     Results from last 7 days   Lab Units 12/15/24  0638 12/13/24  2217 12/13/24  0549   SODIUM mmol/L 135*   < > 132*   POTASSIUM mmol/L 4.6   < > 4.7   CHLORIDE mmol/L 101   < > 101   CO2 mmol/L 23   < > 18*   BUN mg/dL 96*   < > 107*   CREATININE mg/dL 4.50*   < > 4.78*   CALCIUM mg/dL 7.1*   < > 7.5*   PHOSPHORUS mg/dL 5.1*   < > 4.7   MAGNESIUM mg/dL 1.70   < > 1.74   BILIRUBIN TOTAL mg/dL  --   --  0.3   ALT U/L  --   --  7   AST U/L  --   --  18    < > = values in this interval not displayed.       Assessment/Plan     Assessment & Plan  Electrolyte abnormality  AIDE Delaney is a 64 y.o. 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  Nephrology consulted for worsening AIDE on stage IV CKD (baseline Cr  2.3 to 2.4).      # AIDE on stage IV CKD (baseline Cr 2.3 to 2.4) likely secondary to HD instability/hypotension v secondary to chemotherapy  #Hyperkalemia, improving   #Metabolic acidosis secondary to acute renal failure, improving   #Stage IV cervical cancer with severe metastatic disease involving severe lymphadenopathy as well as liver mets and bone mets   :: Currently on Gemcitabine for palliative intent with regards to SCC of cervix, which was last given 11/29  :: CT A/P performed and demonstrated known partially calcified uterine mass is stable to slightly   decreased in size. Additional bilateral adnexal masses are grossly stable in size and Grossly stable abdominopelvic lymphadenopathy and hepatic masses, consistent with metastatic disease.  :: baseline Cr 2.3 to 2.4. Cr seems to be downtrending (5.24 -> 4.8 -> 4.59) with K stable around 4.8  ::  on 12/13, she was found to still have hyperkalemia to 6.5 and was given albuterol, Lokelma, sodium bicarb push, calcium gluconate 2 g IV, insulin and D10   :: UA was obtained 12/11 and showed  500 LE, 2+ bacteria, 21-50 WBC so she was started on antibiotics. Urine culture showed contamination with mixed bacterial navid.  :: patient has bilateral nephrostomy tubes (less output in L tube v right) with last exchange in 10/20/2023.  ::  Switched from bicarb gtt to bicarb tablet 650 mg BID 12/14     - Given that evidence of obstruction has been ruled out on imaging , new onset AIDE could be secondary to HD instability and hypotension, possibly secondary to urosepsis, which is further supported by improvement in AIDE with supportive care. Also could be secondary to chemotherapy given recent administration of medication. Plan is to continue with supportive care with fluids and repeat urine labs at this time, no indication for dialysis at the moment.      Recommendations:  - Agree with supportive care with fluids 100 ml / hr LR for total 24 hours (started 12/14 midnight)   -  Repeat UA, Urine lytes, and protein/Cr ratio.   - Continue with sodium bicarb 650 mg BID   - No indication for dialysis at this time.   - Avoid nephrotoxins, contrast if possible  - strict Is/Os  - Renal dosing for medications for latest eGFR, follow medication trough as appropriate  - Avoid hypotension/rapid fluctuations in BPs     Francois Art DO        Discussed with attending nephrologist       Mell Willoughby MD  12/15/2024  1:09 PM

## 2024-12-15 NOTE — PROGRESS NOTES
"Shelbi Delaney is a 64 y.o. female on day 1 of admission presenting with Electrolyte abnormality.      Subjective   Pt resting comfortably in bed, reports no n/v. Had diarrhea yesterday AM, has tried to self limit PO intake, only eaten some grapes since then. Reports abdominal pain is significantly improved. Denies CP, SOB, dizziness, f/c.    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  review with history of cervical mass consistent with GYN primary  - Extension to rectal mucosa, pubic ramus with ?reactive mediastinal LN   - Bilateral nephrostomy placement   2/16/23 - 6/1/23: Carbo AUC5/Taxol 175 +Avastin 15mg/kg, Pembrolizumab 200mg/m2  - C2-C4 +Avastin - further treatments held due to progressive gr2 proteinuria   - Grade 3 anemia declining transfusion,   7/20/23 - 12/18/23: Pembrolizumab 200mg/m2, progressive disease  1/11/24 - 3/14/24: Tivdak x3, progressive disease   - C1: 0.9mg/kg in setting of CrCl < 30  3/2024-7/2024: Taxotere, progressive disease  8/1/24 - 11/5/24: Topotecan q week x3, progressive disease    Molecular Testing  PLD1: CPS 10  1/2024 [employer] xT - PIK3CA p.[id number] missense (gain): consider alpelisib  Loss of function: BCORL1, KMT2C, NSD1, KMT2D, RAD21  TMB 7.9mB - RACHELE  - Her2 Neg     Cervical cancer, FIGO stage RONIT       Last Recorded Vitals  Blood pressure 96/57, pulse 86, temperature 36.9 °C (98.4 °F), resp. rate 18, height 1.702 m (5' 7.01\"), weight 87.1 kg (192 lb 0.3 oz), SpO2 92%.  Intake/Output last 3 Shifts:    Intake/Output Summary (Last 24 hours) at 12/14/2024 1248  Last data filed at 12/14/2024 1121  Gross per 24 hour   Intake 1845.42 ml   Output 775 ml   Net 1070.42 ml       Physical Exam  General: no acute distress  HEENT: normocephalic, atraumatic  CV: warm and well perfused, RRR  Lungs: breathing comfortably on room air, CTAB in anterior lung " fields  Abdomen: Mildly tender to deep palpation, soft, nondistended  : bilateral PCNTs in place, R with yellow urine, L bag with scant fluid  Extremities: moving all extremities spontaneously, ACE wrap bandages and SCDs in place over bilateral calves  Neuro: awake and conversant  Psych: appropriate mood and affect      Assessment/Plan     Assessment & Plan  Electrolyte abnormality    Shelbi Delaney is a 64 y.o. 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   - s/p IVF, IV bicarb gtt, lokelma at OSH with improvement in metabolic derangements on arrival   - Cr slowly downtrending, 4.46 this AM.   - Bilateral nephrostomy tubes in place, L with low output. Consider IR consult on Mon if Cr continues to have minimal improvement. S/p urology consult, no further recs.   - Nephrology consulted - appreciate recs. Cont PO sodium bicarb 650 BID.   - s/p mIVF x24 hrs, encourage PO hydration   - daily labs, replete lytes PRN.      Anemia  - Admission Hgb 5.9, s/p 3u pRBCs at OSH --> Hgb 8 on arrival to [facility] [place]   - likely in s/o chemotherapy, no overt signs of bleeding, hematemesis, melena.   - initial concern for dark stools at OSH, FOBT not collected. GI cs at OSH rec non-urgent EGD/colonoscopy. Defer GI consult given anemia resolved, no further symptoms.   - T&C x2u pRBCs     Suspected UTI   - OSH U/A with +LE, reflex urine with contamination. Repeat urine cx pending.   - cont IV ceftriaxone 1g q24h pending repeat urine culture  - for PCNT exchange if urine cx pos     Diarrhea  - C diff path ordered, unable to be collected. Will collect  if recurrent episode   - cont imodium, titrate per symptoms.      Lower Extremity Wounds   - bilateral open wounds on lower extremity, covered in dressing, s/p wound care cs at OSH. Pictures in media tab.   - wound care cs pending      Cervical cancer, FIGO stage RONIT   - 11/4 CT demonstrating progression of disease, pt initially on D1/D15 gemcitabine (+d16 gCSF) for palliative intent.   - ongoing GOC conversation with pt and partner, pt expressed primary goal is being at home. Amenable to hospice consult this admission to review options. DNR/DNI per code discussion 12/14.   - supportive onc following, appreciate recs. Cont 10mg oxycodone q4h PRN, 0.2mg IV dilaudid q2h PRN, gabapentin 300mg qhs for pain control        Dispo: continue inpatient management     Seen and discussed with Dr. Li Cavazos MD  PGY3, Gynecologic Oncology  Pager [phone]

## 2024-12-15 NOTE — CARE PLAN
Patient afebrile. Vitals stable. Minimal pain. No nausea or vomiting. Slept most of the night. No issues. Will continue to monitor.

## 2024-12-15 NOTE — ASSESSMENT & PLAN NOTE
AIDE Delaney is a 64 y.o. 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  Nephrology consulted for worsening AIDE on stage IV CKD (baseline Cr 2.3 to 2.4).      # AIDE on stage IV CKD (baseline Cr 2.3 to 2.4) likely secondary to HD instability/hypotension v secondary to chemotherapy  #Hyperkalemia, improving   #Metabolic acidosis secondary to acute renal failure, improving   #Stage IV cervical cancer with severe metastatic disease involving severe lymphadenopathy as well as liver mets and bone mets   :: Currently on Gemcitabine for palliative intent with regards to SCC of cervix, which was last given 11/29  :: CT A/P performed and demonstrated known partially calcified uterine mass is stable to slightly   decreased in size. Additional bilateral adnexal masses are grossly stable in size and Grossly stable abdominopelvic lymphadenopathy and hepatic masses, consistent with metastatic disease.  :: baseline Cr 2.3 to 2.4. Cr seems to be downtrending (5.24 -> 4.8 -> 4.59) with K stable around 4.8  ::  on 12/13, she was found to still have hyperkalemia to 6.5 and was given albuterol, Lokelma, sodium bicarb push, calcium gluconate 2 g IV, insulin and D10   :: UA was obtained 12/11 and showed  500 LE, 2+ bacteria, 21-50 WBC so she was started on antibiotics. Urine culture showed contamination with mixed bacterial navid.  :: patient has bilateral nephrostomy tubes (less output in L tube v right) with last exchange in 10/20/2023.  ::  Switched from bicarb gtt to bicarb tablet 650 mg BID 12/14     - Given that evidence of obstruction has  been ruled out on imaging , new onset AIDE could be secondary to HD instability and hypotension, possibly secondary to urosepsis, which is further supported by improvement in AIDE with supportive care. Also could be secondary to chemotherapy given recent administration of medication. Plan is to continue with supportive care with fluids and repeat urine labs at this time, no indication for dialysis at the moment.      Recommendations:  - Agree with supportive care with fluids 100 ml / hr LR for total 24 hours (started 12/14 midnight)   - Repeat UA, Urine lytes, and protein/Cr ratio.   - Continue with sodium bicarb 650 mg BID   - No indication for dialysis at this time.   - Avoid nephrotoxins, contrast if possible  - strict Is/Os  - Renal dosing for medications for latest eGFR, follow medication trough as appropriate  - Avoid hypotension/rapid fluctuations in BPs     Francois Art, DO        Discussed with attending nephrologist

## 2024-12-16 ENCOUNTER — OFFICE VISIT (OUTPATIENT)
Dept: SURGICAL ONCOLOGY | Facility: HOSPITAL | Age: 64
DRG: 683 | End: 2024-12-16
Payer: COMMERCIAL

## 2024-12-16 LAB
ANION GAP SERPL CALC-SCNC: 17 MMOL/L (ref 10–20)
BUN SERPL-MCNC: 82 MG/DL (ref 6–23)
CALCIUM SERPL-MCNC: 7.5 MG/DL (ref 8.6–10.6)
CHLORIDE SERPL-SCNC: 101 MMOL/L (ref 98–107)
CO2 SERPL-SCNC: 23 MMOL/L (ref 21–32)
CREAT SERPL-MCNC: 4.3 MG/DL (ref 0.5–1.05)
EGFRCR SERPLBLD CKD-EPI 2021: 11 ML/MIN/1.73M*2
ERYTHROCYTE [DISTWIDTH] IN BLOOD BY AUTOMATED COUNT: 17.5 % (ref 11.5–14.5)
GLUCOSE SERPL-MCNC: 95 MG/DL (ref 74–99)
HCT VFR BLD AUTO: 22.5 % (ref 36–46)
HGB BLD-MCNC: 7.9 G/DL (ref 12–16)
MAGNESIUM SERPL-MCNC: 1.73 MG/DL (ref 1.6–2.4)
MCH RBC QN AUTO: 31.2 PG (ref 26–34)
MCHC RBC AUTO-ENTMCNC: 35.1 G/DL (ref 32–36)
MCV RBC AUTO: 89 FL (ref 80–100)
NRBC BLD-RTO: 0.5 /100 WBCS (ref 0–0)
PHOSPHATE SERPL-MCNC: 5.3 MG/DL (ref 2.5–4.9)
PLATELET # BLD AUTO: 393 X10*3/UL (ref 150–450)
POTASSIUM SERPL-SCNC: 4.6 MMOL/L (ref 3.5–5.3)
RBC # BLD AUTO: 2.53 X10*6/UL (ref 4–5.2)
SODIUM SERPL-SCNC: 136 MMOL/L (ref 136–145)
WBC # BLD AUTO: 12.9 X10*3/UL (ref 4.4–11.3)

## 2024-12-16 PROCEDURE — 93005 ELECTROCARDIOGRAM TRACING: CPT

## 2024-12-16 PROCEDURE — 99223 1ST HOSP IP/OBS HIGH 75: CPT

## 2024-12-16 PROCEDURE — 2500000001 HC RX 250 WO HCPCS SELF ADMINISTERED DRUGS (ALT 637 FOR MEDICARE OP)

## 2024-12-16 PROCEDURE — 82374 ASSAY BLOOD CARBON DIOXIDE: CPT

## 2024-12-16 PROCEDURE — 99232 SBSQ HOSP IP/OBS MODERATE 35: CPT | Performed by: INTERNAL MEDICINE

## 2024-12-16 PROCEDURE — 85027 COMPLETE CBC AUTOMATED: CPT

## 2024-12-16 PROCEDURE — 99232 SBSQ HOSP IP/OBS MODERATE 35: CPT

## 2024-12-16 PROCEDURE — 83735 ASSAY OF MAGNESIUM: CPT

## 2024-12-16 PROCEDURE — 84100 ASSAY OF PHOSPHORUS: CPT

## 2024-12-16 PROCEDURE — 2500000004 HC RX 250 GENERAL PHARMACY W/ HCPCS (ALT 636 FOR OP/ED)

## 2024-12-16 PROCEDURE — 2500000005 HC RX 250 GENERAL PHARMACY W/O HCPCS

## 2024-12-16 PROCEDURE — 99221 1ST HOSP IP/OBS SF/LOW 40: CPT | Performed by: PHYSICIAN ASSISTANT

## 2024-12-16 PROCEDURE — 93010 ELECTROCARDIOGRAM REPORT: CPT | Performed by: INTERNAL MEDICINE

## 2024-12-16 PROCEDURE — 2500000001 HC RX 250 WO HCPCS SELF ADMINISTERED DRUGS (ALT 637 FOR MEDICARE OP): Performed by: NURSE PRACTITIONER

## 2024-12-16 PROCEDURE — 1200000003 HC ONCOLOGY  ROOM WITH TELEMETRY DAILY

## 2024-12-16 RX ORDER — METOPROLOL TARTRATE 50 MG/1
50 TABLET ORAL ONCE
Status: COMPLETED | OUTPATIENT
Start: 2024-12-16 | End: 2024-12-16

## 2024-12-16 RX ORDER — METOPROLOL TARTRATE 50 MG/1
50 TABLET ORAL 2 TIMES DAILY
Status: DISCONTINUED | OUTPATIENT
Start: 2024-12-16 | End: 2024-12-16

## 2024-12-16 RX ORDER — METOPROLOL TARTRATE 50 MG/1
50 TABLET ORAL 2 TIMES DAILY
Status: DISCONTINUED | OUTPATIENT
Start: 2024-12-17 | End: 2024-12-16

## 2024-12-16 RX ORDER — METOPROLOL TARTRATE 50 MG/1
50 TABLET ORAL 2 TIMES DAILY
Status: DISCONTINUED | OUTPATIENT
Start: 2024-12-16 | End: 2024-12-18 | Stop reason: HOSPADM

## 2024-12-16 ASSESSMENT — COGNITIVE AND FUNCTIONAL STATUS - GENERAL
DRESSING REGULAR UPPER BODY CLOTHING: A LITTLE
WALKING IN HOSPITAL ROOM: A LITTLE
PERSONAL GROOMING: A LITTLE
DRESSING REGULAR UPPER BODY CLOTHING: A LITTLE
MOBILITY SCORE: 18
MOVING TO AND FROM BED TO CHAIR: A LITTLE
DRESSING REGULAR LOWER BODY CLOTHING: A LITTLE
MOBILITY SCORE: 18
EATING MEALS: A LITTLE
DAILY ACTIVITIY SCORE: 18
MOVING FROM LYING ON BACK TO SITTING ON SIDE OF FLAT BED WITH BEDRAILS: A LITTLE
HELP NEEDED FOR BATHING: A LITTLE
CLIMB 3 TO 5 STEPS WITH RAILING: A LITTLE
TOILETING: A LITTLE
TURNING FROM BACK TO SIDE WHILE IN FLAT BAD: A LITTLE
MOVING FROM LYING ON BACK TO SITTING ON SIDE OF FLAT BED WITH BEDRAILS: A LITTLE
WALKING IN HOSPITAL ROOM: A LITTLE
MOVING TO AND FROM BED TO CHAIR: A LITTLE
STANDING UP FROM CHAIR USING ARMS: A LITTLE
EATING MEALS: A LITTLE
TURNING FROM BACK TO SIDE WHILE IN FLAT BAD: A LITTLE
TOILETING: A LITTLE
PERSONAL GROOMING: A LITTLE
STANDING UP FROM CHAIR USING ARMS: A LITTLE
DRESSING REGULAR LOWER BODY CLOTHING: A LITTLE
CLIMB 3 TO 5 STEPS WITH RAILING: A LITTLE
HELP NEEDED FOR BATHING: A LITTLE
DAILY ACTIVITIY SCORE: 18

## 2024-12-16 ASSESSMENT — PAIN SCALES - GENERAL
PAINLEVEL_OUTOF10: 8
PAINLEVEL_OUTOF10: 7
PAINLEVEL_OUTOF10: 2
PAINLEVEL_OUTOF10: 3
PAINLEVEL_OUTOF10: 8
PAINLEVEL_OUTOF10: 6
PAINLEVEL_OUTOF10: 7
PAINLEVEL_OUTOF10: 5 - MODERATE PAIN
PAINLEVEL_OUTOF10: 7

## 2024-12-16 ASSESSMENT — PAIN DESCRIPTION - LOCATION
LOCATION: BACK

## 2024-12-16 ASSESSMENT — PAIN - FUNCTIONAL ASSESSMENT
PAIN_FUNCTIONAL_ASSESSMENT: 0-10

## 2024-12-16 ASSESSMENT — PAIN DESCRIPTION - ORIENTATION
ORIENTATION: LOWER

## 2024-12-16 ASSESSMENT — ENCOUNTER SYMPTOMS
MUSCULOSKELETAL NEGATIVE: 1
GASTROINTESTINAL NEGATIVE: 1
CONSTITUTIONAL NEGATIVE: 1
NERVOUS/ANXIOUS: 1
FLANK PAIN: 0
FATIGUE: 1
BACK PAIN: 1
RESPIRATORY NEGATIVE: 1

## 2024-12-16 ASSESSMENT — PAIN SCALES - PAIN ASSESSMENT IN ADVANCED DEMENTIA (PAINAD): TOTALSCORE: MEDICATION (SEE MAR)

## 2024-12-16 NOTE — PROGRESS NOTES
"Shelbi Delaney is a 64 y.o. female on day 2 of admission presenting with Electrolyte abnormality.      Subjective   Pt resting comfortably in bed, denies no n/v. Was able to eat light meals yesterday without diarrhea.  Reports no abdominal pain, however endorsing back pain, feels like the oxycodone helps but takes a while to work. Noticing 'gurgling' of stomach. Denies CP or f/c.    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  review with history of cervical mass consistent with GYN primary  - Extension to rectal mucosa, pubic ramus with ?reactive mediastinal LN   - Bilateral nephrostomy placement   2/16/23 - 6/1/23: Carbo AUC5/Taxol 175 +Avastin 15mg/kg, Pembrolizumab 200mg/m2  - C2-C4 +Avastin - further treatments held due to progressive gr2 proteinuria   - Grade 3 anemia declining transfusion,   7/20/23 - 12/18/23: Pembrolizumab 200mg/m2, progressive disease  1/11/24 - 3/14/24: Tivdak x3, progressive disease   - C1: 0.9mg/kg in setting of CrCl < 30  3/2024-7/2024: Taxotere, progressive disease  8/1/24 - 11/5/24: Topotecan q week x3, progressive disease    Molecular Testing  PLD1: CPS 10  1/2024 [employer] xT - PIK3CA p.[id number] missense (gain): consider alpelisib  Loss of function: BCORL1, KMT2C, NSD1, KMT2D, RAD21  TMB 7.9mB - RACHELE  - Her2 Neg     Cervical cancer, FIGO stage RONIT       Last Recorded Vitals  Blood pressure 96/57, pulse 86, temperature 36.9 °C (98.4 °F), resp. rate 18, height 1.702 m (5' 7.01\"), weight 87.1 kg (192 lb 0.3 oz), SpO2 92%.  Intake/Output last 3 Shifts:    Intake/Output Summary (Last 24 hours) at 12/14/2024 1248  Last data filed at 12/14/2024 1121  Gross per 24 hour   Intake 1845.42 ml   Output 775 ml   Net 1070.42 ml       Physical Exam  General: no acute distress  HEENT: normocephalic, atraumatic  CV: warm and well perfused, RRR  Lungs: breathing comfortably on " room air, CTAB in anterior lung fields  Abdomen: Mildly tender to deep palpation, soft, nondistended  : bilateral PCNTs in place, R with yellow urine, L bag with scant fluid  Extremities: moving all extremities spontaneously, SCDs in place over bilateral calves  Neuro: awake and conversant  Psych: appropriate mood and affect      Assessment/Plan     Assessment & Plan  Electrolyte abnormality    Shelbi Delaney is a 64 y.o. 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   - s/p IVF, IV bicarb gtt, lokelma at OSH with improvement in metabolic derangements on arrival   - Cr slowly downtrending, AM labs pending  - Bilateral nephrostomy tubes in place, L with low output. Consider IR consult on Mon if Cr continues to have minimal improvement. S/p urology consult, no further recs.   - Nephrology consulted - appreciate recs. Cont PO sodium bicarb 650 BID.   - s/p mIVF x24 hrs, encourage PO hydration   - daily labs, replete lytes PRN.      Anemia  - Admission Hgb 5.9, s/p 3u pRBCs at OSH --> [place] 8 on [place] [facility] [place]   - likely in s/o chemotherapy, no overt signs of bleeding, hematemesis, melena.   - initial concern for dark stools at OSH, FOBT not collected. GI cs at OSH rec non-urgent EGD/colonoscopy. Defer GI consult given anemia resolved, no further symptoms.   - T&C x2u pRBCs     Suspected UTI   - OSH U/A with +LE, reflex urine with contamination. Repeat urine cx pending.   - cont IV ceftriaxone 1g q24h pending repeat urine culture  - for PCNT exchange if urine cx pos     Diarrhea  - C diff path ordered, unable to be collected.  Will collect if recurrent episode   - cont imodium, titrate per symptoms.      Lower Extremity Wounds   - bilateral open wounds on lower extremity, covered in dressing, s/p wound care cs at OSH. Pictures in media tab.   - wound care cs pending      Cervical cancer, FIGO stage RONIT   - 11/4 CT demonstrating progression of disease, pt initially on D1/D15 gemcitabine (+d16 gCSF) for palliative intent.   - ongoing GOC conversation with pt and partner, pt expressed primary goal is being at home. Amenable to hospice consult this admission to review options. DNR/DNI per code discussion 12/14.   - supportive onc following, appreciate recs. Cont 10mg oxycodone q4h PRN, 0.2mg IV dilaudid q2h PRN, gabapentin 300mg qhs for pain control        Dispo: continue inpatient management     Seen and discussed with Dr. Phillip Elam MD  PGY-1, Obstetrics and Gynecology   Pager [phone]

## 2024-12-16 NOTE — CONSULTS
INTERVENTIONAL RADIOLOGY INPATIENT CONSULT NOTE  [facility]    Assessment & Plan     Review of pertinent imaging demonstrates stable positioning of L PCN and R NU drains, no hydronephrosis, stable atrophy of left kidney    Plan:   -patient and available imaging d/w Dr. Michelle  -patient with chronic right sided NU, left sided PCN, most recently exchanged 11/8/24; per patient routine exchange every 3 months  -CT abdomen/pelvis 12/12: stable drain positioning, no hydronephrosis, L renal atrophy  -able to flush both drains without resistance, without leaking around catheter  -recommend nursing to flush daily with saline syringe  -ensure tubing straight, not kinked, underneath patient to facilitate draining  -no need for exchange at this time given appropriate drain function, downtrending creatinine, no hydronephrosis on imaging; can reassess pending output, trending of creatinine    Subjective  Shelbi Delaney, 64 y.o. female is a patent presenting with:  Electrolyte abnormality [E87.8]     HPI  Shelbi Delaney is a 64 y.o. 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    Patient has chronic RIGHT nephroureteral catheter and LEFT nephrostomy tubes, most recently exchanged 11/8/24.  Patient states that she had noticed a decreased in output from the left sided drain, denied any flank pain or discomfort.  Denies drains being flushed.  States that she has the drains exchanged every 3 months.      Creatinine has downtrended to 4.30 today from 5.31 on 12/11/24 when presented to [facility], but still above baseline.  CT abdomen/pelvis 12/12/24 shows stable positioning of L PCN and R NU, no  hydronephrosis, and stable atrophy of left kidney.    Review of Systems   Constitutional: Negative.    Respiratory: Negative.     Gastrointestinal: Negative.    Genitourinary: Negative.  Negative for flank pain.   Musculoskeletal: Negative.    All other systems reviewed and are negative.      Past Medical History:   Diagnosis Date    Anxiety     Body mass index (BMI)30.0-30.9, adult 2019    BMI 30.0-30.9,adult    Cervix cancer (Multi)     Chemotherapy-induced peripheral neuropathy (Multi)     Chronic kidney disease     Hypertension     Hypothyroidism     Tachycardia, unspecified     Tachycardia     Past Surgical History:   Procedure Laterality Date    CT GUIDED IMAGING FOR NEEDLE PLACEMENT  2023    CT GUIDED IMAGING FOR NEEDLE PLACEMENT [facility]    IR  NEPHROURETERAL PLACEMENT Bilateral 2024    IR  NEPHROURETERAL PLACEMENT 2024 [facility]    IR NEPHROSTOMY TUBE EXCHANGE  10/20/2023    IR NEPHROSTOMY TUBE EXCHANGE 10/20/2023 Manuel Marvin MD [facility]    IR NEPHROSTOMY TUBE EXCHANGE  2023    IR NEPHROSTOMY TUBE EXCHANGE 2023 [facility]    MEDIPORT      NEPHROSTOMY      ORIF TIBIA FRACTURE Right     TRANSURETHRAL RESECTION OF BLADDER TUMOR      US GUIDED PERCUTANEOUS PLACEMENT  2022    US GUIDED PERCUTANEOUS PLACEMENT [facility]    US GUIDED PERCUTANEOUS PLACEMENT  2023    US GUIDED PERCUTANEOUS PLACEMENT [facility]     Social History     Socioeconomic History    Marital status: Single     Spouse name: Not on file    Number of children: Not on file    Years of education: Not on file    Highest education level: Not on file   Occupational History    Not on file   Tobacco Use    Smoking status: Former     Current packs/day: 0.00     Average packs/day: 0.2 packs/day for 42.9 years (10.7 ttl pk-yrs)     Types: Cigarettes     Start date:      Quit date: 2019     Years since quittin.0     Passive exposure: Past    Smokeless  tobacco: Never   Vaping Use    Vaping status: Never Used   Substance and Sexual Activity    Alcohol use: Not Currently     Alcohol/week: 8.0 standard drinks of alcohol     Types: 4 Shots of liquor, 4 Standard drinks or equivalent per week     Comment: quit 12/01/2019    Drug use: Never    Sexual activity: Not on file   Other Topics Concern    Not on file   Social History Narrative    Not on file     Social Drivers of Health     Financial Resource Strain: Low Risk  (12/13/2024)    Overall Financial Resource Strain (CARDIA)     Difficulty of Paying Living Expenses: Not very hard   Food Insecurity: No Food Insecurity (12/13/2024)    Hunger Vital Sign     Worried About Running Out of Food in the Last Year: Never true     Ran Out of Food in the Last Year: Never true   Transportation Needs: No Transportation Needs (12/13/2024)    PRAPARE - Transportation     Lack of Transportation (Medical): No     Lack of Transportation (Non-Medical): No   Physical Activity: Inactive (12/12/2024)    Exercise Vital Sign     Days of Exercise per Week: 0 days     Minutes of Exercise per Session: 0 min   Stress: No Stress Concern Present (12/12/2024)    [employer] - Occupational Stress Questionnaire     Feeling of Stress : Only a little   Social Connections: Moderately Isolated (12/12/2024)    Social Connection and Isolation Panel [NHANES]     Frequency of Communication with Friends and Family: Once a week     Frequency of Social Gatherings with Friends and Family: Once a week     Attends Islam Services: 1 to 4 times per year     Active Member of Clubs or Organizations: No     Attends Club or Organization Meetings: Never     Marital Status:    Intimate Partner Violence: Not At Risk (12/13/2024)    Humiliation, Afraid, Rape, and Kick questionnaire     Fear of Current or Ex-Partner: No     Emotionally Abused: No     Physically Abused: No     Sexually Abused: No   Housing Stability: Low Risk   (12/13/2024)    Housing Stability Vital Sign     Unable to Pay for Housing in the Last Year: No     Number of Times Moved in the Last Year: 0     Homeless in the Last Year: No     Family History   Problem Relation Name Age of Onset    No Known Problems Mother      Other (Acute myocardial infarction) Father       Allergies   Allergen Reactions    L-Lysine [Lysine] Rash     Head to toe red rash    Adhesive Rash     Skin irritation and rash      No current facility-administered medications on file prior to encounter.     Current Outpatient Medications on File Prior to Encounter   Medication Sig Dispense Refill    acetaminophen (Tylenol) 325 mg tablet Take 3 tablets (975 mg) by mouth every 6 hours if needed for mild pain (1 - 3).      calcium carbonate (Calcium 600) 600 mg calcium (1,500 mg) tablet Take 600 mg by mouth once daily.      dexAMETHasone (Decadron) 4 mg tablet Take 2 tablets (8 mg) by mouth 2 times a day. Take on the day before, day of and day after treatment. (Patient not taking: Reported on 11/7/2024) 36 tablet 3    dexAMETHasone 0.5 mg/5 mL oral liquid Take 10 mL (1 mg) by mouth 2 times a day as needed (mucositis). Swish and spit. Do not swallow (Patient not taking: Reported on 11/7/2024) 100 mL 0    docusate sodium (Colace) 100 mg capsule Take 1 capsule (100 mg) by mouth 2 times a day. 180 capsule 3    DULoxetine (Cymbalta) 20 mg DR capsule Take 1 capsule (20 mg) by mouth once daily. Do not crush or chew. (Patient not taking: Reported on 11/7/2024) 30 capsule 11    gabapentin (Neurontin) 300 mg capsule Take 1 capsule (300 mg) by mouth 2 times a day. 60 capsule 3    hydrOXYzine HCL (Atarax) 25 mg tablet Take 1 tablet (25 mg) by mouth 3 times a day as needed for anxiety.      levothyroxine (Synthroid, Levoxyl) 25 mcg tablet Take 1 tablet (25 mcg) by mouth once daily. 90 tablet 3    loperamide (Imodium) 2 mg capsule Take 2 capsules (4 mg) by mouth 4 times a day as needed for diarrhea.      magic mouthwash  (lidocaine, diphenhydrAMINE, Maalox 1:1:1) Swish and spit 10 mL every 6 hours if needed for mucositis. (Patient not taking: Reported on 2024) 250 mL 2    metoprolol tartrate (Lopressor) 50 mg tablet take 0.5 tablets by mouth 2 times a day 30 tablet 5    multivitamin with minerals tablet Take 1 tablet by mouth once daily.      naloxone (Narcan) 4 mg/0.1 mL nasal spray Administer 1 spray (4 mg) into affected nostril(s) if needed for opioid reversal. May repeat every 2-3 minutes if needed, alternating nostrils, until medical assistance becomes available. (Patient not taking: Reported on 2024)      nystatin (Mycostatin) cream Apply 1 Application topically 2 times a day. (Patient not taking: Reported on 2024)      ondansetron (Zofran) 8 mg tablet Take 1 tablet (8 mg) by mouth every 8 hours if needed for nausea or vomiting. 30 tablet 5    oxyCODONE (Roxicodone) 10 mg immediate release tablet Take 1 tablet (10 mg) by mouth 3 times a day as needed for moderate pain (4 - 6). 90 tablet 0    polyethylene glycol (Glycolax, Miralax) 17 gram packet Take 17 g by mouth once daily as needed (constipation). 90 packet 3    prochlorperazine (Compazine) 10 mg tablet Take 1 tablet (10 mg) by mouth every 6 hours if needed for nausea or vomiting. 30 tablet 5    [] sulfamethoxazole-trimethoprim (Bactrim DS) 800-160 mg tablet Take 1 tablet by mouth 2 times a day for 7 days. 14 tablet 0    tiZANidine (Zanaflex) 4 mg tablet Take 1 tablet (4 mg) by mouth 3 times a day. 270 tablet 1    traZODone (Desyrel) 50 mg tablet Take 1 tablet (50 mg) by mouth as needed at bedtime for sleep. 90 tablet 1    [DISCONTINUED] oxyCODONE (Roxicodone) 10 mg immediate release tablet Take 1 tablet (10 mg) by mouth 3 times a day as needed for moderate pain (4 - 6). 90 tablet 0       Objective    Vitals:    12/15/24 2008 12/15/24 2349 24 0400 24 0836   BP: 109/70 112/66 102/56 114/61   BP Location: Left arm Left arm Left arm    Patient  Position: Lying Lying Lying    Pulse: (!) 118 (!) 112 74 77   Resp: 18 20 20 20   Temp: 37 °C (98.6 °F) 37.2 °C (99 °F) 37.1 °C (98.8 °F) 36.8 °C (98.2 °F)   TempSrc: Temporal Temporal Temporal    SpO2: 95% 93% 94% 97%   Weight:       Height:           Results for orders placed or performed during the hospital encounter of 12/13/24 (from the past 24 hours)   Electrocardiogram, 12-lead PRN ACS symptoms   Result Value Ref Range    Ventricular Rate 88 BPM    Atrial Rate 88 BPM    NH Interval 160 ms    QRS Duration 86 ms    QT Interval 366 ms    QTC Calculation(Bazett) 442 ms    P Axis 41 degrees    R Axis 16 degrees    T Axis 34 degrees    QRS Count 15 beats    Q Onset 219 ms    P Onset 139 ms    P Offset 199 ms    T Offset 402 ms    QTC Fredericia 416 ms   Basic Metabolic Panel   Result Value Ref Range    Glucose 95 74 - 99 mg/dL    Sodium 136 136 - 145 mmol/L    Potassium 4.6 3.5 - 5.3 mmol/L    Chloride 101 98 - 107 mmol/L    Bicarbonate 23 21 - 32 mmol/L    Anion Gap 17 10 - 20 mmol/L    Urea Nitrogen 82 (H) 6 - 23 mg/dL    Creatinine 4.30 (H) 0.50 - 1.05 mg/dL    eGFR 11 (L) >60 mL/min/1.73m*2    Calcium 7.5 (L) 8.6 - 10.6 mg/dL   Magnesium   Result Value Ref Range    Magnesium 1.73 1.60 - 2.40 mg/dL   Phosphorus   Result Value Ref Range    Phosphorus 5.3 (H) 2.5 - 4.9 mg/dL   CBC   Result Value Ref Range    WBC 12.9 (H) 4.4 - 11.3 x10*3/uL    nRBC 0.5 (H) 0.0 - 0.0 /100 WBCs    RBC 2.53 (L) 4.00 - 5.20 x10*6/uL    Hemoglobin 7.9 (L) 12.0 - 16.0 g/dL    Hematocrit 22.5 (L) 36.0 - 46.0 %    MCV 89 80 - 100 fL    MCH 31.2 26.0 - 34.0 pg    MCHC 35.1 32.0 - 36.0 g/dL    RDW 17.5 (H) 11.5 - 14.5 %    Platelets 393 150 - 450 x10*3/uL     *Note: Due to a large number of results and/or encounters for the requested time period, some results have not been displayed. A complete set of results can be found in Results Review.       MELD 3.0: 20 at 8/18/2024  4:36 AM  MELD-Na: 18 at 8/18/2024  4:36 AM  Calculated from:  Serum  Creatinine: 2.74 mg/dL at 8/18/2024  4:36 AM  Serum Sodium: 137 mmol/L at 8/18/2024  4:36 AM  Total Bilirubin: 0.8 mg/dL (Using min of 1 mg/dL) at 8/16/2024  1:12 PM  Serum Albumin: 2.4 g/dL at 8/18/2024  4:36 AM  INR(ratio): 1.2 at 8/16/2024  4:21 AM  Age at listing (hypothetical): 64 years  Sex: Female at 8/18/2024  4:36 AM      Physical Exam  Vitals reviewed.   Constitutional:       General: She is not in acute distress.     Appearance: She is normal weight.      Comments: Cooperative; resting comfortably in bed   HENT:      Head: Normocephalic.      Mouth/Throat:      Mouth: Mucous membranes are moist.   Eyes:      Conjunctiva/sclera: Conjunctivae normal.   Cardiovascular:      Rate and Rhythm: Tachycardia present.   Pulmonary:      Effort: Pulmonary effort is normal. No respiratory distress.   Abdominal:      General: Abdomen is flat. There is no distension.      Palpations: Abdomen is soft.   Genitourinary:     Comments: L PCN: flushed with saline without resistance or leaking around drain, added 3-way valve, changed DSD  R NU: flushed with saline without resistance or leaking around drain, added 3-way valve  Skin:     General: Skin is warm and dry.   Neurological:      General: No focal deficit present.      Mental Status: She is alert and oriented to person, place, and time. Mental status is at baseline.   Psychiatric:         Mood and Affect: Mood normal.         Behavior: Behavior normal.             I personally spent 35 minutes on this consult with over 50% of time spent discussing management and care of specified diagnosis with patient and/or coordinating care for this patient.       Shannan Paul PA-C     Vascular and Interventional Radiology  [facility]  [phone] Inpatient Nursing Quarterback  [phone] Nursing Line 7a-5p  [phone] Resident on-call pager    NON-Urgent on call weekends and after hours weekdays (5pm - 5am) IR pager: [phone]  Urgent & emergent on  call weekends and after hours weekdays (5pm-7am) IR pager: [phone]

## 2024-12-16 NOTE — CONSULTS
Inpatient consult to Integrative Hem/Onc  Consult performed by: JUN Ram-CNP  Consult ordered by: JUN Talley-EDGAR          Reason For Consult  Symptom management    History Of Present Illness  Shelbi Delaney is a 64 y.o. 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 Integrative hematology/oncology consulted by supportive oncology for non-pharmacological symptom management of pain, anxiety and depression.    Pt resting in bed at time of exam. No family at bedside.     Family Hx: Father: CAD. Mother: no known medical Hx     Social Hx: lives in a home with her partner, no children. Retired, previously worked in .   Tobacco: quit   ETOH: quit   Illicits: denies      Spiritual Hx: Pent[employer]al     Joys: cooking/eating, outdoors, arts and crafts, music - all types, pets        Information obtained from chart review, discussion with patient/family, and discussion with primary team.       Past Medical History  She has a past medical history of Anxiety, Body mass index (BMI)30.0-30.9, adult (08/07/2019), Cervix cancer (Multi), Chemotherapy-induced peripheral neuropathy (Multi), Chronic kidney disease, Hypertension, Hypothyroidism, and Tachycardia, unspecified.    Surgical History  She has a past surgical history that includes US guided percutaneous placement (11/14/2022); CT guided imaging for needle placement (01/12/2023); US guided percutaneous placement (08/28/2023); IR nephrostomy tube exchange (10/20/2023); Nephrostomy; Transurethral resection of bladder tumor; ORIF tibia fracture (Right); mediport; IR nephrostomy tube exchange (12/19/2023); and IR  nephroureteral placement (Bilateral, 6/18/2024).     Social History  She reports that she quit smoking about  "5 years ago. Her smoking use included cigarettes. She started smoking about 47 years ago. She has a 10.7 pack-year smoking history. She has been exposed to tobacco smoke. She has never used smokeless tobacco. She reports that she does not currently use alcohol after a past usage of about 8.0 standard drinks of alcohol per week. She reports that she does not use drugs.    Family History  Family History   Problem Relation Name Age of Onset    No Known Problems Mother      Other (Acute myocardial infarction) Father          Allergies  L-lysine [lysine] and Adhesive    Review of Systems   Constitutional:  Positive for fatigue.   Musculoskeletal:  Positive for back pain.   Psychiatric/Behavioral:  The patient is nervous/anxious.    All other systems reviewed and are negative.       Physical Exam  Vitals and nursing note reviewed.   Constitutional:       General: She is not in acute distress.     Appearance: Normal appearance. She is normal weight. She is ill-appearing.   HENT:      Head: Normocephalic and atraumatic.      Nose: Nose normal.      Mouth/Throat:      Mouth: Mucous membranes are moist.   Eyes:      Extraocular Movements: Extraocular movements intact.      Pupils: Pupils are equal, round, and reactive to light.   Cardiovascular:      Rate and Rhythm: Normal rate.   Pulmonary:      Effort: Pulmonary effort is normal.   Abdominal:      General: Abdomen is flat.      Palpations: Abdomen is soft.   Skin:     General: Skin is warm and dry.   Neurological:      General: No focal deficit present.      Mental Status: She is alert and oriented to person, place, and time. Mental status is at baseline.   Psychiatric:         Mood and Affect: Mood normal.         Behavior: Behavior normal.         Thought Content: Thought content normal.         Judgment: Judgment normal.          Last Recorded Vitals  Blood pressure 107/65, pulse 80, temperature 36.7 °C (98.1 °F), resp. rate 20, height 1.702 m (5' 7.01\"), weight 87.1 kg " (192 lb 0.3 oz), SpO2 93%.    Relevant Results  Scheduled medications  cefTRIAXone, 1 g, intravenous, q24h  [Held by provider] DULoxetine, 20 mg, oral, Daily  gabapentin, 300 mg, oral, Nightly  heparin (porcine), 5,000 Units, subcutaneous, q8h  heparin flush, 50 Units, intra-catheter, q12h  levothyroxine, 25 mcg, oral, Daily  lidocaine, 1 patch, transdermal, Daily  [Held by provider] metoprolol tartrate, 50 mg, oral, BID  sodium bicarbonate, 650 mg, oral, q12h      Continuous medications     PRN medications  PRN medications: acetaminophen, heparin flush, HYDROmorphone, loperamide, metoclopramide **OR** metoclopramide, ondansetron **OR** ondansetron, oxyCODONE, traZODone    Results for orders placed or performed during the hospital encounter of 12/13/24 (from the past 24 hours)   Electrocardiogram, 12-lead PRN ACS symptoms   Result Value Ref Range    Ventricular Rate 88 BPM    Atrial Rate 88 BPM    DE Interval 160 ms    QRS Duration 86 ms    QT Interval 366 ms    QTC Calculation(Bazett) 442 ms    P Axis 41 degrees    R Axis 16 degrees    T Axis 34 degrees    QRS Count 15 beats    Q Onset 219 ms    P Onset 139 ms    P Offset 199 ms    T Offset 402 ms    QTC Fredericia 416 ms   Basic Metabolic Panel   Result Value Ref Range    Glucose 95 74 - 99 mg/dL    Sodium 136 136 - 145 mmol/L    Potassium 4.6 3.5 - 5.3 mmol/L    Chloride 101 98 - 107 mmol/L    Bicarbonate 23 21 - 32 mmol/L    Anion Gap 17 10 - 20 mmol/L    Urea Nitrogen 82 (H) 6 - 23 mg/dL    Creatinine 4.30 (H) 0.50 - 1.05 mg/dL    eGFR 11 (L) >60 mL/min/1.73m*2    Calcium 7.5 (L) 8.6 - 10.6 mg/dL   Magnesium   Result Value Ref Range    Magnesium 1.73 1.60 - 2.40 mg/dL   Phosphorus   Result Value Ref Range    Phosphorus 5.3 (H) 2.5 - 4.9 mg/dL   CBC   Result Value Ref Range    WBC 12.9 (H) 4.4 - 11.3 x10*3/uL    nRBC 0.5 (H) 0.0 - 0.0 /100 WBCs    RBC 2.53 (L) 4.00 - 5.20 x10*6/uL    Hemoglobin 7.9 (L) 12.0 - 16.0 g/dL    Hematocrit 22.5 (L) 36.0 - 46.0 %    MCV  89 80 - 100 fL    MCH 31.2 26.0 - 34.0 pg    MCHC 35.1 32.0 - 36.0 g/dL    RDW 17.5 (H) 11.5 - 14.5 %    Platelets 393 150 - 450 x10*3/uL     *Note: Due to a large number of results and/or encounters for the requested time period, some results have not been displayed. A complete set of results can be found in Results Review.     Electrocardiogram, 12-lead PRN ACS symptoms    Result Date: 12/16/2024  Sinus rhythm with occasional Premature ventricular complexes Low voltage QRS Borderline ECG When compared with ECG of 15-DEC-2024 21:28, (unconfirmed) Sinus rhythm has replaced Atrial flutter Nonspecific T wave abnormality no longer evident in Lateral leads    XR chest 1 view    Result Date: 12/12/2024  Interpreted By:  Harris Carballo, STUDY: XR CHEST 1 VIEW; 12/12/2024 11:38 am   INDICATION: CLINICAL INFORMATION: Signs/Symptoms:Metastatic cancer.   COMPARISON: 08/16/2024   ACCESSION NUMBER(S): [id number]   ORDERING CLINICIAN: JAMIA MERCADO   TECHNIQUE: Portable chest one view.   FINDINGS: The cardiac size is indeterminate in view of the AP projection.  The aorta is tortuous. MediPort catheter overlies the right apex catheter tip overlying the SVC above the right atrium. No infiltrates or effusions are identified.       No acute pathologic findings are identified.   MACRO: none   Signed by: Harris Carballo 12/12/2024 12:21 PM Dictation workstation:   [id number]    ECG 12 lead    Result Date: 12/12/2024  Normal sinus rhythm Low voltage QRS Poor R-wave progression Abnormal ECG When compared with ECG of 19-AUG-2024 03:28, Low voltage QRS is now evident Confirmed by Vidal Tadeo ([id number]) on 12/12/2024 11:28:30 AM    CT abdomen pelvis wo IV contrast    Result Date: 12/12/2024  Interpreted By:  Tawana Mays, STUDY: CT ABDOMEN PELVIS WO IV CONTRAST;  12/12/2024 12:15 am   INDICATION: Signs/Symptoms:abdominal pain, nausea, vomiting.   COMPARISON: 11/04/2024   ACCESSION NUMBER(S): [id number]   ORDERING CLINICIAN:  ALMA BAUMANN   TECHNIQUE: Axial noncontrast CT images of the abdomen and pelvis with coronal and sagittal reconstructed images.   FINDINGS: LOWER CHEST: No acute abnormality of the lung bases. BONES: Stable destructive lesion involving the right inferior pubic ramus and pubic body. Mild diffuse degenerative disc changes. Mild bilateral hip osteoarthrosis. ABDOMINAL WALL: Stable small fat containing supraumbilical ventral hernia.   ABDOMEN: Lack of intravenous contrast limits evaluation of vessels and solid organs. LIVER: Several ill-defined hypodense lesions in the right lobe of the liver grossly stable. The largest is in segment 8 and measures approximately 3.9 cm. BILE DUCTS: No biliary dilatation. GALLBLADDER: No calcified gallstones. No pericholecystic inflammatory changes. PANCREAS: No peripancreatic inflammatory stranding or duct dilatation. SPLEEN: Within normal limits. ADRENALS: Within normal limits.   KIDNEYS, URETERS, URINARY BLADDER: Stable position of left percutaneous nephrostomy catheter and right percutaneous nephroureteral catheter. No hydronephrosis. Stable atrophy of the left kidney.  Urinary bladder is nondistended.   VESSELS: No aortic aneurysm. RETROPERITONEUM: Stable appearance of multiple enlarged retrocrural, retroperitoneal, external iliac chain and inguinal lymph nodes, most of which are partially calcified. Numerous small subcentimeter calcified perirectal lymph nodes are stable.   PELVIS:   REPRODUCTIVE ORGANS: Uterine mass is stable to slightly decreased in size, measuring 7.9 x 6.4 cm (previously 7.8 x 7.1 cm). Right adnexal mass measuring 4 x 4.5 cm previously measured 4.4 x 4.7 cm. Left adnexal mass measures 2.8 cm, previously 2.5 cm. Is the slight differences in these measurements may be related to slight change in position. Additional fat containing right adnexal mass is stable. No significant free pelvic fluid.   BOWEL: No dilated bowel.  Normal appendix. PERITONEUM: No ascites or  free air, no fluid collection.       No acute abdominal or pelvic process.   The known partially calcified uterine mass is stable to slightly decreased in size. Additional bilateral adnexal masses are grossly stable in size.   Grossly stable abdominopelvic lymphadenopathy and hepatic masses, consistent with metastatic disease.   No significant change in lytic/destructive lesion of the right inferior pubic ramus.   Stable chronic findings as above.   MACRO: None   Signed by: Tawana Mays 12/12/2024 1:20 AM Dictation workstation:   [id number]    Lower extremity venous duplex left    Result Date: 12/5/2024  Interpreted By:  Lily Arita, STUDY: [facility] US LOWER EXTREMITY VENOUS DUPLEX LEFT;  12/4/2024 3:14 pm   INDICATION: Signs/Symptoms:Rule out LLE DVT; advanced cervical cancer.   COMPARISON: Bilateral leg ultrasound on 08/16/2024   ACCESSION NUMBER(S): [id number]   ORDERING CLINICIAN: SYBIL MARRERO   TECHNIQUE: Vascular ultrasound of the  left lower extremity was performed. Real time compression views as well as Gray scale, color Doppler and spectral Doppler waveform analysis was performed.  Augmentation was performed.   FINDINGS: Evaluation of the visualized portions of the  left common femoral vein, proximal, mid, and distal femoral vein, and popliteal vein was performed. There is no deep venous thrombosis.   Evaluation of the calf veins was performed. There is no evidence for deep venous thrombosis of the calf veins. There is edema in the subcutaneous fat of the distal left lower extremity.   Ultrasound of the left inguinal region shows a 3.4 x 1.4 x 1.4 cm complex solid cystic area. This is likely an abnormal lymph node with cystic degeneration or necrosis. This has a compressed echogenic center. On 08/16/2024, this measured 3.3 x 2.0 cm. Ultrasound of the right inguinal region shows a 2.7 x 2.2 x 1.3 cm complex solid cystic area, likely an abnormal lymph node. On 08/16/2024, this measured 1.1 x 0.9 cm.          1. No deep venous thrombosis left lower extremity. 2. Edema subcutaneous fat distal left lower extremity. 3. Bilateral abnormal lymph nodes. On chemotherapy for cervical carcinoma. Has   MACRO: None   Signed by: Lily Arita 12/5/2024 3:55 PM Dictation workstation:   [id number]        Assessment/Plan   Introduction to Integrative Medicine:  Spoke with pt at bedside. Patient seemed to appreciate the extra layer of support.   Integrative Medicine was introduced as a service for patients with serious illness to help with symptoms through non-pharmacological management, such as mindfulness, acupuncture, and gentle bodywork. Such interventions can assist with symptoms such as anxiety, fatigue, nausea, depression and pain.     The [facility] Integrative Medicine Symptom Management program offers multi-disciplinary supervised care of cancer patients using Integrative Modalities billed to insurance using NCCN and SIO/ASCO guideline-driven practices.  ESAS is obtained prior to and after each treatment by the practitioner       Pre- Post-   Wellbeing   5  NA - no treatment today   Coping  4     Pain  8     Fatigue  8     Anxiety   6     Depression  2     Stress  5     Nausea  1              Abdominal pain:   pain related to malignancy   Pain is well-controlled  Defer to supportive oncology team for adequate PO/IV pain regimen   Recommend integrative therapy modalities as pt allows:  -Acupuncture; provided pt education today. Pt agreeable to intervention when next available (available on Tuesdays).       - Pt is an adequate candidate for acupuncture; pt is afebrile, WBC 12.9, ANC 21.32, platelets 393, INR 1.0, PTT 31  -Acupressure, gua sha   -Gentle bodywork and stretching as tolerated      -Art therapy - consult placed   -Music therapy - consult placed   -Chaplaincy - consult placed   -Pet therapy - consult placed (informed pt that this may be delayed, as pt is currently on isolation precautions)        Altered Mood:  Anxiety and/or depression r/t health concerns  History of anxiety/depression  -Recommend integrative medicine modalities as listed above       Thank you for allowing us to participate in the care of this patient. Integrative Medicine Team will continue to follow as needed.  Please contact team with any questions or concerns.           JUN Rae-CNP (available by [employer])  [facility]  Inpatient Integrative Medicine      I spent 80 minutes in the care of this patient which included chart review, interviewing patient/family, discussion with primary team, coordination of care, and documentation.     Medical Decision Making was high level due to high complexity of problems, extensive data review, and high risk of management/treatment.

## 2024-12-16 NOTE — PROGRESS NOTES
12/16/24 1100   Discharge Planning   Living Arrangements Spouse/significant other   Support Systems Spouse/significant other   Type of Residence Private residence   Number of Stairs to Enter Residence 4   Number of Stairs Within Residence 4   Do you have animals or pets at home? No   Home or Post Acute Services Other (Comment)  (Hospice referral)   Expected Discharge Disposition HospiceHome     SW received referral from care team for hospice care.  SW met with pt.  She is alert and oriented x3.  Pt reports care team discussed hospice care over the weekend and she wishes to pursue referral.  SW discussed hospice care, philosophy, and services.  SW also provided pt a choice of hospice agencies.  Pt chose [facility].  SW will make referral to R via [facility].  Support provided.  DELMI Schultz    [facility] is meeting with pt and family between 9:30 and 10am 12/17.  SW will relay to care team.  DELMI Schultz

## 2024-12-16 NOTE — PROGRESS NOTES
Spiritual Care Visit     checked in with the patient per her earlier request. Patient shared that she was still feeling tired and asked if the  could return tomorrow.  will follow-up then.     Patient shared that she was hopeful to get some rest and maybe have some food.     Rev. Duong Acuna, Supportive Oncology

## 2024-12-16 NOTE — PROGRESS NOTES
Shelbi Delaney   64 y.o.    @WT@  MRN/Room: [id number]/[id number]    Subjective:   I visited and examined the patient on bedside.  She is doing fine and she does not have any complaint.  She had an uneventful night.      Meds:   Current Facility-Administered Medications   Medication Dose Route Frequency Provider Last Rate Last Admin    acetaminophen (Tylenol) tablet 975 mg  975 mg oral q6h PRN Jessenia Yadav MD        cefTRIAXone (Rocephin) 1 g in dextrose (iso) IV 50 mL  1 g intravenous q24h Jessenia Yadav MD   Stopped at 12/16/24 0633    [Held by provider] DULoxetine (Cymbalta) DR capsule 20 mg  20 mg oral Daily Jessenia Yadav MD        gabapentin (Neurontin) capsule 300 mg  300 mg oral Nightly Iris Rogers MD   300 mg at 12/15/24 2039    heparin (porcine) injection 5,000 Units  5,000 Units subcutaneous q8h Jessenia Yadav MD   5,000 Units at 12/16/24 1318    heparin flush 10 unit/mL syringe 50 Units  50 Units intra-catheter PRN Jessenia Yadav MD   50 Units at 12/13/24 2231    heparin flush 10 unit/mL syringe 50 Units  50 Units intra-catheter q12h Jessenia Yadav MD   50 Units at 12/13/24 2233    HYDROmorphone (Dilaudid) injection 0.2 mg  0.2 mg intravenous q2h PRN Jessenia Yadav MD   0.2 mg at 12/15/24 2359    levothyroxine (Synthroid, Levoxyl) tablet 25 mcg  25 mcg oral Daily Jessenia Yadav MD   25 mcg at 12/16/24 0804    lidocaine 4 % patch 1 patch  1 patch transdermal Daily Jessenia Yadav MD   1 patch at 12/16/24 0804    loperamide (Imodium) capsule 2 mg  2 mg oral 4x daily PRN Mike Carrasco MD   2 mg at 12/15/24 0808    metoclopramide (Reglan) tablet 10 mg  10 mg oral q8h PRN Jessenia Yadav MD        Or    metoclopramide (Reglan) injection 10 mg  10 mg intravenous q6h PRN Jessenia Yadav MD        metoprolol tartrate (Lopressor) tablet 50 mg  50 mg oral BID Sarah Pappas APRN-CNP        ondansetron (Zofran) tablet 4 mg  4 mg oral q6h PRN Jessenia Yadav MD         Or    ondansetron (Zofran) injection 4 mg  4 mg intravenous q6h PRN Jessenia Yadav MD        oxyCODONE (Roxicodone) immediate release tablet 10 mg  10 mg oral q4h PRN Jessenia Yadav MD   10 mg at 12/16/24 1101    sodium bicarbonate tablet 650 mg  650 mg oral q12h Jessenia Yadav MD   650 mg at 12/16/24 1318    traZODone (Desyrel) tablet 50 mg  50 mg oral Nightly PRN Jessenia Yadav MD   50 mg at 12/13/24 2148          ROS:  The patient is awake and oriented. No dizziness or lightheadedness. No chills and no fever. No headaches. No nausea and no vomiting. No shortness of breath. No cough. No sputum. No chest pain. No chest tightness. No abdominal pain. No diarrhea and no constipation. No hematemesis or hemoptysis. No hematuria. No rectal bleeding. No melena. No epistaxis. No urinary symptoms. No flank pain. No leg edema. No leg pain. No weakness. No itching. Overall, the rest of the review of systems is also negative.  12 point review of systems otherwise negative as stated in HPI.        Physical Examination:        Vitals:    12/16/24 1336   BP: 123/82   Pulse: (!) 125   Resp:    Temp:    SpO2:      General: The patient is awake, oriented, and is not in any distress.  Head and Neck: Normocephalic. No periorbital edema.  Eyes: non-icteric  Respiratory: Symmetric air entry. Symmetric chest expansion.No respiratory distress.  Skin: No maculopapular rash.  Musculoskeletal: No peripheral edema in both left and right upper extremities.  No edema in either left or right lower extremities.  Neuro Exam: Speech is fluent. Moves extremities.    Imaging:  === 03/25/24 ===    US GUIDED PERCUTANEOUS PLACEMENT    - Impression -  Ultrasound and fluoroscopy guided insertion of nephroureteral  catheter right side.    Signed by: Manuel Marvin 3/25/2024 2:12 PM  Dictation workstation:   [id number]       Blood Labs:  Results for orders placed or performed during the hospital encounter of 12/13/24 (from the past 24  hours)   Electrocardiogram, 12-lead PRN ACS symptoms   Result Value Ref Range    Ventricular Rate 88 BPM    Atrial Rate 88 BPM    NE Interval 160 ms    QRS Duration 86 ms    QT Interval 366 ms    QTC Calculation(Bazett) 442 ms    P Axis 41 degrees    R Axis 16 degrees    T Axis 34 degrees    QRS Count 15 beats    Q Onset 219 ms    P Onset 139 ms    P Offset 199 ms    T Offset 402 ms    QTC Fredericia 416 ms   Basic Metabolic Panel   Result Value Ref Range    Glucose 95 74 - 99 mg/dL    Sodium 136 136 - 145 mmol/L    Potassium 4.6 3.5 - 5.3 mmol/L    Chloride 101 98 - 107 mmol/L    Bicarbonate 23 21 - 32 mmol/L    Anion Gap 17 10 - 20 mmol/L    Urea Nitrogen 82 (H) 6 - 23 mg/dL    Creatinine 4.30 (H) 0.50 - 1.05 mg/dL    eGFR 11 (L) >60 mL/min/1.73m*2    Calcium 7.5 (L) 8.6 - 10.6 mg/dL   Magnesium   Result Value Ref Range    Magnesium 1.73 1.60 - 2.40 mg/dL   Phosphorus   Result Value Ref Range    Phosphorus 5.3 (H) 2.5 - 4.9 mg/dL   CBC   Result Value Ref Range    WBC 12.9 (H) 4.4 - 11.3 x10*3/uL    nRBC 0.5 (H) 0.0 - 0.0 /100 WBCs    RBC 2.53 (L) 4.00 - 5.20 x10*6/uL    Hemoglobin 7.9 (L) 12.0 - 16.0 g/dL    Hematocrit 22.5 (L) 36.0 - 46.0 %    MCV 89 80 - 100 fL    MCH 31.2 26.0 - 34.0 pg    MCHC 35.1 32.0 - 36.0 g/dL    RDW 17.5 (H) 11.5 - 14.5 %    Platelets 393 150 - 450 x10*3/uL        Lab Results   Component Value Date    GLUCOSE 95 12/16/2024    CALCIUM 7.5 (L) 12/16/2024     12/16/2024    K 4.6 12/16/2024    CO2 23 12/16/2024     12/16/2024    BUN 82 (H) 12/16/2024    CREATININE 4.30 (H) 12/16/2024         Assessment and Plan:  Shelbi Delaney is a 64 y.o. 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  Nephrology consulted for worsening AIDE on stage IV CKD (baseline Cr 2.3 to 2.4).      # AIDE on stage IV CKD (baseline Cr 2.3 to 2.4) likely secondary to HD instability/hypotension     No major change in creatinine level over the past few days.  Normal potassium and bicarb level.    -Continue supportive care  -LDH and haptoglobin level  -Spot urine protein to creatinine ratio  -Daily renal panel                 Zhen Soares MD  Senior Attending Physician  Director of Onco-Nephrology Program  Division of Nephrology & Hypertension  [facility]

## 2024-12-16 NOTE — CONSULTS
Wound Care Consult     Visit Date: 12/16/2024      Patient Name: Shelbi Delaney         MRN: [id number]           YOB: 1960     Reason for Consult: Assess B/L LE        Wound History: Patient seen at [facility] wound Care [place]      Pertinent Labs:   Albumin   Date Value Ref Range Status   12/13/2024 2.2 (L) 3.4 - 5.0 g/dL Final       Wound Assessment:  Wound 08/16/24 Other (comment) Back Medial (Active)       Wound 08/16/24 Other (comment) Pretibial Right (Active)   Wound Image   12/13/24 0841   Dressing Status Clean;Dry;Occlusive 12/12/24 1435       Wound 12/12/24 Pretibial Left (Active)   Wound Image   12/13/24 2108   Shape rounded x 3 12/13/24 0843   Wound Length (cm) 3 cm 12/16/24 1754   Wound Width (cm) 3 cm 12/16/24 1754   Wound Surface Area (cm^2) 9 cm^2 12/16/24 1754   Wound Depth (cm) 0 cm 12/13/24 0843   Wound Volume (cm^3) 0 cm^3 12/13/24 0843   Margins Well-defined edges 12/13/24 0843   Drainage Description None 12/16/24 1754   Drainage Amount None 12/16/24 1754   Dressing Compression bandage;Hydrofiber;Kerlix/rolled gauze 12/16/24 1754   Dressing Changed Changed 12/16/24 1754   Dressing Status Clean 12/16/24 1754       Wound 12/12/24 Pretibial Right (Active)   Wound Image   12/16/24 1754   Shape irregular diffuse 12/13/24 0841   Wound Length (cm) 3 cm 12/16/24 1754   Wound Width (cm) 4 cm 12/16/24 1754   Wound Surface Area (cm^2) 12 cm^2 12/16/24 1754   Wound Depth (cm) 0.1 cm 12/13/24 0841   Wound Volume (cm^3) 6.8 cm^3 12/13/24 0841   State of Healing Eschar 12/16/24 1754   Margins Poorly defined 12/16/24 1754   Drainage Description None 12/16/24 1754   Drainage Amount None 12/16/24 1754   Dressing Hydrofiber;Compression bandage;Kerlix/rolled gauze 12/16/24 1754   Dressing Changed Changed 12/16/24 1754   Dressing Status Clean 12/16/24 1754       Wound 12/12/24 Tibial Dorsal;Left (Active)   Wound Image   12/16/24 1754   Shape irregular 12/13/24 0843   Wound Length (cm) 10 cm 12/16/24  1754   Wound Width (cm) 1.5 cm 12/16/24 1754   Wound Surface Area (cm^2) 15 cm^2 12/16/24 1754   Wound Depth (cm) 0.1 cm 12/16/24 1754   Wound Volume (cm^3) 1.5 cm^3 12/16/24 1754   Wound Healing % 0 12/16/24 1754   Margins Poorly defined 12/16/24 1754   Drainage Description Serous 12/16/24 1754   Drainage Amount Scant 12/16/24 1754   Dressing Compression bandage;Kerlix/rolled gauze;Hydrofiber 12/16/24 1754   Dressing Changed Changed 12/16/24 1754   Dressing Status Clean 12/16/24 1754       Wound 12/12/24 Tibial Dorsal;Right (Active)   Wound Image   12/13/24 2111   Shape irregular 12/13/24 0843   Wound Length (cm) 5 cm 12/16/24 1754   Wound Width (cm) 3 cm 12/16/24 1754   Wound Surface Area (cm^2) 15 cm^2 12/16/24 1754   Wound Depth (cm) 0.1 cm 12/13/24 0843   Wound Volume (cm^3) 1.75 cm^3 12/13/24 0843   Margins Attached edges;Well-defined edges 12/13/24 0843   Drainage Description None 12/16/24 1754   Drainage Amount None 12/16/24 1754   Dressing Compression bandage;Hydrofiber;Kerlix/rolled gauze 12/16/24 1754   Dressing Changed Changed 12/16/24 1754   Dressing Status Clean 12/16/24 1754       Wound 12/13/24 Buttock (Active)   Wound Image   12/13/24 2104   State of Healing Closed wound edges 12/16/24 0830   Drainage Description None 12/16/24 0830   Drainage Amount Unable to assess 12/16/24 0830   Dressing Silicone border dressing 12/16/24 0830   Dressing Changed New 12/14/24 0454   Dressing Status Clean;Dry;Occlusive 12/16/24 0830       Wound Team Summary Assessment:   Recommendation for all leg wounds: 2x/week  Wash right and left leg  Cleanse wounds with Vashe   Apply medihoney to wound beds  Cover with Aquacel  Cover with ABD dressing   Wrap with Kerlix   Apply ACE wraps        Wound Team Plan: Please review recommendations      Leticia DEVINE   12/16/2024  6:37 PM

## 2024-12-16 NOTE — PROGRESS NOTES
Spiritual Care Visit     introduced self and spiritual care services to patient, explaining services available and checking in to see how patient is doing today. Patient started to have an elevated heartbeat while the  was visiting so  let the patient know he let the team work with her and return later in the afternoon. Patient was grateful for the conversation and support.     Rev. Duong Acuna, Supportive Oncology

## 2024-12-16 NOTE — CONSULTS
Nutrition Note:   Nutrition Assessment    Reason for Assessment: Admission nursing screening    Per Discussion during Rounds, will hold off on evaluation at this time d/t recent/ongoing  GOC discussion.     This service remains available per pt and team request.   Time Spent (min): 15 minutes

## 2024-12-16 NOTE — SIGNIFICANT EVENT
"  Tachycardia   S: RN notified of tachycardia to the 140s. To bedside, patient endorsing chest palpitations and feels generally not herself at this time. Reporting feeling overwhelmed as has had a lot of visitors recently. Denies lightheadedness or SOB. Denies chest pain.     O: /65   Pulse 80   Temp 36.7 °C (98.1 °F)   Resp 20   Ht 1.702 m (5' 7.01\")   Wt 87.1 kg (192 lb 0.3 oz)   SpO2 93%   BMI 30.07 kg/m²   Constitutional: No visible distress, alert and cooperative  Respiratory/Thorax: Normal respiratory effort on RA, CTAB in anterior lung fields  Cardiovascular: elevated rate and regular rhythm  Gastrointestinal: non-distended  Neurological: A&Ox3  Psychological: Appropriate mood and behavior    A/P:     Tachycardia  - symptomatic palpitations, denies lightheadedness/SOB/CP  - /82, satting 93% on RA  - EKG n/f atrial flutter  - home metoprolol 50 mg held, will restart and fu response  - will continue to monitor, can consider a TTE    Roro Elam MD  PGY-1, Obstetrics and Gynecology     "

## 2024-12-17 PROCEDURE — 99233 SBSQ HOSP IP/OBS HIGH 50: CPT | Performed by: NURSE PRACTITIONER

## 2024-12-17 PROCEDURE — 2500000001 HC RX 250 WO HCPCS SELF ADMINISTERED DRUGS (ALT 637 FOR MEDICARE OP)

## 2024-12-17 PROCEDURE — 2500000004 HC RX 250 GENERAL PHARMACY W/ HCPCS (ALT 636 FOR OP/ED)

## 2024-12-17 PROCEDURE — 1200000003 HC ONCOLOGY  ROOM WITH TELEMETRY DAILY

## 2024-12-17 PROCEDURE — 99232 SBSQ HOSP IP/OBS MODERATE 35: CPT

## 2024-12-17 PROCEDURE — 2500000005 HC RX 250 GENERAL PHARMACY W/O HCPCS

## 2024-12-17 PROCEDURE — 2500000001 HC RX 250 WO HCPCS SELF ADMINISTERED DRUGS (ALT 637 FOR MEDICARE OP): Performed by: NURSE PRACTITIONER

## 2024-12-17 PROCEDURE — RXMED WILLOW AMBULATORY MEDICATION CHARGE

## 2024-12-17 PROCEDURE — 99232 SBSQ HOSP IP/OBS MODERATE 35: CPT | Performed by: INTERNAL MEDICINE

## 2024-12-17 RX ORDER — GABAPENTIN 300 MG/1
300 CAPSULE ORAL NIGHTLY
Qty: 30 CAPSULE | Refills: 2 | Status: SHIPPED | OUTPATIENT
Start: 2024-12-17 | End: 2025-03-17

## 2024-12-17 RX ORDER — LIDOCAINE 560 MG/1
1 PATCH PERCUTANEOUS; TOPICAL; TRANSDERMAL DAILY
Qty: 30 PATCH | Refills: 1 | Status: SHIPPED | OUTPATIENT
Start: 2024-12-17 | End: 2025-01-17

## 2024-12-17 RX ORDER — SODIUM BICARBONATE 650 MG/1
650 TABLET ORAL EVERY 12 HOURS
Qty: 60 TABLET | Refills: 0 | Status: SHIPPED | OUTPATIENT
Start: 2024-12-17 | End: 2025-01-17

## 2024-12-17 RX ORDER — OXYCODONE HYDROCHLORIDE 10 MG/1
10 TABLET ORAL EVERY 4 HOURS PRN
Qty: 42 TABLET | Refills: 0 | Status: SHIPPED | OUTPATIENT
Start: 2024-12-17 | End: 2024-12-25

## 2024-12-17 ASSESSMENT — PAIN - FUNCTIONAL ASSESSMENT
PAIN_FUNCTIONAL_ASSESSMENT: 0-10

## 2024-12-17 ASSESSMENT — COGNITIVE AND FUNCTIONAL STATUS - GENERAL
DRESSING REGULAR UPPER BODY CLOTHING: A LITTLE
DRESSING REGULAR LOWER BODY CLOTHING: A LITTLE
WALKING IN HOSPITAL ROOM: A LITTLE
MOBILITY SCORE: 18
EATING MEALS: A LITTLE
TOILETING: A LITTLE
CLIMB 3 TO 5 STEPS WITH RAILING: A LITTLE
DAILY ACTIVITIY SCORE: 18
TURNING FROM BACK TO SIDE WHILE IN FLAT BAD: A LITTLE
MOVING TO AND FROM BED TO CHAIR: A LITTLE
STANDING UP FROM CHAIR USING ARMS: A LITTLE
MOVING FROM LYING ON BACK TO SITTING ON SIDE OF FLAT BED WITH BEDRAILS: A LITTLE
PERSONAL GROOMING: A LITTLE
HELP NEEDED FOR BATHING: A LITTLE

## 2024-12-17 ASSESSMENT — PAIN DESCRIPTION - ORIENTATION
ORIENTATION: LOWER

## 2024-12-17 ASSESSMENT — PAIN SCALES - GENERAL
PAINLEVEL_OUTOF10: 5 - MODERATE PAIN
PAINLEVEL_OUTOF10: 8
PAINLEVEL_OUTOF10: 7

## 2024-12-17 ASSESSMENT — PAIN DESCRIPTION - LOCATION
LOCATION: BACK
LOCATION: BACK
LOCATION: ABDOMEN

## 2024-12-17 NOTE — PROGRESS NOTES
Massage Therapy / Acupuncture Note:Acupuncture Visit:     Shelbi Delaney was referred by Mireille Sanders  .  Session Information  Discipline: Acupuncture  Session Start Time: 1255  Session End Time: 1305  Visit Type: New patient  Conflict of Service : Declined service  Medical History Reviewed: I have reviewed pertinent medical history in EHR, and no contraindications are present to provide treatment  History of Present Illness: Pt referred to service for supportive treatment of low back pain.  She described pain as intermittent.  Number of family members present: 0  Number of staff members present: 1         Pain Assessment  Pain Type: Acute pain  Pain Location: Abdomen  Pain Orientation: Lower  Pain Interventions: Medication (See MAR)         Provider reviewed plan for the acupuncture session, precautions and contraindications. Patient/guardian/hospital staff has given consent to treat with full understanding of what to expect during thesession. Before acupuncture began, provider explained to the patient to communicate at any time if the procedure was causing discomfort past their tolerance level. Patient agreed to advise acupuncturist. The acupuncturist counseled the patient on the risks of acupuncture treatment including pain, infection, bleeding, and no relief of pain. The patient was positioned comfortably. There was no evidence of infection at the site of needle insertions.       No annotated images are attached to the encounter.         Post-treatment Assessment  0-10 (Numeric) Pain Score: 8    Evaluation/Recommendation/Follow-up  Total Session Time (min): 10 minutes

## 2024-12-17 NOTE — PROGRESS NOTES
"Shelbi Delaney is a 64 y.o. female on day 4 of admission presenting with Electrolyte abnormality.    Subjective   Pt assessed this morning; pt declined acupuncture/acupressure, gentle bodywork. Pt states she may be interested in reiki and guided imagery later today. Pt assessed this afternoon; pt sleeping.     Objective     Physical Exam  Vitals and nursing note reviewed.   Constitutional:       General: She is not in acute distress.     Appearance: Normal appearance. She is normal weight. She is ill-appearing.   HENT:      Head: Normocephalic and atraumatic.      Nose: Nose normal.      Mouth/Throat:      Mouth: Mucous membranes are moist.   Eyes:      Extraocular Movements: Extraocular movements intact.      Pupils: Pupils are equal, round, and reactive to light.   Cardiovascular:      Rate and Rhythm: Normal rate.   Pulmonary:      Effort: Pulmonary effort is normal.   Abdominal:      General: Abdomen is flat.      Palpations: Abdomen is soft.   Skin:     General: Skin is warm and dry.   Neurological:      General: No focal deficit present.      Mental Status: She is alert and oriented to person, place, and time. Mental status is at baseline.   Psychiatric:         Mood and Affect: Mood normal.         Behavior: Behavior normal.         Last Recorded Vitals  Blood pressure 119/67, pulse 109, temperature 37 °C (98.6 °F), resp. rate 18, height 1.702 m (5' 7.01\"), weight 87.1 kg (192 lb 0.3 oz), SpO2 96%.  Intake/Output last 3 Shifts:  I/O last 3 completed shifts:  In: 150 (1.7 mL/kg) [IV Piggyback:150]  Out: 2365 (27.2 mL/kg) [Urine:2365 (0.8 mL/kg/hr)]  Weight: 87.1 kg     Relevant Results  Scheduled medications  cefTRIAXone, 1 g, intravenous, q24h  [Held by provider] DULoxetine, 20 mg, oral, Daily  gabapentin, 300 mg, oral, Nightly  heparin (porcine), 5,000 Units, subcutaneous, q8h  heparin flush, 50 Units, intra-catheter, q12h  levothyroxine, 25 mcg, oral, Daily  lidocaine, 1 patch, transdermal, Daily  metoprolol " tartrate, 50 mg, oral, BID  sodium bicarbonate, 650 mg, oral, q12h      Continuous medications     PRN medications  PRN medications: acetaminophen, heparin flush, HYDROmorphone, loperamide, metoclopramide **OR** metoclopramide, ondansetron **OR** ondansetron, oxyCODONE, traZODone    Results for orders placed or performed during the hospital encounter of 12/13/24 (from the past 24 hours)   Electrocardiogram, 12-lead PRN ACS symptoms   Result Value Ref Range    Ventricular Rate 121 BPM    Atrial Rate 278 BPM    QRS Duration 86 ms    QT Interval 290 ms    QTC Calculation(Bazett) 411 ms    R Axis -5 degrees    T Axis 202 degrees    QRS Count 20 beats    Q Onset 224 ms    T Offset 369 ms    QTC Fredericia 366 ms     *Note: Due to a large number of results and/or encounters for the requested time period, some results have not been displayed. A complete set of results can be found in Results Review.     Electrocardiogram, 12-lead PRN ACS symptoms    Result Date: 12/17/2024  Sinus rhythm with Blocked Premature atrial complexes Otherwise normal ECG When compared with ECG of 15-DEC-2024 21:29, (unconfirmed) Premature ventricular complexes are no longer Present Premature atrial complexes are now Present    Electrocardiogram, 12-lead PRN ACS symptoms    Result Date: 12/17/2024  Atrial flutter with variable AV block Anterior infarct , age undetermined Abnormal ECG When compared with ECG of 16-DEC-2024 10:12, (unconfirmed) Atrial flutter has replaced Sinus rhythm Vent. rate has increased BY  46 BPM Anterior infarct is now Present Nonspecific T wave abnormality now evident in Lateral leads    Electrocardiogram, 12-lead PRN ACS symptoms    Result Date: 12/16/2024  Normal sinus rhythm Nonspecific ST and T wave abnormality Abnormal ECG When compared with ECG of 14-DEC-2024 00:09, (unconfirmed) No significant change was found    ECG 12 lead    Result Date: 12/16/2024  Normal sinus rhythm Low voltage QRS Borderline ECG When compared  with ECG of 14-DEC-2024 18:08, (unconfirmed) No significant change was found    Electrocardiogram, 12-lead PRN ACS symptoms    Result Date: 12/16/2024  Sinus rhythm with occasional Premature ventricular complexes Low voltage QRS Borderline ECG When compared with ECG of 15-DEC-2024 21:28, (unconfirmed) Sinus rhythm has replaced Atrial flutter Nonspecific T wave abnormality no longer evident in Lateral leads    XR chest 1 view    Result Date: 12/12/2024  Interpreted By:  Harris Carballo, STUDY: XR CHEST 1 VIEW; 12/12/2024 11:38 am   INDICATION: CLINICAL INFORMATION: Signs/Symptoms:Metastatic cancer.   COMPARISON: 08/16/2024   ACCESSION NUMBER(S): [id number]   ORDERING CLINICIAN: JAMIA MERCADO   TECHNIQUE: Portable chest one view.   FINDINGS: The cardiac size is indeterminate in view of the AP projection.  The aorta is tortuous. MediPort catheter overlies the right apex catheter tip overlying the SVC above the right atrium. No infiltrates or effusions are identified.       No acute pathologic findings are identified.   MACRO: none   Signed by: Harris Carballo 12/12/2024 12:21 PM Dictation workstation:   [id number]    ECG 12 lead    Result Date: 12/12/2024  Normal sinus rhythm Low voltage QRS Poor R-wave progression Abnormal ECG When compared with ECG of 19-AUG-2024 03:28, Low voltage QRS is now evident Confirmed by Vidal Tadeo ([id number]) on 12/12/2024 11:28:30 AM    CT abdomen pelvis wo IV contrast    Result Date: 12/12/2024  Interpreted By:  Tawana Mays, STUDY: CT ABDOMEN PELVIS WO IV CONTRAST;  12/12/2024 12:15 am   INDICATION: Signs/Symptoms:abdominal pain, nausea, vomiting.   COMPARISON: 11/04/2024   ACCESSION NUMBER(S): [id number]   ORDERING CLINICIAN: ALMA BAUMANN   TECHNIQUE: Axial noncontrast CT images of the abdomen and pelvis with coronal and sagittal reconstructed images.   FINDINGS: LOWER CHEST: No acute abnormality of the lung bases. BONES: Stable destructive lesion involving the right  inferior pubic ramus and pubic body. Mild diffuse degenerative disc changes. Mild bilateral hip osteoarthrosis. ABDOMINAL WALL: Stable small fat containing supraumbilical ventral hernia.   ABDOMEN: Lack of intravenous contrast limits evaluation of vessels and solid organs. LIVER: Several ill-defined hypodense lesions in the right lobe of the liver grossly stable. The largest is in segment 8 and measures approximately 3.9 cm. BILE DUCTS: No biliary dilatation. GALLBLADDER: No calcified gallstones. No pericholecystic inflammatory changes. PANCREAS: No peripancreatic inflammatory stranding or duct dilatation. SPLEEN: Within normal limits. ADRENALS: Within normal limits.   KIDNEYS, URETERS, URINARY BLADDER: Stable position of left percutaneous nephrostomy catheter and right percutaneous nephroureteral catheter. No hydronephrosis. Stable atrophy of the left kidney.  Urinary bladder is nondistended.   VESSELS: No aortic aneurysm. RETROPERITONEUM: Stable appearance of multiple enlarged retrocrural, retroperitoneal, external iliac chain and inguinal lymph nodes, most of which are partially calcified. Numerous small subcentimeter calcified perirectal lymph nodes are stable.   PELVIS:   REPRODUCTIVE ORGANS: Uterine mass is stable to slightly decreased in size, measuring 7.9 x 6.4 cm (previously 7.8 x 7.1 cm). Right adnexal mass measuring 4 x 4.5 cm previously measured 4.4 x 4.7 cm. Left adnexal mass measures 2.8 cm, previously 2.5 cm. Is the slight differences in these measurements may be related to slight change in position. Additional fat containing right adnexal mass is stable. No significant free pelvic fluid.   BOWEL: No dilated bowel.  Normal appendix. PERITONEUM: No ascites or free air, no fluid collection.       No acute abdominal or pelvic process.   The known partially calcified uterine mass is stable to slightly decreased in size. Additional bilateral adnexal masses are grossly stable in size.   Grossly stable  abdominopelvic lymphadenopathy and hepatic masses, consistent with metastatic disease.   No significant change in lytic/destructive lesion of the right inferior pubic ramus.   Stable chronic findings as above.   MACRO: None   Signed by: Tawana Mays 12/12/2024 1:20 AM Dictation workstation:   [id number]    Lower extremity venous duplex left    Result Date: 12/5/2024  Interpreted By:  Lily Arita, STUDY: [facility] US LOWER EXTREMITY VENOUS DUPLEX LEFT;  12/4/2024 3:14 pm   INDICATION: Signs/Symptoms:Rule out LLE DVT; advanced cervical cancer.   COMPARISON: Bilateral leg ultrasound on 08/16/2024   ACCESSION NUMBER(S): [id number]   ORDERING CLINICIAN: SYBIL MARRERO   TECHNIQUE: Vascular ultrasound of the  left lower extremity was performed. Real time compression views as well as Gray scale, color Doppler and spectral Doppler waveform analysis was performed.  Augmentation was performed.   FINDINGS: Evaluation of the visualized portions of the  left common femoral vein, proximal, mid, and distal femoral vein, and popliteal vein was performed. There is no deep venous thrombosis.   Evaluation of the calf veins was performed. There is no evidence for deep venous thrombosis of the calf veins. There is edema in the subcutaneous fat of the distal left lower extremity.   Ultrasound of the left inguinal region shows a 3.4 x 1.4 x 1.4 cm complex solid cystic area. This is likely an abnormal lymph node with cystic degeneration or necrosis. This has a compressed echogenic center. On 08/16/2024, this measured 3.3 x 2.0 cm. Ultrasound of the right inguinal region shows a 2.7 x 2.2 x 1.3 cm complex solid cystic area, likely an abnormal lymph node. On 08/16/2024, this measured 1.1 x 0.9 cm.         1. No deep venous thrombosis left lower extremity. 2. Edema subcutaneous fat distal left lower extremity. 3. Bilateral abnormal lymph nodes. On chemotherapy for cervical carcinoma. Has   MACRO: None   Signed by: Lily Arita 12/5/2024  3:55 PM Dictation workstation:   [id number]         Assessment/Plan   Assessment & Plan  Electrolyte abnormality      The [facility] Integrative Medicine Symptom Management program offers multi-disciplinary supervised care of cancer patients using Integrative Modalities billed to insurance using NCCN and SIO/ASCO guideline-driven practices.        Abdominal pain:   pain related to malignancy   Pain is well-controlled  Defer to supportive oncology team for adequate PO/IV pain regimen   Recommend integrative therapy modalities as pt allows:  -Acupuncture; provided pt education today. Pt declined services today.       - Pt is an adequate candidate for acupuncture; pt is afebrile, WBC 12.9, ANC 21.32, platelets 393, INR 1.0, PTT 31  -Acupressure, gua sha   -Gentle bodywork and stretching as tolerated      -Art therapy - consult placed   -Music therapy - consult placed   -Chaplaincy - consult placed   -Pet therapy - consult placed (informed pt that this may be delayed, as pt is currently on isolation precautions)        Altered Mood:  Anxiety and/or depression r/t health concerns  History of anxiety/depression  -Recommend integrative medicine modalities as listed above        Thank you for allowing us to participate in the care of this patient. Integrative Medicine Team will continue to follow as needed.  Please contact team with any questions or concerns.           JUN Rae-CNP (available by [employer])  [facility]  Inpatient Integrative Medicine      I spent 45 minutes in the care of this patient which included chart review, interviewing patient/family, discussion with primary team, coordination of care, and documentation.     Medical Decision Making was high level due to high complexity of problems, extensive data review, and high risk of management/treatment.

## 2024-12-17 NOTE — PROGRESS NOTES
Shelbi Delaney   64 y.o.    @WT@  MRN/Room: [id number]/6023-A    Subjective:   I visited and examined the patient on bedside.  She is doing fine and she does not have any complaint.  She had an uneventful night.    Meds:   Current Facility-Administered Medications   Medication Dose Route Frequency Provider Last Rate Last Admin    acetaminophen (Tylenol) tablet 975 mg  975 mg oral q6h PRN Jessenia Yadav MD        cefTRIAXone (Rocephin) 1 g in dextrose (iso) IV 50 mL  1 g intravenous q24h Jessenia Yadav MD   Stopped at 12/17/24 0548    [Held by provider] DULoxetine (Cymbalta) DR capsule 20 mg  20 mg oral Daily Jessenia Yadav MD        gabapentin (Neurontin) capsule 300 mg  300 mg oral Nightly Iris Rogers MD   300 mg at 12/16/24 2150    heparin (porcine) injection 5,000 Units  5,000 Units subcutaneous q8h Jessenia Yadav MD   5,000 Units at 12/17/24 0505    heparin flush 10 unit/mL syringe 50 Units  50 Units intra-catheter PRN Jessenia Yadav MD   50 Units at 12/13/24 2231    heparin flush 10 unit/mL syringe 50 Units  50 Units intra-catheter q12h Jessenia Yadav MD   50 Units at 12/13/24 2233    HYDROmorphone (Dilaudid) injection 0.2 mg  0.2 mg intravenous q2h PRN Jessenia Yadav MD   0.2 mg at 12/15/24 2359    levothyroxine (Synthroid, Levoxyl) tablet 25 mcg  25 mcg oral Daily Jessenia Yadav MD   25 mcg at 12/16/24 0804    lidocaine 4 % patch 1 patch  1 patch transdermal Daily Jessenia Yadav MD   1 patch at 12/16/24 0804    loperamide (Imodium) capsule 2 mg  2 mg oral 4x daily PRN Mike Carrasco MD   2 mg at 12/15/24 0808    metoclopramide (Reglan) tablet 10 mg  10 mg oral q8h PRN Jessenia Yadav MD        Or    metoclopramide (Reglan) injection 10 mg  10 mg intravenous q6h PRN Jessenia Yadav MD        metoprolol tartrate (Lopressor) tablet 50 mg  50 mg oral BID JUN Clayton-CNP   50 mg at 12/16/24 2150    ondansetron (Zofran) tablet 4 mg  4 mg oral q6h PRN  Jessenia Yadav MD        Or    ondansetron (Zofran) injection 4 mg  4 mg intravenous q6h PRN Jessenia Yadav MD        oxyCODONE (Roxicodone) immediate release tablet 10 mg  10 mg oral q4h PRN Jessenia Yadav MD   10 mg at 12/17/24 0501    sodium bicarbonate tablet 650 mg  650 mg oral q12h Jessenia Yadav MD   650 mg at 12/17/24 0016    traZODone (Desyrel) tablet 50 mg  50 mg oral Nightly PRN Jessenia Yadav MD   50 mg at 12/13/24 2148        Physical Examination:        Vitals:    12/17/24 0932   BP: 103/67   Pulse: 75   Resp: 18   Temp: 37.2 °C (99 °F)   SpO2: 95%     General: The patient is awake, oriented, and is not in any distress.  Head and Neck: Normocephalic. No periorbital edema.  Eyes: non-icteric  Respiratory: Symmetric air entry. Symmetric chest expansion.No respiratory distress.  Skin: No maculopapular rash.  Musculoskeletal: No peripheral edema in both left and right upper extremities.  No edema in either left or right lower extremities.  Neuro Exam: Speech is fluent. Moves extremities.    Imaging:  === 03/25/24 ===    US GUIDED PERCUTANEOUS PLACEMENT    - Impression -  Ultrasound and fluoroscopy guided insertion of nephroureteral  catheter right side.    Signed by: Manuel Marvin 3/25/2024 2:12 PM  Dictation workstation:   [id number]       Blood Labs:  Results for orders placed or performed during the hospital encounter of 12/13/24 (from the past 24 hours)   Electrocardiogram, 12-lead PRN ACS symptoms   Result Value Ref Range    Ventricular Rate 121 BPM    Atrial Rate 278 BPM    QRS Duration 86 ms    QT Interval 290 ms    QTC Calculation(Bazett) 411 ms    R Axis -5 degrees    T Axis 202 degrees    QRS Count 20 beats    Q Onset 224 ms    T Offset 369 ms    QTC Fredericia 366 ms        Lab Results   Component Value Date    GLUCOSE 95 12/16/2024    CALCIUM 7.5 (L) 12/16/2024     12/16/2024    K 4.6 12/16/2024    CO2 23 12/16/2024     12/16/2024    BUN 82 (H) 12/16/2024     CREATININE 4.30 (H) 12/16/2024         Assessment and Plan:  Shelbi Delaney is a 64 y.o. 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  Nephrology consulted for worsening AIDE on stage IV CKD (baseline Cr 2.3 to 2.4).      # AIDE on stage IV CKD (baseline Cr 2.3 to 2.4) likely secondary to HD instability/hypotension      No major change in creatinine level over the past few days.  Last creatinine level is 4.3.  Normal potassium and bicarb level.  She has good urine output.     -Continue supportive care  -LDH and haptoglobin level  -Spot urine protein to creatinine ratio  -Daily renal panel             Zhen Soares MD  Senior Attending Physician  Director of Onco-Nephrology Program  Division of Nephrology & Hypertension  [facility]

## 2024-12-17 NOTE — PROGRESS NOTES
"Shelbi Delaney is a 64 y.o. female on day 2 of admission presenting with Electrolyte abnormality.      Subjective   Pt resting comfortably in bed, denies no n/v. Endorsing back pain still. Tolerating PO without nausea/vomiting. Reporting 1 episode of diarrhea with 2 loose Bms. Denies CP/SOB or palpitaitons. Denies fever or chills.     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  review with history of cervical mass consistent with GYN primary  - Extension to rectal mucosa, pubic ramus with ?reactive mediastinal LN   - Bilateral nephrostomy placement   2/16/23 - 6/1/23: Carbo AUC5/Taxol 175 +Avastin 15mg/kg, Pembrolizumab 200mg/m2  - C2-C4 +Avastin - further treatments held due to progressive gr2 proteinuria   - Grade 3 anemia declining transfusion,   7/20/23 - 12/18/23: Pembrolizumab 200mg/m2, progressive disease  1/11/24 - 3/14/24: Tivdak x3, progressive disease   - C1: 0.9mg/kg in setting of CrCl < 30  3/2024-7/2024: Taxotere, progressive disease  8/1/24 - 11/5/24: Topotecan q week x3, progressive disease    Molecular Testing  PLD1: CPS 10  1/2024 Tempus xT - PIK3CA p.[id number] missense (gain): consider alpelisib  Loss of function: BCORL1, KMT2C, NSD1, KMT2D, RAD21  TMB 7.9mB - RACHELE  - Her2 Neg     Cervical cancer, FIGO stage RONIT       Last Recorded Vitals  Blood pressure 96/57, pulse 86, temperature 36.9 °C (98.4 °F), resp. rate 18, height 1.702 m (5' 7.01\"), weight 87.1 kg (192 lb 0.3 oz), SpO2 92%.  Intake/Output last 3 Shifts:    Intake/Output Summary (Last 24 hours) at 12/14/2024 1248  Last data filed at 12/14/2024 1121  Gross per 24 hour   Intake 1845.42 ml   Output 775 ml   Net 1070.42 ml       Physical Exam  General: no acute distress  HEENT: normocephalic, atraumatic  CV: warm and well perfused, RRR  Lungs: breathing comfortably on room air, CTAB in anterior lung fields  Abdomen: " Mildly tender to deep palpation, soft, nondistended  : bilateral PCNTs in place, R with yellow urine, L bag with scant fluid  Extremities: moving all extremities spontaneously, SCDs in place over bilateral calves  Neuro: awake and conversant  Psych: appropriate mood and affect      Assessment/Plan   Shelbi Delaney is a 64 y.o. female with stage RONIT moderately differentiated SCC of cervix with progressive disease on Topotecan, now on Gemzar with palliative intent who was admitted as a transfer from [facility] where she presented to the ED after pre-chemo labs show numerous lab abnormalities     Assessment & Plan  Electrolyte abnormality    Shelbi Delaney is a 64 y.o. female with stage RONIT moderately differentiated SCC of cervix with progressive disease on Topotecan, now on Gemzar with palliative intent who was admitted as a transfer from [facility] where she presented to the ED after pre-chemo labs show numerous lab abnormalities     AIDE on CKD Stage IV   Electrolyte Abnormalities   - h/o CKD 2/2 obstructive uropathy (baseline Cr ~2), with Cr on arrival 5.24, associated hyperkalemia and metabolic acidosis.   - s/p IVF, IV bicarb gtt, lokelma at OSH with improvement in metabolic derangements on arrival   - Cr slowly downtrending, AM labs pending  - Bilateral nephrostomy tubes in place, L with low output. IR eval, no indication for PCNT exchange. S/p urology consult, no further recs.   - Nephrology consulted - appreciate recs. Cont PO sodium bicarb 650 BID.   - s/p mIVF x24 hrs, encourage PO hydration   - daily labs, replete lytes PRN.      Anemia  - Admission Hgb 5.9, s/p 3u pRBCs at OSH --> Hgb 8 on arrival to [facility]   - likely in s/o chemotherapy, no overt signs of bleeding, hematemesis, melena.   - initial concern for dark stools at OSH, FOBT not collected. GI cs at OSH rec non-urgent EGD/colonoscopy. Defer GI consult given anemia resolved, no further symptoms.   - T&C x2u pRBCs     Suspected UTI   - OSH U/A with +LE,  reflex urine with contamination. Repeat urine cx pending.   - cont IV ceftriaxone 1g q24h pending repeat urine culture  - IR eval, no indication for PCNT exchange    Diarrhea  - C diff path ordered, unable to be collected. Will collect if recurrent episode   - cont imodium, titrate per symptoms.      Lower Extremity Wounds   - bilateral open wounds on lower extremity, covered in dressing, s/p wound care cs at OSH. Pictures in media tab.   - wound care cs pending      Cervical cancer, FIGO stage RONIT   - 11/4 CT demonstrating progression of disease, pt initially on D1/D15 gemcitabine (+d16 gCSF) for palliative intent.   - ongoing GOC conversation with pt and partner, pt expressed primary goal is being at home. Amenable to hospice consult this admission to review options. DNR/DNI per code discussion 12/14. Hospice consult this AM   - supportive onc following, appreciate recs. Cont 10mg oxycodone q4h PRN, 0.2mg IV dilaudid q2h PRN, gabapentin 300mg qhs for pain control        Dispo: continue inpatient management     Seen and discussed with Dr. Li Elam MD  PGY-1, Obstetrics and Gynecology   Pager [phone]

## 2024-12-17 NOTE — PROGRESS NOTES
Spiritual Care Visit  Spiritual Care Request    Reason for Visit:  Routine Visit: Follow-up  Continue Visiting: Yes     Request Received From:  Referral From: Patient    Focus of Care:  Visited With: Patient       Care Provided:  Intended Effects: Aligning care plan with patient's values, Build relationship of care and support, Promote sense of peace  Methods: Encourage self care  Interventions: Active listening, Ask guided questions, Discuss concerns, Facilitate life review     visited with the patient. Patient said her significant other had to cancel the hospice appointment because he was feeling sick. Patient shared she feels ready for discharge and reports wanting to meet with hospice at home.     Patient feels at peace with her decision about hospice and in thinking about her own death. We spoke about what this has been like for her, how she has come to peace with it all, and what her hopes/expectations are moving forward. Patient spoke about spending time with her loved ones, doing the things she enjoys and taking it day by day. Patient was grateful for the conversation and the opportunity to talk.     Patient had no other needs at this time.     Rev. Duong Acuna, Supportive Oncology

## 2024-12-17 NOTE — PROGRESS NOTES
Shelbi Delaney is a 64 y.o. female on day 4 of admission presenting with Electrolyte abnormality.    SUPPORTIVE AND PALLIATIVE ONCOLOGY PROGRESS NOTE      SERVICE DATE: 12/17/2024      SUBJECTIVE:  Interval Events:  Pt laying in bed, NAD.  She is hopinh to go home ASAP.  Canceled hospice meeting this AM bc her  is sick.  She plans to meet with them at the house when she gets home.  We talked about hospice services.  Reports pain is well controlled.  BM today- diarrhea.    Pain Assessment:  Location:  ABD and back  Duration: Constant  Characteristics:              Rating: Severe              Descriptors: cramping              Aggravating: movement               Relieving: Analgesics, Positioning, and Modifying activity              Intolerances:Shelbi Delaney is allergic to l-lysine [lysine] and adhesive.              Personal Pain Goal: 2               Interference with Function: Very Much    Opioid Requirements  Past 24 h opioid requirements (12/16/24 at 0800 to 12/17/24 at 0800):   Oxycodone IR 10 mg PO x 4 doses = 40 mg = 60 OME  Total 24h OME use:  60  Symptom Assessment:  Constipation none  Lack of energy very much   Worrying somewhat    Information obtained from: chart review, interview of patient, and discussion with primary team  ______________________________________________________________________        Objective       Lab Results   Component Value Date    WBC 12.9 (H) 12/16/2024    HGB 7.9 (L) 12/16/2024    HCT 22.5 (L) 12/16/2024    MCV 89 12/16/2024     12/16/2024      Lab Results   Component Value Date    GLUCOSE 95 12/16/2024    CALCIUM 7.5 (L) 12/16/2024     12/16/2024    K 4.6 12/16/2024    CO2 23 12/16/2024     12/16/2024    BUN 82 (H) 12/16/2024    CREATININE 4.30 (H) 12/16/2024     Lab Results   Component Value Date    ALT 7 12/13/2024    AST 18 12/13/2024    ALKPHOS 88 12/13/2024    BILITOT 0.3 12/13/2024     Estimated Creatinine Clearance: 15 mL/min (A) (by C-G formula  based on SCr of 4.3 mg/dL (H)).       Scheduled medications   cefTRIAXone, 1 g, intravenous, q24h  [Held by provider] DULoxetine, 20 mg, oral, Daily  gabapentin, 300 mg, oral, Nightly  heparin (porcine), 5,000 Units, subcutaneous, q8h  heparin flush, 50 Units, intra-catheter, q12h  levothyroxine, 25 mcg, oral, Daily  lidocaine, 1 patch, transdermal, Daily  metoprolol tartrate, 50 mg, oral, BID  sodium bicarbonate, 650 mg, oral, q12h      Continuous medications     PRN medications  acetaminophen, 975 mg, q6h PRN  heparin flush, 50 Units, PRN  HYDROmorphone, 0.2 mg, q2h PRN  loperamide, 2 mg, 4x daily PRN  metoclopramide, 10 mg, q8h PRN   Or  metoclopramide, 10 mg, q6h PRN  ondansetron, 4 mg, q6h PRN   Or  ondansetron, 4 mg, q6h PRN  oxyCODONE, 10 mg, q4h PRN  traZODone, 50 mg, Nightly PRN                    PHYSICAL EXAMINATION:  Vital Signs:   Vital signs reviewed  Vitals:    12/17/24 0932   BP: 103/67   Pulse: 75   Resp: 18   Temp: 37.2 °C (99 °F)   SpO2: 95%     Pain Score: 5 - Moderate pain     Physical Exam  Well-developed  F Laying in bed, NAD  A&O x 3, pleasant and cooperative with interview & exam  Breathing comfortably on RA  Abd soft, NTND, BS +  No edema in ext      ASSESSMENT/PLAN:  Shelbi Delaney is a 64 y.o. 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 PMH significant for CKD due to obstructive uropathy with baseline Cr. in the 2's and anemia.  Admitted 12/13/2024 for further evaluation and management of pre-chemo lab abnormalities.. Supportive and Palliative Oncology is consulted for Introduction to Supportive and Palliative Oncology Services.      Pain:  ABD and back pain related to cancer  Pain is: cancer related pain  Type: visceral, somatic, and neuropathic  Pain control: well-controlled  Home regimen:  Oxycodone 10mg and Gabapentin 300mg BID  Intolerances/previously tried:  NA  Personalized pain goal: Mild  Total OME usage for the past 24 hours:  51.4  Pt follows [facility] management [facility] with Dr. Candelaria  Decrease to 300mg gabapentin at bedtime (AIDE)  Hold 20mg duloxetine daily (AIDE)  Continue 10mg oxycodone q4h PRN  Continue 0.2mg IV dilaudid q2h PRN     Nausea:  At risk for nausea with vomiting related to chemotherapy and opioids   Home regimen:  Ondansetron   Well-controlled  Continue 4mg zofran q6h PRN     Constipation/ diarrhea-   At risk for constipation related to medication side effects (including opioids), currently not constipated- had diarrhea x 3 today  Usual bowel pattern: every day  Home regimen: none  LBM today  Imodium PRN  Goal to have BM without straining q48-72h, adjust regimen as needed     Altered Mood:  Acute on chronic anxiety and depression related to health concerns   controlled with home regimen   Home regimen: Duloxetine 20mg  Hold duloxetine (AIDE)     Medical Decision Making/Goals of Care/Advance Care Planning:  Patient's current clinical condition, including diagnosis, prognosis, and management plan, and goals of care were discussed.   Life limiting disease: metastatic malignancy  Family: Supportive   Performance status: Major  limitations due to fatigue and disease process  Joys/meaning/strength: Family and [place]  Understanding of health:   12/14:  Demonstrates fair understanding of disease process, understands her electrolytes are off s/p chemotherapy.  Plan to replete.  Pt chose DNR/DNI earlier today.  Plan for further GOC tomorrow with the family.  Pt is no longer candidate for treatment.  Information:Wants full disclosure  12/17/24:  pt postponed hospice meeting today as her  is sick and unable to attend.  Plans to meet with them at her home.  We discussed hospice services.  Pt is requesting short supply of oxycodone meds to bed to get her through to enrolling with hospice.  Discussed with primary team.  Prognosis:poor  Goals: symptom  control  Worries and fears now and future: ongoing symptoms, inability to receive cancer treatment, and death      Advance Directives  Existence of Advance Directives:  Non- has interest  Decision maker: Surrogate decision maker is Kenn StevensCameron chase to complete HCPOA  Code Status: DNR DNI     Supportive Interventions: Interventions: Music Therapy: referral placed, Art Therapy: referral placed, SPO Spiritual Care: referral placed, Social work referral for: Advance Directives     Disposition:  Please  start the process of having prior authorization with meds to beds deliver medications to patient prior to discharge via [facility] pharmacy. Prescriptions will need to be sent 48-72 hours prior to discharge so that a prior authorization can be completed.      Discharge date: unknown pending acute issues  Patient has an appointment with Outpatient Supportive Oncology JUN Jiang-CNP 1/7/25        Supportive and Palliative Oncology encounter:  Spoke with patient at bedside  Emotional support provided  Coordination of care:  medication changes and home-going prescription recommendations    Signature and billing:  Medical complexity was high level due to due to complexity of problems, extensive data review, and high risk of management/treatment.    DATA   Diagnostic tests and information reviewed for today's visit:  Conversation with primary team, Most recent labs and imaging results, Medications       Some elements copied from my note on 12/14/24, the elements have been updated and all reflect current decision making from today, 12/17/2024.    Plan of Care discussed with: Provider and Patient    Thank you for asking Supportive and Palliative Oncology to assist with care of this patient.  Recommendations will be communicated back to the consulting service by way of shared electronic medical record/secure chat/email or face-to-face.   We will continue to follow.  Please contact us for additional questions or  concerns.      SIGNATURE: KENDALL Talley  PAGER/CONTACT:  Contact information:  Supportive and Palliative Oncology  Monday-Friday 8 AM-5 PM  Epic Secure chat or pager [phone].  After hours and weekends:  pager [phone]

## 2024-12-17 NOTE — CARE PLAN
The clinical goals for the shift include pt will remain aware of poc.      Problem: Pain - Adult  Goal: Verbalizes/displays adequate comfort level or baseline comfort level  Outcome: Progressing     Problem: Safety - Adult  Goal: Free from fall injury  Outcome: Progressing     Problem: Discharge Planning  Goal: Discharge to home or other facility with appropriate resources  Outcome: Progressing     Problem: Chronic Conditions and Co-morbidities  Goal: Patient's chronic conditions and co-morbidity symptoms are monitored and maintained or improved  Outcome: Progressing

## 2024-12-18 ENCOUNTER — PHARMACY VISIT (OUTPATIENT)
Dept: PHARMACY | Facility: CLINIC | Age: 64
End: 2024-12-18
Payer: COMMERCIAL

## 2024-12-18 VITALS
OXYGEN SATURATION: 97 % | TEMPERATURE: 98.4 F | HEART RATE: 72 BPM | HEIGHT: 67 IN | SYSTOLIC BLOOD PRESSURE: 116 MMHG | WEIGHT: 192.02 LBS | BODY MASS INDEX: 30.14 KG/M2 | RESPIRATION RATE: 18 BRPM | DIASTOLIC BLOOD PRESSURE: 72 MMHG

## 2024-12-18 LAB
BACTERIA BLD CULT: NORMAL
BACTERIA BLD CULT: NORMAL

## 2024-12-18 PROCEDURE — 2500000001 HC RX 250 WO HCPCS SELF ADMINISTERED DRUGS (ALT 637 FOR MEDICARE OP)

## 2024-12-18 PROCEDURE — 2500000004 HC RX 250 GENERAL PHARMACY W/ HCPCS (ALT 636 FOR OP/ED)

## 2024-12-18 PROCEDURE — 2500000001 HC RX 250 WO HCPCS SELF ADMINISTERED DRUGS (ALT 637 FOR MEDICARE OP): Performed by: NURSE PRACTITIONER

## 2024-12-18 PROCEDURE — 2500000005 HC RX 250 GENERAL PHARMACY W/O HCPCS

## 2024-12-18 ASSESSMENT — PAIN DESCRIPTION - ORIENTATION
ORIENTATION: LOWER
ORIENTATION: LOWER

## 2024-12-18 ASSESSMENT — PAIN SCALES - GENERAL
PAINLEVEL_OUTOF10: 7
PAINLEVEL_OUTOF10: 7
PAINLEVEL_OUTOF10: 0 - NO PAIN
PAINLEVEL_OUTOF10: 7
PAINLEVEL_OUTOF10: 7

## 2024-12-18 ASSESSMENT — PAIN DESCRIPTION - LOCATION
LOCATION: BACK

## 2024-12-18 ASSESSMENT — PAIN SCALES - WONG BAKER: WONGBAKER_NUMERICALRESPONSE: NO HURT

## 2024-12-18 NOTE — CARE PLAN
Problem: Pain - Adult  Goal: Verbalizes/displays adequate comfort level or baseline comfort level  12/18/2024 1152 by Aydee Auguste RN  Outcome: Adequate for Discharge  12/18/2024 0941 by Aydee Auguste RN  Outcome: Progressing     Problem: Safety - Adult  Goal: Free from fall injury  12/18/2024 1152 by Aydee Auguste RN  Outcome: Adequate for Discharge  12/18/2024 0941 by Aydee Auguste RN  Outcome: Progressing     Problem: Discharge Planning  Goal: Discharge to home or other facility with appropriate resources  12/18/2024 1152 by Aydee Auguste RN  Outcome: Adequate for Discharge  12/18/2024 0941 by Aydee Auguste RN  Outcome: Progressing     Problem: Chronic Conditions and Co-morbidities  Goal: Patient's chronic conditions and co-morbidity symptoms are monitored and maintained or improved  12/18/2024 1152 by Aydee Auguste RN  Outcome: Adequate for Discharge  12/18/2024 0941 by Aydee Auguste RN  Outcome: Progressing   The patient's goals for the shift include      The clinical goals for the shift include comfortable

## 2024-12-18 NOTE — PROGRESS NOTES
Art Therapy Note    Shelbi Delaney     Therapy Session  Referral Type: New referral this admission  Visit Type: New visit  Session Start Time: 1443  Session End Time: 1444  Intervention Delivery: In-person  Conflict of Service: Asleep  Number of family members present: 0  Number of staff members present: 0              Treatment/Interventions  Art Therapy Interventions: Assessment, Education/instruction    Post-assessment  Total Session Time (min): 1 minutes    Narrative  Assessment Detail: ATR and AT intern made a visit to Pt.;s room to follow up on a referral and introduce services.  Pt found alone and sleeping comfortably in bed.  ATR and intern will follow up another time to offer services.    Education Documentation  No documentation found.       Detail Level: Zone Detail Level: Detailed Detail Level: Simple

## 2024-12-18 NOTE — NURSING NOTE
Patient verbalized understanding of all discharge instructions, original copy provided, all questions answered, follow ups, medications, narcotics/oxycodone from pharmacy, other medications delivered by pharmacist, port deaccessed, site clean and dry, going home with hospice, no need of oxygen, and all safety measures reviewed. Patient leaving via private vehicle with . Patient with all medications and personal belongings with her.

## 2024-12-18 NOTE — CARE PLAN
Problem: Pain - Adult  Goal: Verbalizes/displays adequate comfort level or baseline comfort level  Outcome: Progressing     Problem: Safety - Adult  Goal: Free from fall injury  Outcome: Progressing     Problem: Discharge Planning  Goal: Discharge to home or other facility with appropriate resources  Outcome: Progressing     Problem: Chronic Conditions and Co-morbidities  Goal: Patient's chronic conditions and co-morbidity symptoms are monitored and maintained or improved  Outcome: Progressing   The patient's goals for the shift include      The clinical goals for the shift include Pt pain will be managed and remain free from injury

## 2024-12-18 NOTE — DISCHARGE INSTRUCTIONS
[facility] will reach out to reschedule your meeting. Please contact them if you do not hear from them in 2 business days, need to speak with them sooner, or if you have any questions or concerns.  [phone]

## 2024-12-18 NOTE — DISCHARGE SUMMARY
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     OSH Course:     In the ED on admission vital signs notable for hypotension and multiple electrolyte derangements and started on IV ceftriaxone empirically given leukocytosis pending Urine Cx results.     CT A/P showed no acute abdominal and pelvic process, no hydronephrosis, stable to slightly decreased in size partially calcified uterine mass, grossly stable lymphadenopathy with hepatic metastasis and no significant change in lytic bone lesion the inferior pubic ramus.    At OSH, patient  showed improvement with IVF and a bicarb drip, no plans for dialysis currently per Nephrology at [facility].     She was given 2 units PRBCs for Hgb 5.9 in the ED with post-transfusion Hgb 6.7 after which a 3rd unit RBCs was given and subsequent post-transfusion Hgb was 7.9. GI was consulted due to Hgb not rising appropriately and pt with dark stools and recommended non-urgent EGD/colonosocpy. Anemia resolved upon arrival to [facility] Course:   Upon arrival to [facility], nephrology and urology consulted here. Patient was started on IV sodium bicarbonate and lokelma with no acute intervention noted by urology.  Her creatinine remained stably elevated throughout the weekend and IR was consulted on 12/16 for possible interrogation of the left per-cutaneous nephrostomy tube given significantly lower output compared to right. Per IR, they were able to flush both drains with no plan for exchange due to appropriate  drain function and downtrending creatinine without hydronephrosis on imaging. Her creatinine remained stably elevated throughout her course and decision was made to not collect routine AM labs in the setting of patient desiring hospice care. She was given IV ceftriaxone daily however for potential suspected UTI at OSH due to + LE on UA without urine culture. Patient endorsing diarrhea as well on initial admission, however this resolved during her stay and so C. Diff path was not collected.     Patient requested a hospice care meeting on 12/16. This was scheduled to occur at home. Patient deemed satisfactory for discharge on 12/18/2024.     Past Medical History:  Anemia likely 2/2 chemotherapy and chronic disease  Anxiety  Cervical cancer  Grade 2 Chemotherapy-induced peripheral neuropathy  Stage IV CKD (Baseline BUN 40s, CR 2.3, GFR>20)  Obstructive uropathy with bilateral PCNs in place. Left kidney less functional with low urine output.  Fluid retention  Hypothyroidism  HTN  Tachycardia  Pseudomonas UTI   Open wounds on bilateral LE (followed by wound care, Unna boots placed 12/11/24).    Past Surgical History:  TURBT 1/20/23  Bilateral nephrostomy tube placement  ORIF tibia fracture (Right)  Mediport placement    Oncology History:  Stage RONIT grade 2 squamous cell carcinoma of cervix  1/20/23: TURBT - poorly differentiated squamaous cell carcinoma; CPS10.  review with history of cervical mass consistent with GYN primary  - Extension to rectal mucosa, pubic ramus with ?reactive mediastinal LN  - Bilateral nephrostomy placement  2/16/23 - 6/1/23: Carbo AUC5/Taxol 175 +Avastin 15mg/kg, Pembrolizumab 200mg/m2  - C2-C4 +Avastin - further treatments held due to progressive gr2 proteinuria  - Grade 3 anemia declining transfusion,  7/20/23 - 12/18/23: Pembrolizumab 200mg/m2, progressive disease  1/11/24 - 3/14/24: Tivdak x3, progressive disease  - C1: 0.9mg/kg in setting of CrCl < 30  3/2024-7/2024: Taxotere, progressive  disease  8/1/24 - 11/5/24: Topotecan q week x3, progressive disease  11/29/24:  Gemzar (palliative intent) + Neulasta     Molecular Testing  PLD1: CPS 10  1/2024 Tempus xT - PIK3CA p.E726K missense (gain): consider alpelisib  Loss of function: BCORL1, KMT2C, NSD1, KMT2D, RAD21  TMB 7.9mB - RACHELE  - Her2 Neg    Social History:  She reports that she quit smoking about 5 years ago. Her smoking use included cigarettes. She started smoking about 47 years ago. She has a 10.7 pack-year smoking history. She has been exposed to tobacco smoke. She has never used smokeless tobacco. She reports that she does not currently use alcohol after a past usage of about 8.0 standard drinks of alcohol per week. She reports that she does not use drugs.  HCPOA: Kenn Stevens (Partner) [phone]     Family History:   Mother: no known problems  Father: acute MI  No known gyn malignancy history    Allergies:  L-lysine [lysine] and Adhesive    Pertinent Physical Exam At Time of Discharge  General: no acute distress  HEENT: normocephalic, atraumatic  CV: warm and well perfused, RRR  Lungs: breathing comfortably on room air, CTAB in anterior lung fields  Abdomen: Mildly tender to deep palpation, soft, nondistended  : bilateral PCNTs in place, R with yellow urine, L bag with scant fluid  Extremities: moving all extremities spontaneously, SCDs in place over bilateral calves  Neuro: awake and conversant  Psych: appropriate mood and affect         Last Vitals:  Temp Pulse Resp BP MAP Pulse Ox   36.9 °C (98.4 °F) 72 18 116/72 87 97 %     Discharge Meds     Your medication list        START taking these medications        Instructions Last Dose Given Next Dose Due   lidocaine 4 % patch      Place 1 patch over 12 hours on the skin once daily. Remove & discard patch within 12 hours or as directed by MD.       sodium bicarbonate 650 mg tablet      Take 1 tablet (650 mg) by mouth every 12 hours.              CHANGE how you take these medications         Instructions Last Dose Given Next Dose Due   gabapentin 300 mg capsule  Commonly known as: Neurontin  What changed: when to take this      Take 1 capsule (300 mg) by mouth once daily at bedtime.       oxyCODONE 10 mg immediate release tablet  Commonly known as: Roxicodone  What changed: Another medication with the same name was added. Make sure you understand how and when to take each.      Take 1 tablet (10 mg) by mouth 3 times a day as needed for moderate pain (4 - 6).       oxyCODONE 10 mg immediate release tablet  Commonly known as: Roxicodone  What changed: You were already taking a medication with the same name, and this prescription was added. Make sure you understand how and when to take each.      Take 1 tablet (10 mg) by mouth every 4 hours if needed for moderate pain (4 - 6) or severe pain (7 - 10) for up to 7 days.              CONTINUE taking these medications        Instructions Last Dose Given Next Dose Due   acetaminophen 325 mg tablet  Commonly known as: Tylenol      Take 3 tablets (975 mg) by mouth every 6 hours if needed for mild pain (1 - 3).       Calcium 600 600 mg calcium (1,500 mg) tablet  Generic drug: calcium carbonate           docusate sodium 100 mg capsule  Commonly known as: Colace      Take 1 capsule (100 mg) by mouth 2 times a day.       hydrOXYzine HCL 25 mg tablet  Commonly known as: Atarax           levothyroxine 25 mcg tablet  Commonly known as: Synthroid, Levoxyl      Take 1 tablet (25 mcg) by mouth once daily.       loperamide 2 mg capsule  Commonly known as: Imodium      Take 2 capsules (4 mg) by mouth 4 times a day as needed for diarrhea.       magic mouthwash (lidocaine, diphenhydrAMINE, Maalox 1:1:1)      Swish and spit 10 mL every 6 hours if needed for mucositis.       metoprolol tartrate 50 mg tablet  Commonly known as: Lopressor      take 0.5 tablets by mouth 2 times a day       multivitamin with minerals tablet           naloxone 4 mg/0.1 mL nasal spray  Commonly known  as: Narcan           ondansetron 8 mg tablet  Commonly known as: Zofran      Take 1 tablet (8 mg) by mouth every 8 hours if needed for nausea or vomiting.       polyethylene glycol 17 gram packet  Commonly known as: Glycolax, Miralax      Take 17 g by mouth once daily as needed (constipation).       prochlorperazine 10 mg tablet  Commonly known as: Compazine      Take 1 tablet (10 mg) by mouth every 6 hours if needed for nausea or vomiting.       tiZANidine 4 mg tablet  Commonly known as: Zanaflex      Take 1 tablet (4 mg) by mouth 3 times a day.       traZODone 50 mg tablet  Commonly known as: Desyrel      Take 1 tablet (50 mg) by mouth as needed at bedtime for sleep.              STOP taking these medications      dexAMETHasone 0.5 mg/5 mL oral liquid        dexAMETHasone 4 mg tablet  Commonly known as: Decadron        DULoxetine 20 mg DR capsule  Commonly known as: Cymbalta        nystatin cream  Commonly known as: Mycostatin        sulfamethoxazole-trimethoprim 800-160 mg tablet  Commonly known as: Bactrim DS                  Where to Get Your Medications        These medications were sent to [facility]  [address], [address] [address]      Hours: 8AM to 6PM Mon-Fri, 8AM to 4PM Sat, 9AM to 1PM Sun Phone: [phone]   gabapentin 300 mg capsule  lidocaine 4 % patch  oxyCODONE 10 mg immediate release tablet  sodium bicarbonate 650 mg tablet          Complications Requiring Follow-Up  See below    Test Results Pending At Discharge  Pending Labs       Order Current Status    Blood Culture Preliminary result    Blood Culture Preliminary result            Outpatient Follow-Up  Future Appointments   Date Time Provider Department Center   12/20/2024  2:00 PM Pedro Lazo MD [id number] [facility]   12/26/2024 12:00 PM INF [address] [facility] [id number] [place]   1/7/2025  1:30 PM Liset Ibarra APRN-CNP [id number] [facility]   1/9/2025 10:20 AM Adrianna Jefferson MD [id number] [facility]   1/9/2025 12:00 PM INF  10 [address] [id number] [facility]   1/23/2025  2:00 PM INF [address] [id number] [facility] Elam MD

## 2024-12-19 ENCOUNTER — DOCUMENTATION (OUTPATIENT)
Dept: GYNECOLOGIC ONCOLOGY | Facility: HOSPITAL | Age: 64
End: 2024-12-19
Payer: COMMERCIAL

## 2024-12-19 ENCOUNTER — TELEPHONE (OUTPATIENT)
Dept: GYNECOLOGIC ONCOLOGY | Facility: HOSPITAL | Age: 64
End: 2024-12-19
Payer: COMMERCIAL

## 2024-12-19 ENCOUNTER — APPOINTMENT (OUTPATIENT)
Dept: HEMATOLOGY/ONCOLOGY | Facility: CLINIC | Age: 64
End: 2024-12-19
Payer: COMMERCIAL

## 2024-12-19 DIAGNOSIS — C53.9 CERVICAL CANCER, FIGO STAGE IVA: Primary | ICD-10-CM

## 2024-12-19 NOTE — PROGRESS NOTES
Danette from [facility] hospice LM that patient would like to sign with them. They need an order, demographics, and recent notes. These were faxed to [phone] attisaac Samaniego.

## 2024-12-19 NOTE — TELEPHONE ENCOUNTER
Kenn called and asked what type of supplements she can take now that she is off chemo. Reviewed her supplements: She can take MTV, calcium, tumeric, Vitamin B 6 since she still has neuropathy, Vitamin D. She does not need to take Magnesium since her Mag levels have been good. She should continue Iron since her Hgb is still low and she was reminded to continue stool softeners with it. No vitamin c even though it could help with iron but she has kidney issues and would not want her to have kidney stones.

## 2024-12-20 ENCOUNTER — APPOINTMENT (OUTPATIENT)
Dept: WOUND CARE | Facility: HOSPITAL | Age: 64
End: 2024-12-20
Payer: COMMERCIAL

## 2024-12-20 LAB
ATRIAL RATE: 278 BPM
ATRIAL RATE: 75 BPM
ATRIAL RATE: 82 BPM
ATRIAL RATE: 84 BPM
ATRIAL RATE: 88 BPM
P AXIS: 24 DEGREES
P AXIS: 41 DEGREES
P AXIS: 43 DEGREES
P AXIS: 53 DEGREES
P OFFSET: 199 MS
P OFFSET: 200 MS
P OFFSET: 201 MS
P OFFSET: 201 MS
P ONSET: 139 MS
P ONSET: 139 MS
P ONSET: 144 MS
P ONSET: 147 MS
PR INTERVAL: 150 MS
PR INTERVAL: 154 MS
PR INTERVAL: 160 MS
PR INTERVAL: 160 MS
Q ONSET: 219 MS
Q ONSET: 219 MS
Q ONSET: 221 MS
Q ONSET: 222 MS
Q ONSET: 224 MS
QRS COUNT: 12 BEATS
QRS COUNT: 14 BEATS
QRS COUNT: 14 BEATS
QRS COUNT: 15 BEATS
QRS COUNT: 20 BEATS
QRS DURATION: 86 MS
QRS DURATION: 86 MS
QRS DURATION: 88 MS
QRS DURATION: 88 MS
QRS DURATION: 90 MS
QT INTERVAL: 290 MS
QT INTERVAL: 366 MS
QT INTERVAL: 376 MS
QT INTERVAL: 398 MS
QT INTERVAL: 400 MS
QTC CALCULATION(BAZETT): 411 MS
QTC CALCULATION(BAZETT): 442 MS
QTC CALCULATION(BAZETT): 444 MS
QTC CALCULATION(BAZETT): 446 MS
QTC CALCULATION(BAZETT): 464 MS
QTC FREDERICIA: 366 MS
QTC FREDERICIA: 416 MS
QTC FREDERICIA: 420 MS
QTC FREDERICIA: 430 MS
QTC FREDERICIA: 441 MS
R AXIS: -5 DEGREES
R AXIS: 16 DEGREES
R AXIS: 22 DEGREES
R AXIS: 23 DEGREES
R AXIS: 6 DEGREES
T AXIS: 202 DEGREES
T AXIS: 21 DEGREES
T AXIS: 223 DEGREES
T AXIS: 34 DEGREES
T AXIS: 39 DEGREES
T OFFSET: 369 MS
T OFFSET: 402 MS
T OFFSET: 407 MS
T OFFSET: 420 MS
T OFFSET: 422 MS
VENTRICULAR RATE: 121 BPM
VENTRICULAR RATE: 75 BPM
VENTRICULAR RATE: 82 BPM
VENTRICULAR RATE: 84 BPM
VENTRICULAR RATE: 88 BPM

## 2024-12-24 NOTE — DOCUMENTATION CLARIFICATION NOTE
"    PATIENT:               MARBELLA ANGLIN  ACCT #:                  [id number]  MRN:                       [id number]  :                       1960  ADMIT DATE:       2024 11:05 PM  DISCH DATE:        2024 6:55 PM  RESPONDING PROVIDER #:        [id number]          PROVIDER RESPONSE TEXT:    Anemia due to chemo treatment    CDI QUERY TEXT:    Clarification    Instruction:    Based on your assessment of the patient and the clinical information, please provide the requested documentation by clicking on the appropriate radio button and enter any additional information if prompted.    Question: Please further clarify the diagnosis of anemia as    When answering this query, please exercise your independent professional judgment. The fact that a question is being asked, does not imply that any particular answer is desired or expected.    The patient's clinical indicators include:  Clinical Information: 64-year-old female with history of active stage IV cervical cancer with mets  to liver presenting with generalized weakness with  severe anemia and AIDE.    Clinical Indicators:  HGB:  ,  6.4  ,  5.9  , 6.7   7.9   7.6   8.0     H&P: \"Anemia  Likely secondary to chemotherapy treatment\"     Nephrology consult: \"Impression: 1. Acute kidney injury superimposed on chronic kidney disease stage IV secondary severe anemia and blood loss and intravascular volume depletion rule out sepsis no evidence of obstruction by CAT scan of the abdomen pelvis possibly worsened by antibiotics mainly Bactrim...\"     IM PN: \"Anemia: Secondary to chemotherapy vs GI bleed, she has dark stool.  No active bleeding.  She has received a total of 3 units of PRBCs on this admission\"     GI consult: \"assessment/Plan: \"Normocytic anemia initial 6.7 status post 2 units transfusion 7.6 Anemia is likely multifactorial. She currently she denies any overt bleeding. She has no prior EGD/colonoscopy and " "would recommend at some point however no urgent indication as there are no signs of active bleeding.\"    12/13 Oncology Consult: \"Anemia likely due to blood loss. Iron indices are OK\"    Treatment:  -3U RBC's  -Sodium Chloride .9% bolus 1000 mL x 2  -Lab monitoring    Risk Factors:  64 y.o with metastatic cervical cancer on chemo treatment  Options provided:  -- Anemia due to chemo treatment  -- Anemia due to acute blood loss, Please specify source of blood loss  -- Anemia due to chronic blood loss, Please specify source of blood loss  -- Anemia due to chronic  renal disease  -- Other - I will add my own diagnosis  -- Refer to Clinical Documentation Reviewer    Query created by: Chely Aguero on 12/16/2024 7:56 AM      Electronically signed by:  JAMIA MERCADO MD 12/23/2024 8:27 PM          "

## 2024-12-26 ENCOUNTER — APPOINTMENT (OUTPATIENT)
Dept: HEMATOLOGY/ONCOLOGY | Facility: CLINIC | Age: 64
End: 2024-12-26
Payer: COMMERCIAL

## 2024-12-27 NOTE — ED PROCEDURE NOTE
Procedure  Critical Care    Performed by: Miriam Amin PA-C  Authorized by: April Pardo DO    Critical care provider statement:     Critical care time (minutes):  33    Critical care time was exclusive of:  Separately billable procedures and treating other patients    Critical care was necessary to treat or prevent imminent or life-threatening deterioration of the following conditions:  Circulatory failure    Critical care was time spent personally by me on the following activities:  Blood draw for specimens, development of treatment plan with patient or surrogate, discussions with consultants, evaluation of patient's response to treatment, examination of patient, obtaining history from patient or surrogate, ordering and review of laboratory studies and ordering and performing treatments and interventions    Care discussed with: admitting provider                 Miriam Amin PA-C  12/26/24 [id number]

## 2025-01-03 ENCOUNTER — APPOINTMENT (OUTPATIENT)
Dept: WOUND CARE | Facility: HOSPITAL | Age: 65
End: 2025-01-03
Payer: COMMERCIAL

## 2025-01-07 ENCOUNTER — APPOINTMENT (OUTPATIENT)
Dept: PALLIATIVE MEDICINE | Facility: CLINIC | Age: 65
End: 2025-01-07
Payer: COMMERCIAL

## 2025-01-09 ENCOUNTER — APPOINTMENT (OUTPATIENT)
Dept: HEMATOLOGY/ONCOLOGY | Facility: CLINIC | Age: 65
End: 2025-01-09
Payer: COMMERCIAL

## 2025-01-09 ENCOUNTER — TELEMEDICINE (OUTPATIENT)
Dept: GYNECOLOGIC ONCOLOGY | Facility: CLINIC | Age: 65
End: 2025-01-09
Payer: COMMERCIAL

## 2025-01-09 DIAGNOSIS — C53.9 CERVICAL CANCER, FIGO STAGE IVA: Primary | ICD-10-CM

## 2025-01-09 DIAGNOSIS — N13.9 OBSTRUCTIVE UROPATHY: ICD-10-CM

## 2025-01-09 ASSESSMENT — PAIN SCALES - GENERAL: PAINLEVEL_OUTOF10: 7

## 2025-01-09 NOTE — PROGRESS NOTES
Consent:  Verbal consent was requested and obtained from patient on this date for a telehealth visit.    Patient ID: Shelbi Delaney is a 64 y.o. female.  Referring Physician: No referring provider defined for this encounter.  Primary Care Provider: Adrianna Jefferson MD    Subjective    Continues to have occasional pain with Oxycodone. Reports tolerating regular diet no issues; continues on twice weekly hospice visits. Occasional constipation - controlled with current regimen. Occasional confusion per spouse - but patient reports overall doing better since hospitalization. Has been using unaboot at home for leg swelling with some improvement and weeping from open leg wounds. No fevers/chills.     A comprehensive review of systems was performed and otherwise negative.      Objective    BSA: There is no height or weight on file to calculate BSA.  There were no vitals taken for this visit.     Physical Exam  General:   alert and oriented, in no acute distress   Cardiovascular: No apparent distress    Lungs: Normal respirations, No increased work of breathing   Neurologic:  Grossly intact, no deficits     Performance Status:  Symptomatic; in bed <50% of the day    Assessment/Plan    Oncology History Overview Note   Stage RONIT grade 2 squamous cell carcinoma of cervix  1/20/23: TURBT - poorly differentiated squamaous cell carcinoma; CPS10.  review with history of cervical mass consistent with GYN primary  - Extension to rectal mucosa, pubic ramus with ?reactive mediastinal LN   - Bilateral nephrostomy placement   2/16/23 - 6/1/23: Carbo AUC5/Taxol 175 +Avastin 15mg/kg, Pembrolizumab 200mg/m2  - C2-C4 +Avastin - further treatments held due to progressive gr2 proteinuria   - Grade 3 anemia declining transfusion,   7/20/23 - 12/18/23: Pembrolizumab 200mg/m2, progressive disease  1/11/24 - 3/14/24: Tivdak x3, progressive disease   - C1: 0.9mg/kg in setting of CrCl < 30  3/2024-7/2024: Taxotere, progressive disease  8/1/24 -  11/5/24: Topotecan q week x3, progressive disease    Molecular Testing  PLD1: CPS 10  1/2024 Tempus xT - PIK3CA p.E726K missense (gain): consider alpelisib  Loss of function: BCORL1, KMT2C, NSD1, KMT2D, RAD21  TMB 7.9mB - RACHELE  - Her2 Neg     Cervical cancer, FIGO stage RONIT   3/21/2022 Cancer Staged    Staging form: Cervix Uteri, AJCC Version 9, Clinical stage from 3/21/2022: FIGO Stage RONIT (cT4, cN2, cM0) - Signed by Adrianna Jefferson MD on 10/12/2023, Prognostic indicators: CPS 10     7/20/2023 - 11/30/2023 Chemotherapy    Pembrolizumab, 21 Day Cycles     1/12/2024 - 2/22/2024 Chemotherapy    Tisotumab vedotin, 21 Day Cycles     3/21/2024 - 7/5/2024 Chemotherapy    DOCEtaxel, 21 Day Cycles     8/1/2024 - 10/24/2024 Chemotherapy    Topotecan (Weekly), 28 Day Cycles      11/14/2024 - 11/29/2024 Chemotherapy    Gemcitabine, 28 Day Cycles     3/13/2025 - 3/13/2025 Chemotherapy    PACLitaxel / CARBOplatin, 21 Day Cycles - Gyn     3/13/2025 - 3/13/2025 Chemotherapy    PACLitaxel / CARBOplatin, 21 Day Cycles - Gyn       Cancer Staging   Cervical cancer, FIGO stage RONIT  Staging form: Cervix Uteri, AJCC Version 9  - Clinical stage from 3/21/2022: FIGO Stage RONIT (cT4, cN2, cM0) - Signed by Adrianna Jefferson MD on 10/12/2023       Cervical cancer  - Discussed with patient and her spouse I'm reassured she is clinically improving on hospice.  concerned re: intermittent episodes similar to UTI in the past with confusion about recurrence of UTI. Discussed consideration for urinalysis but do not recommend transfusion at this point for symptomatic relief. Limited benefit for transfusion in hospice care setting.   - Follow up U/A, culture as appropriate due to history of urosepsis  - Continue primary management per Hospice   Obstructive uropathy  - Continue PCNs in place     Treatment Plans       No treatment plans exist        Approx 15min spent in discussion with patient     Adrianna Jefferson MD

## 2025-01-14 ENCOUNTER — PREP FOR PROCEDURE (OUTPATIENT)
Dept: RADIOLOGY | Facility: HOSPITAL | Age: 65
End: 2025-01-14
Payer: COMMERCIAL

## 2025-01-14 DIAGNOSIS — N13.9 URINARY TRACT OBSTRUCTION: Primary | ICD-10-CM

## 2025-01-16 ENCOUNTER — HOSPITAL ENCOUNTER (OUTPATIENT)
Dept: RADIOLOGY | Facility: HOSPITAL | Age: 65
Discharge: HOME | End: 2025-01-16
Payer: COMMERCIAL

## 2025-01-16 ENCOUNTER — PREP FOR PROCEDURE (OUTPATIENT)
Dept: RADIOLOGY | Facility: HOSPITAL | Age: 65
End: 2025-01-16

## 2025-01-16 VITALS
DIASTOLIC BLOOD PRESSURE: 60 MMHG | WEIGHT: 177 LBS | RESPIRATION RATE: 14 BRPM | HEART RATE: 78 BPM | SYSTOLIC BLOOD PRESSURE: 90 MMHG | HEIGHT: 67 IN | BODY MASS INDEX: 27.78 KG/M2

## 2025-01-16 VITALS
OXYGEN SATURATION: 99 % | HEART RATE: 68 BPM | RESPIRATION RATE: 16 BRPM | SYSTOLIC BLOOD PRESSURE: 83 MMHG | DIASTOLIC BLOOD PRESSURE: 49 MMHG

## 2025-01-16 DIAGNOSIS — N13.9 URINARY TRACT OBSTRUCTION: ICD-10-CM

## 2025-01-16 DIAGNOSIS — N13.9 URINARY TRACT OBSTRUCTION: Primary | ICD-10-CM

## 2025-01-16 DIAGNOSIS — N13.6 ACUTE PYONEPHROSIS: Primary | ICD-10-CM

## 2025-01-16 LAB
APPEARANCE UR: ABNORMAL
BACTERIA #/AREA URNS AUTO: ABNORMAL /HPF
BILIRUB UR STRIP.AUTO-MCNC: NEGATIVE MG/DL
COLOR UR: YELLOW
GLUCOSE UR STRIP.AUTO-MCNC: NEGATIVE MG/DL
KETONES UR STRIP.AUTO-MCNC: ABNORMAL MG/DL
LEUKOCYTE ESTERASE UR QL STRIP.AUTO: ABNORMAL
NITRITE UR QL STRIP.AUTO: NEGATIVE
PH UR STRIP.AUTO: 7 [PH]
PROT UR STRIP.AUTO-MCNC: ABNORMAL MG/DL
RBC # UR STRIP.AUTO: ABNORMAL /UL
RBC #/AREA URNS AUTO: ABNORMAL /HPF
SP GR UR STRIP.AUTO: 1.02
UROBILINOGEN UR STRIP.AUTO-MCNC: 0.2 MG/DL
WBC #/AREA URNS AUTO: >50 /HPF

## 2025-01-16 PROCEDURE — 2500000004 HC RX 250 GENERAL PHARMACY W/ HCPCS (ALT 636 FOR OP/ED): Performed by: RADIOLOGY

## 2025-01-16 PROCEDURE — C1769 GUIDE WIRE: HCPCS

## 2025-01-16 PROCEDURE — 76775 US EXAM ABDO BACK WALL LIM: CPT | Mod: TC

## 2025-01-16 PROCEDURE — 87086 URINE CULTURE/COLONY COUNT: CPT | Mod: WESLAB | Performed by: RADIOLOGY

## 2025-01-16 PROCEDURE — 81001 URINALYSIS AUTO W/SCOPE: CPT | Performed by: RADIOLOGY

## 2025-01-16 PROCEDURE — 76942 ECHO GUIDE FOR BIOPSY: CPT

## 2025-01-16 PROCEDURE — 2720000007 HC OR 272 NO HCPCS

## 2025-01-16 PROCEDURE — 76775 US EXAM ABDO BACK WALL LIM: CPT | Performed by: RADIOLOGY

## 2025-01-16 PROCEDURE — 50432 PLMT NEPHROSTOMY CATHETER: CPT | Performed by: RADIOLOGY

## 2025-01-16 PROCEDURE — 99152 MOD SED SAME PHYS/QHP 5/>YRS: CPT

## 2025-01-16 PROCEDURE — C1729 CATH, DRAINAGE: HCPCS

## 2025-01-16 PROCEDURE — 99152 MOD SED SAME PHYS/QHP 5/>YRS: CPT | Performed by: RADIOLOGY

## 2025-01-16 PROCEDURE — 2550000001 HC RX 255 CONTRASTS: Performed by: RADIOLOGY

## 2025-01-16 RX ORDER — MIDAZOLAM HYDROCHLORIDE 1 MG/ML
INJECTION, SOLUTION INTRAMUSCULAR; INTRAVENOUS
Status: COMPLETED | OUTPATIENT
Start: 2025-01-16 | End: 2025-01-16

## 2025-01-16 RX ORDER — CIPROFLOXACIN 2 MG/ML
400 INJECTION, SOLUTION INTRAVENOUS ONCE
Status: COMPLETED | OUTPATIENT
Start: 2025-01-16 | End: 2025-01-16

## 2025-01-16 RX ORDER — CIPROFLOXACIN 500 MG/1
500 TABLET ORAL 2 TIMES DAILY
Qty: 14 TABLET | Refills: 0 | Status: SHIPPED | OUTPATIENT
Start: 2025-01-16 | End: 2025-01-23

## 2025-01-16 RX ORDER — FENTANYL CITRATE 50 UG/ML
INJECTION, SOLUTION INTRAMUSCULAR; INTRAVENOUS
Status: COMPLETED | OUTPATIENT
Start: 2025-01-16 | End: 2025-01-16

## 2025-01-16 RX ORDER — LIDOCAINE HYDROCHLORIDE 10 MG/ML
INJECTION, SOLUTION EPIDURAL; INFILTRATION; INTRACAUDAL; PERINEURAL
Status: COMPLETED | OUTPATIENT
Start: 2025-01-16 | End: 2025-01-16

## 2025-01-16 RX ADMIN — MIDAZOLAM 1 MG: 1 INJECTION INTRAMUSCULAR; INTRAVENOUS at 13:44

## 2025-01-16 RX ADMIN — IOHEXOL 20 ML: 240 INJECTION, SOLUTION INTRATHECAL; INTRAVASCULAR; INTRAVENOUS; ORAL at 13:50

## 2025-01-16 RX ADMIN — MIDAZOLAM 1 MG: 1 INJECTION INTRAMUSCULAR; INTRAVENOUS at 13:46

## 2025-01-16 RX ADMIN — FENTANYL CITRATE 50 MCG: 0.05 INJECTION, SOLUTION INTRAMUSCULAR; INTRAVENOUS at 13:44

## 2025-01-16 RX ADMIN — LIDOCAINE HYDROCHLORIDE 7 ML: 10 INJECTION, SOLUTION EPIDURAL; INFILTRATION; INTRACAUDAL; PERINEURAL at 13:45

## 2025-01-16 RX ADMIN — CIPROFLOXACIN 400 MG: 2 INJECTION, SOLUTION INTRAVENOUS at 14:44

## 2025-01-16 ASSESSMENT — PAIN - FUNCTIONAL ASSESSMENT
PAIN_FUNCTIONAL_ASSESSMENT: 0-10
PAIN_FUNCTIONAL_ASSESSMENT: 0-10

## 2025-01-16 ASSESSMENT — PAIN SCALES - GENERAL
PAINLEVEL_OUTOF10: 0 - NO PAIN
PAINLEVEL_OUTOF10: 9
PAINLEVEL_OUTOF10: 0 - NO PAIN
PAINLEVEL_OUTOF10: 9
PAINLEVEL_OUTOF10: 0 - NO PAIN
PAINLEVEL_OUTOF10: 0 - NO PAIN

## 2025-01-16 NOTE — DISCHARGE INSTRUCTIONS
Nephrostomy Tube Care  [facility], 04/2023; [id number] Page 1 of 3    Patient and Family Education  If you have questions or need to change the time or date of your Nephrostomy Tube Care  Appointment, call  Radiology Scheduling at [phone].    What is a Nephrostomy Tube?  You have a Nephrostomy Tube. This tube has been inserted into your kidney to drain urine. It is  important for urine to flow freely from your kidney through the tube. You may feel pressure in  your back over your kidney area if the urine cannot drain freely.    To Prevent Problems:  You may need help in caring for your Nephrostomy Tube. Follow the steps listed below to  prevent problems.  ? Coil the tube so it is never bent or kinked. Tape the tube securely to your skin.  ? Empty the pouch when it is half full.  ? Change the dressing around the tube 3 times a week, or more often if it is soiled.  ? Do Not put any ointment or cream around the area unless your doctor tells you to do so.  ? Inspect the skin around the tube for any drainage, redness, or pus.  ? Drink at least 6 to 8, 8-ounce glasses of water, juice, or other liquid each day unless your  doctor restricts your fluid intake.  ? When you shower, cover your dressing with plastic. Tape the plastic to your skin.  ? Do not sit in a tub, or whirlpool. Do not go swimming.  ? The tube should be changed every 8 to 12 weeks by a radiologist. This will help prevent  infection. This should be scheduled in advance.  Call your Doctor If:  ? The tube falls out or pulls out partway.  ? Your urine has a strong odor.  ? Your urine is cloudy.  ? There is blood in your urine or you notice a change in the amount of blood in your urine.  ? There is a sudden change in the amount of urine (check with your doctor about how much  to expect before you leave the hospital).  ? You have pain or pressure over the kidney area.  ? You see pus and redness on the skin where the tube is inserted.  ? There is  urine leaking around the tube.     Page 2 of 3  You may have some of these symptoms when you leave the hospital. Call your doctor if there is  a change.  Dressing Change:  ? To prevent or reduce infection, follow the steps below.  ? Gather supplies:  o Small waste bag.  o Paper tape.  o 4 x 4 sterile dressing.  o Clean washcloth and towel.  ? Wash your hands well. Dry them with a clean towel.  ? Cut 5 strips of tape, each about 6 inches long.  ? Open the sterile dressing package(s) by folding back the top. DO NOT TOUCH THE  DRESSING. Lay the package(s) on the table.  ? Remove the old dressing. Place it in the waste bag.  ? Inspect the skin around the tube for any redness, drainage, or pus. Check the old dressing  for dampness or odor. These may be signs that urine is leaking around the tube.  ? Carefully wash your skin with soap and water. Rinse and dry it with a clean towel.  ? Place the dressing around the tube one at a time. Change the direction of the slit dressing  so the entire area is covered and no skin shows.  ? Tape the dressing  ? Make sure there are no kinks in the tube, then coil it and tape to your skin.   Figure 1 Figure 2  Side View: Nephrostomy Tube Front View: Nephrostomy Tube  & Pouch worn over underwear & Pouch worn over underwear       Page 3 of 3  Care of drainage Pouch:  ? Empty the pouch when it is half-full.  ? To lessen odor:  o Have 2 pouches. Change the drainage pouch when it is dirty and smells strong.  ? [place] the pouch to prevent pulling on the tube.  o Attach the belt and wear the pouch around your waist.  o See figures 1 and 2 (previous page) for placement.  o If you choose not to use the belt, you can pin the pouch to your clothing.   How to Reach your Doctor:    Call  ____________________________at __________________ with problems or questions.

## 2025-01-16 NOTE — Clinical Note
8fr pigtail drain placed to left flank and dr sutured the pigtail and a dsg was placed to left flank.  Pt rolled over back to her cart supine.

## 2025-01-19 LAB — BACTERIA UR CULT: ABNORMAL

## 2025-01-20 LAB — BACTERIA UR CULT: ABNORMAL

## 2025-01-23 ENCOUNTER — APPOINTMENT (OUTPATIENT)
Dept: HEMATOLOGY/ONCOLOGY | Facility: CLINIC | Age: 65
End: 2025-01-23
Payer: COMMERCIAL

## 2025-01-28 NOTE — H&P
History Of Present Illness  Shelbi Delaney is a 64 y.o. female presenting with history of bilateral ureteral obstruction secondary to pelvic malignancy. S/p bilateral nephrostomy tube placement. Here for right nephrostomy tube exchange. Recent left nephrostomy tube dislodged, Ultrasound identified severe left hydronephrosis. Left nephrostomy tube placed. 1/16/2025. PMH includes Pseudomonas UTI, obstructive uropathy with bilat nephrostomy drains, cervical cancer, HTN, hypothyroid, CKD 4, anemia.       Past Medical History:  Past Medical History:   Diagnosis Date    Anxiety     Body mass index (BMI)30.0-30.9, adult 08/07/2019    BMI 30.0-30.9,adult    Cervix cancer (Multi)     Chemotherapy-induced peripheral neuropathy (Multi)     Chronic kidney disease     Hypertension     Hypothyroidism     Tachycardia, unspecified     Tachycardia        Past Surgical History:  Past Surgical History:   Procedure Laterality Date    CT GUIDED IMAGING FOR NEEDLE PLACEMENT  01/12/2023    CT GUIDED IMAGING FOR NEEDLE PLACEMENT [facility]    IR  NEPHROURETERAL PLACEMENT Bilateral 6/18/2024    IR  NEPHROURETERAL PLACEMENT 6/18/2024 [facility]    IR NEPHROSTOMY TUBE EXCHANGE  10/20/2023    IR NEPHROSTOMY TUBE EXCHANGE 10/20/2023 Manuel Marvin MD [facility]    IR NEPHROSTOMY TUBE EXCHANGE  12/19/2023    IR NEPHROSTOMY TUBE EXCHANGE 12/19/2023 [facility]    MEDIPORT      NEPHROSTOMY      ORIF TIBIA FRACTURE Right     TRANSURETHRAL RESECTION OF BLADDER TUMOR      US GUIDED PERCUTANEOUS PLACEMENT  11/14/2022    US GUIDED PERCUTANEOUS PLACEMENT [facility]    US GUIDED PERCUTANEOUS PLACEMENT  08/28/2023    US GUIDED PERCUTANEOUS PLACEMENT [facility]          Social History:   reports that she quit smoking about 5 years ago. Her smoking use included cigarettes. She started smoking about 48 years ago. She has a 10.7 pack-year smoking history. She has been exposed to tobacco smoke. She has never used smokeless  tobacco. She reports that she does not currently use alcohol after a past usage of about 8.0 standard drinks of alcohol per week. She reports that she does not use drugs.     Family History:  Family History   Problem Relation Name Age of Onset    No Known Problems Mother      Other (Acute myocardial infarction) Father          Allergies:  Allergies   Allergen Reactions    L-Lysine [Lysine] Rash     Head to toe red rash    Adhesive Rash     Skin irritation and rash         Home Medications:  Current Outpatient Medications   Medication Instructions    acetaminophen (TYLENOL) 975 mg, oral, Every 6 hours PRN    calcium carbonate (CALCIUM 600) 600 mg, Daily    docusate sodium (COLACE) 100 mg, oral, 2 times daily    gabapentin (NEURONTIN) 300 mg, oral, Nightly    hydrOXYzine HCL (Atarax) 25 mg tablet 1 tablet, 3 times daily PRN    levothyroxine (SYNTHROID, LEVOXYL) 25 mcg, oral, Daily    loperamide (IMODIUM) 4 mg, oral, 4 times daily PRN    magic mouthwash (lidocaine, diphenhydrAMINE, Maalox 1:1:1) 10 mL, Swish & Spit, Every 6 hours PRN    metoprolol tartrate (Lopressor) 50 mg tablet take 0.5 tablets by mouth 2 times a day    multivitamin with minerals tablet 1 tablet, Daily    naloxone (Narcan) 4 mg/0.1 mL nasal spray 1 spray, nasal, As needed, May repeat every 2-3 minutes if needed, alternating nostrils, until medical assistance becomes available.    ondansetron (ZOFRAN) 8 mg, oral, Every 8 hours PRN    polyethylene glycol (GLYCOLAX, MIRALAX) 17 g, oral, Daily PRN    prochlorperazine (COMPAZINE) 10 mg, oral, Every 6 hours PRN    tiZANidine (ZANAFLEX) 4 mg, oral, 3 times daily    traZODone (DESYREL) 50 mg, oral, Nightly PRN       Inpatient Medications:  Scheduled medications   Medication Dose Route Frequency    heparin flush  5 mL intravenous Once     PRN medications   Medication     Continuous Medications   Medication Dose Last Rate         Review of Systems   Constitutional: Negative.    HENT: Negative.     Eyes:  Negative.    Respiratory: Negative.     Cardiovascular:  Positive for leg swelling (bilat lower leg edema, use orf caprice boots and ace wraps to bilat legs).   Gastrointestinal: Negative.    Endocrine: Negative.    Genitourinary:  Positive for decreased urine volume. Negative for hematuria.        Per son, having minimal drainage from left nephrostomy tube. Denies hematuria, or urinary sediment   Musculoskeletal: Negative.    Skin: Negative.    Allergic/Immunologic: Negative.    Neurological: Negative.    Hematological: Negative.    Psychiatric/Behavioral: Negative.            Physical Exam  Constitutional:       General: She is awake. She is not in acute distress.     Appearance: Normal appearance. She is not ill-appearing.   HENT:      Head: Normocephalic and atraumatic.      Mouth/Throat:      Mouth: Mucous membranes are moist.      Pharynx: Oropharynx is clear.   Cardiovascular:      Rate and Rhythm: Normal rate and regular rhythm.      Pulses:           Radial pulses are 2+ on the right side and 2+ on the left side.        Dorsalis pedis pulses are 2+ on the right side and 2+ on the left side.      Heart sounds: Normal heart sounds. No murmur heard.  Pulmonary:      Effort: Pulmonary effort is normal.      Breath sounds: Normal breath sounds.   Abdominal:      General: Bowel sounds are normal.      Palpations: Abdomen is soft.   Genitourinary:     Comments: Dressing to bilat nephrostomy tubes dry, intact, no erythema. urine right drain clear, scant amount drainage in tube to left   Musculoskeletal:      Cervical back: Normal range of motion and neck supple.      Right lower leg: Edema present.      Left lower leg: Edema present.   Skin:     General: Skin is warm and dry.      Capillary Refill: Capillary refill takes less than 2 seconds.   Neurological:      General: No focal deficit present.      Mental Status: She is alert and oriented to person, place, and time. Mental status is at baseline.      Cranial Nerves:  "Cranial nerves 2-12 are intact.   Psychiatric:         Mood and Affect: Mood and affect normal.         Behavior: Behavior normal.        Sedation Plan    ASA 3     Mallampati class: IV.           NPO since 1900 on 1/30/2025    Last Recorded Vitals  Blood pressure 103/62, pulse 84, temperature 36.3 °C (97.4 °F), temperature source Temporal, resp. rate 16, height 1.702 m (5' 7\"), weight 72.6 kg (160 lb), SpO2 99%.         Vitals from the Past 24 Hours  Heart Rate:  [84]   Temp:  [36.3 °C (97.4 °F)]   Resp:  [16]   BP: (103)/(62)   Height:  [170.2 cm (5' 7\")]   Weight:  [72.6 kg (160 lb)]   SpO2:  [99 %]          Relevant Results    Labs    POCT Glucose:      POCT INR:     POCT Urine Pregnancy:       CBC:   Recent Labs     12/16/24  0529 12/15/24  0041 12/14/24  1726 12/14/24  1225 12/14/24  0524 12/13/24  2217   WBC 12.9* 14.3* 17.2* 17.0* 19.8* 15.7*   HGB 7.9* 7.3* 7.8* 7.5* 8.5* 8.0*   HCT 22.5* 22.4* 24.0* 23.2* 25.5* 24.7*    325 324 308 355 298   MCV 89 94 95 94 94 97     BMP/CMP:   Recent Labs     12/16/24  0529 12/15/24  0638 12/15/24  0041 12/14/24  1726 12/14/24  1225 12/14/24  0525 12/13/24  2217 12/13/24  0549 12/13/24  0129 12/12/24  1022 12/11/24  2344 12/11/24  1755 11/13/24  1033 10/08/24  1256    135* 134* 134* 134* 133*   < > 132* 131*   < > 129* 130* 141 140   K 4.6 4.6 4.7 4.8 4.7 4.8   < > 4.7 4.9   < > 5.5* 6.3* 4.7 5.0    101 99 98 99 98   < > 101 103   < > 101 101 106 105   BUN 82* 96* 99* 99* 100* 104*   < > 107* 112*   < > 112* 115* 35* 24*   CREATININE 4.30* 4.50* 4.46* 4.65* 4.69* 4.59*   < > 4.78* 4.82*   < > 5.24* 5.31* 2.31* 2.42*   CO2 23 23 23 21 22 21   < > 18* 16*   < > 16* 15* 24 26   CALCIUM 7.5* 7.1* 7.2* 7.3* 7.2* 7.4*   < > 7.5* 7.5*   < > 8.0* 7.9* 8.9 8.9   PROT  --   --   --   --   --   --   --  5.4* 5.1*  --  6.1* 6.0* 7.2 6.9   BILITOT  --   --   --   --   --   --   --  0.3 0.3  --  0.3 0.3 0.3 0.3   ALKPHOS  --   --   --   --   --   --   --  88 89  --  " "101 109 62 71   ALT  --   --   --   --   --   --   --  7 7  --  8 10 4* 4*   AST  --   --   --   --   --   --   --  18 18 -- 21 29 15 14   GLUCOSE 95 101* 98 99 110* 110*   < > 122* 128*   < > 113* 113* 83 81    < > = values in this interval not displayed.      Magnesium:   Recent Labs     12/16/24  0529 12/15/24  0638 12/15/24  0041 12/14/24  1726 12/14/24  1225 12/14/24  0525   MG 1.73 1.70 1.75 1.75 1.69 1.70     Lipid Panel:   Recent Labs     08/18/21  1220 08/24/20  0924   CHOL 216* 221*    97.2   CHHDL 2.1 2.3   VLDL  --  14   TRIG 61 68     Cardiac       No lab exists for component: \"CK\", \"CKMBP\"   Hemoglobin A1C: No results for input(s): \"HGBA1C\" in the last 31611 hours.  TSH/ Free T4:   Recent Labs     08/17/24  0508 11/27/23  1353 10/09/23  1253 09/18/23  1009 08/28/23  1244 06/19/23  1303 05/08/23  1025 03/27/23  1352 02/13/23  1328 11/14/22  0453 08/18/21  1220 08/24/20  0924   TSH 2.70 5.10* 3.40 2.96 5.34* 2.30 1.85 3.03 3.01 9.88* 3.27 5.14*   FREET4  --  0.79  --   --  0.85  --   --   --   --  0.7*  --   --      Iron:   Recent Labs     12/12/24  1831 06/09/23  1326 03/21/23  1306 01/26/23  1040   FERRITIN 5,570* 1,170* 305*  --    TIBC 139* 286 330 321   IRONSAT 35 16* 8* 12*     Coag:     ABO: No results found for: \"ABO\"    Past Cardiology Tests (Last 3 Years):    EKG:  Recent Labs     12/16/24  1450 12/16/24  1015 12/15/24  2130 12/14/24  0015   ATRRATE 278 75 88 84   VENTRATE 121 75 88 84   PRINT  --  150 160 160   QRSDUR 86 88 86 90   QTCFRED 366 430 416 420   QTCCALCB 411 446 442 444     Encounter Date: 12/13/24   Electrocardiogram, 12-lead PRN ACS symptoms   Result Value    Ventricular Rate 75    Atrial Rate 75    TN Interval 150    QRS Duration 88    QT Interval 400    QTC Calculation(Bazett) 446    P Axis 24    R Axis 6    T Axis 21    QRS Count 12    Q Onset 222    P Onset 147    P Offset 201    T Offset 422    QTC Fredericia 430    Narrative    Sinus rhythm with Blocked Premature " "atrial complexes  Otherwise normal ECG  When compared with ECG of 15-DEC-2024 21:29, (unconfirmed)  Premature ventricular complexes are no longer Present  Premature atrial complexes are now Present  Confirmed by Joe Goff ([id number]) on 12/20/2024 10:42:55 AM     Echo:  Echocardiogram:   ECHOCARDIOGRAM     Narrative  Ordered by an unspecified provider.    Ejection Fractions:  No results found for: \"EF\"  Cath:  Coronary Angiography:   ADULT CATH     Narrative  Ordered by an unspecified provider.    Right Heart Cath: No results found for this or any previous visit from the past 1800 days.    Stress Test:  Nuclear:No results found for this or any previous visit from the past 1800 days.    Metabolic Stress: No results found for this or any previous visit from the past 1800 days.    Cardiac Imaging:  Cardiac Scoring:   CT HEART CALCIUM SCORING WO IV CONTRAST 07/09/2020    Narrative  MRN: [id number]  Patient Name: MARBELLA ANGLIN    STUDY:  CT CARDIAC SCORING;  7/9/2020 3:27 pm    INDICATION:  Paroxysmal tachycardia, unspecified.    COMPARISON:  None.    ACCESSION NUMBER(S):  [id number]    ORDERING CLINICIAN:  ALEX TEMPLE    TECHNIQUE:  Using prospective ECG gating, CT scan of the coronary arteries was  performed without intravenous contrast. Coronary calcium scoring  was  performed according to the method of Agatston.    FINDINGS:  The score and distribution of calcium in the coronary arteries is as  follows:    LM 0.6, distal vessel  LAD 0,  LCx 0,  RCA 0,    Total   0.6    The visualized mid/lower ascending thoracic aorta is ectatic  measuring 4 cm in diameter. The heart is normal in size. No  pericardial effusion is present. Main pulmonary artery is mildly  dilated measuring 3.1 cm in diameter.    No gross evidence of mediastinal or hilar lymphadenopathy or masses  is identified. The visualized esophagus is unremarkable. Note is made  of a tiny hiatal hernia.    The visualized segments of the lungs are normally expanded. " There is  minimal subsegmental medial right middle lobar atelectasis. Subtle  ground-glass opacities along the left lung base as on axial image 39  of 57, may represent focal bronchiolitis.    The visualized subdiaphragmatic structures appear intact.    Impression  1. Coronary artery calcium score of 0.6.  2. Ectatic thoracic aorta measuring 4 cm in diameter. Follow-up CT/MR  angiogram of chest within 1 year may be obtained to ensure stability.  3. Mildly dilated main pulmonary artery and correlate with concern  for pulmonary hypertension.  4. Subtle ground-glass opacities along the left lung base may  represent focal bronchiolitis.    *Coronary Artery Calcium Gated and Nongated Agatston score  Score                                      Risk  0                                       Very low  1-99                                  Mildly increased  100-299                             Moderately increased  >300                                Moderate to severely increased    Raymond et al. JCCT 2016 ([email or web address].[email or web address]/[email or web address])    [facility] 10-Year CHD Risk with Coronary Artery Calcification can be  calcuated using link below  [email or web address]  Josefa max al. JACC 2015 ([email or web address]/[email or web address])    Cardiac MRI: No results found for this or any previous visit from the past 1800 days.         Assessment/Plan  Assessment/Plan   Assessment & Plan  Cervical cancer, FIGO stage RONIT    Urinary tract obstruction    bilateral ureteral obstruction  S/p bilateral nephrostomy tube placement  CKD stage 4  Cervical cancer     Here for right nephrostomy tube exchange               I spent 35 minutes in the professional and overall care of this patient.      Felipa Rubio, APRN-CNP

## 2025-01-31 ENCOUNTER — HOSPITAL ENCOUNTER (OUTPATIENT)
Dept: CARDIOLOGY | Facility: HOSPITAL | Age: 65
Discharge: HOME | End: 2025-01-31
Payer: COMMERCIAL

## 2025-01-31 VITALS
SYSTOLIC BLOOD PRESSURE: 125 MMHG | BODY MASS INDEX: 25.11 KG/M2 | DIASTOLIC BLOOD PRESSURE: 80 MMHG | WEIGHT: 160 LBS | HEIGHT: 67 IN | OXYGEN SATURATION: 96 % | RESPIRATION RATE: 16 BRPM | TEMPERATURE: 97.4 F | HEART RATE: 97 BPM

## 2025-01-31 DIAGNOSIS — C53.9 CERVICAL CANCER, FIGO STAGE IVA: ICD-10-CM

## 2025-01-31 DIAGNOSIS — N13.9 URINARY TRACT OBSTRUCTION: ICD-10-CM

## 2025-01-31 LAB
ERYTHROCYTE [DISTWIDTH] IN BLOOD BY AUTOMATED COUNT: 17 % (ref 11.5–14.5)
HCT VFR BLD AUTO: 25.7 % (ref 36–46)
HGB BLD-MCNC: 7.9 G/DL (ref 12–16)
INR PPP: 1.1 (ref 0.9–1.2)
MCH RBC QN AUTO: 30.6 PG (ref 26–34)
MCHC RBC AUTO-ENTMCNC: 30.7 G/DL (ref 32–36)
MCV RBC AUTO: 100 FL (ref 80–100)
NRBC BLD-RTO: 0 /100 WBCS (ref 0–0)
PLATELET # BLD AUTO: 362 X10*3/UL (ref 150–450)
PROTHROMBIN TIME: 11.3 SECONDS (ref 9.3–12.7)
RBC # BLD AUTO: 2.58 X10*6/UL (ref 4–5.2)
WBC # BLD AUTO: 11.7 X10*3/UL (ref 4.4–11.3)

## 2025-01-31 PROCEDURE — 99152 MOD SED SAME PHYS/QHP 5/>YRS: CPT

## 2025-01-31 PROCEDURE — 50431 NJX PX NFROSGRM &/URTRGRM: CPT | Mod: LT

## 2025-01-31 PROCEDURE — 7100000010 HC PHASE TWO TIME - EACH INCREMENTAL 1 MINUTE

## 2025-01-31 PROCEDURE — 2500000004 HC RX 250 GENERAL PHARMACY W/ HCPCS (ALT 636 FOR OP/ED): Performed by: RADIOLOGY

## 2025-01-31 PROCEDURE — 50387 CHANGE NEPHROURETERAL CATH: CPT

## 2025-01-31 PROCEDURE — C2625 STENT, NON-COR, TEM W/DEL SY: HCPCS

## 2025-01-31 PROCEDURE — 85027 COMPLETE CBC AUTOMATED: CPT | Performed by: NURSE PRACTITIONER

## 2025-01-31 PROCEDURE — 2550000001 HC RX 255 CONTRASTS: Performed by: RADIOLOGY

## 2025-01-31 PROCEDURE — 85610 PROTHROMBIN TIME: CPT | Performed by: NURSE PRACTITIONER

## 2025-01-31 PROCEDURE — C1769 GUIDE WIRE: HCPCS

## 2025-01-31 PROCEDURE — 2720000007 HC OR 272 NO HCPCS

## 2025-01-31 PROCEDURE — 99222 1ST HOSP IP/OBS MODERATE 55: CPT | Performed by: NURSE PRACTITIONER

## 2025-01-31 PROCEDURE — 7100000009 HC PHASE TWO TIME - INITIAL BASE CHARGE

## 2025-01-31 PROCEDURE — 2780000003 HC OR 278 NO HCPCS

## 2025-01-31 RX ORDER — IODIXANOL 320 MG/ML
INJECTION, SOLUTION INTRAVASCULAR AS NEEDED
Status: DISCONTINUED | OUTPATIENT
Start: 2025-01-31 | End: 2025-01-31 | Stop reason: HOSPADM

## 2025-01-31 RX ORDER — LIDOCAINE HYDROCHLORIDE 10 MG/ML
INJECTION, SOLUTION EPIDURAL; INFILTRATION; INTRACAUDAL; PERINEURAL AS NEEDED
Status: DISCONTINUED | OUTPATIENT
Start: 2025-01-31 | End: 2025-01-31 | Stop reason: HOSPADM

## 2025-01-31 RX ORDER — FENTANYL CITRATE 50 UG/ML
INJECTION, SOLUTION INTRAMUSCULAR; INTRAVENOUS AS NEEDED
Status: DISCONTINUED | OUTPATIENT
Start: 2025-01-31 | End: 2025-01-31 | Stop reason: HOSPADM

## 2025-01-31 RX ORDER — HEPARIN 100 UNIT/ML
5 SYRINGE INTRAVENOUS ONCE
Status: DISCONTINUED | OUTPATIENT
Start: 2025-01-31 | End: 2025-02-01 | Stop reason: HOSPADM

## 2025-01-31 RX ORDER — MIDAZOLAM HYDROCHLORIDE 1 MG/ML
INJECTION, SOLUTION INTRAMUSCULAR; INTRAVENOUS AS NEEDED
Status: DISCONTINUED | OUTPATIENT
Start: 2025-01-31 | End: 2025-01-31 | Stop reason: HOSPADM

## 2025-01-31 RX ADMIN — FENTANYL CITRATE 50 MCG: 50 INJECTION, SOLUTION INTRAMUSCULAR; INTRAVENOUS at 13:04

## 2025-01-31 RX ADMIN — IODIXANOL 15 ML: 320 INJECTION, SOLUTION INTRAVASCULAR at 13:26

## 2025-01-31 RX ADMIN — MIDAZOLAM 1 MG: 1 INJECTION INTRAMUSCULAR; INTRAVENOUS at 13:04

## 2025-01-31 RX ADMIN — LIDOCAINE HYDROCHLORIDE 5 ML: 10 INJECTION, SOLUTION EPIDURAL; INFILTRATION; INTRACAUDAL; PERINEURAL at 13:09

## 2025-01-31 ASSESSMENT — ENCOUNTER SYMPTOMS
RESPIRATORY NEGATIVE: 1
CONSTITUTIONAL NEGATIVE: 1
HEMATURIA: 0
MUSCULOSKELETAL NEGATIVE: 1
HEMATOLOGIC/LYMPHATIC NEGATIVE: 1
GASTROINTESTINAL NEGATIVE: 1
NEUROLOGICAL NEGATIVE: 1
ALLERGIC/IMMUNOLOGIC NEGATIVE: 1
PSYCHIATRIC NEGATIVE: 1
ENDOCRINE NEGATIVE: 1
EYES NEGATIVE: 1

## 2025-01-31 ASSESSMENT — PAIN - FUNCTIONAL ASSESSMENT: PAIN_FUNCTIONAL_ASSESSMENT: 0-10

## 2025-01-31 ASSESSMENT — PAIN SCALES - GENERAL: PAINLEVEL_OUTOF10: 5 - MODERATE PAIN

## 2025-01-31 NOTE — DISCHARGE INSTRUCTIONS
Nephrostomy Tube Care- Patient and Family Education    What is a Nephrostomy Tube?  You have a Nephrostomy Tube. This tube has been inserted into your kidney to drain urine. It is  important for urine to flow freely from your kidney through the tube. You may feel pressure in  your back over your kidney area if the urine cannot drain freely.    Sedation:  If you received medication for sedation, it will be active in your body for approximately 24  hours. So you may feel a little sleepy. Therefore, for the next 24 hours:  ? DO NOT drive a car, operate machinery or power tools. It is recommended that a   responsible adult be with you for the first 24 hours.  ? DO NOT drink any alcoholic beverages or take any non-prescriptive medications that   contain alcohol.  ? DO NOT make any important decisions or sign any legal/important documents.    To Prevent Problems:  You may need help in caring for your Nephrostomy Tube. Follow the steps listed below to  prevent problems.  ? Coil the tube so it is never bent or kinked. Tape the tube securely to your skin.  ? Empty the pouch when it is half full.  ? Change the dressing around the tube 3 times a week, or more often if it is soiled.  ? Do Not put any ointment or cream around the area unless your doctor tells you to do so.  ? Inspect the skin around the tube for any drainage, redness, or pus.  ? Drink at least 6 to 8, 8-ounce glasses of water, juice, or other liquid each day unless your  doctor restricts your fluid intake.  ? When you shower, cover your dressing with plastic. Tape the plastic to your skin.  ? Do not sit in a tub, or whirlpool. Do not go swimming.  ? The tube should be changed every 8 to 12 weeks by a radiologist. This will help prevent  infection. This should be scheduled in advance.    Call your Doctor If:  ? The tube falls out or pulls out partway.  ? Your urine has a strong odor.  ? Your urine is cloudy.  ? There is blood in your urine or you notice a change  in the amount of blood in your urine.  ? There is a sudden change in the amount of urine (check with your doctor about how much  to expect before you leave the hospital).  ? You have pain or pressure over the kidney area.  ? You see pus and redness on the skin where the tube is inserted.  ? There is urine leaking around the tube.    You may have some of these symptoms when you leave the hospital. Call your doctor if there is  a change.    Dressing Change:  ? To prevent or reduce infection, follow the steps below.  ? Gather supplies:  o Small waste bag.  o Paper tape.  o 4 x 4 sterile dressing.  o Clean washcloth and towel.  ? Wash your hands well. Dry them with a clean towel.  ? Cut 5 strips of tape, each about 6 inches long.  ? Open the sterile dressing package(s) by folding back the top. DO NOT TOUCH THE  DRESSING. Lay the package(s) on the table.  ? Remove the old dressing. Place it in the waste bag.  ? Inspect the skin around the tube for any redness, drainage, or pus. Check the old dressing  for dampness or odor. These may be signs that urine is leaking around the tube.  ? Carefully wash your skin with soap and water. Rinse and dry it with a clean towel.  ? Place the dressing around the tube one at a time. Change the direction of the slit dressing  so the entire area is covered and no skin shows.  ? Tape the dressing  ? Make sure there are no kinks in the tube, then coil it and tape to your skin.    Care of drainage Pouch:  ? Empty the pouch when it is half-full.  ? To lessen odor:  o Have 2 pouches. Change the drainage pouch when it is dirty and smells strong.  ? [place] the pouch to prevent pulling on the tube.  o Attach the belt and wear the pouch around your waist.  o If you choose not to use the belt, you can pin the pouch to your clothing.     How to Reach your Doctor:    Call Dr. Jefferson [phone] with problems or questions.     This info is a general resource. It is not meant to replace your health care  provider’s advice.  Ask your doctor or health care team any questions. Always follow their instructions.

## 2025-02-25 ENCOUNTER — APPOINTMENT (OUTPATIENT)
Dept: OPHTHALMOLOGY | Facility: CLINIC | Age: 65
End: 2025-02-25
Payer: COMMERCIAL

## 2025-02-28 DIAGNOSIS — C53.9 CERVICAL CANCER, FIGO STAGE IVA: Primary | ICD-10-CM

## 2025-03-03 RX ORDER — HYDROXYZINE HYDROCHLORIDE 25 MG/1
25 TABLET, FILM COATED ORAL 2 TIMES DAILY PRN
Qty: 180 TABLET | Refills: 0 | Status: SHIPPED | OUTPATIENT
Start: 2025-03-03